# Patient Record
Sex: MALE | Race: BLACK OR AFRICAN AMERICAN | NOT HISPANIC OR LATINO | Employment: OTHER | URBAN - METROPOLITAN AREA
[De-identification: names, ages, dates, MRNs, and addresses within clinical notes are randomized per-mention and may not be internally consistent; named-entity substitution may affect disease eponyms.]

---

## 2016-06-13 LAB
LEFT EYE DM RETINOPATHY: NEGATIVE
RIGHT EYE DM RETINOPATHY: NEGATIVE

## 2017-04-21 ENCOUNTER — OFFICE VISIT (OUTPATIENT)
Dept: INTERNAL MEDICINE | Facility: CLINIC | Age: 62
End: 2017-04-21
Payer: COMMERCIAL

## 2017-04-21 VITALS
WEIGHT: 224 LBS | HEIGHT: 70 IN | DIASTOLIC BLOOD PRESSURE: 92 MMHG | SYSTOLIC BLOOD PRESSURE: 152 MMHG | RESPIRATION RATE: 16 BRPM | TEMPERATURE: 98 F | HEART RATE: 76 BPM | BODY MASS INDEX: 32.07 KG/M2

## 2017-04-21 DIAGNOSIS — L25.9 CONTACT DERMATITIS, UNSPECIFIED CONTACT DERMATITIS TYPE, UNSPECIFIED TRIGGER: Primary | ICD-10-CM

## 2017-04-21 DIAGNOSIS — E66.9 OBESITY, DIABETES, AND HYPERTENSION SYNDROME: ICD-10-CM

## 2017-04-21 DIAGNOSIS — E11.69 OBESITY, DIABETES, AND HYPERTENSION SYNDROME: ICD-10-CM

## 2017-04-21 DIAGNOSIS — E11.59 OBESITY, DIABETES, AND HYPERTENSION SYNDROME: ICD-10-CM

## 2017-04-21 DIAGNOSIS — M10.9 GOUTY ARTHRITIS: ICD-10-CM

## 2017-04-21 DIAGNOSIS — I67.9 CVD (CEREBROVASCULAR DISEASE): ICD-10-CM

## 2017-04-21 DIAGNOSIS — E11.9 CONTROLLED TYPE 2 DIABETES MELLITUS WITHOUT COMPLICATION, WITHOUT LONG-TERM CURRENT USE OF INSULIN: ICD-10-CM

## 2017-04-21 DIAGNOSIS — I10 ESSENTIAL HYPERTENSION: ICD-10-CM

## 2017-04-21 DIAGNOSIS — I15.2 HYPERTENSION COMPLICATING DIABETES: ICD-10-CM

## 2017-04-21 DIAGNOSIS — I15.2 OBESITY, DIABETES, AND HYPERTENSION SYNDROME: ICD-10-CM

## 2017-04-21 DIAGNOSIS — E11.59 HYPERTENSION COMPLICATING DIABETES: ICD-10-CM

## 2017-04-21 DIAGNOSIS — E78.5 HYPERLIPIDEMIA, UNSPECIFIED HYPERLIPIDEMIA TYPE: ICD-10-CM

## 2017-04-21 DIAGNOSIS — E66.9 OBESITY (BMI 30-39.9): ICD-10-CM

## 2017-04-21 PROCEDURE — 99999 PR PBB SHADOW E&M-EST. PATIENT-LVL III: CPT | Mod: PBBFAC,,, | Performed by: INTERNAL MEDICINE

## 2017-04-21 PROCEDURE — 3044F HG A1C LEVEL LT 7.0%: CPT | Mod: S$GLB,,, | Performed by: INTERNAL MEDICINE

## 2017-04-21 PROCEDURE — 99213 OFFICE O/P EST LOW 20 MIN: CPT | Mod: 25,S$GLB,, | Performed by: INTERNAL MEDICINE

## 2017-04-21 PROCEDURE — 96372 THER/PROPH/DIAG INJ SC/IM: CPT | Mod: S$GLB,,, | Performed by: INTERNAL MEDICINE

## 2017-04-21 PROCEDURE — 1160F RVW MEDS BY RX/DR IN RCRD: CPT | Mod: S$GLB,,, | Performed by: INTERNAL MEDICINE

## 2017-04-21 PROCEDURE — 4010F ACE/ARB THERAPY RXD/TAKEN: CPT | Mod: S$GLB,,, | Performed by: INTERNAL MEDICINE

## 2017-04-21 PROCEDURE — 3077F SYST BP >= 140 MM HG: CPT | Mod: S$GLB,,, | Performed by: INTERNAL MEDICINE

## 2017-04-21 PROCEDURE — 3080F DIAST BP >= 90 MM HG: CPT | Mod: S$GLB,,, | Performed by: INTERNAL MEDICINE

## 2017-04-21 RX ORDER — AMLODIPINE AND BENAZEPRIL HYDROCHLORIDE 10; 40 MG/1; MG/1
1 CAPSULE ORAL DAILY
Qty: 90 CAPSULE | Refills: 3 | Status: SHIPPED | OUTPATIENT
Start: 2017-04-21 | End: 2018-05-17 | Stop reason: SDUPTHER

## 2017-04-21 RX ORDER — PREDNISONE 20 MG/1
TABLET ORAL
Qty: 14 TABLET | Refills: 0 | Status: SHIPPED | OUTPATIENT
Start: 2017-04-21 | End: 2017-10-25 | Stop reason: ALTCHOICE

## 2017-04-21 RX ORDER — BETAMETHASONE SODIUM PHOSPHATE AND BETAMETHASONE ACETATE 3; 3 MG/ML; MG/ML
6 INJECTION, SUSPENSION INTRA-ARTICULAR; INTRALESIONAL; INTRAMUSCULAR; SOFT TISSUE
Status: COMPLETED | OUTPATIENT
Start: 2017-04-21 | End: 2017-04-21

## 2017-04-21 RX ADMIN — BETAMETHASONE SODIUM PHOSPHATE AND BETAMETHASONE ACETATE 6 MG: 3; 3 INJECTION, SUSPENSION INTRA-ARTICULAR; INTRALESIONAL; INTRAMUSCULAR; SOFT TISSUE at 12:04

## 2017-04-21 NOTE — MR AVS SNAPSHOT
[facility] - Internal Medicine  [address] [place] [address], [address]  [place] [address]  Phone: [phone]  Fax: [phone]                  Anjel Sanders   2017 11:45 AM   Office Visit    Description:  Male : 1955   Provider:  Mario Kraft MD   [facility]           Reason for Visit     Rash           Diagnoses this Visit        Comments    Contact dermatitis, unspecified contact dermatitis type, unspecified trigger    -  Primary     Essential hypertension         Controlled type 2 diabetes mellitus without complication, without long-term current use of insulin         Hyperlipidemia, unspecified hyperlipidemia type         Gouty arthritis         Obesity (BMI 30-39.9)         CVD (cerebrovascular disease)         Hypertension complicating diabetes         Obesity, diabetes, and hypertension syndrome                To Do List           Goals (5 Years of Data)     None      Follow-Up and Disposition     Return in about 3 months (around 2017).       These Medications        Disp Refills Start End    predniSONE (DELTASONE) 20 MG tablet 14 tablet 0 2017     2 tabs po qd x 7 days    [facility] [address] [place] [address] [facility] Ph #: [phone]       amlodipine-benazepril (LOTREL) 10-40 mg per capsule 90 capsule 3 2017    Take 1 capsule by mouth once daily. - Oral    [facility] [address] [place] [address], [place] [address] [facility] Ph #: [phone]         [facility]     [facility] Care [facility] -  Assistance  Unless otherwise directed by your provider, please contact [facility], our nurse care line that is available for / assistance.     Registered nurses in the [facility] provide: appointment scheduling, clinical advisement, health education, and other advisory services.  Call: [phone] (toll  "free)               Medications           Message regarding Medications     Verify the changes and/or additions to your medication regime listed below are the same as discussed with your clinician today.  If any of these changes or additions are incorrect, please notify your healthcare provider.        START taking these NEW medications        Refills    predniSONE (DELTASONE) 20 MG tablet 0    Si tabs po qd x 7 days    Class: Normal    amlodipine-benazepril (LOTREL) 10-40 mg per capsule 3    Sig: Take 1 capsule by mouth once daily.    Class: Normal    Route: Oral      These medications were administered today        Dose Freq    betamethasone acetate-betamethasone sodium phosphate injection 6 mg 6 mg Clinic/HOD 1 time    Sig: Inject 1 mL (6 mg total) into the muscle one time.    Class: Normal    Route: Intramuscular      STOP taking these medications     amlodipine-benazepril 10-20mg (LOTREL) 10-20 mg per capsule Take 1 capsule by mouth once daily.           Verify that the below list of medications is an accurate representation of the medications you are currently taking.  If none reported, the list may be blank. If incorrect, please contact your healthcare provider. Carry this list with you in case of emergency.           Current Medications     allopurinol (ZYLOPRIM) 300 MG tablet 2 tablets daily    aspirin (ECOTRIN) 81 MG EC tablet Take 81 mg by mouth once daily.    atorvastatin (LIPITOR) 80 MG tablet Take 1 tablet (80 mg total) by mouth once daily.    amlodipine-benazepril (LOTREL) 10-40 mg per capsule Take 1 capsule by mouth once daily.    predniSONE (DELTASONE) 20 MG tablet 2 tabs po qd x 7 days           Clinical Reference Information           Your Vitals Were     BP Pulse Temp Resp Height Weight    152/92 76 98.3 °F (36.8 °C) (Oral) 16 5' 10" (1.778 m) 101.6 kg (223 lb 15.8 oz)    BMI                32.14 kg/m2          Blood Pressure          Most Recent Value    BP  (!)  152/92      Allergies as of " 4/21/2017     No Known Allergies      Immunizations Administered on Date of Encounter - 4/21/2017     None      Language Assistance Services     ATTENTION: Language assistance services are available, free of charge. Please call [phone].      ATENCIÓN: Si hemalatha chan, tiene a mckeon disposición servicios gratuitos de asistencia lingüística. Llame al [phone].     CHÚ Ý: N?u b?n nói Ti?ng Vi?t, có các d?ch v? h? tr? ngôn ng? mi?n phí dành cho b?n. G?i s? [phone].         [facility] complies with applicable Federal civil rights laws and does not discriminate on the basis of race, color, national origin, age, disability, or sex.

## 2017-06-01 ENCOUNTER — OFFICE VISIT (OUTPATIENT)
Dept: DERMATOLOGY | Facility: CLINIC | Age: 62
End: 2017-06-01
Payer: COMMERCIAL

## 2017-06-01 DIAGNOSIS — L30.9 DERMATITIS: Primary | ICD-10-CM

## 2017-06-01 DIAGNOSIS — L01.00 IMPETIGO: ICD-10-CM

## 2017-06-01 PROCEDURE — 99999 PR PBB SHADOW E&M-EST. PATIENT-LVL II: CPT | Mod: PBBFAC,,, | Performed by: DERMATOLOGY

## 2017-06-01 PROCEDURE — 99202 OFFICE O/P NEW SF 15 MIN: CPT | Mod: S$GLB,,, | Performed by: DERMATOLOGY

## 2017-06-01 RX ORDER — TRIAMCINOLONE ACETONIDE 1 MG/G
OINTMENT TOPICAL
Qty: 454 G | Refills: 3 | Status: SHIPPED | OUTPATIENT
Start: 2017-06-01 | End: 2017-07-31

## 2017-06-01 RX ORDER — CEPHALEXIN 500 MG/1
500 CAPSULE ORAL 4 TIMES DAILY
Qty: 40 CAPSULE | Refills: 0 | Status: SHIPPED | OUTPATIENT
Start: 2017-06-01 | End: 2017-06-11

## 2017-06-01 NOTE — PROGRESS NOTES
Subjective:       Patient ID:  Anjel Sanders is a 61 y.o. male who presents for   Chief Complaint   Patient presents with    Rash     Pt c/o itchy, flaky rash that burns on post neck and both forearms x 2-3 months. Tx with -betamethasone sodium phosphate injection 6 mg and prednisone 20 mg. Pt has similar rash 1 years ago. 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   Prednisone pills help but flares after.  Wears long and short sleeves when he cuts grass.  Uses dial soap or Nepali spring.  No moisturizers on arms.   Pt states he did have this rash last spring but it was not as bad.         Review of Systems   HENT: Negative for mouth sores.    Skin: Positive for itching and rash.   Allergic/Immunologic: Positive for environmental allergies.        Objective:    Physical Exam   Constitutional: He appears well-developed and well-nourished. No distress.   Neurological: He is alert and oriented to person, place, and time. He is not disoriented.   Psychiatric: He has a normal mood and affect.   Skin:   Areas Examined (abnormalities noted in diagram):   Scalp / Hair Palpated and Inspected  Head / Face Inspection Performed  Neck Inspection Performed  Chest / Axilla Inspection Performed  Abdomen Inspection Performed  Back Inspection Performed  RUE Inspected  LUE Inspection Performed                   Diagram Legend     Erythematous scaling macule/papule c/w actinic keratosis       Vascular papule c/w angioma      Pigmented verrucoid papule/plaque c/w seborrheic keratosis      Yellow umbilicated papule c/w sebaceous hyperplasia      Irregularly shaped tan macule c/w lentigo     1-2 mm smooth white papules consistent with Milia      Movable subcutaneous cyst with punctum c/w epidermal inclusion cyst      Subcutaneous movable cyst c/w pilar cyst      Firm pink to brown papule c/w dermatofibroma      Pedunculated fleshy papule(s) c/w skin tag(s)       Evenly pigmented macule c/w junctional nevus     Mildly variegated pigmented, slightly irregular-bordered macule c/w mildly atypical nevus      Flesh colored to evenly pigmented papule c/w intradermal nevus       Pink pearly papule/plaque c/w basal cell carcinoma      Erythematous hyperkeratotic cursted plaque c/w SCC      Surgical scar with no sign of skin cancer recurrence      Open and closed comedones      Inflammatory papules and pustules      Verrucoid papule consistent consistent with wart     Erythematous eczematous patches and plaques     Dystrophic onycholytic nail with subungual debris c/w onychomycosis     Umbilicated papule    Erythematous-base heme-crusted tan verrucoid plaque consistent with inflamed seborrheic keratosis     Erythematous Silvery Scaling Plaque c/w Psoriasis     See annotation      Assessment / Plan:        Dermatitis  Impetigo  Pt difficult historian but possible photodrug reaction v PMLE v other photosensitivity dermatitis but unusual that face not affected.    Will treat and if not improved consider bx and DIF  And PRESTON  Pt thinks maybe worse since starting lotrel in spring but also states he had mild version of rash last spring  -     triamcinolone acetonide 0.1% (KENALOG) 0.1 % ointment; AAA qhs on arms then vaseline-  Dispense: 454 g; Refill: 3  -     cephALEXin (KEFLEX) 500 MG capsule; Take 1 capsule (500 mg total) by mouth 4 (four) times daily.  Dispense: 40 capsule; Refill: 0               Return in about 4 weeks (around 6/29/2017).

## 2017-06-28 ENCOUNTER — PATIENT OUTREACH (OUTPATIENT)
Dept: ADMINISTRATIVE | Facility: HOSPITAL | Age: 62
End: 2017-06-28

## 2017-06-28 NOTE — PROGRESS NOTES
Phoned patient to schedule labs prior to f/u visit and discuss need for eye exam. Unable to reach and no voicemail.

## 2017-07-31 ENCOUNTER — OFFICE VISIT (OUTPATIENT)
Dept: INTERNAL MEDICINE | Facility: CLINIC | Age: 62
End: 2017-07-31
Payer: COMMERCIAL

## 2017-07-31 VITALS
WEIGHT: 220.38 LBS | RESPIRATION RATE: 18 BRPM | HEIGHT: 70 IN | TEMPERATURE: 98 F | HEART RATE: 80 BPM | DIASTOLIC BLOOD PRESSURE: 84 MMHG | SYSTOLIC BLOOD PRESSURE: 126 MMHG | BODY MASS INDEX: 31.55 KG/M2

## 2017-07-31 DIAGNOSIS — E11.9 CONTROLLED TYPE 2 DIABETES MELLITUS WITHOUT COMPLICATION, WITHOUT LONG-TERM CURRENT USE OF INSULIN: ICD-10-CM

## 2017-07-31 DIAGNOSIS — E11.59 OBESITY, DIABETES, AND HYPERTENSION SYNDROME: ICD-10-CM

## 2017-07-31 DIAGNOSIS — I10 ESSENTIAL HYPERTENSION: Primary | ICD-10-CM

## 2017-07-31 DIAGNOSIS — I67.9 CVD (CEREBROVASCULAR DISEASE): ICD-10-CM

## 2017-07-31 DIAGNOSIS — M54.50 CHRONIC MIDLINE LOW BACK PAIN WITHOUT SCIATICA: ICD-10-CM

## 2017-07-31 DIAGNOSIS — G47.33 OSA (OBSTRUCTIVE SLEEP APNEA): ICD-10-CM

## 2017-07-31 DIAGNOSIS — I15.2 OBESITY, DIABETES, AND HYPERTENSION SYNDROME: ICD-10-CM

## 2017-07-31 DIAGNOSIS — G89.29 CHRONIC MIDLINE LOW BACK PAIN WITHOUT SCIATICA: ICD-10-CM

## 2017-07-31 DIAGNOSIS — M10.9 GOUTY ARTHRITIS: ICD-10-CM

## 2017-07-31 DIAGNOSIS — E78.5 HYPERLIPIDEMIA, UNSPECIFIED HYPERLIPIDEMIA TYPE: ICD-10-CM

## 2017-07-31 DIAGNOSIS — E11.59 HYPERTENSION COMPLICATING DIABETES: ICD-10-CM

## 2017-07-31 DIAGNOSIS — E11.69 OBESITY, DIABETES, AND HYPERTENSION SYNDROME: ICD-10-CM

## 2017-07-31 DIAGNOSIS — I15.2 HYPERTENSION COMPLICATING DIABETES: ICD-10-CM

## 2017-07-31 DIAGNOSIS — E66.9 OBESITY, DIABETES, AND HYPERTENSION SYNDROME: ICD-10-CM

## 2017-07-31 DIAGNOSIS — E66.9 OBESITY (BMI 30-39.9): ICD-10-CM

## 2017-07-31 PROCEDURE — 99214 OFFICE O/P EST MOD 30 MIN: CPT | Mod: S$GLB,,, | Performed by: INTERNAL MEDICINE

## 2017-07-31 PROCEDURE — 3044F HG A1C LEVEL LT 7.0%: CPT | Mod: S$GLB,,, | Performed by: INTERNAL MEDICINE

## 2017-07-31 PROCEDURE — 99999 PR PBB SHADOW E&M-EST. PATIENT-LVL III: CPT | Mod: PBBFAC,,, | Performed by: INTERNAL MEDICINE

## 2017-07-31 PROCEDURE — 4010F ACE/ARB THERAPY RXD/TAKEN: CPT | Mod: S$GLB,,, | Performed by: INTERNAL MEDICINE

## 2017-07-31 RX ORDER — CEPHALEXIN 500 MG/1
500 CAPSULE ORAL 4 TIMES DAILY
COMMUNITY
End: 2017-10-25

## 2017-07-31 NOTE — PROGRESS NOTES
Subjective:       Patient ID: Anjel Sanders is a 61 y.o. male.    Chief Complaint: Follow-up    HPI  Checkup.  No labs.  Seeing derm for rash, doing better with less pruritus with triamcinolone cream.  No CP, SOB.  Using CPAP.  No gout flares.  Still with occasional LBP w/o radicular sxs.  Review of Systems   All other systems reviewed and are negative.      Objective:      Physical Exam   Constitutional: He appears well-developed. No distress.   HENT:   Head: Normocephalic.   Eyes: EOM are normal. No scleral icterus.   Neck: Normal range of motion. No tracheal deviation present.   Cardiovascular: Normal rate, regular rhythm, normal heart sounds and intact distal pulses.    Pulmonary/Chest: Effort normal and breath sounds normal. No respiratory distress.   Abdominal: He exhibits no distension. There is no tenderness.   Musculoskeletal: Normal range of motion. He exhibits no edema.   Neurological: He is alert. He exhibits abnormal muscle tone (mild r hemiparesis).   Skin: Skin is warm and dry. Rash (lichenification UEs) noted. He is not diaphoretic. No erythema.   Psychiatric: He has a normal mood and affect. His behavior is normal.   Vitals reviewed.      Assessment:       1. Essential hypertension    2. Controlled type 2 diabetes mellitus without complication, without long-term current use of insulin    3. Gouty arthritis    4. CVD (cerebrovascular disease)    5. Obesity (BMI 30-39.9)    6. Hypertension complicating diabetes    7. Obesity, diabetes, and hypertension syndrome    8. Chronic midline low back pain without sciatica    9. Hyperlipidemia, unspecified hyperlipidemia type    10. MURTAZA (obstructive sleep apnea)        Plan:       Anjel was seen today for follow-up.    Diagnoses and all orders for this visit:    Essential hypertension   Well-cont    Controlled type 2 diabetes mellitus without complication, without long-term current use of insulin   Await labs    Gouty arthritis   Quiescent    CVD  (cerebrovascular disease)   Stable    Obesity (BMI 30-39.9)    Hypertension complicating diabetes    Obesity, diabetes, and hypertension syndrome    Chronic midline low back pain without sciatica   ACTM prn      Return in about 3 months (around 10/31/2017).

## 2017-10-24 ENCOUNTER — LAB VISIT (OUTPATIENT)
Dept: LAB | Facility: HOSPITAL | Age: 62
End: 2017-10-24
Attending: INTERNAL MEDICINE
Payer: MEDICARE

## 2017-10-24 DIAGNOSIS — E78.5 HYPERLIPIDEMIA, UNSPECIFIED HYPERLIPIDEMIA TYPE: ICD-10-CM

## 2017-10-24 DIAGNOSIS — E11.9 CONTROLLED TYPE 2 DIABETES MELLITUS WITHOUT COMPLICATION, WITHOUT LONG-TERM CURRENT USE OF INSULIN: ICD-10-CM

## 2017-10-24 LAB
ALBUMIN SERPL BCP-MCNC: 4.5 G/DL
ALP SERPL-CCNC: 85 U/L
ALT SERPL W/O P-5'-P-CCNC: 42 U/L
ANION GAP SERPL CALC-SCNC: 12 MMOL/L
AST SERPL-CCNC: 38 U/L
BILIRUB SERPL-MCNC: 0.6 MG/DL
BUN SERPL-MCNC: 16 MG/DL
CALCIUM SERPL-MCNC: 9.5 MG/DL
CHLORIDE SERPL-SCNC: 103 MMOL/L
CHOLEST SERPL-MCNC: 198 MG/DL
CHOLEST/HDLC SERPL: 4.3 {RATIO}
CO2 SERPL-SCNC: 28 MMOL/L
CREAT SERPL-MCNC: 1.42 MG/DL
EST. GFR  (AFRICAN AMERICAN): >60 ML/MIN/1.73 M^2
EST. GFR  (NON AFRICAN AMERICAN): 52.2 ML/MIN/1.73 M^2
ESTIMATED AVG GLUCOSE: 128 MG/DL
GLUCOSE SERPL-MCNC: 107 MG/DL
HBA1C MFR BLD HPLC: 6.1 %
HDLC SERPL-MCNC: 46 MG/DL
HDLC SERPL: 23.2 %
LDLC SERPL CALC-MCNC: 136 MG/DL
NONHDLC SERPL-MCNC: 152 MG/DL
POTASSIUM SERPL-SCNC: 4 MMOL/L
PROT SERPL-MCNC: 8.1 G/DL
SODIUM SERPL-SCNC: 143 MMOL/L
TRIGL SERPL-MCNC: 80 MG/DL

## 2017-10-24 PROCEDURE — 83036 HEMOGLOBIN GLYCOSYLATED A1C: CPT | Mod: PO

## 2017-10-24 PROCEDURE — 80061 LIPID PANEL: CPT

## 2017-10-24 PROCEDURE — 36415 COLL VENOUS BLD VENIPUNCTURE: CPT | Mod: PO

## 2017-10-24 PROCEDURE — 80053 COMPREHEN METABOLIC PANEL: CPT | Mod: PO

## 2017-10-25 ENCOUNTER — OFFICE VISIT (OUTPATIENT)
Dept: INTERNAL MEDICINE | Facility: CLINIC | Age: 62
End: 2017-10-25
Payer: MEDICARE

## 2017-10-25 VITALS
RESPIRATION RATE: 16 BRPM | SYSTOLIC BLOOD PRESSURE: 138 MMHG | BODY MASS INDEX: 34.98 KG/M2 | HEART RATE: 72 BPM | HEIGHT: 66 IN | DIASTOLIC BLOOD PRESSURE: 88 MMHG | TEMPERATURE: 97 F | WEIGHT: 217.69 LBS

## 2017-10-25 DIAGNOSIS — E11.59 OBESITY, DIABETES, AND HYPERTENSION SYNDROME: ICD-10-CM

## 2017-10-25 DIAGNOSIS — I10 ESSENTIAL HYPERTENSION: Primary | ICD-10-CM

## 2017-10-25 DIAGNOSIS — M10.9 GOUTY ARTHRITIS: ICD-10-CM

## 2017-10-25 DIAGNOSIS — E11.9 CONTROLLED TYPE 2 DIABETES MELLITUS WITHOUT COMPLICATION, WITHOUT LONG-TERM CURRENT USE OF INSULIN: ICD-10-CM

## 2017-10-25 DIAGNOSIS — E11.59 HYPERTENSION COMPLICATING DIABETES: ICD-10-CM

## 2017-10-25 DIAGNOSIS — E66.9 OBESITY, DIABETES, AND HYPERTENSION SYNDROME: ICD-10-CM

## 2017-10-25 DIAGNOSIS — I15.2 OBESITY, DIABETES, AND HYPERTENSION SYNDROME: ICD-10-CM

## 2017-10-25 DIAGNOSIS — Z23 NEED FOR PROPHYLACTIC VACCINATION AND INOCULATION AGAINST INFLUENZA: ICD-10-CM

## 2017-10-25 DIAGNOSIS — E66.01 MORBID OBESITY: ICD-10-CM

## 2017-10-25 DIAGNOSIS — I15.2 HYPERTENSION COMPLICATING DIABETES: ICD-10-CM

## 2017-10-25 DIAGNOSIS — E78.5 HYPERLIPIDEMIA, UNSPECIFIED HYPERLIPIDEMIA TYPE: ICD-10-CM

## 2017-10-25 DIAGNOSIS — I67.9 CVD (CEREBROVASCULAR DISEASE): ICD-10-CM

## 2017-10-25 DIAGNOSIS — E11.69 OBESITY, DIABETES, AND HYPERTENSION SYNDROME: ICD-10-CM

## 2017-10-25 PROCEDURE — 99999 PR PBB SHADOW E&M-EST. PATIENT-LVL III: CPT | Mod: PBBFAC,,, | Performed by: INTERNAL MEDICINE

## 2017-10-25 PROCEDURE — 99213 OFFICE O/P EST LOW 20 MIN: CPT | Mod: PBBFAC,PN | Performed by: INTERNAL MEDICINE

## 2017-10-25 PROCEDURE — G0008 ADMIN INFLUENZA VIRUS VAC: HCPCS | Mod: PBBFAC,PN

## 2017-10-25 PROCEDURE — 99214 OFFICE O/P EST MOD 30 MIN: CPT | Mod: S$PBB,,, | Performed by: INTERNAL MEDICINE

## 2017-10-25 RX ORDER — PREDNISONE 20 MG/1
TABLET ORAL
Qty: 14 TABLET | Refills: 0 | Status: SHIPPED | OUTPATIENT
Start: 2017-10-25 | End: 2017-11-03

## 2017-10-25 RX ORDER — ATORVASTATIN CALCIUM 80 MG/1
80 TABLET, FILM COATED ORAL DAILY
Qty: 90 TABLET | Refills: 4 | Status: SHIPPED | OUTPATIENT
Start: 2017-10-25 | End: 2018-08-22 | Stop reason: SDUPTHER

## 2017-10-25 NOTE — PROGRESS NOTES
Subjective:       Patient ID: Anjel Sanders is a 62 y.o. male.    Chief Complaint: Gout (on right   foot x 2 days)    HPI  Checkup. Labs reviewed.  Gout flared in r foot 2 days ago possibly provoked by soft drinks and a burger.  Last attack was a few years ago.  No CP, SOB.  No pedal paresthesias.  Ran out of Lipitor.  No recent eye exam.  Review of Systems   All other systems reviewed and are negative.      Objective:      Physical Exam   Constitutional: He appears well-developed. No distress.   HENT:   Head: Normocephalic.   Eyes: EOM are normal. No scleral icterus.   Neck: Normal range of motion. No tracheal deviation present.   Cardiovascular: Normal rate, regular rhythm, normal heart sounds and intact distal pulses.    Pulses:       Dorsalis pedis pulses are 2+ on the right side, and 2+ on the left side.        Posterior tibial pulses are 2+ on the right side, and 2+ on the left side.   Pulmonary/Chest: Effort normal and breath sounds normal. No respiratory distress.   Abdominal: He exhibits no distension. There is no tenderness.   Musculoskeletal: Normal range of motion. He exhibits tenderness (R 1st MTP.). He exhibits no edema.        Right foot: There is normal range of motion and no deformity.        Left foot: There is normal range of motion and no deformity.   Feet:   Right Foot:   Protective Sensation: 0 sites tested. 0 sites sensed.   Skin Integrity: Positive for dry skin.   Left Foot:   Protective Sensation: 0 sites tested. 0 sites sensed.   Skin Integrity: Positive for dry skin.   Neurological: He is alert. He exhibits abnormal muscle tone (mild r hemiparesis).   Skin: Skin is warm and dry. Rash (lichenification UEs) noted. He is not diaphoretic. No erythema.   Psychiatric: He has a normal mood and affect. His behavior is normal.   Vitals reviewed.      Assessment:       1. Essential hypertension    2. Hyperlipidemia, unspecified hyperlipidemia type    3. Gouty arthritis    4. CVD (cerebrovascular  disease)    5. Hypertension complicating diabetes    6. Morbid obesity    7. Need for prophylactic vaccination and inoculation against influenza    8. Obesity, diabetes, and hypertension syndrome    9. Controlled type 2 diabetes mellitus without complication, without long-term current use of insulin        Plan:       Anjel was seen today for gout.    Diagnoses and all orders for this visit:    Essential hypertension   Well-cont    Hyperlipidemia, unspecified hyperlipidemia type  -     atorvastatin (LIPITOR) 80 MG tablet; Take 1 tablet (80 mg total) by mouth once daily.  -     Lipid panel; Future    Gouty arthritis  -     predniSONE (DELTASONE) 20 MG tablet; 2 tabs po qd x 7 days    CVD (cerebrovascular disease)   Stable    Hypertension complicating diabetes    Morbid obesity    Need for prophylactic vaccination and inoculation against influenza  -     Influenza - Quadrivalent (3 years & older) (PF)    Obesity, diabetes, and hypertension syndrome    Controlled type 2 diabetes mellitus without complication, without long-term current use of insulin  -     Hemoglobin A1c; Future      Return in about 6 months (around 4/25/2018).

## 2017-11-03 ENCOUNTER — LAB VISIT (OUTPATIENT)
Dept: LAB | Facility: HOSPITAL | Age: 62
End: 2017-11-03
Attending: FAMILY MEDICINE
Payer: MEDICARE

## 2017-11-03 ENCOUNTER — OFFICE VISIT (OUTPATIENT)
Dept: FAMILY MEDICINE | Facility: CLINIC | Age: 62
End: 2017-11-03
Attending: FAMILY MEDICINE
Payer: MEDICARE

## 2017-11-03 VITALS
BODY MASS INDEX: 31.12 KG/M2 | SYSTOLIC BLOOD PRESSURE: 118 MMHG | OXYGEN SATURATION: 97 % | HEART RATE: 63 BPM | HEIGHT: 70 IN | TEMPERATURE: 99 F | WEIGHT: 217.38 LBS | DIASTOLIC BLOOD PRESSURE: 70 MMHG

## 2017-11-03 DIAGNOSIS — I10 ESSENTIAL HYPERTENSION: Primary | ICD-10-CM

## 2017-11-03 DIAGNOSIS — I10 ESSENTIAL HYPERTENSION: ICD-10-CM

## 2017-11-03 DIAGNOSIS — I67.9 CVD (CEREBROVASCULAR DISEASE): ICD-10-CM

## 2017-11-03 DIAGNOSIS — E78.5 HYPERLIPIDEMIA, UNSPECIFIED HYPERLIPIDEMIA TYPE: ICD-10-CM

## 2017-11-03 DIAGNOSIS — Z12.5 SPECIAL SCREENING, PROSTATE CANCER: ICD-10-CM

## 2017-11-03 DIAGNOSIS — N52.9 ERECTILE DYSFUNCTION, UNSPECIFIED ERECTILE DYSFUNCTION TYPE: ICD-10-CM

## 2017-11-03 DIAGNOSIS — E11.9 CONTROLLED TYPE 2 DIABETES MELLITUS WITHOUT COMPLICATION, WITHOUT LONG-TERM CURRENT USE OF INSULIN: ICD-10-CM

## 2017-11-03 DIAGNOSIS — M1A.9XX0 CHRONIC GOUT WITHOUT TOPHUS, UNSPECIFIED CAUSE, UNSPECIFIED SITE: ICD-10-CM

## 2017-11-03 DIAGNOSIS — G47.33 OSA (OBSTRUCTIVE SLEEP APNEA): ICD-10-CM

## 2017-11-03 DIAGNOSIS — E66.9 OBESITY (BMI 30-39.9): ICD-10-CM

## 2017-11-03 LAB
ALBUMIN SERPL BCP-MCNC: 3.7 G/DL
ALP SERPL-CCNC: 96 U/L
ALT SERPL W/O P-5'-P-CCNC: 49 U/L
ANION GAP SERPL CALC-SCNC: 7 MMOL/L
AST SERPL-CCNC: 20 U/L
BASOPHILS # BLD AUTO: 0.01 K/UL
BASOPHILS NFR BLD: 0.1 %
BILIRUB SERPL-MCNC: 0.8 MG/DL
BUN SERPL-MCNC: 12 MG/DL
CALCIUM SERPL-MCNC: 9.6 MG/DL
CHLORIDE SERPL-SCNC: 106 MMOL/L
CHOLEST SERPL-MCNC: 137 MG/DL
CHOLEST/HDLC SERPL: 2.7 {RATIO}
CO2 SERPL-SCNC: 29 MMOL/L
COMPLEXED PSA SERPL-MCNC: 1.4 NG/ML
CREAT SERPL-MCNC: 1.2 MG/DL
DIFFERENTIAL METHOD: ABNORMAL
EOSINOPHIL # BLD AUTO: 0 K/UL
EOSINOPHIL NFR BLD: 0 %
ERYTHROCYTE [DISTWIDTH] IN BLOOD BY AUTOMATED COUNT: 13.1 %
EST. GFR  (AFRICAN AMERICAN): >60 ML/MIN/1.73 M^2
EST. GFR  (NON AFRICAN AMERICAN): >60 ML/MIN/1.73 M^2
ESTIMATED AVG GLUCOSE: 131 MG/DL
GLUCOSE SERPL-MCNC: 118 MG/DL
HBA1C MFR BLD HPLC: 6.2 %
HCT VFR BLD AUTO: 45.1 %
HDLC SERPL-MCNC: 50 MG/DL
HDLC SERPL: 36.5 %
HGB BLD-MCNC: 14.9 G/DL
LDLC SERPL CALC-MCNC: 79.2 MG/DL
LYMPHOCYTES # BLD AUTO: 1.3 K/UL
LYMPHOCYTES NFR BLD: 15.2 %
MCH RBC QN AUTO: 28.4 PG
MCHC RBC AUTO-ENTMCNC: 33 G/DL
MCV RBC AUTO: 86 FL
MONOCYTES # BLD AUTO: 0.4 K/UL
MONOCYTES NFR BLD: 4.4 %
NEUTROPHILS # BLD AUTO: 6.7 K/UL
NEUTROPHILS NFR BLD: 80.1 %
NONHDLC SERPL-MCNC: 87 MG/DL
PLATELET # BLD AUTO: 319 K/UL
PMV BLD AUTO: 10.4 FL
POTASSIUM SERPL-SCNC: 4.4 MMOL/L
PROT SERPL-MCNC: 8 G/DL
RBC # BLD AUTO: 5.24 M/UL
SODIUM SERPL-SCNC: 142 MMOL/L
TRIGL SERPL-MCNC: 39 MG/DL
URATE SERPL-MCNC: 7.2 MG/DL
WBC # BLD AUTO: 8.38 K/UL

## 2017-11-03 PROCEDURE — 84550 ASSAY OF BLOOD/URIC ACID: CPT

## 2017-11-03 PROCEDURE — 83036 HEMOGLOBIN GLYCOSYLATED A1C: CPT

## 2017-11-03 PROCEDURE — 99999 PR PBB SHADOW E&M-EST. PATIENT-LVL III: CPT | Mod: PBBFAC,,, | Performed by: FAMILY MEDICINE

## 2017-11-03 PROCEDURE — 85025 COMPLETE CBC W/AUTO DIFF WBC: CPT

## 2017-11-03 PROCEDURE — 99213 OFFICE O/P EST LOW 20 MIN: CPT | Mod: PBBFAC,PO | Performed by: FAMILY MEDICINE

## 2017-11-03 PROCEDURE — 99214 OFFICE O/P EST MOD 30 MIN: CPT | Mod: S$PBB,,, | Performed by: FAMILY MEDICINE

## 2017-11-03 PROCEDURE — 36415 COLL VENOUS BLD VENIPUNCTURE: CPT

## 2017-11-03 PROCEDURE — 80061 LIPID PANEL: CPT

## 2017-11-03 PROCEDURE — 84153 ASSAY OF PSA TOTAL: CPT

## 2017-11-03 PROCEDURE — 80053 COMPREHEN METABOLIC PANEL: CPT

## 2017-11-03 RX ORDER — TADALAFIL 20 MG/1
20 TABLET ORAL DAILY
Qty: 9 TABLET | Refills: 2 | Status: SHIPPED | OUTPATIENT
Start: 2017-11-03 | End: 2019-02-26

## 2017-11-03 NOTE — PROGRESS NOTES
Subjective:       Patient ID: Anjel Sanders is a 62 y.o. male.    Chief Complaint: Gout and Back Pain    62 yr old black male with Hypertension, DM II, Hyperlipidemia, gout, ED, MURTAZA, presents today for 6 month follow up. He denies any complaints today.    DM II - diet controlled - no s/s of DM - foot exam UTD - eye exam due - on ASA and ACE    HTN - controlled - on prinzide - no side effects - does not need refills    HLD - uncontrolled - on statin - LDLCALC                  136.0               10/24/2017           - on meds - need labs    ED - uncontrolled - need cialis -     Gout - controlled - - URICACID                 6.6                 10/25/2016          on allopurinol    Health maintenance  -UTD except labs      Hypertension   This is a chronic problem. The current episode started more than 1 year ago. The problem has been gradually improving since onset. The problem is controlled. Pertinent negatives include no anxiety, blurred vision, chest pain, headaches, malaise/fatigue, neck pain, orthopnea, palpitations, peripheral edema, PND, shortness of breath or sweats. There are no associated agents to hypertension. Risk factors for coronary artery disease include dyslipidemia, obesity and male gender. Past treatments include ACE inhibitors and diuretics. The current treatment provides moderate improvement. There are no compliance problems.  There is no history of angina, CAD/MI, CVA, heart failure, left ventricular hypertrophy, renovascular disease, retinopathy or a thyroid problem. There is no history of chronic renal disease, coarctation of the aorta, hypercortisolism or pheochromocytoma.   Hyperlipidemia   This is a chronic problem. The current episode started more than 1 year ago. The problem is controlled. Recent lipid tests were reviewed and are normal. Exacerbating diseases include obesity. He has no history of chronic renal disease, diabetes, hypothyroidism or liver disease. There are no known  factors aggravating his hyperlipidemia. Pertinent negatives include no chest pain, focal sensory loss, focal weakness, leg pain, myalgias or shortness of breath. He is currently on no antihyperlipidemic treatment. The current treatment provides moderate improvement of lipids. There are no compliance problems.  Risk factors for coronary artery disease include dyslipidemia, hypertension, obesity and male sex.     Review of Systems   Constitutional: Negative.  Negative for activity change, diaphoresis, malaise/fatigue and unexpected weight change.   HENT: Negative.  Negative for congestion, ear pain, mouth sores, rhinorrhea and voice change.    Eyes: Negative.  Negative for blurred vision, pain, discharge and visual disturbance.   Respiratory: Negative.  Negative for apnea, cough, shortness of breath and wheezing.    Cardiovascular: Negative.  Negative for chest pain, palpitations, orthopnea and PND.   Gastrointestinal: Negative.  Negative for abdominal distention, anal bleeding, diarrhea and vomiting.   Endocrine: Negative.  Negative for cold intolerance and polyuria.   Genitourinary: Negative.  Negative for decreased urine volume, difficulty urinating, discharge, frequency and scrotal swelling.   Musculoskeletal: Negative.  Negative for back pain, myalgias, neck pain and neck stiffness.   Skin: Negative.  Negative for color change and rash.   Allergic/Immunologic: Negative.  Negative for environmental allergies and immunocompromised state.   Neurological: Negative.  Negative for dizziness, focal weakness, speech difficulty, weakness, light-headedness and headaches.   Hematological: Negative.    Psychiatric/Behavioral: Negative.  Negative for agitation, dysphoric mood and suicidal ideas. The patient is not nervous/anxious.        Objective:      Physical Exam   Constitutional: He is oriented to person, place, and time. He appears well-developed and well-nourished. No distress.   HENT:   Head: Normocephalic and  atraumatic.   Neck: Normal range of motion. Neck supple.   Cardiovascular: Normal rate, regular rhythm, normal heart sounds and intact distal pulses.  Exam reveals no gallop and no friction rub.    No murmur heard.  Pulmonary/Chest: Effort normal and breath sounds normal. No respiratory distress. He has no wheezes. He has no rales.   Musculoskeletal: Normal range of motion. He exhibits no edema or tenderness.   Neurological: He is alert and oriented to person, place, and time. He has normal reflexes. No cranial nerve deficit. He exhibits normal muscle tone. Coordination normal.   No deficits   Skin: Skin is warm and dry. He is not diaphoretic.   Psychiatric: He has a normal mood and affect.       Assessment:       1. Essential hypertension    2. Hyperlipidemia, unspecified hyperlipidemia type    3. CVD (cerebrovascular disease)    4. Controlled type 2 diabetes mellitus without complication, without long-term current use of insulin    5. Obesity (BMI 30-39.9)    6. MURTAZA (obstructive sleep apnea)    7. Chronic gout without tophus, unspecified cause, unspecified site    8. Erectile dysfunction, unspecified erectile dysfunction type    9. Special screening, prostate cancer        Plan:       Anjel was seen today for gout and back pain.    Diagnoses and all orders for this visit:    Essential hypertension  -     CBC auto differential; Future  -     Comprehensive metabolic panel; Future  -     Lipid panel; Future  -     Urinalysis; Future    Hyperlipidemia, unspecified hyperlipidemia type  -     CBC auto differential; Future  -     Comprehensive metabolic panel; Future  -     Lipid panel; Future    CVD (cerebrovascular disease)    Controlled type 2 diabetes mellitus without complication, without long-term current use of insulin  -     CBC auto differential; Future  -     Comprehensive metabolic panel; Future  -     Lipid panel; Future  -     Hemoglobin A1c; Future  -     Urinalysis; Future  -     Microalbumin/creatinine  urine ratio; Future    Obesity (BMI 30-39.9)    MURTAZA (obstructive sleep apnea)    Chronic gout without tophus, unspecified cause, unspecified site  -     Uric acid; Future    Erectile dysfunction, unspecified erectile dysfunction type  -     tadalafil (CIALIS) 20 MG Tab; Take 1 tablet (20 mg total) by mouth once daily.    Special screening, prostate cancer  -     PSA, Screening; Future      HTN/HLD   -controlled - stable - on meds       ED and Gout   -controlled     MURTAZA  -stable    CVA  -on ASA and plavix      DM II  -controlled  -diet and exercise only    Obesity  -diet and exercise  -weight loss    40 minutes spent during this visit of which greater than 50% devoted to face-face counseling and coordination of care regarding diagnosis and management plan     Spent adequate time in obtaining history and explaining differentials     Return in about 3 months (around 2/3/2018), or if symptoms worsen or fail to improve.

## 2017-11-06 ENCOUNTER — TELEPHONE (OUTPATIENT)
Dept: PHARMACY | Facility: CLINIC | Age: 62
End: 2017-11-06

## 2017-11-06 ENCOUNTER — TELEPHONE (OUTPATIENT)
Dept: FAMILY MEDICINE | Facility: CLINIC | Age: 62
End: 2017-11-06

## 2017-11-06 NOTE — TELEPHONE ENCOUNTER
----- Message from Lars Payan MD sent at 11/4/2017  4:31 PM CDT -----  Call    Labs within acceptable range

## 2017-11-07 ENCOUNTER — TELEPHONE (OUTPATIENT)
Dept: PHARMACY | Facility: CLINIC | Age: 62
End: 2017-11-07

## 2017-11-07 DIAGNOSIS — M10.9 GOUTY ARTHRITIS: ICD-10-CM

## 2017-11-07 RX ORDER — ALLOPURINOL 300 MG/1
TABLET ORAL
Qty: 60 TABLET | Refills: 0 | Status: SHIPPED | OUTPATIENT
Start: 2017-11-07 | End: 2018-08-22 | Stop reason: SDUPTHER

## 2017-11-07 NOTE — TELEPHONE ENCOUNTER
The prior authorization for Mr. Sanders's Cialis prescription has been denied per his insurance plan. The plan excludes all ED medications with no exceptions.  If there are any questions please contact the pharmacy at, [phone].

## 2017-11-07 NOTE — PROGRESS NOTES
Good afternoon,     The prior authorization for Mr. Sanders's Cialis prescription has been denied per his insurance plan. The plan excludes all ED medications with no exceptions.  If there are any questions please contact the pharmacy at, [phone].    Thank You.    Miladys Kwong CpT.  Patient Care Advocate  [facility]  [email or web address]

## 2017-11-08 NOTE — TELEPHONE ENCOUNTER
Informed pt his Cialis prescription has been denied per his insurance plan. His plan excludes all ED medications with no exceptions.

## 2017-12-11 ENCOUNTER — OFFICE VISIT (OUTPATIENT)
Dept: FAMILY MEDICINE | Facility: CLINIC | Age: 62
End: 2017-12-11
Attending: FAMILY MEDICINE
Payer: MEDICARE

## 2017-12-11 VITALS
OXYGEN SATURATION: 98 % | DIASTOLIC BLOOD PRESSURE: 76 MMHG | SYSTOLIC BLOOD PRESSURE: 116 MMHG | HEART RATE: 70 BPM | WEIGHT: 223.56 LBS | BODY MASS INDEX: 32.08 KG/M2

## 2017-12-11 DIAGNOSIS — M54.32 BILATERAL SCIATICA: ICD-10-CM

## 2017-12-11 DIAGNOSIS — G89.29 CHRONIC BILATERAL LOW BACK PAIN WITH BILATERAL SCIATICA: Primary | ICD-10-CM

## 2017-12-11 DIAGNOSIS — M54.42 CHRONIC BILATERAL LOW BACK PAIN WITH BILATERAL SCIATICA: Primary | ICD-10-CM

## 2017-12-11 DIAGNOSIS — M54.31 BILATERAL SCIATICA: ICD-10-CM

## 2017-12-11 DIAGNOSIS — M54.41 CHRONIC BILATERAL LOW BACK PAIN WITH BILATERAL SCIATICA: Primary | ICD-10-CM

## 2017-12-11 PROCEDURE — 96372 THER/PROPH/DIAG INJ SC/IM: CPT | Mod: PBBFAC,PO

## 2017-12-11 PROCEDURE — 99999 PR PBB SHADOW E&M-EST. PATIENT-LVL III: CPT | Mod: PBBFAC,,, | Performed by: FAMILY MEDICINE

## 2017-12-11 PROCEDURE — 99213 OFFICE O/P EST LOW 20 MIN: CPT | Mod: PBBFAC,PO | Performed by: FAMILY MEDICINE

## 2017-12-11 PROCEDURE — 99214 OFFICE O/P EST MOD 30 MIN: CPT | Mod: 25,S$PBB,, | Performed by: FAMILY MEDICINE

## 2017-12-11 RX ORDER — GABAPENTIN 100 MG/1
100 CAPSULE ORAL NIGHTLY
Qty: 90 CAPSULE | Refills: 2 | Status: SHIPPED | OUTPATIENT
Start: 2017-12-11 | End: 2018-08-22

## 2017-12-11 RX ORDER — KETOROLAC TROMETHAMINE 30 MG/ML
30 INJECTION, SOLUTION INTRAMUSCULAR; INTRAVENOUS
Status: COMPLETED | OUTPATIENT
Start: 2017-12-11 | End: 2017-12-11

## 2017-12-11 RX ADMIN — KETOROLAC TROMETHAMINE 30 MG: 60 INJECTION, SOLUTION INTRAMUSCULAR at 10:12

## 2017-12-11 NOTE — PROGRESS NOTES
Subjective:       Patient ID: Anjel Sanders is a 62 y.o. male.    Chief Complaint: Back Pain (lower back pain that goes to legs.  Pain goes away when pressure is applied.)    62 yr old black male with Hypertension, DM II, Hyperlipidemia, gout, ED, MURTAZA, presents today for evaluation of recurrent low back pain. Onset 2-3 months ago and gradually worsening. No injury or fall. It radiates in both legs and intermittent tingling. No weakness. No issues with BM and urination. He tried NSAIDS and heat pads and it helped some. Details as follows -      History as below - reviewed       Back Pain   This is a recurrent problem. The current episode started 1 to 4 weeks ago. The problem occurs constantly. The problem is unchanged. The pain is present in the sacro-iliac and lumbar spine. The quality of the pain is described as aching. The pain radiates to the left foot and right foot. The pain is at a severity of 8/10. The pain is severe. The pain is the same all the time. The symptoms are aggravated by bending and twisting. Pertinent negatives include no chest pain, leg pain, paresis, paresthesias, pelvic pain or weakness. He has tried NSAIDs and heat for the symptoms. The treatment provided mild relief.     Review of Systems   Constitutional: Negative.  Negative for activity change, diaphoresis and unexpected weight change.   HENT: Negative.  Negative for congestion, ear pain, mouth sores, rhinorrhea and voice change.    Eyes: Negative.  Negative for pain, discharge and visual disturbance.   Respiratory: Negative.  Negative for apnea, cough and wheezing.    Cardiovascular: Negative.  Negative for chest pain and palpitations.   Gastrointestinal: Negative.  Negative for abdominal distention, anal bleeding, diarrhea and vomiting.   Endocrine: Negative.  Negative for cold intolerance and polyuria.   Genitourinary: Negative.  Negative for decreased urine volume, difficulty urinating, discharge, frequency, pelvic pain and scrotal  swelling.   Musculoskeletal: Positive for arthralgias, back pain and myalgias. Negative for neck stiffness.   Skin: Negative.  Negative for color change and rash.   Allergic/Immunologic: Negative.  Negative for environmental allergies and immunocompromised state.   Neurological: Negative.  Negative for dizziness, speech difficulty, weakness, light-headedness and paresthesias.   Hematological: Negative.    Psychiatric/Behavioral: Negative.  Negative for agitation, dysphoric mood and suicidal ideas. The patient is not nervous/anxious.        PMH/PSH/FH/SH/MED/ALLERGY reviewed    Objective:       Vitals:    12/11/17 0919   BP: 116/76   Pulse: 70       Physical Exam   Constitutional: He is oriented to person, place, and time. He appears well-developed and well-nourished.   HENT:   Head: Normocephalic and atraumatic.   Right Ear: External ear normal.   Left Ear: External ear normal.   Nose: Nose normal.   Mouth/Throat: Oropharynx is clear and moist. No oropharyngeal exudate.   Eyes: Conjunctivae and EOM are normal. Pupils are equal, round, and reactive to light. Right eye exhibits no discharge. Left eye exhibits no discharge. No scleral icterus.   Neck: Normal range of motion. Neck supple. No JVD present. No tracheal deviation present. No thyromegaly present.   Cardiovascular: Normal rate, regular rhythm, normal heart sounds and intact distal pulses.  Exam reveals no gallop and no friction rub.    No murmur heard.  Pulmonary/Chest: Effort normal and breath sounds normal. No stridor. No respiratory distress. He has no wheezes. He has no rales. He exhibits no tenderness.   Abdominal: Soft. Bowel sounds are normal. He exhibits no distension and no mass. There is no tenderness. There is no rebound and no guarding. No hernia.   Musculoskeletal: Normal range of motion. He exhibits tenderness (TTP l3-S1 and paralumbar area. + sacroiliac area. SLRt + B/L). He exhibits no edema.   Lymphadenopathy:     He has no cervical  adenopathy.   Neurological: He is alert and oriented to person, place, and time. He has normal reflexes. He displays normal reflexes. No cranial nerve deficit. He exhibits normal muscle tone. Coordination normal.   Skin: Skin is warm and dry. No rash noted. No erythema. No pallor.   Psychiatric: He has a normal mood and affect. His behavior is normal. Judgment and thought content normal.       Assessment:       1. Chronic bilateral low back pain with bilateral sciatica    2. Bilateral sciatica        Plan:       Anjel was seen today for back pain.    Diagnoses and all orders for this visit:    Chronic bilateral low back pain with bilateral sciatica  -     ketorolac injection 30 mg; Inject 1 mL (30 mg total) into the muscle one time.  -     gabapentin (NEURONTIN) 100 MG capsule; Take 1 capsule (100 mg total) by mouth every evening.    Bilateral sciatica  -     ketorolac injection 30 mg; Inject 1 mL (30 mg total) into the muscle one time.  -     gabapentin (NEURONTIN) 100 MG capsule; Take 1 capsule (100 mg total) by mouth every evening.      Chronic B/L sciatica/LBP  -rest, neurontin, heat pads  -Toradol 30 mg IM once    Spent adequate time in obtaining history and explaining differentials    40 minutes spent during this visit of which greater than 50% devoted to face-face counseling and coordination of care regarding diagnosis and management plan    Return in about 2 months (around 2/11/2018), or if symptoms worsen or fail to improve.

## 2018-01-11 ENCOUNTER — TELEPHONE (OUTPATIENT)
Dept: FAMILY MEDICINE | Facility: CLINIC | Age: 63
End: 2018-01-11

## 2018-01-11 NOTE — TELEPHONE ENCOUNTER
----- Message from Allison Hennessy sent at 1/11/2018  3:00 PM CST -----  Contact: Pt  Pt is calling to speak with nurse in regards to calling medication in for cold like symptoms.    Pt can be reached at [phone].   Thank you

## 2018-03-05 ENCOUNTER — TELEPHONE (OUTPATIENT)
Dept: FAMILY MEDICINE | Facility: CLINIC | Age: 63
End: 2018-03-05

## 2018-03-05 DIAGNOSIS — R05.9 COUGH: Primary | ICD-10-CM

## 2018-03-05 NOTE — TELEPHONE ENCOUNTER
----- Message from Nidhi Zhang sent at 3/5/2018  1:28 PM CST -----  Contact: Pt wife Mrs.Henderson Mrs. Sanders says pt needs to be seen on tomorrow says pt has a bad cough    Attempted to schedule visit and first available is not until 4/11 for EP and same day visit is not until 03/08/2018    Mrs. Sanders can be reached at [phone]

## 2018-03-06 ENCOUNTER — HOSPITAL ENCOUNTER (OUTPATIENT)
Dept: RADIOLOGY | Facility: HOSPITAL | Age: 63
Discharge: HOME | End: 2018-03-06
Attending: FAMILY MEDICINE
Payer: MEDICARE

## 2018-03-06 ENCOUNTER — OFFICE VISIT (OUTPATIENT)
Dept: FAMILY MEDICINE | Facility: CLINIC | Age: 63
End: 2018-03-06
Attending: FAMILY MEDICINE
Payer: MEDICARE

## 2018-03-06 VITALS
WEIGHT: 223.56 LBS | OXYGEN SATURATION: 98 % | DIASTOLIC BLOOD PRESSURE: 85 MMHG | BODY MASS INDEX: 32.01 KG/M2 | HEIGHT: 70 IN | TEMPERATURE: 98 F | SYSTOLIC BLOOD PRESSURE: 139 MMHG | HEART RATE: 65 BPM

## 2018-03-06 DIAGNOSIS — R05.9 COUGH: ICD-10-CM

## 2018-03-06 DIAGNOSIS — J20.8 ACUTE BACTERIAL BRONCHITIS: Primary | ICD-10-CM

## 2018-03-06 DIAGNOSIS — B96.89 ACUTE BACTERIAL BRONCHITIS: Primary | ICD-10-CM

## 2018-03-06 PROCEDURE — 99213 OFFICE O/P EST LOW 20 MIN: CPT | Mod: PBBFAC,PO | Performed by: FAMILY MEDICINE

## 2018-03-06 PROCEDURE — 99214 OFFICE O/P EST MOD 30 MIN: CPT | Mod: S$PBB,,, | Performed by: FAMILY MEDICINE

## 2018-03-06 PROCEDURE — 71046 X-RAY EXAM CHEST 2 VIEWS: CPT | Mod: 26,,, | Performed by: RADIOLOGY

## 2018-03-06 PROCEDURE — 71046 X-RAY EXAM CHEST 2 VIEWS: CPT | Mod: TC,FY

## 2018-03-06 PROCEDURE — 99999 PR PBB SHADOW E&M-EST. PATIENT-LVL III: CPT | Mod: PBBFAC,,, | Performed by: FAMILY MEDICINE

## 2018-03-06 RX ORDER — DOXYCYCLINE 100 MG/1
100 CAPSULE ORAL 2 TIMES DAILY
Qty: 20 CAPSULE | Refills: 0 | Status: SHIPPED | OUTPATIENT
Start: 2018-03-06 | End: 2018-04-20 | Stop reason: SDUPTHER

## 2018-03-06 NOTE — PROGRESS NOTES
Subjective:       Patient ID: Anjel Sanders is a 62 y.o. male.    Chief Complaint: Cough (has had cough for about 3 weeks.  )    62 yr old black male with Hypertension, DM II, Hyperlipidemia, gout, ED, MURTAZA, presents today for evaluation of cough with yellow sputum. Onset 3-4 weeks ago and gradually worsening. He had low grade fever too. He had smoke exposure in his life. + sick contacts. He also had recent travel but to nearby state. No weight changes or appetite changes. He tried OTC meds, cough syrup with no relief. Details as follows -      History as below - reviewed       Cough   This is a chronic problem. The current episode started more than 1 month ago. The problem has been unchanged. The problem occurs constantly. The cough is productive of sputum. Associated symptoms include shortness of breath. Pertinent negatives include no chest pain, chills, ear pain, headaches, heartburn, hemoptysis, myalgias, nasal congestion, rash, rhinorrhea or wheezing. Nothing aggravates the symptoms. He has tried OTC cough suppressant for the symptoms. The treatment provided no relief. His past medical history is significant for bronchitis and environmental allergies.     Review of Systems   Constitutional: Negative.  Negative for activity change, chills, diaphoresis and unexpected weight change.   HENT: Negative.  Negative for congestion, ear pain, mouth sores, rhinorrhea and voice change.    Eyes: Negative.  Negative for pain, discharge and visual disturbance.   Respiratory: Positive for cough and shortness of breath. Negative for apnea, hemoptysis and wheezing.    Cardiovascular: Negative.  Negative for chest pain and palpitations.   Gastrointestinal: Negative.  Negative for abdominal distention, anal bleeding, diarrhea, heartburn and vomiting.   Endocrine: Negative.  Negative for cold intolerance and polyuria.   Genitourinary: Negative.  Negative for decreased urine volume, difficulty urinating, discharge, frequency and  scrotal swelling.   Musculoskeletal: Negative.  Negative for back pain, myalgias and neck stiffness.   Skin: Negative.  Negative for color change and rash.   Allergic/Immunologic: Positive for environmental allergies. Negative for immunocompromised state.   Neurological: Negative.  Negative for dizziness, speech difficulty, weakness, light-headedness and headaches.   Hematological: Negative.    Psychiatric/Behavioral: Negative.  Negative for agitation, dysphoric mood and suicidal ideas. The patient is not nervous/anxious.        PMH/PSH/FH/SH/MED/ALLERGY reviewed    Objective:       Vitals:    03/06/18 1049   BP: 139/85   Pulse: 65   Temp: 97.8 °F (36.6 °C)       Physical Exam   Constitutional: He is oriented to person, place, and time. He appears well-developed and well-nourished.   HENT:   Head: Normocephalic and atraumatic.   Right Ear: External ear normal.   Left Ear: External ear normal.   Nose: Nose normal.   Mouth/Throat: Oropharynx is clear and moist. No oropharyngeal exudate.   Eyes: Conjunctivae and EOM are normal. Pupils are equal, round, and reactive to light. Right eye exhibits no discharge. Left eye exhibits no discharge. No scleral icterus.   Neck: Normal range of motion. Neck supple. No JVD present. No tracheal deviation present. No thyromegaly present.   Cardiovascular: Normal rate, regular rhythm, normal heart sounds and intact distal pulses.  Exam reveals no gallop and no friction rub.    No murmur heard.  Pulmonary/Chest: Effort normal. No stridor. No respiratory distress. He has wheezes. He has rales. He exhibits no tenderness.   B/L wheeze and rales   Abdominal: Soft. Bowel sounds are normal. He exhibits no distension and no mass. There is no tenderness. There is no rebound and no guarding. No hernia.   Musculoskeletal: Normal range of motion. He exhibits tenderness (TTP l3-S1 and paralumbar area. + sacroiliac area. SLRt + B/L). He exhibits no edema.   Lymphadenopathy:     He has no cervical  adenopathy.   Neurological: He is alert and oriented to person, place, and time. He has normal reflexes. He displays normal reflexes. No cranial nerve deficit. He exhibits normal muscle tone. Coordination normal.   Skin: Skin is warm and dry. No rash noted. No erythema. No pallor.   Psychiatric: He has a normal mood and affect. His behavior is normal. Judgment and thought content normal.       X-Ray Chest PA And Lateral 03/06/2018 None Specified             RESULTS:  2 views of chest, comparison 2015.  Heart normal.  Lungs clear.  Calcified plaque left carotid bifurcation region.    IMPRESSION:    No active process.         Assessment:       1. Acute bacterial bronchitis        Plan:       Anjel was seen today for cough.    Diagnoses and all orders for this visit:    Acute bacterial bronchitis  -     doxycycline (MONODOX) 100 MG capsule; Take 1 capsule (100 mg total) by mouth 2 (two) times daily.      Acute bacterial bronchitis  -CXr reassuring  -dozy x 10 days  -desym as needed OTC    Spent adequate time in obtaining history and explaining differentials    40 minutes spent during this visit of which greater than 50% devoted to face-face counseling and coordination of care regarding diagnosis and management plan    Follow-up in about 3 months (around 6/6/2018), or if symptoms worsen or fail to improve.

## 2018-04-20 DIAGNOSIS — B96.89 ACUTE BACTERIAL BRONCHITIS: ICD-10-CM

## 2018-04-20 DIAGNOSIS — J20.8 ACUTE BACTERIAL BRONCHITIS: ICD-10-CM

## 2018-04-22 RX ORDER — DOXYCYCLINE 100 MG/1
CAPSULE ORAL
Qty: 20 CAPSULE | Refills: 0 | Status: SHIPPED | OUTPATIENT
Start: 2018-04-22 | End: 2018-07-23 | Stop reason: SDUPTHER

## 2018-05-17 DIAGNOSIS — I10 ESSENTIAL HYPERTENSION: ICD-10-CM

## 2018-05-17 RX ORDER — AMLODIPINE AND BENAZEPRIL HYDROCHLORIDE 10; 40 MG/1; MG/1
1 CAPSULE ORAL DAILY
Qty: 90 CAPSULE | Refills: 1 | Status: SHIPPED | OUTPATIENT
Start: 2018-05-17 | End: 2018-08-22 | Stop reason: SDUPTHER

## 2018-05-18 DIAGNOSIS — E11.9 TYPE 2 DIABETES MELLITUS WITHOUT COMPLICATION: ICD-10-CM

## 2018-06-10 ENCOUNTER — NURSE TRIAGE (OUTPATIENT)
Dept: ADMINISTRATIVE | Facility: CLINIC | Age: 63
End: 2018-06-10

## 2018-06-11 NOTE — TELEPHONE ENCOUNTER
"  Reason for Disposition   General information question, no triage required and triager able to answer question    Answer Assessment - Initial Assessment Questions  1. REASON FOR CALL or QUESTION: "What is your reason for calling today?" or "How can I best help you?" or "What question do you have that I can help answer?"      Questions regarding appt    Protocols used: ST INFORMATION ONLY CALL-A-AH    "

## 2018-07-23 DIAGNOSIS — B96.89 ACUTE BACTERIAL BRONCHITIS: ICD-10-CM

## 2018-07-23 DIAGNOSIS — J20.8 ACUTE BACTERIAL BRONCHITIS: ICD-10-CM

## 2018-07-23 RX ORDER — DOXYCYCLINE 100 MG/1
CAPSULE ORAL
Qty: 20 CAPSULE | Refills: 0 | Status: SHIPPED | OUTPATIENT
Start: 2018-07-23 | End: 2018-08-22

## 2018-08-22 ENCOUNTER — LAB VISIT (OUTPATIENT)
Dept: LAB | Facility: HOSPITAL | Age: 63
End: 2018-08-22
Attending: FAMILY MEDICINE
Payer: MEDICARE

## 2018-08-22 ENCOUNTER — TELEPHONE (OUTPATIENT)
Dept: FAMILY MEDICINE | Facility: CLINIC | Age: 63
End: 2018-08-22

## 2018-08-22 ENCOUNTER — OFFICE VISIT (OUTPATIENT)
Dept: FAMILY MEDICINE | Facility: CLINIC | Age: 63
End: 2018-08-22
Attending: FAMILY MEDICINE
Payer: MEDICARE

## 2018-08-22 VITALS
HEART RATE: 59 BPM | TEMPERATURE: 99 F | DIASTOLIC BLOOD PRESSURE: 78 MMHG | BODY MASS INDEX: 32.45 KG/M2 | SYSTOLIC BLOOD PRESSURE: 128 MMHG | HEIGHT: 70 IN | WEIGHT: 226.63 LBS | OXYGEN SATURATION: 98 %

## 2018-08-22 DIAGNOSIS — M10.9 GOUTY ARTHRITIS: ICD-10-CM

## 2018-08-22 DIAGNOSIS — R41.3 MEMORY DEFICITS: ICD-10-CM

## 2018-08-22 DIAGNOSIS — M54.31 BILATERAL SCIATICA: ICD-10-CM

## 2018-08-22 DIAGNOSIS — E11.9 CONTROLLED TYPE 2 DIABETES MELLITUS WITHOUT COMPLICATION, WITHOUT LONG-TERM CURRENT USE OF INSULIN: ICD-10-CM

## 2018-08-22 DIAGNOSIS — G89.29 CHRONIC BILATERAL LOW BACK PAIN WITH BILATERAL SCIATICA: ICD-10-CM

## 2018-08-22 DIAGNOSIS — M54.32 BILATERAL SCIATICA: ICD-10-CM

## 2018-08-22 DIAGNOSIS — I10 ESSENTIAL HYPERTENSION: Primary | ICD-10-CM

## 2018-08-22 DIAGNOSIS — I67.9 CVD (CEREBROVASCULAR DISEASE): ICD-10-CM

## 2018-08-22 DIAGNOSIS — M54.41 CHRONIC BILATERAL LOW BACK PAIN WITH BILATERAL SCIATICA: ICD-10-CM

## 2018-08-22 DIAGNOSIS — M54.42 CHRONIC BILATERAL LOW BACK PAIN WITH BILATERAL SCIATICA: ICD-10-CM

## 2018-08-22 DIAGNOSIS — E78.2 MIXED HYPERLIPIDEMIA: ICD-10-CM

## 2018-08-22 DIAGNOSIS — I10 ESSENTIAL HYPERTENSION: ICD-10-CM

## 2018-08-22 DIAGNOSIS — E78.5 HYPERLIPIDEMIA, UNSPECIFIED HYPERLIPIDEMIA TYPE: ICD-10-CM

## 2018-08-22 LAB
ALBUMIN SERPL BCP-MCNC: 4.1 G/DL
ALP SERPL-CCNC: 103 U/L
ALT SERPL W/O P-5'-P-CCNC: 39 U/L
ANION GAP SERPL CALC-SCNC: 7 MMOL/L
AST SERPL-CCNC: 25 U/L
BASOPHILS # BLD AUTO: 0.02 K/UL
BASOPHILS NFR BLD: 0.4 %
BILIRUB SERPL-MCNC: 1.1 MG/DL
BUN SERPL-MCNC: 11 MG/DL
CALCIUM SERPL-MCNC: 9.6 MG/DL
CHLORIDE SERPL-SCNC: 105 MMOL/L
CHOLEST SERPL-MCNC: 152 MG/DL
CHOLEST/HDLC SERPL: 3.6 {RATIO}
CO2 SERPL-SCNC: 30 MMOL/L
CREAT SERPL-MCNC: 1.3 MG/DL
DIFFERENTIAL METHOD: ABNORMAL
EOSINOPHIL # BLD AUTO: 0.2 K/UL
EOSINOPHIL NFR BLD: 4 %
ERYTHROCYTE [DISTWIDTH] IN BLOOD BY AUTOMATED COUNT: 14.2 %
EST. GFR  (AFRICAN AMERICAN): >60 ML/MIN/1.73 M^2
EST. GFR  (NON AFRICAN AMERICAN): 58 ML/MIN/1.73 M^2
ESTIMATED AVG GLUCOSE: 131 MG/DL
GLUCOSE SERPL-MCNC: 108 MG/DL
HBA1C MFR BLD HPLC: 6.2 %
HCT VFR BLD AUTO: 42.4 %
HDLC SERPL-MCNC: 42 MG/DL
HDLC SERPL: 27.6 %
HGB BLD-MCNC: 13.6 G/DL
LDLC SERPL CALC-MCNC: 93.6 MG/DL
LYMPHOCYTES # BLD AUTO: 2 K/UL
LYMPHOCYTES NFR BLD: 40.4 %
MCH RBC QN AUTO: 28 PG
MCHC RBC AUTO-ENTMCNC: 32.1 G/DL
MCV RBC AUTO: 87 FL
MONOCYTES # BLD AUTO: 0.5 K/UL
MONOCYTES NFR BLD: 9.3 %
NEUTROPHILS # BLD AUTO: 2.3 K/UL
NEUTROPHILS NFR BLD: 45.9 %
NONHDLC SERPL-MCNC: 110 MG/DL
PLATELET # BLD AUTO: 241 K/UL
PMV BLD AUTO: 10.3 FL
POTASSIUM SERPL-SCNC: 4.2 MMOL/L
PROT SERPL-MCNC: 7.6 G/DL
RBC # BLD AUTO: 4.86 M/UL
SODIUM SERPL-SCNC: 142 MMOL/L
TRIGL SERPL-MCNC: 82 MG/DL
WBC # BLD AUTO: 5.05 K/UL

## 2018-08-22 PROCEDURE — 99999 PR PBB SHADOW E&M-EST. PATIENT-LVL IV: CPT | Mod: PBBFAC,,, | Performed by: FAMILY MEDICINE

## 2018-08-22 PROCEDURE — 80053 COMPREHEN METABOLIC PANEL: CPT

## 2018-08-22 PROCEDURE — 99214 OFFICE O/P EST MOD 30 MIN: CPT | Mod: PBBFAC,PO | Performed by: FAMILY MEDICINE

## 2018-08-22 PROCEDURE — 85025 COMPLETE CBC W/AUTO DIFF WBC: CPT

## 2018-08-22 PROCEDURE — 80061 LIPID PANEL: CPT

## 2018-08-22 PROCEDURE — 36415 COLL VENOUS BLD VENIPUNCTURE: CPT

## 2018-08-22 PROCEDURE — 83036 HEMOGLOBIN GLYCOSYLATED A1C: CPT

## 2018-08-22 PROCEDURE — 99214 OFFICE O/P EST MOD 30 MIN: CPT | Mod: S$PBB,,, | Performed by: FAMILY MEDICINE

## 2018-08-22 RX ORDER — GABAPENTIN 300 MG/1
300 CAPSULE ORAL NIGHTLY
Qty: 90 CAPSULE | Refills: 2 | Status: SHIPPED | OUTPATIENT
Start: 2018-08-22 | End: 2019-03-19 | Stop reason: SDUPTHER

## 2018-08-22 RX ORDER — AMLODIPINE AND BENAZEPRIL HYDROCHLORIDE 10; 40 MG/1; MG/1
1 CAPSULE ORAL DAILY
Qty: 90 CAPSULE | Refills: 1 | Status: SHIPPED | OUTPATIENT
Start: 2018-08-22 | End: 2018-08-22 | Stop reason: SDUPTHER

## 2018-08-22 RX ORDER — AMLODIPINE AND BENAZEPRIL HYDROCHLORIDE 10; 40 MG/1; MG/1
1 CAPSULE ORAL DAILY
Qty: 90 CAPSULE | Refills: 3 | Status: SHIPPED | OUTPATIENT
Start: 2018-08-22 | End: 2019-07-12 | Stop reason: SDUPTHER

## 2018-08-22 RX ORDER — ALLOPURINOL 300 MG/1
300 TABLET ORAL DAILY
Qty: 90 TABLET | Refills: 3 | Status: SHIPPED | OUTPATIENT
Start: 2018-08-22 | End: 2019-07-12 | Stop reason: SDUPTHER

## 2018-08-22 RX ORDER — ATORVASTATIN CALCIUM 80 MG/1
80 TABLET, FILM COATED ORAL DAILY
Qty: 90 TABLET | Refills: 4 | Status: SHIPPED | OUTPATIENT
Start: 2018-08-22 | End: 2019-07-12 | Stop reason: SDUPTHER

## 2018-08-22 NOTE — PROGRESS NOTES
Subjective:       Patient ID: Anjel Sanders is a 62 y.o. male.    Chief Complaint: Low-back Pain and Memory Loss    62 yr old black male with Hypertension, DM II, Hyperlipidemia, gout, ED, MURTAZA, presents today for 6 month follow up. C/o memory issues and baseline extremity weakness since stroke.     DM II - diet controlled - no s/s of DM - foot exam UTD - eye exam due - on ASA and ACE    HTN - controlled - on prinzide - no side effects - does not need refills    HLD - uncontrolled - on statin -LDLCALC                  93.6                08/22/2018                   - on meds - need labs    ED - controlled - need cialis -     Gout - controlled - -  URICACID                 7.2 (H)             11/03/2017                   Not -  on allopurinol    Health maintenance  -UTD except labs      Hypertension   This is a chronic problem. The current episode started more than 1 year ago. The problem has been gradually improving since onset. The problem is controlled. Pertinent negatives include no anxiety, blurred vision, chest pain, headaches, malaise/fatigue, neck pain, orthopnea, palpitations, peripheral edema, PND, shortness of breath or sweats. There are no associated agents to hypertension. Risk factors for coronary artery disease include dyslipidemia, obesity and male gender. Past treatments include ACE inhibitors and diuretics. The current treatment provides moderate improvement. There are no compliance problems.  There is no history of angina, CAD/MI, CVA, heart failure, left ventricular hypertrophy or retinopathy. There is no history of chronic renal disease, coarctation of the aorta, hypercortisolism, pheochromocytoma, renovascular disease or a thyroid problem.   Hyperlipidemia   This is a chronic problem. The current episode started more than 1 year ago. The problem is controlled. Recent lipid tests were reviewed and are normal. Exacerbating diseases include obesity. He has no history of chronic renal disease,  diabetes, hypothyroidism or liver disease. There are no known factors aggravating his hyperlipidemia. Pertinent negatives include no chest pain, focal sensory loss, focal weakness, leg pain, myalgias or shortness of breath. He is currently on no antihyperlipidemic treatment. The current treatment provides moderate improvement of lipids. There are no compliance problems.  Risk factors for coronary artery disease include dyslipidemia, hypertension, obesity and male sex.     Review of Systems   Constitutional: Negative for activity change, diaphoresis, malaise/fatigue and unexpected weight change.   HENT: Negative.  Negative for congestion, ear pain, mouth sores, rhinorrhea and voice change.    Eyes: Negative.  Negative for blurred vision, pain, discharge and visual disturbance.   Respiratory: Negative.  Negative for apnea, cough, shortness of breath and wheezing.    Cardiovascular: Negative.  Negative for chest pain, palpitations, orthopnea and PND.   Gastrointestinal: Negative.  Negative for abdominal distention, anal bleeding, diarrhea and vomiting.   Endocrine: Negative.  Negative for cold intolerance and polyuria.   Genitourinary: Negative.  Negative for decreased urine volume, difficulty urinating, discharge, frequency and scrotal swelling.   Musculoskeletal: Negative.  Negative for back pain, myalgias, neck pain and neck stiffness.   Skin: Negative.  Negative for color change and rash.   Allergic/Immunologic: Negative.  Negative for environmental allergies and immunocompromised state.   Neurological: Positive for weakness. Negative for dizziness, focal weakness, speech difficulty, light-headedness and headaches.   Hematological: Negative.    Psychiatric/Behavioral: Negative.  Negative for agitation, dysphoric mood and suicidal ideas. The patient is not nervous/anxious.        PMH/PSH/FH/SH/MED/ALLERGY reviewed    Objective:       Vitals:    08/22/18 1043   BP: 128/78   Pulse: (!) 59   Temp: 98.5 °F (36.9 °C)        Physical Exam   Constitutional: He is oriented to person, place, and time. He appears well-developed and well-nourished. No distress.   HENT:   Head: Normocephalic and atraumatic.   Neck: Normal range of motion. Neck supple.   Cardiovascular: Normal rate, regular rhythm, normal heart sounds and intact distal pulses. Exam reveals no gallop and no friction rub.   No murmur heard.  Pulmonary/Chest: Effort normal and breath sounds normal. No respiratory distress. He has no wheezes. He has no rales.   Musculoskeletal: Normal range of motion. He exhibits no edema or tenderness.   Neurological: He is alert and oriented to person, place, and time. He has normal reflexes. No cranial nerve deficit. He exhibits normal muscle tone. Coordination normal.   No deficits   Skin: Skin is warm and dry. He is not diaphoretic.   Psychiatric: He has a normal mood and affect.       Protective Sensation (w/ 10 gram monofilament):  Right: Intact  Left: Intact    Visual Inspection:  Normal -  Bilateral    Pedal Pulses:   Right: Present  Left: Present    Posterior tibialis:   Right:Present  Left: Present      Assessment:       1. Essential hypertension    2. Mixed hyperlipidemia    3. CVD (cerebrovascular disease)    4. Controlled type 2 diabetes mellitus without complication, without long-term current use of insulin    5. Chronic bilateral low back pain with bilateral sciatica    6. Bilateral sciatica    7. Memory deficits    8. Hyperlipidemia, unspecified hyperlipidemia type    9. Gouty arthritis        Plan:         Anjel was seen today for low-back pain and memory loss.    Diagnoses and all orders for this visit:    Essential hypertension  -     CBC auto differential; Future  -     Comprehensive metabolic panel; Future  -     Lipid panel; Future  -     Discontinue: amlodipine-benazepril (LOTREL) 10-40 mg per capsule; Take 1 capsule by mouth once daily.  -     amlodipine-benazepril (LOTREL) 10-40 mg per capsule; Take 1 capsule by  mouth once daily.    Mixed hyperlipidemia  -     CBC auto differential; Future  -     Comprehensive metabolic panel; Future  -     Lipid panel; Future    CVD (cerebrovascular disease)  -     Ambulatory referral to Neurology    Controlled type 2 diabetes mellitus without complication, without long-term current use of insulin  -     CBC auto differential; Future  -     Comprehensive metabolic panel; Future  -     Lipid panel; Future  -     Hemoglobin A1c; Future    Chronic bilateral low back pain with bilateral sciatica  -     gabapentin (NEURONTIN) 300 MG capsule; Take 1 capsule (300 mg total) by mouth every evening.    Bilateral sciatica  -     gabapentin (NEURONTIN) 300 MG capsule; Take 1 capsule (300 mg total) by mouth every evening.    Memory deficits  -     Ambulatory referral to Neurology    Hyperlipidemia, unspecified hyperlipidemia type  -     atorvastatin (LIPITOR) 80 MG tablet; Take 1 tablet (80 mg total) by mouth once daily.    Gouty arthritis  -     allopurinol (ZYLOPRIM) 300 MG tablet; Take 1 tablet (300 mg total) by mouth once daily.    HTN/HLD   -controlled - stable - on meds       ED and Gout   -controlled     MURTAZA  -stable    CVA  -on ASA and plavix      DM II  -controlled  -diet and exercise only    Obesity  -diet and exercise  -weight loss    Memory deficits  -refer neurology    40 minutes spent during this visit of which greater than 50% devoted to face-face counseling and coordination of care regarding diagnosis and management plan     Spent adequate time in obtaining history and explaining differentials       Follow-up in about 3 months (around 11/22/2018), or if symptoms worsen or fail to improve.

## 2018-10-05 ENCOUNTER — LAB VISIT (OUTPATIENT)
Dept: LAB | Facility: HOSPITAL | Age: 63
End: 2018-10-05
Attending: PSYCHIATRY & NEUROLOGY
Payer: MEDICARE

## 2018-10-05 ENCOUNTER — OFFICE VISIT (OUTPATIENT)
Dept: NEUROLOGY | Facility: CLINIC | Age: 63
End: 2018-10-05
Attending: FAMILY MEDICINE
Payer: MEDICARE

## 2018-10-05 VITALS
HEART RATE: 64 BPM | DIASTOLIC BLOOD PRESSURE: 86 MMHG | WEIGHT: 226.44 LBS | BODY MASS INDEX: 32.42 KG/M2 | HEIGHT: 70 IN | SYSTOLIC BLOOD PRESSURE: 159 MMHG

## 2018-10-05 DIAGNOSIS — I63.81 LEFT SIDED LACUNAR STROKE: ICD-10-CM

## 2018-10-05 DIAGNOSIS — R41.82 ALTERED MENTAL STATUS, UNSPECIFIED ALTERED MENTAL STATUS TYPE: ICD-10-CM

## 2018-10-05 DIAGNOSIS — M54.30 SCIATICA, UNSPECIFIED LATERALITY: Primary | ICD-10-CM

## 2018-10-05 DIAGNOSIS — G31.84 MILD COGNITIVE IMPAIRMENT: ICD-10-CM

## 2018-10-05 LAB — VIT B12 SERPL-MCNC: 412 PG/ML

## 2018-10-05 PROCEDURE — 99999 PR PBB SHADOW E&M-EST. PATIENT-LVL III: CPT | Mod: PBBFAC,,, | Performed by: PSYCHIATRY & NEUROLOGY

## 2018-10-05 PROCEDURE — 82607 VITAMIN B-12: CPT

## 2018-10-05 PROCEDURE — 99213 OFFICE O/P EST LOW 20 MIN: CPT | Mod: PBBFAC,PO | Performed by: PSYCHIATRY & NEUROLOGY

## 2018-10-05 PROCEDURE — 99214 OFFICE O/P EST MOD 30 MIN: CPT | Mod: S$PBB,,, | Performed by: PSYCHIATRY & NEUROLOGY

## 2018-10-05 PROCEDURE — 36415 COLL VENOUS BLD VENIPUNCTURE: CPT

## 2018-10-05 RX ORDER — DONEPEZIL HYDROCHLORIDE 5 MG/1
5 TABLET, FILM COATED ORAL NIGHTLY
Qty: 30 TABLET | Refills: 11 | Status: SHIPPED | OUTPATIENT
Start: 2018-10-05 | End: 2019-07-12

## 2018-10-05 NOTE — LETTER
October 5, 2018      Lars Payan MD  [address]  [address]  [facility] [address]           [facility]  [address] [facility] [address]  Phone: [phone]  Fax: [phone]          Patient: Anjel Sanders   MR Number: [id number]   YOB: 1955   Date of Visit: 10/5/2018       Dear Dr. Lars Payan:    Thank you for referring Anjel Sanders to me for evaluation. Attached you will find relevant portions of my assessment and plan of care.    If you have questions, please do not hesitate to call me. I look forward to following Anjel Sanders along with you.    Sincerely,    Anthony Gamboa MD    Enclosure  CC:  No Recipients    If you would like to receive this communication electronically, please contact [email or web address] or [phone] to request more information on [employer] access.    For providers and/or their staff who would like to refer a patient to [facility], please contact us through our one-stop-shop provider referral line, [facility], at [phone].    If you feel you have received this communication in error or would no longer like to receive these types of communications, please e-mail [email or web address]

## 2018-10-05 NOTE — PATIENT INSTRUCTIONS
Symptoms of a Stroke     A sudden feeling of weakness on one side of your body may be a sign that you are having a stroke.   During a stroke, blood stops flowing to part of the brain. This can damage areas in the brain that control the rest of the body. Get help right away if any of these symptoms come on suddenly, even if the symptoms dont last.  Know the symptoms of a stroke  · Weakness. You may feel a sudden weakness, tingling, or a loss of feeling on 1 side of your face or body including your arm or leg.   · Vision problems. You may have sudden double vision or trouble seeing in 1 or both eyes.  · Speech problems. You may have sudden trouble talking, slurred speech, or problems understanding others.  · Headache. You may have a sudden, severe headache.  · Movement problems. You may have sudden trouble walking, dizziness, a feeling of spinning, a loss of balance, a feeling of falling, or blackouts.  · Seizure. You may also have a seizure with a large or hemorrhagic stroke.   Remember: If you have any of these symptoms, call 911 and your doctor as soon as possible.  F.A.S.T. is an easy way to remember the signs of a stroke. When you see these signs, you will know that you need to call 911 fast.   F.A.S.T. stands for:  · F is for face drooping - One side of the face is drooping or numb. When the person smiles, the smile is uneven.  · A is for arm weakness - One arm is weak or numb. When the person lifts both arms at the same time, one arm may drift downward.  · S is for speech difficulty - You may notice slurred speech or difficulty speaking. The person can't repeat a simple sentence correctly when asked.  · T is for time to dial 911 - If someone shows any of these symptoms, even if they go away, call 911 immediately. Make note of the time the symptoms first appeared.   Date Last Reviewed: 8/26/2015 © 2000-2017 [employer]. [address] [place] [address], [place] [address]. All rights reserved. This  information is not intended as a substitute for professional medical care. Always follow your healthcare professional's instructions.

## 2018-10-05 NOTE — PROGRESS NOTES
[facility]  [facility] [place]    Date: 10/5/18  Patient Name: Anjel Sanders   MRN: [id number]   PCP: Lars Payan  Referring Provider: Lars Payan MD    Assessment:   Anjel Sanders is a 63 y.o. male presenting in follow-up for cognitive change and low back pain.  Have provided her for physical therapy for conservative management.  Should symptoms persist will consider imaging.  With regard to be route to this code presentation moved to to sit with mild cognitive impairment.  Will attempt to contact family for collateral information.  Will initiate Aricept 5 mg daily and obtain dementia screening labs listed below.  Patient may have a vascular component to cognitive deficits with known history of prior stroke.  Plan:     Problem List Items Addressed This Visit        Neuro    Left sided lacunar stroke    Current Assessment & Plan     -- continue ASA, statin therapy  -- MRI personally reviewed, L thalamic infarct           Other Visit Diagnoses     Sciatica, unspecified laterality    -  Primary    Relevant Orders    Ambulatory consult to Physical Therapy    Mild cognitive impairment        Relevant Orders    Vitamin B12    TSH    RPR    Vitamin B1    Altered mental status, unspecified altered mental status type         Relevant Orders    Vitamin B12          Anthony Gamboa MD  [facility]   Department of Neurology    Patient note was created using MModal Dictation.  Any errors in syntax or even information may not have been identified and edited on initial review prior to signing this note.  Subjective:   Patient seen in consultation at the request of Lars Payan MD for the evaluation of memory changes.  A copy of this note will be sent to the referring physician.        HPI:   Mr. Anjel Sanders is a 63 y.o. male who presents with a chief complaint of memory changes and low back pain.  The patient presents by himself and is a vague historian.  He states that  his wife has complained that his short-term memory is not as good as it used to be.  He states that he feels that he is doing well and denies any difficulties completing his activities of daily living, managing his finances, any mood changes, or behavioral changes.  The patient does have difficulty remembering the medications he takes and notably is wearing his shirt inside out during his visit.  He does not appear to be aware of this.  He complains of aching low back pain with radiating pain and numbness into his bilateral buttocks and follows made worse by standing or sitting for long periods of time.  He denies weakness.  He has never completed PT.    PAST MEDICAL HISTORY:  Past Medical History:   Diagnosis Date    AR (allergic rhinitis)     Gouty arthritis     Hx of colonic polyps     Tubular Adenoma    MURTAZA (obstructive sleep apnea)     Other and unspecified hyperlipidemia     Stroke     Unspecified essential hypertension        PAST SURGICAL HISTORY:  Past Surgical History:   Procedure Laterality Date    COLONOSCOPY N/A 6/28/2013    Performed by Thomas Oneil MD at [facility] ([address] [place] [address])    FOOT SURGERY         CURRENT MEDS:  Current Outpatient Medications   Medication Sig Dispense Refill    allopurinol (ZYLOPRIM) 300 MG tablet Take 1 tablet (300 mg total) by mouth once daily. 90 tablet 3    amlodipine-benazepril (LOTREL) 10-40 mg per capsule Take 1 capsule by mouth once daily. 90 capsule 3    aspirin (ECOTRIN) 81 MG EC tablet Take 81 mg by mouth once daily.      atorvastatin (LIPITOR) 80 MG tablet Take 1 tablet (80 mg total) by mouth once daily. 90 tablet 4    gabapentin (NEURONTIN) 300 MG capsule Take 1 capsule (300 mg total) by mouth every evening. 90 capsule 2    donepezil (ARICEPT) 5 MG tablet Take 1 tablet (5 mg total) by mouth every evening. 30 tablet 11    tadalafil (CIALIS) 20 MG Tab Take 1 tablet (20 mg total) by mouth once daily. 9 tablet 2     No current facility-administered  "medications for this visit.        ALLERGIES:  Review of patient's allergies indicates:  No Known Allergies    FAMILY HISTORY:  Family History   Problem Relation Age of Onset    Stomach cancer Sister     Stroke Sister     Hypertension Mother     Glaucoma Mother     Diabetes Sister     Heart failure Sister     No Known Problems Father     No Known Problems Brother     No Known Problems Maternal Aunt     No Known Problems Maternal Uncle     No Known Problems Paternal Aunt     No Known Problems Paternal Uncle     No Known Problems Maternal Grandmother     No Known Problems Maternal Grandfather     No Known Problems Paternal Grandmother     No Known Problems Paternal Grandfather     Amblyopia Neg Hx     Blindness Neg Hx     Cancer Neg Hx     Cataracts Neg Hx     Macular degeneration Neg Hx     Retinal detachment Neg Hx     Strabismus Neg Hx     Thyroid disease Neg Hx        SOCIAL HISTORY:  Social History     Tobacco Use    Smoking status: Never Smoker    Smokeless tobacco: Never Used   Substance Use Topics    Alcohol use: Yes     Alcohol/week: 5.6 oz     Types: 6 Cans of beer, 4 Standard drinks or equivalent per week    Drug use: Not on file       Review of Systems:  12 review of systems is negative except for the symptoms mentioned in HPI.      Objective:     Vitals:    10/05/18 0923   BP: (!) 159/86   Pulse: 64   Weight: 102.7 kg (226 lb 6.6 oz)   Height: 5' 10" (1.778 m)     General: NAD, well nourished   Eyes: no tearing, discharge, no erythema   ENT: moist mucous membranes of the oral cavity, nares patent    Neck: Supple, full range of motion  Cardiovascular: Warm and well perfused, pulses equal and symmetrical  Lungs: Normal work of breathing, normal chest wall excursions  Skin: No rash, lesions, or breakdown on exposed skin  Psychiatry: Mood and affect are appropriate   Abdomen: soft, non tender, non distended  Extremeties: No cyanosis, clubbing or edema.    Neurological   MENTAL " STATUS: Alert and oriented to person, place, and time. Attention and concentration within normal limits. Speech without dysarthria, able to name and repeat without difficulty. Recent and remote memory within normal limits   CRANIAL NERVES: Visual fields intact. PERRL. EOMI. Facial sensation intact. Face symmetrical. Hearing grossly intact. Full shoulder shrug bilaterally. Tongue protrudes midline   SENSORY: Sensation is intact to light touch throughout.    MOTOR: Normal bulk and tone. N 5/5 deltoid, biceps, triceps, interosseous, hand  bilaterally. 5/5 iliopsoas, knee extension/flexion, foot dorsi/plantarflexion bilaterally.    REFLEXES: Symmetric and 1+ throughout, absent at patellars.   CEREBELLAR/COORDINATION/GAIT: Gait steady with normal arm swing and stride length. Finger to nose intact. Normal rapid alternating movements.     MOCA Results 10/05/2018 :   Visuospatial/Executive: 4/5  Naming: 3/3  Attention: 4/6  Language: 3/3  Abstraction: 2/2  Delayed Recall: 0/5  Orientation: 5/6  Total:  21/30

## 2018-10-09 ENCOUNTER — CLINICAL SUPPORT (OUTPATIENT)
Dept: REHABILITATION | Facility: HOSPITAL | Age: 63
End: 2018-10-09
Attending: PSYCHIATRY & NEUROLOGY
Payer: MEDICARE

## 2018-10-09 DIAGNOSIS — R53.1 DECREASED STRENGTH: ICD-10-CM

## 2018-10-09 DIAGNOSIS — M53.86 DECREASED ROM OF LUMBAR SPINE: ICD-10-CM

## 2018-10-09 PROCEDURE — 97161 PT EVAL LOW COMPLEX 20 MIN: CPT | Mod: PN

## 2018-10-09 PROCEDURE — 97110 THERAPEUTIC EXERCISES: CPT | Mod: PN

## 2018-10-09 PROCEDURE — G8978 MOBILITY CURRENT STATUS: HCPCS | Mod: CK,PN

## 2018-10-09 PROCEDURE — G8979 MOBILITY GOAL STATUS: HCPCS | Mod: CJ,PN

## 2018-10-09 NOTE — PLAN OF CARE
TIME RECORD    Date: 10/9/2018    Start Time:  11:10  Stop Time:  12:00    PROCEDURES:    TIMED  Procedure Min.   TE 10                     UNTIMED  Procedure Min.   IE 40         Total Timed Minutes:  10  Total Timed Units:  1 TE  Total Untimed Units:  1 LCE  Charges Billed/# of units:  1 TE + 1 LCE    [facility]    PHYSICAL THERAPY EVALUATION  Onset Date: 4/2018  Medical Diagnosis: M54.30 (ICD-10-CM) - Sciatica, unspecified laterality  Treatment Diagnosis:   1. Decreased strength     2. Decreased ROM of lumbar spine       Physician: Anthony Gamboa MD  Past Medical History:   Diagnosis Date    AR (allergic rhinitis)     Gouty arthritis     Hx of colonic polyps     Tubular Adenoma    MURTAZA (obstructive sleep apnea)     Other and unspecified hyperlipidemia     Stroke     Unspecified essential hypertension      Precautions: thalamic stroke in 2015, gouty arthritis, HTN  Prior Therapy: Yes but pt unsure for what  Medications: Anjel Sanders has a current medication list which includes the following prescription(s): allopurinol, amlodipine-benazepril, aspirin, atorvastatin, donepezil, gabapentin, and tadalafil.  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 Pt also has history of R thalamic stroke about 3 years ago with R hemiplegia, pt also demo memory impairments possibly from stroke as well. Pt states that back pain is worse at the end of the day, but also has morning stiffness/pain that  "gets better with movement. He states that most of his pain is midline at low lumbar spine that gets better if he puts pressure on it with his hands, and that occasionally "tightness" and pain will go down the back of his legs. He complains B posterior leg cramps R>>L at night that he has to get up or out of bed and walk around. 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    Pain:  Location: lumbar spine   Description: Aching and Dull  Activities Which Increase Pain: Standing, Bending, Walking, Night Time, Morning and Getting out of bed/chair  Activities Which Decrease Pain: heating pad and rest  Pain Scale: 0/10 at best 2/10 now  7/10 at worst    Objective     Posture: increased forward head, increased lumbothoracic lordosis, decreased lower thoracic lyphosis  Palpation: +TTP at L3-S1 vertebrae, R SI joint  Sensation: light touch intact  DTRs: 1+/5 B L4 and S1  Range of Motion/Strength:   * = minimal low back pain/stiffness with testing  AROM: LUMBAR   Flexion 90%*   Extension 100%, hinge point at L1-2   Right side bending 90%*   Left side bending 100%*   Right rotation 100%*   Left rotation 90%*     Hip  Right   Left  Pain/Dysfunction with Movement    AROM PROM MMT AROM PROM MMT    Flexion WFL WFL 4+/5 WFL WFL 5/5    Extension 50% WFL 2+/5`* 50% WFL 2+/5*    Abduction WFL WFL 4+/5 WFL WFL 4+/5    Adduction WFL WFL 4+/5 WFL WFL 4+/5    Internal rotation 50% WFL 4+/5 WFL WFL 5/5    External rotation 50% WFL 4+/5 WFL WFL 5/5       Knee  Right   Left  Pain/Dysfunction with Movement    AROM PROM MMT AROM PROM MMT    Flexion WFL WFL 5/5 WFL WFL 5/5    Extension WFL WFL 5/5 WFL WFL 5/5      Ankle  Right   Left  Pain/Dysfunction with Movement    AROM PROM MMT AROM PROM MMT    Plantarflexion WFL WFL 5/5 WFL WFL 5/5    Dorsiflexion WFL WFL 5/5 WFL WFL 5/5    Inversion WFL WFL NT WFL WFL NT    Eversion WFL WFL NT WFL WFL NT      Flexibility: decreased R HS length, decreased B quad and " hip flexor length  Gait: Without AD  Analysis: WBoS, decreased carson, mild BLE ER  Bed Mobility:Independent  Transfers: Independent  Special Tests:   Slump test = negative for dural tension B, slight positive in nerve root tension (minimal pain)  Quadrant test = Minimal pain B, negative  SLR Test = negative B  SL Bridge Test (glut med strength) = NT  Ely's test = positive B  Prone Instability Test = NT  Korey Test = NT   J-Sign= NT  HS Length 90-90 test = decreased R HS length    SI Tests:  SI distraction test = NT  SI compression test (sidelying) = NT  Flick test = NT  Thigh Thrust Test = NT  Sacral Spring = NT    Repeated Measures:  Lumbar flexion = NT  Lumbar extension = NT    CMS Impairment/Limitation/Restriction for FOTO Lumbar Spine Survey    Therapist reviewed FOTO scores for Anjel Sanders on 10/9/2018.   FOTO documents entered into [address] - see Media section.    Limitation Score: 47%  Category: Mobility    Current : CK = at least 40% but < 60% impaired, limited or restricted  Goal: CJ = at least 20% but < 40% impaired, limited or restricted         TREATMENT     Time In: 11:50  Time Out: 12:00    PT Evaluation Completed? Yes  Discussed Plan of Care with patient: Yes    Anjel received 10 minutes of therapeutic exercise & instruction including:  DATE 10/9/2018   VISIT 1   POC 12/9/18      G CODE 1/10   FOTO 1/5   Cap Visit  Cap Total 113.20  113.20   MHP    MT Hip ext mobs, next   Stretches:    HS stretch Next on R   Piriformis stretch Next    Hip flexor stretch    Supine:    LTRs Next    TAs 5x5''   Brace knee fall outs    Brace marching    Bridges 10x   SL hip abd    Clams Next    Thoracic rotations Next    Dead bug        Prone:    Glut sets    Butt winks    Prone contra UE/LE ext     Quadruped contra UE/LE ext    Cat-Camel    Isometric multifidi submax contraction        Standing:    Rows    Lat pull downs Next BTC    Calf stretch on fitter Next    Leg Press Next    Palloff press Next    Lifts     Chops    Gait    CP    INITIAL[facility]     Written Home Exercises Provided: Yes  Anjel jeanette good understanding of the education provided. Patient demo good return demo of skill of exercises.    See EMR in Patient Instructions for HEP given: Yes      Assessment       Initial Assessment (Pertinent finding, problem list and factors affecting outcome):   Pt is a 64 yo male dx with Sciaticapresenting to PT at [facility]. Pt currently presents with low back and buttock pain, decreased lumbar ROM, decreased R>L hip extension A/PROM, decreased BLE strength, poor posture, and functional deficits with prolonged standing and walking activities. Pt would benefit from skilled PT consisting of muscular skeletal stretching/strengthening, manual therapy, neuro muscular re-education, and modalities prn to address limitations and increase functional mobility.      History  Co-morbidities and personal factors that may impact the plan of care Co-morbidities:   BMI over 30, COPD, thalamic stroke      Personal Factors:   decreased short term memory     moderate   Examination  Body Structures and Functions, activity limitations and participation restrictions that may impact the plan of care Body Regions:   back  lower extremities  trunk    Body Systems:    gross symmetry  ROM  strength  gross coordinated movement  balance  gait  transfers  transitions  motor control    Participation Restrictions:   Community walking    Activity limitations:   Learning and applying knowledge  no deficits    General Tasks and Commands  no deficits    Communication  no deficits    Mobility  lifting and carrying objects  walking    Self care  no deficits    Domestic Life  shopping  cooking  doing house work (cleaning house, washing dishes, laundry)  assisting others    Interactions/Relationships  no deficits    Life Areas  no deficits    Community and Social Life  no deficits         high   Clinical Presentation stable and  uncomplicated low   Decision Making/ Complexity Score: low       Rehab Potiential: good  Spiritual/Cultural Needs: None  Barriers to Rehab: thalamic stroke, memory deficits  GOALS: Short Term Goals: 4 weeks  1. Pt will demo good TA muscle contraction for improved deep abdominal strength and lumbar stability.  3. Increase lumbar ROM to WNL loss in order to improve functional mobility.    4. Pt will demo good sitting/standing posture and body mechanics for improved spine health and decreased risk of future injury.  5. Pt to tolerate HEP to improve ROM and independence with ADL's.  6. Increased B hip extension A/PROM to WNL for increased functional mobility.    Long Term Goals: 8 weeks  1. Report decreased low back pain without radiculopathy to </= 2/10  to increase tolerance for ADLs and yard work.  2. Increase strength to >/= 4+/5 MMT grade for BLE to increase tolerance for ADL and work activities.  3. Pt to demonstrate negative SLR and/or Slump Test in order to show improved core strength and decreased nerve/dural tension.  4. Patient's goal: To get rid of the pain  5. Pt will report at CJ level (20-40% impaired) on FOTO lumbar score for low back pain disability to demonstrate decrease in disability and improvement in back pain.      Plan     Certification Period: 10/9/18 to 12/9/18  Recommended Treatment Plan: 2 times per week for 8 weeks: Aquatic Therapy, Cervical/Lumbar Traction, Electrical Stimulation IFC/Russian, Gait Training, Manual Therapy, Moist Heat/ Ice, Neuromuscular Re-ed, Orthotic Management and Training, Patient Education, Self Care, Therapeutic Activites and Therapeutic Exercise  Other Recommendations: modalities prn, ASTYM prn, kinesiotape prn, Functional Dry Needling prn       Graham Shah, PT  10/9/2018      I CERTIFY THE NEED FOR THESE SERVICES FURNISHED UNDER THIS PLAN OF TREATMENT AND WHILE UNDER MY CARE    Physician's comments:  ________________________________________________________________________________________________________________________________________________      Physician's Name: ___________________________________

## 2018-10-11 ENCOUNTER — CLINICAL SUPPORT (OUTPATIENT)
Dept: REHABILITATION | Facility: HOSPITAL | Age: 63
End: 2018-10-11
Attending: PSYCHIATRY & NEUROLOGY
Payer: MEDICARE

## 2018-10-11 DIAGNOSIS — M53.86 DECREASED ROM OF LUMBAR SPINE: ICD-10-CM

## 2018-10-11 DIAGNOSIS — R53.1 DECREASED STRENGTH: ICD-10-CM

## 2018-10-11 PROCEDURE — 97110 THERAPEUTIC EXERCISES: CPT | Mod: PN

## 2018-10-11 NOTE — PROGRESS NOTES
DAILY TREATMENT NOTE    DATE: 10/11/2018    Start Time:  9:05  Stop Time:  10:00    PROCEDURES:    TIMED  Procedure Min.   TE 25   MT                  UNTIMED  Procedure Min.   Supervised TE 30         Total Timed Minutes:  25  Total Timed Units:  2 TE  Total Untimed Units:  0  Charges Billed/# of units:  2 TE      Progress/Current Status    Subjective:     Patient ID: Anjel Sanders is a 63 y.o. male.  Diagnosis:   1. Decreased strength     2. Decreased ROM of lumbar spine       Pain: 2 /10 Midline low back pain  Pt reports low back has been feeling a bit better    Objective:     Pt performed TE x 25 mins per log below 1:1 with PT, and supervised TE x 30 mins per log below.    DATE 10/11/18 10/9/2018   VISIT 2 1   POC 12/9/18        G CODE 2/10 1/10   FOTO 2/5 1/5   Cap Visit  Cap Total 60.64  173.84 113.20  113.20   MHP      MT Next  Hip ext mobs, next   Stretches:      HS stretch 3x30'' R Next on R   Piriformis stretch 3x30'' B Next    Hip flexor stretch     Supine:      LTRs 2' Next    TAs 15x5'' 5x5''   Brace knee fall outs      Brace hip extensions      Bridges 3x10 10x   SL hip abd 2x10 B     Clams 20x5'' B Next    Thoracic rotations 15x5''  Next    Dead bug Next             Prone:      Prone contra UE/LE ext       Quadruped contra UE/LE ext      Cat-Camel      Hip extensions Next after MT            Standing:      Rows      Lat pull downs 2x10 GTC Next BTC    Calf stretch on fitter 3x30'' DL Next    Leg Press 2x25 DL  6.5 plates  ^weight next Next    Palloff press 15x B double GTC  (no trunk rotation!) Next    Lifts      Chops      Gait      CP      INITIALS [facility]     Assessment:   A: decreased B hip ER and hip ext present   Perform manual for decreased B hip extension next visit and pt requires cues for proper palloff press performance without any rotation of the trunk. Pt tolerated all other exercises well today.    Patient Education/Response:     Cont HEP    Plans and Goals:     Cont per POC,  progress per pt tolerance. Pt limited by B knee and hip OA    GOALS: Short Term Goals: 4 weeks  1. Pt will demo good TA muscle contraction for improved deep abdominal strength and lumbar stability.  3. Increase lumbar ROM to WNL loss in order to improve functional mobility.    4. Pt will demo good sitting/standing posture and body mechanics for improved spine health and decreased risk of future injury.  5. Pt to tolerate HEP to improve ROM and independence with ADL's.  6. Increased B hip extension A/PROM to WNL for increased functional mobility.     Long Term Goals: 8 weeks  1. Report decreased low back pain without radiculopathy to </= 2/10  to increase tolerance for ADLs and yard work.  2. Increase strength to >/= 4+/5 MMT grade for BLE to increase tolerance for ADL and work activities.  3. Pt to demonstrate negative SLR and/or Slump Test in order to show improved core strength and decreased nerve/dural tension.  4. Patient's goal: To get rid of the pain  5. Pt will report at CJ level (20-40% impaired) on FOTO lumbar score for low back pain disability to demonstrate decrease in disability and improvement in back pain.

## 2018-10-18 ENCOUNTER — CLINICAL SUPPORT (OUTPATIENT)
Dept: REHABILITATION | Facility: HOSPITAL | Age: 63
End: 2018-10-18
Attending: PSYCHIATRY & NEUROLOGY
Payer: MEDICARE

## 2018-10-18 DIAGNOSIS — M53.86 DECREASED ROM OF LUMBAR SPINE: ICD-10-CM

## 2018-10-18 DIAGNOSIS — R53.1 DECREASED STRENGTH: ICD-10-CM

## 2018-10-18 PROCEDURE — 97140 MANUAL THERAPY 1/> REGIONS: CPT | Mod: PN

## 2018-10-18 PROCEDURE — 97110 THERAPEUTIC EXERCISES: CPT | Mod: PN

## 2018-10-18 NOTE — PROGRESS NOTES
DAILY TREATMENT NOTE    DATE: 10/18/2018    Start Time:  10:05  Stop Time:  11:00    PROCEDURES:    TIMED  Procedure Min.   TE 45   MT 10                 UNTIMED  Procedure Min.   Supervised TE          Total Timed Minutes:  45  Total Timed Units:  4  Total Untimed Units:  0  Charges Billed/# of units:  3 TE + 1 MT      Progress/Current Status    Subjective:     Patient ID: Anjel Sanders is a 63 y.o. male.  Diagnosis:   1. Decreased strength     2. 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 Normally back does not bother him. Pt reports after his session last Thursday he was walking around the corner for assistance to jump his car battery and stepped off unknowingly from a large drop off (probably about 2-3') landing on his L shoulder and L hip. He is currently able to lift his L arm in any direction and received x-rays last week for his L shoulder and hip that were negative for any fractures. He has not seen shoulder orthopedic MD yet. L hip is better just bruised and L shoulder is slightly better with him able to lay on it at night easier.    Objective:   O: decreased B hip mobility and poor L UE elevation in any direction above 45 degrees with sharp pain. Pt denied any TTP around L shoulder.  Pt performed TE x 45 mins per log below and received MT x 10 mins consisting of B hip extension mobs Grade III and B long axis hip distraction Grade III.    DATE 10/18/18 10/11/18 10/9/2018   VISIT 3 2 1   POC 12/9/18         G CODE 3/10 2/10 1/10   FOTO 3/5 2/5 1/5   Cap Visit  Cap Total 118.42  292.26 60.64  173.84 113.20  113.20   MHP       MT 10' Next  Hip ext mobs, next   Stretches:       HS stretch 3x30'' B 3x30'' R Next on R   Piriformis stretch NT 3x30'' B Next    SKTC 2x30'' B     Supine:       LTRs 2' w/ SB 2' Next    TAs 10x10'' 15x5'' 5x5''   Brace knee fall outs       Brace hip extensions       Bridges  2x15 3x10 10x   SL hip abd standing 2x10 B     Clams standing 20x5'' B Next    Thoracic rotations Hold  15x5''  Next    Dead bug Trial  Next              Prone:       Prone contra UE/LE ext        Quadruped contra UE/LE ext       Cat-Camel       Hip extensions  Next after MT             Standing:       Rows       Lat pull downs Hold  2x10 GTC Next BTC    Calf stretch on fitter 2x45'' DL 3x30'' DL Next    Leg Press  2x25 DL  6.5 plates  ^weight next Next    Palloff press Hold  15x B double GTC  (no trunk rotation!) Next    Lifts       Chops       Gait       CP       INITIALS MARY MARY MARY     Assessment:   A: decreased B hip ER and hip ext present   Modify manual and exercises as needed due to L shoulder pain and decreased mobility. Pt presents as a L RCT and PT recommended that pt schedule appointment with orthopaedic MD and further imaging of L shoulder.    Patient Education/Response:     Cont HEP    Plans and Goals:     Cont per POC, progress per pt tolerance. Pt limited by B knee and hip OA    GOALS: Short Term Goals: 4 weeks  1. Pt will demo good TA muscle contraction for improved deep abdominal strength and lumbar stability.  3. Increase lumbar ROM to WNL loss in order to improve functional mobility.    4. Pt will demo good sitting/standing posture and body mechanics for improved spine health and decreased risk of future injury.  5. Pt to tolerate HEP to improve ROM and independence with ADL's.  6. Increased B hip extension A/PROM to WNL for increased functional mobility.     Long Term Goals: 8 weeks  1. Report decreased low back pain without radiculopathy to </= 2/10  to increase tolerance for ADLs and yard work.  2. Increase strength to >/= 4+/5 MMT grade for BLE to increase tolerance for ADL and work activities.  3. Pt to demonstrate negative SLR and/or Slump Test in order to show improved core strength and decreased nerve/dural tension.  4. Patient's goal: To get rid of the pain  5. Pt will report at CJ level  (20-40% impaired) on FOTO lumbar score for low back pain disability to demonstrate decrease in disability and improvement in back pain.

## 2018-10-25 ENCOUNTER — CLINICAL SUPPORT (OUTPATIENT)
Dept: REHABILITATION | Facility: HOSPITAL | Age: 63
End: 2018-10-25
Attending: PSYCHIATRY & NEUROLOGY
Payer: MEDICARE

## 2018-10-25 DIAGNOSIS — M53.86 DECREASED ROM OF LUMBAR SPINE: ICD-10-CM

## 2018-10-25 DIAGNOSIS — R53.1 DECREASED STRENGTH: ICD-10-CM

## 2018-10-25 PROCEDURE — 97110 THERAPEUTIC EXERCISES: CPT | Mod: PN

## 2018-10-25 NOTE — PROGRESS NOTES
DAILY TREATMENT NOTE    DATE: 10/25/2018    Start Time:  9:40 AM  Stop Time:  10:15 AM    PROCEDURES:    TIMED  Procedure Min.   TE 35                     UNTIMED  Procedure Min.             Total Timed Minutes:  35  Total Timed Units:  2  Total Untimed Units:  0  Charges Billed/# of units:  2 TE       Progress/Current Status    Subjective:     Patient ID: Anjel Sanders is a 63 y.o. male.  Diagnosis:   1. Decreased strength     2. Decreased ROM of lumbar spine       Pain: pt states he tries his best to stay mobile at home. Limited by pain  In B hip and LE's. Pt agreeable to pT session     Objective:   O:  Pt performed TE x 35 mins per log below 1:1 w / PTA    DATE 10/25/18 10/18/18 10/11/18 10/9/2018   VISIT 4 3 2 1   POC 12/9/18          G CODE 4/10 3/10 2/10 1/10   FOTO 4/5 3/5 2/5 1/5   Cap Visit  Cap Total 60.64  352.90 118.42  292.26 60.64  173.84 113.20  113.20   MHP        MT NT 10' Next  Hip ext mobs, next   Stretches:        HS stretch 3x30'' B 3x30'' B 3x30'' R Next on R   Piriformis stretch  NT 3x30'' B Next    SKTC 2x30'' B 2x30'' B     Supine:        LTRs 2' w/ SB 2' w/ SB 2' Next    TAs 10x10'' 10x10'' 15x5'' 5x5''   Brace knee fall outs        Brace hip extensions        Bridges 2x15  2x15 3x10 10x   SL hip abd  standing 2x10 B     Clams  standing 20x5'' B Next    Thoracic rotations NT Hold  15x5''  Next    Dead bug NT Trial  Next               Prone:        Prone contra UE/LE ext         Quadruped contra UE/LE ext        Cat-Camel        Hip extensions   Next after MT              Standing:        Rows        Lat pull downs  Hold  2x10 GTC Next BTC    Calf stretch on fitter 3x30'' DL 2x45'' DL 3x30'' DL Next    Leg Press 8.0 3x10 B  2x25 DL  6.5 plates  ^weight next Next    Palloff press  Hold  15x B double GTC  (no trunk rotation!) Next    Lifts        Chops        Gait        CP        INITIALS JA 1/6 MARY MARY MARY     Assessment:   Pt completed session with time restraints. Tolerated session  with no adverse reactions.     Patient Education/Response:     Cont HEP    Plans and Goals:     Cont per POC, progress per pt tolerance. Pt limited by B knee and hip OA    GOALS: Short Term Goals: 4 weeks  1. Pt will demo good TA muscle contraction for improved deep abdominal strength and lumbar stability.  3. Increase lumbar ROM to WNL loss in order to improve functional mobility.    4. Pt will demo good sitting/standing posture and body mechanics for improved spine health and decreased risk of future injury.  5. Pt to tolerate HEP to improve ROM and independence with ADL's.  6. Increased B hip extension A/PROM to WNL for increased functional mobility.     Long Term Goals: 8 weeks  1. Report decreased low back pain without radiculopathy to </= 2/10  to increase tolerance for ADLs and yard work.  2. Increase strength to >/= 4+/5 MMT grade for BLE to increase tolerance for ADL and work activities.  3. Pt to demonstrate negative SLR and/or Slump Test in order to show improved core strength and decreased nerve/dural tension.  4. Patient's goal: To get rid of the pain  5. Pt will report at CJ level (20-40% impaired) on FOTO lumbar score for low back pain disability to demonstrate decrease in disability and improvement in back pain.

## 2018-10-30 ENCOUNTER — CLINICAL SUPPORT (OUTPATIENT)
Dept: REHABILITATION | Facility: HOSPITAL | Age: 63
End: 2018-10-30
Attending: PSYCHIATRY & NEUROLOGY
Payer: MEDICARE

## 2018-10-30 DIAGNOSIS — M53.86 DECREASED ROM OF LUMBAR SPINE: ICD-10-CM

## 2018-10-30 DIAGNOSIS — R53.1 DECREASED STRENGTH: ICD-10-CM

## 2018-10-30 PROCEDURE — 97110 THERAPEUTIC EXERCISES: CPT | Mod: PN

## 2018-10-30 NOTE — PROGRESS NOTES
DAILY TREATMENT NOTE    DATE: 10/30/2018    Start Time:  1500  Stop Time:  1545    PROCEDURES:    TIMED  Procedure Min.   TE 40                     UNTIMED  Procedure Min.             Total Timed Minutes:  40  Total Timed Units:  3  Total Untimed Units:  0  Charges Billed/# of units:  3 TE       Progress/Current Status    Subjective:     Patient ID: Anjel Sanders is a 63 y.o. male.  Diagnosis:   1. Decreased strength     2. Decreased ROM of lumbar spine       Mr. Sanders voices no new complaints.  Agreeable to PT.  Pain: 0-1/10 low back    Objective:   O:  Pt performed TE x 40 mins per log below 1:1 w / PT.    DATE 10/305/18   VISIT 5   POC 12/9/18      G CODE 5/5   FOTO 5/5 DONE   Cap Visit  Cap Total 90.96  443.86   MHP    MT NT   Stretches:    HS stretch 3x30'' B   Piriformis stretch -   SKTC 2x30'' B   Supine:    LTRs 2' w/ SB   TAs 10x10''   Brace knee fall outs    Brace hip extensions 2x10 B   Bridges 2x15    SL hip abd    Clams    Thoracic rotations NT   Dead bug 2x10 B        Prone:    Prone contra UE/LE ext     Quadruped contra UE/LE ext    Cat-Camel    Hip extensions         Standing:    Rows    Lat pull downs 3x10 GTB    Calf stretch on fitter 3x30'' DL   Leg Press 9.0 3x10 B   Palloff press    Lifts    Chops    Gait    CP    INITIALS JH     Assessment:   No low back pain with therapy session.  Limited positioning and therex due to L shoulder pain from recent fall. Good abdominal activation but quick too fatigue.  Pain with forward bending at end-range return motion but patient already stands past neutral lumbar extension.    Patient Education/Response:   Cont HEP    Plans and Goals:   Cont per POC, progress per pt tolerance. Pt limited by B knee and hip OA    GOALS: Short Term Goals: 4 weeks  1. Pt will demo good TA muscle contraction for improved deep abdominal strength and lumbar stability.  3. Increase lumbar ROM to WNL loss in order to improve functional mobility.    4. Pt will demo good  sitting/standing posture and body mechanics for improved spine health and decreased risk of future injury.  5. Pt to tolerate HEP to improve ROM and independence with ADL's.  6. Increased B hip extension A/PROM to WNL for increased functional mobility.     Long Term Goals: 8 weeks  1. Report decreased low back pain without radiculopathy to </= 2/10  to increase tolerance for ADLs and yard work.  2. Increase strength to >/= 4+/5 MMT grade for BLE to increase tolerance for ADL and work activities.  3. Pt to demonstrate negative SLR and/or Slump Test in order to show improved core strength and decreased nerve/dural tension.  4. Patient's goal: To get rid of the pain  5. Pt will report at CJ level (20-40% impaired) on FOTO lumbar score for low back pain disability to demonstrate decrease in disability and improvement in back pain.

## 2018-11-01 ENCOUNTER — CLINICAL SUPPORT (OUTPATIENT)
Dept: REHABILITATION | Facility: HOSPITAL | Age: 63
End: 2018-11-01
Attending: PSYCHIATRY & NEUROLOGY
Payer: MEDICARE

## 2018-11-01 DIAGNOSIS — M53.86 DECREASED ROM OF LUMBAR SPINE: ICD-10-CM

## 2018-11-01 DIAGNOSIS — R53.1 DECREASED STRENGTH: ICD-10-CM

## 2018-11-01 PROCEDURE — 97110 THERAPEUTIC EXERCISES: CPT | Mod: PN

## 2018-11-01 NOTE — PROGRESS NOTES
"DAILY TREATMENT NOTE    DATE: 11/1/2018    Start Time:  8:15 AM  Stop Time:  9:00 AM    PROCEDURES:    TIMED  Procedure Min.   TE 25                     UNTIMED  Procedure Min.             Total Timed Minutes:  25  Total Timed Units:  2  Total Untimed Units:  0  Charges Billed/# of units:  2 TE       Progress/Current Status    Subjective:     Patient ID: Anjel Sanders is a 63 y.o. male.  Diagnosis:   1. Decreased strength     2. Decreased ROM of lumbar spine         Pain: Pt reports no new complaints of pain upon arrival. States he has not been doing any HEP    Objective:   O:  Pt performed Therapeutic exercise x 25 mins per log below 1:1 w / PTA, and 20 min under PTA supervision.    DATE 11/1/18 10/305/18   VISIT 6 5   POC 12/9/18       G CODE 6/10 5/5   FOTO 6/10 5/5 DONE   Cap Visit  Cap Total 60.64  504.50 90.96  443.86   MHP     MT - NT   Stretches:     HS stretch 3x30'' B 3x30'' B   Piriformis stretch  -   SKTC 3x30'' B 2x30'' B   Supine:     LTRs 2' w/ SB 2' w/ SB   TAs 10"x10 10x10''   Brace knee fall outs     Brace hip extensions 2x10 B 2x10 B   Bridges 2x15 2x15    SL hip abd     Clams     Thoracic rotations  NT   Dead bug Marching only due to Left shoulder pain. 2x10 B         Prone:     Prone contra UE/LE ext      Quadruped contra UE/LE ext     Cat-Camel     Hip extensions           Standing:     Rows     Lat pull downs 3x10 GTB  3x10 GTB    Calf stretch on fitter 30"X3 DL 3x30'' DL   Leg Press 9.0 3x10 B 9.0 3x10 B   Palloff press     Lifts     Chops     Gait     CP     INITIALS JA 1/6      Assessment:   Pt completed session with no reports of increased mid lower back pain. Some verbal instructions provided for safety and technique with core activities.     Patient Education/Response:   Cont HEP    Plans and Goals:   Cont per POC, progress per pt tolerance. Pt limited by B knee and hip OA    GOALS: Short Term Goals: 4 weeks  1. Pt will demo good TA muscle contraction for improved deep abdominal " strength and lumbar stability.  3. Increase lumbar ROM to WNL loss in order to improve functional mobility.    4. Pt will demo good sitting/standing posture and body mechanics for improved spine health and decreased risk of future injury.  5. Pt to tolerate HEP to improve ROM and independence with ADL's.  6. Increased B hip extension A/PROM to WNL for increased functional mobility.     Long Term Goals: 8 weeks  1. Report decreased low back pain without radiculopathy to </= 2/10  to increase tolerance for ADLs and yard work.  2. Increase strength to >/= 4+/5 MMT grade for BLE to increase tolerance for ADL and work activities.  3. Pt to demonstrate negative SLR and/or Slump Test in order to show improved core strength and decreased nerve/dural tension.  4. Patient's goal: To get rid of the pain  5. Pt will report at CJ level (20-40% impaired) on FOTO lumbar score for low back pain disability to demonstrate decrease in disability and improvement in back pain.

## 2018-11-06 ENCOUNTER — CLINICAL SUPPORT (OUTPATIENT)
Dept: REHABILITATION | Facility: HOSPITAL | Age: 63
End: 2018-11-06
Attending: PSYCHIATRY & NEUROLOGY
Payer: MEDICARE

## 2018-11-06 DIAGNOSIS — R53.1 DECREASED STRENGTH: ICD-10-CM

## 2018-11-06 DIAGNOSIS — M53.86 DECREASED ROM OF LUMBAR SPINE: ICD-10-CM

## 2018-11-06 PROCEDURE — 97110 THERAPEUTIC EXERCISES: CPT | Mod: PN

## 2018-11-06 PROCEDURE — 97140 MANUAL THERAPY 1/> REGIONS: CPT | Mod: PN

## 2018-11-06 NOTE — PROGRESS NOTES
"DAILY TREATMENT NOTE    DATE: 11/6/2018    Start Time:  1200  Stop Time:  1300    PROCEDURES:    TIMED  Procedure Min.   TE 45   MT 10                 UNTIMED  Procedure Min.             Total Timed Minutes:  55  Total Timed Units:  4  Total Untimed Units:  -  Charges Billed/# of units:  3 TE, 1 MT      Progress/Current Status    Subjective:     Patient ID: Anjel Sanders is a 63 y.o. male.  Diagnosis:   1. Decreased strength     2. Decreased ROM of lumbar spine       Mr. Sanders voices no complaints with regards to his lumbosacral area.   Pain: L shoulder pain; worse at night and lying on his L shoulder.     Objective:   O: Brief L shoulder assessment: (+) Drop arm test, (+) Concepcion's test, PROM flexion/abduction >> AROM, pain and weakness with L supraspinatus and infraspinatus testing.     Pt performed Therapeutic exercise x 45 mins per log below 1:1 w / PT; see log below.    PT provided manual therapy to L shoulder complex including cervical and shoulders assessment x 10 minutes.     Patient decline ice pack for L shoulder joint.     DATE 11/6/2018   VISIT 7   POC 12/9/18      G CODE 7/10   FOTO 7/10   Cap Visit  Cap Total 118.42  622.92   MHP    MT -   Stretches:    HS stretch 3x30'' B   Piriformis stretch    SKTC 3x30'' B   Supine:    LTRs 2' w/ SB   TAs 10"x10   Brace knee fall outs    Brace hip extensions 2x10 B   Brace with SLR 2x10 B   Bridges 2x15   SL hip abd    Clams    Thoracic rotations    Dead bug Marching only due to Left shoulder pain.        Prone:    Prone contra UE/LE ext     Quadruped contra UE/LE ext    Cat-Camel    Hip extensions         Standing:    Rows    Lat pull downs 3x10 GTB    Calf stretch on fitter 30"X3 DL   Leg Press 9.0 3x10 B   Palloff press    Lifts    Chops    Gait    CP    INITIALS JA 1/6     Assessment:   A:  Tolerate lumbar program activities without pain.  Improving core strength and HS flexibility. Brief L shoulder assessment reveals possible RTC dysfunction; cannot r/o " tear.      Patient Education/Response:   Cont HEP    Plans and Goals:   Cont per POC, progress per pt tolerance. Pt limited by B knee and hip OA    GOALS: Short Term Goals: 4 weeks  1. Pt will demo good TA muscle contraction for improved deep abdominal strength and lumbar stability.  3. Increase lumbar ROM to WNL loss in order to improve functional mobility.    4. Pt will demo good sitting/standing posture and body mechanics for improved spine health and decreased risk of future injury.  5. Pt to tolerate HEP to improve ROM and independence with ADL's.  6. Increased B hip extension A/PROM to WNL for increased functional mobility.     Long Term Goals: 8 weeks  1. Report decreased low back pain without radiculopathy to </= 2/10  to increase tolerance for ADLs and yard work.  2. Increase strength to >/= 4+/5 MMT grade for BLE to increase tolerance for ADL and work activities.  3. Pt to demonstrate negative SLR and/or Slump Test in order to show improved core strength and decreased nerve/dural tension.  4. Patient's goal: To get rid of the pain  5. Pt will report at CJ level (20-40% impaired) on FOTO lumbar score for low back pain disability to demonstrate decrease in disability and improvement in back pain.

## 2018-11-08 ENCOUNTER — CLINICAL SUPPORT (OUTPATIENT)
Dept: REHABILITATION | Facility: HOSPITAL | Age: 63
End: 2018-11-08
Attending: PSYCHIATRY & NEUROLOGY
Payer: MEDICARE

## 2018-11-08 DIAGNOSIS — R53.1 DECREASED STRENGTH: ICD-10-CM

## 2018-11-08 DIAGNOSIS — M53.86 DECREASED ROM OF LUMBAR SPINE: ICD-10-CM

## 2018-11-08 PROCEDURE — 97110 THERAPEUTIC EXERCISES: CPT | Mod: PN

## 2018-11-08 NOTE — PROGRESS NOTES
"  Physical Therapy Daily Treatment Note     Name: Anjel Sanders  Clinic Number: [id number]    Therapy Diagnosis:   Encounter Diagnoses   Name Primary?    Decreased strength     Decreased ROM of lumbar spine      Physician: Anthony Gamboa MD    Visit Date: 11/8/2018    Start Time:  9:10  Stop Time:  9:55     PROCEDURES:     TIMED  Procedure Min.   TE 45                                                                     UNTIMED  Procedure Min.                  Total Timed Minutes:  45  Total Timed Units:  3  Total Untimed Units:    Charges Billed/# of units:  TE-3    Subjective     Pt reports: not feeling well today due to going to the [place] game last night and doing a lot of walking which increased his lower back pain. Pt reports trouble sleeping due to L shoulder pain.   He was not compliant with home exercise program, stating he was just here two days ago.   Response to previous treatment: no adverse effects  Functional change: none    Pain: 3/10 currently, 8/10 last night  Location: lower back , L shoulder    Objective   Pt arriving 10' late to session   Anjel received therapeutic exercises to develop strength, ROM, posture and core stabilization for 45 minutes including:  Log below     DATE 11/8/18 11/6/2018   VISIT 8 7   POC 12/9/18        G CODE 8/10 7/10   FOTO 8/10 7/10   Cap Visit  Cap Total 90.96  713.88 118.42  622.92   MHP      MT  -   Stretches:      HS stretch 3x30" B 3x30'' B   Piriformis stretch      SKTC 3x30" B 3x30'' B   Supine:      LTRs 2' w/ SB 2' w/ SB   TAs 10" x 10 10"x10   Brace knee fall outs      Brace hip extensions 2x10 B 2x10 B   Brace with SLR 2x10 B 2x10 B   Bridges 2x15 2x15   SL hip abd      Clams      Thoracic rotations      Dead bug Marching 2x15 B Marching only due to Left shoulder pain.          Prone:      Prone contra UE/LE ext       Quadruped contra UE/LE ext      Cat-Camel      Hip extensions             Standing:      Rows      Lat pull downs 3x10 3# cable cross " "3x10 GTB    Calf stretch on fitter 3x30" DL 30"X3 DL   Leg Press 10.0 5x10 B 9.0 3x10 B   Palloff press      Lifts      Chops      Gait      CP      INITIALS AC 2/6 JA 1/6       Home Exercises Provided and Patient Education Provided     Education provided:   - Continue HEP    Written Home Exercises Provided: Patient instructed to cont prior HEP.  Exercises were reviewed and Anjel was able to demonstrate them prior to the end of the session.  Anjel demonstrated good  understanding of the education provided.     See EMR under Patient Instructions for exercises provided prior visit.    Assessment     Pt with report of decrease in back pain towards completion.   Anjel is progressing well towards his goals.   Pt prognosis is Good.     Pt will continue to benefit from skilled outpatient physical therapy to address the deficits listed in the problem list box on initial evaluation, provide pt/family education and to maximize pt's level of independence in the home and community environment.   Pt's spiritual, cultural and educational needs considered and pt agreeable to plan of care and goals.    Anticipated barriers to physical therapy: none    GOALS: Short Term Goals: 4 weeks  1. Pt will demo good TA muscle contraction for improved deep abdominal strength and lumbar stability.  3. Increase lumbar ROM to WNL loss in order to improve functional mobility.    4. Pt will demo good sitting/standing posture and body mechanics for improved spine health and decreased risk of future injury.  5. Pt to tolerate HEP to improve ROM and independence with ADL's.  6. Increased B hip extension A/PROM to WNL for increased functional mobility.     Long Term Goals: 8 weeks  1. Report decreased low back pain without radiculopathy to </= 2/10  to increase tolerance for ADLs and yard work.  2. Increase strength to >/= 4+/5 MMT grade for BLE to increase tolerance for ADL and work activities.  3. Pt to demonstrate negative SLR and/or Slump Test in " order to show improved core strength and decreased nerve/dural tension.  4. Patient's goal: To get rid of the pain  5. Pt will report at CJ level (20-40% impaired) on FOTO lumbar score for low back pain disability to demonstrate decrease in disability and improvement in back pain.    Plan     Continue POC. Progress as tolerated.    Marleen Sebastian, PTA

## 2018-11-13 ENCOUNTER — CLINICAL SUPPORT (OUTPATIENT)
Dept: REHABILITATION | Facility: HOSPITAL | Age: 63
End: 2018-11-13
Attending: PSYCHIATRY & NEUROLOGY
Payer: MEDICARE

## 2018-11-13 DIAGNOSIS — M53.86 DECREASED ROM OF LUMBAR SPINE: ICD-10-CM

## 2018-11-13 DIAGNOSIS — R53.1 DECREASED STRENGTH: ICD-10-CM

## 2018-11-13 PROCEDURE — 97110 THERAPEUTIC EXERCISES: CPT | Mod: PN

## 2018-11-13 NOTE — PROGRESS NOTES
"  Physical Therapy Daily Treatment Note     Name: Anjel Sanders  Clinic Number: [id number]    Therapy Diagnosis:   Encounter Diagnoses   Name Primary?    Decreased strength     Decreased ROM of lumbar spine      Physician: Anthony Gamboa MD    Visit Date: 11/13/2018    Start Time:  2:00  Stop Time:  3:00     PROCEDURES:     TIMED  Procedure Min.   TE 60                                                                     UNTIMED  Procedure Min.                  Total Timed Minutes:  60  Total Timed Units:  4  Total Untimed Units:    Charges Billed/# of units:  TE-4    Subjective     Pt reports: he feels like his back is getting better although still reports bad shoulder pain. Pt stated the main thing that increases his back pain is walking/standing for long periods of time.  He was partially compliant with home exercise program.  Response to previous treatment: no adverse effects  Functional change: none    Pain: 2-3/10  Location: bilateral lower back     Objective     Anjel received therapeutic exercises to develop strength, ROM, posture and core stabilization for 60 minutes including:  Log below     DATE 11/13/18 11/8/18 11/6/2018   VISIT 9 8 7   POC 12/9/18          G CODE 9/10 8/10 7/10   FOTO 9/10 NEXT 8/10 7/10   Cap Visit  Cap Total 121.28  835.16   90.96  713.88 118.42  622.92   MHP        MT    -   Stretches:        HS stretch 3x30" B 3x30" B 3x30'' B   Piriformis stretch        SKTC 3x30" B 3x30" B 3x30'' B   DKTC x15 w/ SB     Supine:        LTRs 2' w/ SB 2' w/ SB 2' w/ SB   TAs 10" x 10 10" x 10 10"x10   Brace knee fall outs        Brace hip extensions 2x10 B 2x10 B 2x10 B   Brace with SLR 2x10 B 2x10 B 2x10 B   Bridges 2x15 w/ ball adduction 2x15 2x15   SL hip abd        Clams        Thoracic rotations        Dead bug Marching 2x15 B Marching 2x15 B Marching only due to Left shoulder pain.            Prone:        Prone contra UE/LE ext         Quadruped contra UE/LE ext        Cat-Camel        Hip " "extensions                 Standing:        Hip abduction 2x10 B       Hip extension 2x10 B     Rows      Lat pull downs 3x10 GTB 3x10 3# cable cross 3x10 GTB    Calf stretch on fitter 3x30" DL 3x30" DL 30"X3 DL   Leg Press 9.5 3x10 DL 10.0 5x10 B 9.0 3x10 B   Palloff press        Lifts        Chops        Gait        CP        INITIALS AC 3/6 AC 2/6 JA 1/6        Home Exercises Provided and Patient Education Provided     Education provided:   - Continue HEP    Written Home Exercises Provided: Patient instructed to cont prior HEP.  Exercises were reviewed and Anjel was able to demonstrate them prior to the end of the session.  Anjel demonstrated good  understanding of the education provided.     See EMR under Patient Instructions for exercises provided prior visit.    Assessment     Pt with report of decreased pain towards completion. Addition of standing hip abduction and extension with no pain reported.   Anjel is progressing well towards his goals.   Pt prognosis is Good.     Pt will continue to benefit from skilled outpatient physical therapy to address the deficits listed in the problem list box on initial evaluation, provide pt/family education and to maximize pt's level of independence in the home and community environment.   Pt's spiritual, cultural and educational needs considered and pt agreeable to plan of care and goals.    Anticipated barriers to physical therapy: none    GOALS: Short Term Goals: 4 weeks  1. Pt will demo good TA muscle contraction for improved deep abdominal strength and lumbar stability.  3. Increase lumbar ROM to WNL loss in order to improve functional mobility.    4. Pt will demo good sitting/standing posture and body mechanics for improved spine health and decreased risk of future injury.  5. Pt to tolerate HEP to improve ROM and independence with ADL's.  6. Increased B hip extension A/PROM to WNL for increased functional mobility.     Long Term Goals: 8 weeks  1. Report decreased " low back pain without radiculopathy to </= 2/10  to increase tolerance for ADLs and yard work.  2. Increase strength to >/= 4+/5 MMT grade for BLE to increase tolerance for ADL and work activities.  3. Pt to demonstrate negative SLR and/or Slump Test in order to show improved core strength and decreased nerve/dural tension.  4. Patient's goal: To get rid of the pain  5. Pt will report at CJ level (20-40% impaired) on FOTO lumbar score for low back pain disability to demonstrate decrease in disability and improvement in back pain.    Plan   Continue POC. Progress as tolerated.    Marleen Sebastian, PTA

## 2018-11-15 ENCOUNTER — CLINICAL SUPPORT (OUTPATIENT)
Dept: REHABILITATION | Facility: HOSPITAL | Age: 63
End: 2018-11-15
Attending: PSYCHIATRY & NEUROLOGY
Payer: MEDICARE

## 2018-11-15 ENCOUNTER — TELEPHONE (OUTPATIENT)
Dept: ADMINISTRATIVE | Facility: HOSPITAL | Age: 63
End: 2018-11-15

## 2018-11-15 DIAGNOSIS — R53.1 DECREASED STRENGTH: ICD-10-CM

## 2018-11-15 DIAGNOSIS — Z13.5 ENCOUNTER FOR SCREENING FOR DIABETIC RETINOPATHY: Primary | ICD-10-CM

## 2018-11-15 DIAGNOSIS — M53.86 DECREASED ROM OF LUMBAR SPINE: ICD-10-CM

## 2018-11-15 PROCEDURE — 97110 THERAPEUTIC EXERCISES: CPT | Mod: PN

## 2018-11-15 NOTE — PROGRESS NOTES
"  Physical Therapy Daily Treatment Note     Name: Anjel Sanders  Clinic Number: [id number]    Therapy Diagnosis:   Encounter Diagnoses   Name Primary?    Decreased strength     Decreased ROM of lumbar spine      Physician: Anthony Gamboa MD    Visit Date: 11/15/2018    Start Time:  9:05  Stop Time:  10:10     PROCEDURES:     TIMED  Procedure Min.   TE 20   TE Supervised 45                                                                 UNTIMED  Procedure Min.                  Total Timed Minutes:  20  Total Timed Units:  1  Total Untimed Units:    Charges Billed/# of units:  TE-1    Subjective     Pt reports: shoulder pain today but no lower back pain, stating "its really coming along well"  He was compliant with home exercise program.  Response to previous treatment: no adverse effects  Functional change: none    Pain: 0/10  Location: lower back    Objective     Anjel received therapeutic exercises to develop strength, endurance and ROM for 20 minutes 1:1 with PTA and 45' supervised including:  Log below     DATE 11/15/18 11/13/18 11/8/18 11/6/2018   VISIT 10 9 8 7   POC 12/9/18            G CODE 10/10 9/10 8/10 7/10   FOTO 10/10 DONE 9/10 NEXT 8/10 7/10   Cap Visit  Cap Total 30.32  865.48   121.28  835.16    90.96  713.88 118.42  622.92   MHP          MT      -   Stretches:          HS stretch 3x30" B 3x30" B 3x30" B 3x30'' B   Piriformis stretch          SKTC 3x30" B 3x30" B 3x30" B 3x30'' B   DKTC 15 w/ SB x15 w/ SB       Supine:          LTRs 2" w/ SB 2' w/ SB 2' w/ SB 2' w/ SB   TAs 10" x 10 10" x 10 10" x 10 10"x10   Brace knee fall outs 2x10         Brace hip extensions 2x10 B 2x10 B 2x10 B 2x10 B   Brace with SLR 2x10 B 2x10 B 2x10 B 2x10 B   Bridges 2x15 w/ ball 2x15 w/ ball adduction 2x15 2x15   SL hip abd          Clams          Thoracic rotations          Dead bug Marching 2x15 B Marching 2x15 B Marching 2x15 B Marching only due to Left shoulder pain.              Prone:          Prone contra UE/LE " "ext           Quadruped contra UE/LE ext          Cat-Camel          Hip extensions                     Standing:          Hip abduction 2x10 B 2x10 B       Hip extension 2x10 B 2x10 B       Rows          Lat pull downs 3x10 GTB 3x10 GTB 3x10 3# cable cross 3x10 GTB    Calf stretch on fitter 3x30" DL 3x30" DL 3x30" DL 30"X3 DL   Heel raises 3x10      Leg Press 9.5 3x10 DL 9.5 3x10 DL 10.0 5x10 B 9.0 3x10 B   Palloff press          Lifts          Chops          Gait          CP          INITIALS AC 4/6 AC 3/6 AC 2/6 JA 1/6        Home Exercises Provided and Patient Education Provided     Education provided:   - Continue HEP    Written Home Exercises Provided: Patient instructed to cont prior HEP.  Exercises were reviewed and Anjel was able to demonstrate them prior to the end of the session.  Anjel demonstrated good  understanding of the education provided.     See EMR under Patient Instructions for exercises provided prior visit.    Assessment     Pt required cueing for most of his there-ex for correct form, otherwise performing well with no c/o pain.   Anjel is progressing well towards his goals.   Pt prognosis is Excellent.     Pt will continue to benefit from skilled outpatient physical therapy to address the deficits listed in the problem list box on initial evaluation, provide pt/family education and to maximize pt's level of independence in the home and community environment.   Pt's spiritual, cultural and educational needs considered and pt agreeable to plan of care and goals.    Anticipated barriers to physical therapy: none    GOALS: Short Term Goals: 4 weeks  1. Pt will demo good TA muscle contraction for improved deep abdominal strength and lumbar stability.  3. Increase lumbar ROM to WNL loss in order to improve functional mobility.    4. Pt will demo good sitting/standing posture and body mechanics for improved spine health and decreased risk of future injury.  5. Pt to tolerate HEP to improve ROM and " independence with ADL's.  6. Increased B hip extension A/PROM to WNL for increased functional mobility.     Long Term Goals: 8 weeks  1. Report decreased low back pain without radiculopathy to </= 2/10  to increase tolerance for ADLs and yard work.  2. Increase strength to >/= 4+/5 MMT grade for BLE to increase tolerance for ADL and work activities.  3. Pt to demonstrate negative SLR and/or Slump Test in order to show improved core strength and decreased nerve/dural tension.  4. Patient's goal: To get rid of the pain  5. Pt will report at CJ level (20-40% impaired) on FOTO lumbar score for low back pain disability to demonstrate decrease in disability and improvement in back pain.    Plan   Continue POC. Progress as tolerated.    Marleen Sebastian, PTA

## 2018-11-20 ENCOUNTER — CLINICAL SUPPORT (OUTPATIENT)
Dept: REHABILITATION | Facility: HOSPITAL | Age: 63
End: 2018-11-20
Attending: PSYCHIATRY & NEUROLOGY
Payer: MEDICARE

## 2018-11-20 DIAGNOSIS — R53.1 DECREASED STRENGTH: ICD-10-CM

## 2018-11-20 DIAGNOSIS — M53.86 DECREASED ROM OF LUMBAR SPINE: ICD-10-CM

## 2018-11-20 PROCEDURE — 97110 THERAPEUTIC EXERCISES: CPT | Mod: PN

## 2018-11-20 NOTE — PROGRESS NOTES
"  Physical Therapy Daily Treatment Note     Name: Anjel Sanders  Clinic Number: [id number]    Therapy Diagnosis:   Encounter Diagnoses   Name Primary?    Decreased strength     Decreased ROM of lumbar spine      Physician: Anthony Gamboa MD    Visit Date: 11/20/2018    Start Time:  2:00  Stop Time:  2:58     PROCEDURES:     TIMED  Procedure Min.   TE 53                                                                     UNTIMED  Procedure Min.    Bike  5            Total Timed Minutes: 53  Total Timed Units:  4  Total Untimed Units:    Charges Billed/# of units:  TE-4    Subjective     Pt reports: that his back has not been bothering him at all but he is still having shoulder pain.  He was compliant with home exercise program.  Response to previous treatment: no adverse effects  Functional change: none    Pain: 0/10  Location: lower back    Objective     Anjel received therapeutic exercises to develop strength, endurance and ROM for 53 including:  Log below     DATE 11/20/18 11/15/18 11/13/18 11/8/18 11/6/2018   VISIT 11 10 9 8 7   POC 12/9/18             G CODE  10/10 9/10 8/10 7/10   FOTO  10/10 DONE 9/10 NEXT 8/10 7/10   Cap Visit  Cap Total 121.28  986.76 30.32  865.48 121.28  835.16    90.96  713.88 118.42  622.92   MHP           MT       -   Stretches:           HS stretch 3x30" B 3x30" B 3x30" B 3x30" B 3x30'' B   Piriformis stretch           SKTC 3x30" B 3x30" B 3x30" B 3x30" B 3x30'' B   DKTC 2' w/ SB 15 w/ SB x15 w/ SB       Supine:           LTRs 2" w/ SB 2" w/ SB 2' w/ SB 2' w/ SB 2' w/ SB   TAs 10" x 10 10" x 10 10" x 10 10" x 10 10"x10   Brace knee fall outs 2x15 B 2x10          Brace hip extensions 2x15 B 2x10 B 2x10 B 2x10 B 2x10 B   Brace with SLR 2x15 B 2x10 B 2x10 B 2x10 B 2x10 B   Bridges 2x15 w/ ball 2x15 w/ ball 2x15 w/ ball adduction 2x15 2x15   SL hip abd           Clams           Thoracic rotations           Dead bug Marching 2x15 B Marching 2x15 B Marching 2x15 B Marching 2x15 B " "Marching only due to Left shoulder pain.               Prone:           Prone contra UE/LE ext            Quadruped contra UE/LE ext           Cat-Camel           Hip extensions                       Standing:           Hip abduction oot 2x10 B 2x10 B       Hip extension oot 2x10 B 2x10 B       Rows           Lat pull downs 3x10 GTC 3x10 GTB 3x10 GTB 3x10 3# cable cross 3x10 GTB    Calf stretch on fitter 3x30" DL 3x30" DL 3x30" DL 3x30" DL 30"X3 DL   Heel raises 3x10 3x10      Leg Press 9.5 3x10 DL 9.5 3x10 DL 9.5 3x10 DL 10.0 5x10 B 9.0 3x10 B   Palloff press           Lifts           Chops           Gait           CP           INITIALS AC 5/6 AC 4/6 AC 3/6 AC 2/6 JA 1/6        Home Exercises Provided and Patient Education Provided     Education provided:   - Continue HEP    Written Home Exercises Provided: Patient instructed to cont prior HEP.  Exercises were reviewed and Anjel was able to demonstrate them prior to the end of the session.  Anjel demonstrated good  understanding of the education provided.     See EMR under Patient Instructions for exercises provided prior visit.    Assessment     Pt able to complete session with no report of increased back pain. Pt suffering from muscle cramps in R calf today, as well as reporting experiencing numbness in R posterior leg while performing calf stretch.    Anjel is progressing well towards his goals.   Pt prognosis is Excellent.     Pt will continue to benefit from skilled outpatient physical therapy to address the deficits listed in the problem list box on initial evaluation, provide pt/family education and to maximize pt's level of independence in the home and community environment.   Pt's spiritual, cultural and educational needs considered and pt agreeable to plan of care and goals.    Anticipated barriers to physical therapy: none    GOALS: Short Term Goals: 4 weeks  1. Pt will demo good TA muscle contraction for improved deep abdominal strength and lumbar " stability.  3. Increase lumbar ROM to WNL loss in order to improve functional mobility.    4. Pt will demo good sitting/standing posture and body mechanics for improved spine health and decreased risk of future injury.  5. Pt to tolerate HEP to improve ROM and independence with ADL's.  6. Increased B hip extension A/PROM to WNL for increased functional mobility.     Long Term Goals: 8 weeks  1. Report decreased low back pain without radiculopathy to </= 2/10  to increase tolerance for ADLs and yard work.  2. Increase strength to >/= 4+/5 MMT grade for BLE to increase tolerance for ADL and work activities.  3. Pt to demonstrate negative SLR and/or Slump Test in order to show improved core strength and decreased nerve/dural tension.  4. Patient's goal: To get rid of the pain  5. Pt will report at CJ level (20-40% impaired) on FOTO lumbar score for low back pain disability to demonstrate decrease in disability and improvement in back pain.    Plan   Continue POC. Progress as tolerated.    Marleen Sebastian, PTA

## 2018-11-27 ENCOUNTER — CLINICAL SUPPORT (OUTPATIENT)
Dept: REHABILITATION | Facility: HOSPITAL | Age: 63
End: 2018-11-27
Attending: PSYCHIATRY & NEUROLOGY
Payer: MEDICARE

## 2018-11-27 ENCOUNTER — LAB VISIT (OUTPATIENT)
Dept: LAB | Facility: HOSPITAL | Age: 63
End: 2018-11-27
Attending: FAMILY MEDICINE
Payer: MEDICARE

## 2018-11-27 ENCOUNTER — OFFICE VISIT (OUTPATIENT)
Dept: FAMILY MEDICINE | Facility: CLINIC | Age: 63
End: 2018-11-27
Attending: FAMILY MEDICINE
Payer: MEDICARE

## 2018-11-27 ENCOUNTER — TELEPHONE (OUTPATIENT)
Dept: FAMILY MEDICINE | Facility: CLINIC | Age: 63
End: 2018-11-27

## 2018-11-27 VITALS
HEART RATE: 65 BPM | HEIGHT: 70 IN | OXYGEN SATURATION: 98 % | BODY MASS INDEX: 33.27 KG/M2 | SYSTOLIC BLOOD PRESSURE: 130 MMHG | WEIGHT: 232.38 LBS | DIASTOLIC BLOOD PRESSURE: 80 MMHG

## 2018-11-27 DIAGNOSIS — Z12.5 ENCOUNTER FOR SCREENING FOR MALIGNANT NEOPLASM OF PROSTATE: ICD-10-CM

## 2018-11-27 DIAGNOSIS — R53.1 DECREASED STRENGTH: ICD-10-CM

## 2018-11-27 DIAGNOSIS — M19.112 OSTEOARTHRITIS OF LEFT SHOULDER DUE TO ROTATOR CUFF INJURY: ICD-10-CM

## 2018-11-27 DIAGNOSIS — E66.9 OBESITY (BMI 30-39.9): ICD-10-CM

## 2018-11-27 DIAGNOSIS — E11.9 CONTROLLED TYPE 2 DIABETES MELLITUS WITHOUT COMPLICATION, WITHOUT LONG-TERM CURRENT USE OF INSULIN: ICD-10-CM

## 2018-11-27 DIAGNOSIS — G47.33 OSA (OBSTRUCTIVE SLEEP APNEA): ICD-10-CM

## 2018-11-27 DIAGNOSIS — S46.002S OSTEOARTHRITIS OF LEFT SHOULDER DUE TO ROTATOR CUFF INJURY: ICD-10-CM

## 2018-11-27 DIAGNOSIS — R25.2 LEG CRAMPS: ICD-10-CM

## 2018-11-27 DIAGNOSIS — I10 ESSENTIAL HYPERTENSION: ICD-10-CM

## 2018-11-27 DIAGNOSIS — I10 ESSENTIAL HYPERTENSION: Primary | ICD-10-CM

## 2018-11-27 DIAGNOSIS — Z23 IMMUNIZATION DUE: ICD-10-CM

## 2018-11-27 DIAGNOSIS — E78.2 MIXED HYPERLIPIDEMIA: ICD-10-CM

## 2018-11-27 DIAGNOSIS — I69.351 HEMIPARESIS AFFECTING RIGHT SIDE AS LATE EFFECT OF CEREBROVASCULAR ACCIDENT: ICD-10-CM

## 2018-11-27 DIAGNOSIS — M75.42 ROTATOR CUFF IMPINGEMENT SYNDROME OF LEFT SHOULDER: ICD-10-CM

## 2018-11-27 DIAGNOSIS — M53.86 DECREASED ROM OF LUMBAR SPINE: ICD-10-CM

## 2018-11-27 LAB
ALBUMIN SERPL BCP-MCNC: 4.2 G/DL
ALP SERPL-CCNC: 108 U/L
ALT SERPL W/O P-5'-P-CCNC: 35 U/L
ANION GAP SERPL CALC-SCNC: 7 MMOL/L
AST SERPL-CCNC: 29 U/L
BILIRUB SERPL-MCNC: 1 MG/DL
BUN SERPL-MCNC: 12 MG/DL
CALCIUM SERPL-MCNC: 9.9 MG/DL
CHLORIDE SERPL-SCNC: 103 MMOL/L
CK SERPL-CCNC: 1250 U/L
CO2 SERPL-SCNC: 30 MMOL/L
COMPLEXED PSA SERPL-MCNC: 1.3 NG/ML
CREAT SERPL-MCNC: 1.2 MG/DL
EST. GFR  (AFRICAN AMERICAN): >60 ML/MIN/1.73 M^2
EST. GFR  (NON AFRICAN AMERICAN): >60 ML/MIN/1.73 M^2
ESTIMATED AVG GLUCOSE: 134 MG/DL
GLUCOSE SERPL-MCNC: 120 MG/DL
HBA1C MFR BLD HPLC: 6.3 %
POTASSIUM SERPL-SCNC: 4 MMOL/L
PROT SERPL-MCNC: 8.4 G/DL
SODIUM SERPL-SCNC: 140 MMOL/L

## 2018-11-27 PROCEDURE — 84153 ASSAY OF PSA TOTAL: CPT

## 2018-11-27 PROCEDURE — 36415 COLL VENOUS BLD VENIPUNCTURE: CPT

## 2018-11-27 PROCEDURE — 90686 IIV4 VACC NO PRSV 0.5 ML IM: CPT | Mod: PBBFAC,PO

## 2018-11-27 PROCEDURE — 82550 ASSAY OF CK (CPK): CPT

## 2018-11-27 PROCEDURE — 83036 HEMOGLOBIN GLYCOSYLATED A1C: CPT

## 2018-11-27 PROCEDURE — 97110 THERAPEUTIC EXERCISES: CPT | Mod: PN

## 2018-11-27 PROCEDURE — 80053 COMPREHEN METABOLIC PANEL: CPT

## 2018-11-27 PROCEDURE — 99999 PR PBB SHADOW E&M-EST. PATIENT-LVL IV: CPT | Mod: PBBFAC,,, | Performed by: FAMILY MEDICINE

## 2018-11-27 PROCEDURE — 99214 OFFICE O/P EST MOD 30 MIN: CPT | Mod: PBBFAC,PO,25 | Performed by: FAMILY MEDICINE

## 2018-11-27 PROCEDURE — 99214 OFFICE O/P EST MOD 30 MIN: CPT | Mod: S$PBB,,, | Performed by: FAMILY MEDICINE

## 2018-11-27 NOTE — PROGRESS NOTES
"  Physical Therapy Daily Treatment Note     Name: Anjel Sanders  Clinic Number: [id number]    Therapy Diagnosis:   Encounter Diagnoses   Name Primary?    Decreased strength     Decreased ROM of lumbar spine      Physician: Anthony Gamboa MD    Visit Date: 11/27/2018    Start Time:  12:00  Stop Time:  12:55     PROCEDURES:     TIMED  Procedure Min.   TE 55                                                                     UNTIMED  Procedure Min.    Bike              Total Timed Minutes: 55  Total Timed Units:  4  Total Untimed Units:    Charges Billed/# of units:  TE-4    Subjective     Pt reports: bach is pretty good but left shoulder most painful   He was compliant with home exercise program.  Response to previous treatment: no adverse effects  Functional change: none    Pain: 2/10; Left shoulder  5/10   Location: lower back    Objective     Anjel received therapeutic exercises to develop strength, endurance and ROM for 55 including:  Log below     DATE 11/27/18 11/20/18 11/15/18 11/13/18 11/8/18 11/6/2018   VISIT 12 11 10 9 8 7   POC 12/9/18              G CODE 12/  10/10 9/10 8/10 7/10   FOTO 12/20  10/10 DONE 9/10 NEXT 8/10 7/10   Cap Visit  Cap Total 121.28  1108.04 121.28  986.76 30.32  865.48 121.28  835.16    90.96  713.88 118.42  622.92   MHP            MT        -   Stretches:            HS stretch 3x30" B 3x30" B 3x30" B 3x30" B 3x30" B 3x30'' B   Piriformis stretch            SKTC 3x30" B 3x30" B 3x30" B 3x30" B 3x30" B 3x30'' B   DKTC 2' w/Grn SB 2' w/ SB 15 w/ SB x15 w/ SB       Supine:            LTRs 2' w/Grn SB 2" w/ SB 2" w/ SB 2' w/ SB 2' w/ SB 2' w/ SB   TAs 10x10" 10" x 10 10" x 10 10" x 10 10" x 10 10"x10   Brace knee fall outs 2x15 B 2x15 B 2x10          Brace hip extensions 2x15 B 2x15 B 2x10 B 2x10 B 2x10 B 2x10 B   Brace with SLR 2x15 B 2x15 B 2x10 B 2x10 B 2x10 B 2x10 B   Bridges 2x15 w/ball 2x15 w/ ball 2x15 w/ ball 2x15 w/ ball adduction 2x15 2x15   SL hip abd            Clams       " "     Thoracic rotations            Dead bug Held due to left shoulder pain Marching 2x15 B Marching 2x15 B Marching 2x15 B Marching 2x15 B Marching only due to Left shoulder pain.                Prone:            Prone contra UE/LE ext             Quadruped contra UE/LE ext            Cat-Camel            Hip extensions                         Standing:            Hip abduction 2x15 B oot 2x10 B 2x10 B       Hip extension 2x15 B oot 2x10 B 2x10 B       Rows            Lat pull downs 3x10 GTB 3x10 GTC 3x10 GTB 3x10 GTB 3x10 3# cable cross 3x10 GTB    Calf stretch on fitter 3x30" B 3x30" DL 3x30" DL 3x30" DL 3x30" DL 30"X3 DL   Heel raises 3x10  3x10 3x10      Leg Press 100# 3 x 10 DL 9.5 3x10 DL 9.5 3x10 DL 9.5 3x10 DL 10.0 5x10 B 9.0 3x10 B   Palloff press            Lifts            Chops            Gait            CP            INITIALS MB 5/6 AC 4/6 AC 3/6 AC 2/6 AC 1/6 JA         Home Exercises Provided and Patient Education Provided     Education provided:   - Continue HEP    Written Home Exercises Provided: Patient instructed to cont prior HEP.  Exercises were reviewed and Anjel was able to demonstrate them prior to the end of the session.  Anjel demonstrated good  understanding of the education provided.     See EMR under Patient Instructions for exercises provided prior visit.    Assessment     Anjel tolerated interventions well. Presented with only minimal pain 2/10. Left shouder pain is chief c/o. Dead bud TE deferred due to left shoulder pain.  Anjel is progressing well towards his goals.   Pt prognosis is Excellent.     Pt will continue to benefit from skilled outpatient physical therapy to address the deficits listed in the problem list box on initial evaluation, provide pt/family education and to maximize pt's level of independence in the home and community environment.   Pt's spiritual, cultural and educational needs considered and pt agreeable to plan of care and goals.    Anticipated barriers to " physical therapy: none    GOALS: Short Term Goals: 4 weeks  1. Pt will demo good TA muscle contraction for improved deep abdominal strength and lumbar stability.  3. Increase lumbar ROM to WNL loss in order to improve functional mobility.    4. Pt will demo good sitting/standing posture and body mechanics for improved spine health and decreased risk of future injury.  5. Pt to tolerate HEP to improve ROM and independence with ADL's.  6. Increased B hip extension A/PROM to WNL for increased functional mobility.     Long Term Goals: 8 weeks  1. Report decreased low back pain without radiculopathy to </= 2/10  to increase tolerance for ADLs and yard work.  2. Increase strength to >/= 4+/5 MMT grade for BLE to increase tolerance for ADL and work activities.  3. Pt to demonstrate negative SLR and/or Slump Test in order to show improved core strength and decreased nerve/dural tension.  4. Patient's goal: To get rid of the pain  5. Pt will report at CJ level (20-40% impaired) on FOTO lumbar score for low back pain disability to demonstrate decrease in disability and improvement in back pain.    Plan   Continue POC. Progress as tolerated.    Jez Harrell, PTA

## 2018-11-27 NOTE — TELEPHONE ENCOUNTER
Left msg for CB to inform pt that his Lab showed high CK levels which are suspected to be from medication LIPITOR - please stop and we will recheck labs in 4 weeks

## 2018-11-27 NOTE — PROGRESS NOTES
Subjective:       Patient ID: Anjel Sanders is a 63 y.o. male.    Chief Complaint: Diabetes (3 mths follow-up); Diabetic Eye Exam; and Shoulder Pain (Left shoulder)    63 yr old black male with Hypertension, DM II, Hyperlipidemia, gout, ED, MURTAZA, presents today for 6 month follow up. C/o left shoulder pain from fall 4 weeks ago. He tripped and fell on shoulder. He went to ER and had x rays and they were normal. He still has pain and limited mobility.    DM II - diet controlled  - A1C                   6.3 (H)             11/27/2018          - no s/s of DM - foot exam UTD - eye exam due - on ASA and ACE    HTN - controlled - on prinzide - no side effects - does not need refills    HLD - uncontrolled - on statin -LDLCALC                  93.6                08/22/2018                   - on meds - need labs    ED - controlled - need cialis -     Gout - controlled - -  URICACID                 7.2 (H)             11/03/2017                   Not -  on allopurinol    Health maintenance  -UTD except labs      Hypertension   This is a chronic problem. The current episode started more than 1 year ago. The problem has been gradually improving since onset. The problem is controlled. Pertinent negatives include no anxiety, blurred vision, chest pain, headaches, malaise/fatigue, neck pain, orthopnea, palpitations, peripheral edema, PND, shortness of breath or sweats. There are no associated agents to hypertension. Risk factors for coronary artery disease include dyslipidemia, obesity and male gender. Past treatments include ACE inhibitors and diuretics. The current treatment provides moderate improvement. There are no compliance problems.  There is no history of angina, CAD/MI, CVA, heart failure, left ventricular hypertrophy or retinopathy. There is no history of chronic renal disease, coarctation of the aorta, hypercortisolism, pheochromocytoma, renovascular disease or a thyroid problem.   Hyperlipidemia   This is a  chronic problem. The current episode started more than 1 year ago. The problem is controlled. Recent lipid tests were reviewed and are normal. Exacerbating diseases include obesity. He has no history of chronic renal disease, diabetes, hypothyroidism or liver disease. There are no known factors aggravating his hyperlipidemia. Associated symptoms include myalgias. Pertinent negatives include no chest pain, focal sensory loss, focal weakness, leg pain or shortness of breath. He is currently on no antihyperlipidemic treatment. The current treatment provides moderate improvement of lipids. There are no compliance problems.  Risk factors for coronary artery disease include dyslipidemia, hypertension, obesity and male sex.   Shoulder Injury    The left shoulder is affected. The incident occurred more than 1 week ago. The injury mechanism was a fall. The quality of the pain is described as cramping and shooting. The pain does not radiate. The pain is at a severity of 6/10. The pain is moderate. Pertinent negatives include no chest pain. The symptoms are aggravated by movement, overhead lifting and palpation. He has tried NSAIDs for the symptoms. The treatment provided moderate relief.     Review of Systems   Constitutional: Negative.  Negative for activity change, diaphoresis, malaise/fatigue and unexpected weight change.   HENT: Negative.  Negative for congestion, ear pain, mouth sores, rhinorrhea and voice change.    Eyes: Negative.  Negative for blurred vision, pain, discharge and visual disturbance.   Respiratory: Negative.  Negative for apnea, cough, shortness of breath and wheezing.    Cardiovascular: Negative.  Negative for chest pain, palpitations, orthopnea and PND.   Gastrointestinal: Negative.  Negative for abdominal distention, anal bleeding, diarrhea and vomiting.   Endocrine: Negative.  Negative for cold intolerance and polyuria.   Genitourinary: Negative.  Negative for decreased urine volume, difficulty  urinating, discharge, frequency and scrotal swelling.   Musculoskeletal: Positive for arthralgias and myalgias. Negative for back pain, neck pain and neck stiffness.   Skin: Negative.  Negative for color change and rash.   Allergic/Immunologic: Negative.  Negative for environmental allergies and immunocompromised state.   Neurological: Negative.  Negative for dizziness, focal weakness, speech difficulty, weakness, light-headedness and headaches.   Hematological: Negative.    Psychiatric/Behavioral: Negative.  Negative for agitation, dysphoric mood and suicidal ideas. The patient is not nervous/anxious.        PMH/PSH/FH/SH/MED/ALLERGY reviewed    Objective:       Vitals:    11/27/18 0836   BP: 130/80   Pulse: 65       Physical Exam   Constitutional: He is oriented to person, place, and time. He appears well-developed and well-nourished. No distress.   HENT:   Head: Normocephalic and atraumatic.   Right Ear: External ear normal.   Left Ear: External ear normal.   Nose: Nose normal.   Mouth/Throat: Oropharynx is clear and moist. No oropharyngeal exudate.   Eyes: Conjunctivae and EOM are normal. Pupils are equal, round, and reactive to light. Right eye exhibits no discharge. Left eye exhibits no discharge. No scleral icterus.   Neck: Normal range of motion. Neck supple. No JVD present. No tracheal deviation present. No thyromegaly present.   Cardiovascular: Normal rate, regular rhythm, normal heart sounds and intact distal pulses. Exam reveals no gallop and no friction rub.   No murmur heard.  Pulmonary/Chest: Effort normal and breath sounds normal. No stridor. No respiratory distress. He has no wheezes. He has no rales. He exhibits no tenderness.   Abdominal: Soft. Bowel sounds are normal. He exhibits no distension and no mass. There is no tenderness. There is no rebound and no guarding. No hernia.   Musculoskeletal: Normal range of motion. He exhibits tenderness (TTP LEFT SHOULDER WITH ROTATOR CUFF IMPINGEMENT). He  exhibits no edema.   Lymphadenopathy:     He has no cervical adenopathy.   Neurological: He is alert and oriented to person, place, and time. He has normal reflexes. He displays normal reflexes. No cranial nerve deficit. He exhibits normal muscle tone. Coordination normal.   No deficits   Skin: Skin is warm and dry. No rash noted. He is not diaphoretic. No erythema. No pallor.   Psychiatric: He has a normal mood and affect. His behavior is normal. Judgment and thought content normal.       Assessment:       1. Essential hypertension    2. Hemiparesis affecting right side as late effect of cerebrovascular accident    3. Mixed hyperlipidemia    4. Controlled type 2 diabetes mellitus without complication, without long-term current use of insulin    5. Obesity (BMI 30-39.9)    6. MURTAZA (obstructive sleep apnea)    7. Encounter for screening for malignant neoplasm of prostate    8. Leg cramps    9. Immunization due    10. Osteoarthritis of left shoulder due to rotator cuff injury    11. Rotator cuff impingement syndrome of left shoulder        Plan:       Anjel was seen today for diabetes, diabetic eye exam and shoulder pain.    Diagnoses and all orders for this visit:    Essential hypertension  -     Comprehensive metabolic panel; Future    Hemiparesis affecting right side as late effect of cerebrovascular accident    Mixed hyperlipidemia  -     Comprehensive metabolic panel; Future    Controlled type 2 diabetes mellitus without complication, without long-term current use of insulin  -     Ambulatory referral to Optometry  -     Comprehensive metabolic panel; Future  -     Hemoglobin A1c; Future    Obesity (BMI 30-39.9)    MURTAZA (obstructive sleep apnea)    Encounter for screening for malignant neoplasm of prostate  -     PSA, Screening; Future    Leg cramps  -     CK; Future    Immunization due  -     Influenza - Quadrivalent (3 years & older) (PF)    Osteoarthritis of left shoulder due to rotator cuff injury  -      Ambulatory Referral to Physical/Occupational Therapy    Rotator cuff impingement syndrome of left shoulder  -     Ambulatory Referral to Physical/Occupational Therapy      HTN/HLD   -controlled - stable - on meds       ED and Gout   -controlled     MURTAZA  -stable    CVA  -on ASA and plavix      DM II  -controlled  -diet and exercise only    Obesity  -diet and exercise  -weight loss    Memory deficits  -FOLLOW neurology    40 minutes spent during this visit of which greater than 50% devoted to face-face counseling and coordination of care regarding diagnosis and management plan     Spent adequate time in obtaining history and explaining differentials     Follow-up in about 3 months (around 2/27/2019), or if symptoms worsen or fail to improve.

## 2018-11-27 NOTE — TELEPHONE ENCOUNTER
----- Message from Lars Payan MD sent at 11/27/2018  3:58 PM CST -----  Call    Lab showed high CK levels which are suspected to be from medication LIPITOR - please stop and we will recheck labs in 4 weeks

## 2018-11-29 ENCOUNTER — CLINICAL SUPPORT (OUTPATIENT)
Dept: REHABILITATION | Facility: HOSPITAL | Age: 63
End: 2018-11-29
Attending: PSYCHIATRY & NEUROLOGY
Payer: MEDICARE

## 2018-11-29 DIAGNOSIS — R53.1 DECREASED STRENGTH: ICD-10-CM

## 2018-11-29 DIAGNOSIS — M53.86 DECREASED ROM OF LUMBAR SPINE: ICD-10-CM

## 2018-11-29 PROCEDURE — 97110 THERAPEUTIC EXERCISES: CPT | Mod: PN

## 2018-11-29 NOTE — PROGRESS NOTES
"  Physical Therapy Daily Treatment Note     Name: Anjel Sanders  Clinic Number: [id number]    Therapy Diagnosis:   Encounter Diagnoses   Name Primary?    Decreased strength     Decreased ROM of lumbar spine      Physician: Anthony Gamboa MD    Visit Date: 11/29/2018    Start Time:  9:15  Stop Time:  10:00     PROCEDURES:     TIMED  Procedure Min.   TE 45                                                                     UNTIMED  Procedure Min.    Bike              Total Timed Minutes: 45  Total Timed Units:  3  Total Untimed Units:    Charges Billed/# of units:  TE-3    Subjective     Pt reports: left shoulder is more painful than back. Pt reports minimal to no back pain and only pain mainly comes on at the end of the day. Muscle cramps at night that MD knows about that may be from the medication he is on.  He was compliant with home exercise program.  Response to previous treatment: no adverse effects  Functional change: none    Pain: 0/10 low back; Left shoulder  5/10     Objective     Anjel received therapeutic exercises to develop strength, endurance and ROM for 45 including:  Log below and tests and measures     DATE 11/29/18 11/27/18 11/20/18 11/15/18 11/13/18 11/8/18 11/6/2018   VISIT 13 12 11 10 9 8 7   POC 12/9/18               G CODE 3/10 12/  10/10 9/10 8/10 7/10   FOTO 3/10 12/20  10/10 DONE 9/10 NEXT 8/10 7/10   Cap Visit  Cap Total 90.96  1199 121.28  1108.04 121.28  986.76 30.32  865.48 121.28  835.16    90.96  713.88 118.42  622.92   MHP             MT         -   Stretches:             HS stretch -- 3x30" B 3x30" B 3x30" B 3x30" B 3x30" B 3x30'' B   Hip flexor and quad stretch 4x30'' B  MT on L side            SKTC 2x45'' B 3x30" B 3x30" B 3x30" B 3x30" B 3x30" B 3x30'' B   DKTC 2' c. grn ball 2' w/Grn SB 2' w/ SB 15 w/ SB x15 w/ SB       Supine:             LTRs 2' c/ SB 2' w/Grn SB 2" w/ SB 2" w/ SB 2' w/ SB 2' w/ SB 2' w/ SB   TAs -- 10x10" 10" x 10 10" x 10 10" x 10 10" x 10 10"x10   Brace " "knee fall outs -- 2x15 B 2x15 B 2x10          Brace hip extensions -- 2x15 B 2x15 B 2x10 B 2x10 B 2x10 B 2x10 B   Brace with SLR -- 2x15 B 2x15 B 2x10 B 2x10 B 2x10 B 2x10 B   Bridges -- 2x15 w/ball 2x15 w/ ball 2x15 w/ ball 2x15 w/ ball adduction 2x15 2x15   SL hip abd             Clams             Thoracic rotations             Dead bug -- Held due to left shoulder pain Marching 2x15 B Marching 2x15 B Marching 2x15 B Marching 2x15 B Marching only due to Left shoulder pain.   Prone:             Hip extensions                           Standing:             Hip abduction cybex  2x15 B  1.5 plates 2x15 B oot 2x10 B 2x10 B       Hip extension cybex  2x15 B  1.5 plates 2x15 B oot 2x10 B 2x10 B       Rows             Lat pull downs  3x10 GTB 3x10 GTC 3x10 GTB 3x10 GTB 3x10 3# cable cross 3x10 GTB    Calf stretch on fitter  3x30" B 3x30" DL 3x30" DL 3x30" DL 3x30" DL 30"X3 DL   Heel raises -- 3x10  3x10 3x10      Leg Press 3x10 DL  3 plates 10 3 x 10 DL 9.5 3x10 DL 9.5 3x10 DL 9.5 3x10 DL 10.0 5x10 B 9.0 3x10 B   Palloff press             Lifts             Chops             Gait             CP             INITIALS MARY MB 5/6 AC 4/6 AC 3/6 AC 2/6 AC 1/6 JA       Range of Motion/Strength:   * = minimal low back pain/stiffness with testing  AROM: LUMBAR   Flexion 90%   Extension 100%, hinge point at L1-2   Right side bending 100%   Left side bending 100%   Right rotation 100%   Left rotation 100%      Hip   Right     Left   Pain/Dysfunction with Movement     AROM PROM MMT AROM PROM MMT     Flexion WFL WFL 5/5 WFL WFL 5/5     Extension 50% WFL 4/5* 50% WFL 4/5*     Abduction WFL WFL 5/5 WFL WFL 5/5     Adduction WFL WFL 5/5 WFL WFL 5/5     Internal rotation 75% WFL 5/5 WFL WFL 5/5     External rotation 75% WFL 5/5 WFL WFL 5/5        Knee   Right     Left   Pain/Dysfunction with Movement     AROM PROM MMT AROM PROM MMT     Flexion WFL WFL 5/5 WFL WFL 5/5     Extension WFL WFL 5/5 WFL WFL 5/5        Ankle   Right     Left   " Pain/Dysfunction with Movement     AROM PROM MMT AROM PROM MMT     Plantarflexion WFL WFL 5/5 WFL WFL 5/5     Dorsiflexion WFL WFL 5/5 WFL WFL 5/5     Inversion WFL WFL NT WFL WFL NT     Eversion WFL WFL NT WFL WFL NT        Flexibility: decreased B quad and hip flexor length  Gait Analysis: decreased carson, mild BLE ER, increased lumbar lordosis  Special Tests:   Slump test = negative for dural tension B  Quadrant test = Minimal pain B, negative  SLR Test = negative B  Ely's test = negative B, min low back pain with each and B quad tightness    Home Exercises Provided and Patient Education Provided     Education provided:   - Continue HEP    Written Home Exercises Provided: Patient instructed to cont prior HEP.  Exercises were reviewed and Anjel was able to demonstrate them prior to the end of the session.  Anjel demonstrated good  understanding of the education provided.     See EMR under Patient Instructions for exercises provided prior visit.    Assessment     Pt performed well and demo improved BLE strength, pt demo significant tightness in 2 and 1 joint hip flexors. Pt shown how to stretch at home and added hip flexor and quad stretch on HEP. Pt will be d/c soon for low back and evaled for L shoulder.    Anjel is progressing well towards his goals.   Pt prognosis is Excellent.     Pt will continue to benefit from skilled outpatient physical therapy to address the deficits listed in the problem list box on initial evaluation, provide pt/family education and to maximize pt's level of independence in the home and community environment.   Pt's spiritual, cultural and educational needs considered and pt agreeable to plan of care and goals.    Anticipated barriers to physical therapy: none    GOALS: Short Term Goals: 4 weeks  1. Pt will demo good TA muscle contraction for improved deep abdominal strength and lumbar stability.  3. Increase lumbar ROM to WNL loss in order to improve functional mobility.    4. Pt  will demo good sitting/standing posture and body mechanics for improved spine health and decreased risk of future injury.  5. Pt to tolerate HEP to improve ROM and independence with ADL's.  6. Increased B hip extension A/PROM to WNL for increased functional mobility.     Long Term Goals: 8 weeks  1. Report decreased low back pain without radiculopathy to </= 2/10  to increase tolerance for ADLs and yard work.  2. Increase strength to >/= 4+/5 MMT grade for BLE to increase tolerance for ADL and work activities.  3. Pt to demonstrate negative SLR and/or Slump Test in order to show improved core strength and decreased nerve/dural tension.  4. Patient's goal: To get rid of the pain  5. Pt will report at CJ level (20-40% impaired) on FOTO lumbar score for low back pain disability to demonstrate decrease in disability and improvement in back pain.    Plan   Continue POC. Progress as tolerated.    Graham Shah, PT

## 2018-12-04 ENCOUNTER — TELEPHONE (OUTPATIENT)
Dept: FAMILY MEDICINE | Facility: CLINIC | Age: 63
End: 2018-12-04

## 2018-12-04 ENCOUNTER — CLINICAL SUPPORT (OUTPATIENT)
Dept: REHABILITATION | Facility: HOSPITAL | Age: 63
End: 2018-12-04
Attending: PSYCHIATRY & NEUROLOGY
Payer: MEDICARE

## 2018-12-04 DIAGNOSIS — M53.86 DECREASED ROM OF LUMBAR SPINE: ICD-10-CM

## 2018-12-04 DIAGNOSIS — R53.1 DECREASED STRENGTH: ICD-10-CM

## 2018-12-04 PROCEDURE — 97110 THERAPEUTIC EXERCISES: CPT | Mod: PN

## 2018-12-04 NOTE — PROGRESS NOTES
"  Physical Therapy Daily Treatment Note     Name: Anjel Sanders  Clinic Number: [id number]    Therapy Diagnosis:   No diagnosis found.  Physician: Anthony Gamboa MD    Visit Date: 12/4/2018    Start Time:  12:00  Stop Time:  12:55     PROCEDURES:     TIMED  Procedure Min.   TE 55                                                                     UNTIMED  Procedure Min.    Bike              Total Timed Minutes: 55  Total Timed Units:  4  Total Untimed Units:    Charges Billed/# of units:  TE-4    Subjective     Pt reports: left shoulder is more painful than back. HEP compliant. Okay to continue with addressing LB pain then address left shoulder    Response to previous treatment: no adverse effects  Functional change: none    Pain: 2/10 low back; Left shoulder  5/10 "Only when I do certain things."    Objective     Anjel received therapeutic exercises to develop strength, endurance and ROM for 55 including:  Log below and tests and measures     DATE 12/4/18 11/29/18 11/27/18 11/20/18 11/15/18 11/13/18 11/8/18 11/6/2018   VISIT 14 13 12 11 10 9 8 7   POC 12/9/18                G CODE 4/10 3/10 12/  10/10 9/10 8/10 7/10   FOTO 4/10 3/10 12/20  10/10 DONE 9/10 NEXT 8/10 7/10   Cap Visit  Cap Total 121.28  1320.28 90.96  1199 121.28  1108.04 121.28  986.76 30.32  865.48 121.28  835.16    90.96  713.88 118.42  622.92   MHP              MT          -   Stretches:              HS stretch 3x30" B w/strap -- 3x30" B 3x30" B 3x30" B 3x30" B 3x30" B 3x30'' B   Hip flexor and quad stretch 4x30" Bw/strap   4x30'' B  MT on L side            SKTC 3x30" B 2x45'' B 3x30" B 3x30" B 3x30" B 3x30" B 3x30" B 3x30'' B   DKTC 2' w/Grn SB 2' c. grn ball 2' w/Grn SB 2' w/ SB 15 w/ SB x15 w/ SB       Supine:              LTRs 2' w/Grn SB 2' c/ SB 2' w/Grn SB 2" w/ SB 2" w/ SB 2' w/ SB 2' w/ SB 2' w/ SB   TAs 10"x10 -- 10x10" 10" x 10 10" x 10 10" x 10 10" x 10 10"x10   Brace knee fall outs  -- 2x15 B 2x15 B 2x10          Brace hip " "extensions  -- 2x15 B 2x15 B 2x10 B 2x10 B 2x10 B 2x10 B   Brace with SLR  -- 2x15 B 2x15 B 2x10 B 2x10 B 2x10 B 2x10 B   Bridges 3x30 w/ball -- 2x15 w/ball 2x15 w/ ball 2x15 w/ ball 2x15 w/ ball adduction 2x15 2x15   SL hip abd              Clams              Thoracic rotations              Dead bug  -- Held due to left shoulder pain Marching 2x15 B Marching 2x15 B Marching 2x15 B Marching 2x15 B Marching only due to Left shoulder pain.   Prone:              Hip extensions                             Standing:              Hip abduction Cybex  2x15 B   2.0 plates cybex  2x15 B  1.5 plates 2x15 B oot 2x10 B 2x10 B       Hip extension Cybex  2x15 B  2.0 plates cybex  2x15 B  1.5 plates 2x15 B oot 2x10 B 2x10 B       Rows              Lat pull downs   3x10 GTB 3x10 GTC 3x10 GTB 3x10 GTB 3x10 3# cable cross 3x10 GTB    Calf stretch on fitter   3x30" B 3x30" DL 3x30" DL 3x30" DL 3x30" DL 30"X3 DL   Heel raises  -- 3x10  3x10 3x10      Leg Press 3x10 DL  10 plates 3x10 DL  3 plates 10 3 x 10 DL 9.5 3x10 DL 9.5 3x10 DL 9.5 3x10 DL 10.0 5x10 B 9.0 3x10 B   Palloff press              Lifts              Chops              Gait              CP              INITIALS MB 1/6 MARY MB 5/6 AC 4/6 AC 3/6 AC 2/6 AC 1/6 JA           Home Exercises Provided and Patient Education Provided     Education provided:   - Continue HEP    Written Home Exercises Provided: Patient instructed to cont prior HEP.  Exercises were reviewed and Anjel was able to demonstrate them prior to the end of the session.  Anjel demonstrated good  understanding of the education provided.     See EMR under Patient Instructions for exercises provided prior visit.    Assessment     Pt performed well . Reviewed stretch at home and added hip flexor and quad stretch on HEP. Pt will be d/c soon for low back and evaled for L shoulder.    Anjel is progressing well towards his goals.   Pt prognosis is Excellent.     Pt will continue to benefit from skilled outpatient " physical therapy to address the deficits listed in the problem list box on initial evaluation, provide pt/family education and to maximize pt's level of independence in the home and community environment.   Pt's spiritual, cultural and educational needs considered and pt agreeable to plan of care and goals.    Anticipated barriers to physical therapy: none    GOALS: Short Term Goals: 4 weeks  1. Pt will demo good TA muscle contraction for improved deep abdominal strength and lumbar stability.  3. Increase lumbar ROM to WNL loss in order to improve functional mobility.    4. Pt will demo good sitting/standing posture and body mechanics for improved spine health and decreased risk of future injury.  5. Pt to tolerate HEP to improve ROM and independence with ADL's.  6. Increased B hip extension A/PROM to WNL for increased functional mobility.     Long Term Goals: 8 weeks  1. Report decreased low back pain without radiculopathy to </= 2/10  to increase tolerance for ADLs and yard work.  2. Increase strength to >/= 4+/5 MMT grade for BLE to increase tolerance for ADL and work activities.  3. Pt to demonstrate negative SLR and/or Slump Test in order to show improved core strength and decreased nerve/dural tension.  4. Patient's goal: To get rid of the pain  5. Pt will report at CJ level (20-40% impaired) on FOTO lumbar score for low back pain disability to demonstrate decrease in disability and improvement in back pain.    Plan   Continue POC. Progress as tolerated.    Jez Harrell, PTA

## 2018-12-04 NOTE — TELEPHONE ENCOUNTER
----- Message from Mary Beth Ruiz sent at 12/4/2018  2:41 PM CST -----  Contact: [phone]/self  Patient called in returning your call. Please advise.

## 2018-12-04 NOTE — TELEPHONE ENCOUNTER
Informed pt his lab showed high CK levels which are suspected to be from medication Lipitor. Informed pt to please stop the Atorvastatin and Dr. Payan will recheck his labs in 4 wks. Pt verbalized understanding.

## 2018-12-06 ENCOUNTER — CLINICAL SUPPORT (OUTPATIENT)
Dept: REHABILITATION | Facility: HOSPITAL | Age: 63
End: 2018-12-06
Attending: PSYCHIATRY & NEUROLOGY
Payer: MEDICARE

## 2018-12-06 DIAGNOSIS — M53.86 DECREASED ROM OF LUMBAR SPINE: ICD-10-CM

## 2018-12-06 DIAGNOSIS — R53.1 DECREASED STRENGTH: ICD-10-CM

## 2018-12-06 PROCEDURE — G8980 MOBILITY D/C STATUS: HCPCS | Mod: CJ,PN

## 2018-12-06 PROCEDURE — 97110 THERAPEUTIC EXERCISES: CPT | Mod: PN

## 2018-12-06 PROCEDURE — G8979 MOBILITY GOAL STATUS: HCPCS | Mod: CJ,PN

## 2018-12-06 NOTE — PROGRESS NOTES
"  Physical Therapy Daily Treatment Note     Name: Anjel Sanders  Clinic Number: [id number]    Therapy Diagnosis:   Encounter Diagnoses   Name Primary?    Decreased strength     Decreased ROM of lumbar spine      Physician: Anthony Gamboa MD    Visit Date: 12/6/2018    Start Time:  9:20  Stop Time:  9:40     PROCEDURES:     TIMED  Procedure Min.   TE 20                                                                     UNTIMED  Procedure Min.    Bike              Total Timed Minutes: 20  Total Timed Units:  1  Total Untimed Units:    Charges Billed/# of units:  TE-1    Subjective     Pt reports: low back is only when he is walking or standing for a long time. If he has to walk about 1/2 to a full block. Next visit will be eval for L shoulder   Response to previous treatment: no adverse effects  Functional change: none    Pain: 1/10 low back    Objective     Anjel received therapeutic exercises to develop strength, endurance and ROM for 20 including:  Log below and tests and measures     DATE 12/6/18 12/4/18 11/29/18 11/27/18 11/20/18 11/15/18 11/13/18 11/8/18 11/6/2018   VISIT 15 14 13 12 11 10 9 8 7   POC 12/9/18                 G CODE 5/10 4/10 3/10 12/  10/10 9/10 8/10 7/10   FOTO 5/10 4/10 3/10 12/20  10/10 DONE 9/10 NEXT 8/10 7/10   Cap Visit  Cap Total 60.64  1380.92 121.28  1320.28 90.96  1199 121.28  1108.04 121.28  986.76 30.32  865.48 121.28  835.16    90.96  713.88 118.42  622.92   MHP               MT           -   Stretches:               HS stretch -- 3x30" B w/strap -- 3x30" B 3x30" B 3x30" B 3x30" B 3x30" B 3x30'' B   Hip flexor and quad stretch -- 4x30" Bw/strap   4x30'' B  MT on L side            SKTC -- 3x30" B 2x45'' B 3x30" B 3x30" B 3x30" B 3x30" B 3x30" B 3x30'' B   DKTC -- 2' w/Grn SB 2' c. grn ball 2' w/Grn SB 2' w/ SB 15 w/ SB x15 w/ SB       Supine:               LTRs -- 2' w/Grn SB 2' c/ SB 2' w/Grn SB 2" w/ SB 2" w/ SB 2' w/ SB 2' w/ SB 2' w/ SB   TAs -- 10"x10 -- 10x10" 10" x 10 10" " "x 10 10" x 10 10" x 10 10"x10   Brace knee fall outs   -- 2x15 B 2x15 B 2x10          Brace hip extensions   -- 2x15 B 2x15 B 2x10 B 2x10 B 2x10 B 2x10 B   Brace with SLR   -- 2x15 B 2x15 B 2x10 B 2x10 B 2x10 B 2x10 B   Bridges -- 3x30 w/ball -- 2x15 w/ball 2x15 w/ ball 2x15 w/ ball 2x15 w/ ball adduction 2x15 2x15   SL hip abd               Clams               Thoracic rotations               Dead bug   -- Held due to left shoulder pain Marching 2x15 B Marching 2x15 B Marching 2x15 B Marching 2x15 B Marching only due to Left shoulder pain.   Prone:               Hip extensions                               Standing:               Hip abduction -- Cybex  2x15 B   2.0 plates cybex  2x15 B  1.5 plates 2x15 B oot 2x10 B 2x10 B       Hip extension -- Cybex  2x15 B  2.0 plates cybex  2x15 B  1.5 plates 2x15 B oot 2x10 B 2x10 B       Rows               Lat pull downs    3x10 GTB 3x10 GTC 3x10 GTB 3x10 GTB 3x10 3# cable cross 3x10 GTB    Calf stretch on fitter    3x30" B 3x30" DL 3x30" DL 3x30" DL 3x30" DL 30"X3 DL   Heel raises   -- 3x10  3x10 3x10      Leg Press -- 3x10 DL  10 plates 3x10 DL  3 plates 10 3 x 10 DL 9.5 3x10 DL 9.5 3x10 DL 9.5 3x10 DL 10.0 5x10 B 9.0 3x10 B   Palloff press               Lifts               Chops               Gait               CP               INITIALS MARY MB 1/6 MARY MB 5/6 AC 4/6 AC 3/6 AC 2/6 AC 1/6 JA       Range of Motion/Strength:   * = minimal low back pain/stiffness with testing  AROM: LUMBAR   Flexion 100%   Extension 100%, hinge point at L1-2   Right side bending 100%   Left side bending 100%   Right rotation 100%   Left rotation 100%      Hip   Right     Left   Pain/Dysfunction with Movement     AROM PROM MMT AROM PROM MMT     Flexion WFL WFL 5/5 WFL WFL 5/5     Extension 50% WFL 4/5 50% WFL 4/5* Very minimal R sided low back pain c/ L MMT   Abduction WFL WFL 5/5 WFL WFL 4+/5     Adduction WFL WFL 5/5 WFL WFL 5/5     Internal rotation 75% WFL 5/5 WFL WFL 5/5     External " rotation 75% WFL 5/5 WFL WFL 5/5        Knee   Right     Left   Pain/Dysfunction with Movement     AROM PROM MMT AROM PROM MMT     Flexion WFL WFL 5/5 WFL WFL 5/5     Extension WFL WFL 5/5 WFL WFL 5/5        Ankle   Right     Left   Pain/Dysfunction with Movement     AROM PROM MMT AROM PROM MMT     Plantarflexion WFL WFL 5/5 WFL WFL 5/5     Dorsiflexion WFL WFL 5/5 WFL WFL 5/5     Inversion WFL WFL NT WFL WFL NT     Eversion WFL WFL NT WFL WFL NT          Outpatient Therapy Discharge Summary     Name: Anjel Sanders  Clinic Number: [id number]    Therapy Diagnosis:   Encounter Diagnoses   Name Primary?    Decreased strength     Decreased ROM of lumbar spine      Physician: Anthony Gamboa MD    Physician Orders: Eval and treat  Medical Diagnosis: M54.30 (ICD-10-CM) - Sciatica, unspecified laterality  Evaluation Date: 10/9/18      Date of Last visit: 12/6/18  Total Visits Received: 15    Assessment    Pt will start rehab for potential L shoulder rotator cuff tear later this month. Pt only reports low back pain with extended periods of walking and standing. Able to cut grass and perform ADLs.    Goals:  Short Term Goals: 4 weeks  1. Pt will demo good TA muscle contraction for improved deep abdominal strength and lumbar stability. - met  3. Increase lumbar ROM to WNL loss in order to improve functional mobility.  - met  4. Pt will demo good sitting/standing posture and body mechanics for improved spine health and decreased risk of future injury. - met  5. Pt to tolerate HEP to improve ROM and independence with ADL's. - met  6. Increased B hip extension A/PROM to WNL for increased functional mobility. - met     Long Term Goals: 8 weeks  1. Report decreased low back pain without radiculopathy to </= 2/10  to increase tolerance for ADLs and yard work. - met  2. Increase strength to >/= 4+/5 MMT grade for BLE to increase tolerance for ADL and work activities. - met  3. Pt to demonstrate negative SLR and/or Slump Test in  order to show improved core strength and decreased nerve/dural tension. - met  4. Patient's goal: To get rid of the pain - met  5. Pt will report at CJ level (20-40% impaired) on FOTO lumbar score for low back pain disability to demonstrate decrease in disability and improvement in back pain. - met    Discharge reason: Patient has met all of his/her goals and Patient has reached the maximum rehab potential for the present time.     Plan   This patient is discharged from Physical Therapy    Graham Shah, PT

## 2018-12-06 NOTE — Clinical Note
Zuly Gamboa, Anthony LUTZ MD,Thank you for your referral of Anjel Sanders (MRN:[id number], :1955) to [facility] and [facility]. Pt d/c from PT, see d/c note.Thank you,Graham Shah, PT, DPT

## 2018-12-19 ENCOUNTER — CLINICAL SUPPORT (OUTPATIENT)
Dept: REHABILITATION | Facility: HOSPITAL | Age: 63
End: 2018-12-19
Attending: PSYCHIATRY & NEUROLOGY
Payer: MEDICARE

## 2018-12-19 DIAGNOSIS — M25.60 STIFFNESS IN JOINT: ICD-10-CM

## 2018-12-19 DIAGNOSIS — M79.602 PAIN OF LEFT UPPER EXTREMITY: ICD-10-CM

## 2018-12-19 DIAGNOSIS — R53.1 WEAKNESS: ICD-10-CM

## 2018-12-19 PROCEDURE — 97165 OT EVAL LOW COMPLEX 30 MIN: CPT | Mod: PN

## 2018-12-19 PROCEDURE — 97110 THERAPEUTIC EXERCISES: CPT | Mod: PN

## 2018-12-19 PROCEDURE — G8987 SELF CARE CURRENT STATUS: HCPCS | Mod: CJ,PN

## 2018-12-19 PROCEDURE — G8988 SELF CARE GOAL STATUS: HCPCS | Mod: CI,PN

## 2018-12-19 NOTE — PLAN OF CARE
[facility] Occupational Therapy  Initial Evaluation     Date: 12/19/2018  Patient: Anjel Sanders  Chart Number: [id number]    Therapy Diagnosis: L shoulder pain, weakness, stiffness in joint  Physician: Lars Payan MD    Physician Orders: OT eval and treat  Medical Diagnosis:   M19.112,S46.002S (ICD-10-CM) - Osteoarthritis of left shoulder due to rotator cuff injury   M75.42 (ICD-10-CM) - Rotator cuff impingement syndrome of left shoulder     Evaluation Date: 12/19/2018  Insurance Authorization period Expiration: 12/31/2018  Plan of Care Expiration Period: 2/15/2019    Visit # / Visits Authortized: 1 / 18  Time In:410  Time Out: 450  Total Billable Time: 40 minutes  LCE1 TE1    Precautions: Standard and Standard    Subjective     Involved Side: Left  Dominant Side: Right  Date of Onset: a few months ago  Mechanism of Injury: s/p fall onto left side   History of Current Condition: presents to clinic today with decreased ROM, weakness and pain all of which limit pt functionally.   Surgical Procedure: none  Imaging: xrays reveal no abnormalities; No MRI  Previous Therapy: rec'd therapy for lower back earlier this year    Patient's Goals for Therapy: to decrease pain and increase functional use LUE    Pain:  Functional Pain Scale Rating 0-10:   5/10 on average  0/10 at best  8/10 at worst  Location: supraspinatus region  Description: Aching  Aggravating Factors: Night Time and Lifting  Easing Factors: pain medication    Occupation:    Working presently: retired  Duties: lives with wife; drives    Functional Limitations/Social History:    Previous functional status includes: Independent with all ADLs.     Current FunctionalStatus   Home/Living environment : lives with their spouse      Limitation of Functional Status as follows:   ADLs/IADLs:     - Feeding: Independent      - Bathing: Independent      - Dressing/Grooming: some difficulty with overhead lifting    - Driving: Independent     Leisure:  housework, yard work, cutting grass-limited with these tasks by shoulder      Past Medical History/Physical Systems Review:   Anjel Sanders  has a past medical history of AR (allergic rhinitis), Gouty arthritis, colonic polyps, MURATZA (obstructive sleep apnea), Other and unspecified hyperlipidemia, Stroke, and Unspecified essential hypertension.    Anjel Sanders  has a past surgical history that includes Foot surgery and COLONOSCOPY (N/A, 6/28/2013).    Anjel has a current medication list which includes the following prescription(s): allopurinol, amlodipine-benazepril, aspirin, atorvastatin, donepezil, gabapentin, oxycodone-acetaminophen, and tadalafil.    Review of patient's allergies indicates:  No Known Allergies     Objective     Sensation Test: Patient denies any numbness/tingling    Observation/Inspection:rounded shoulders    Range of Motion/Strength:   Shoulder  Left   Right  Pain/Dysfunction with Movement    AROM PROM MMT AROM PROM MMT    Flexion 60 80 3-/5 WNL WNL WNL    Extension 45 NT NT WNL WNL WNL    Abduction 60 85 3-/5 WNL WNL WNL    HorizAdduction 20 NT NT WNL WNL WNL    Internal rotation PSIS @0WNL 3-/5 WNL WNL WNL    ER at 90° abd NT NT NT WNL WNL WNL    ER at 0° abd 35 30 3-/5 WNL WNL WNL      ROM Comments: Pain at end range    Painful Arc: none noted    Tenderness upon Palpation:      Positive: Bicipital Groove and Supraspinatus Region      Special Tests:  AC Joint Left Right   Empty Can Test + -   Hawkin's Kenndy + -   Neer's Test + -     Scapular Control/Dyskinesis:    Normal / Subtle / Obvious  Comments    Left  Noted -    Right  Normal -       CMS Impairment/Limitation/Restriction for FOTO Shoulder Survey    Therapist reviewed FOTO scores for Anjel Sanders on 12/19/2018.   FOTO documents entered into [employer] - see Media section.    Limitation Score: 34%  Category: Self Care    Current : CJ = at least 20% but < 40% impaired, limited or restricted  Goal: CI = at least 1% but < 20%  impaired, limited or restricted     Treatment     Treatment Time In: 450  Treatment Time Out: 500  Total Treatment time separate from Evaluation time:10    Anjel received therapeutic exercises for 10 minutes including:  -see attached in pt instructions    Home Exercise Program/Education:  Issued HEP (see patient instructions in EMR) and educated on modality use for pain management . Exercises were reviewed and Anjel was able to demonstrate them prior to the end of the session.   Pt received a written copy of exercises to perform at home. Anjel demonstrated good  understanding of the education provided.  Pt was advised to perform these exercises free of pain, and to stop performing them if pain occurs.    Patient/Family Education: role of OT, goals for OT, scheduling/cancellations - pt verbalized understanding. Discussed insurance limitations with patient.    Assessment     Anjel Sanders is a 63 y.o. male referred to outpatient occupational therapy and presents with a medical diagnosis of L shoulder impingement, resulting in Decreased ROM, Decreased muscle strength, Increased pain and Joint Stiffness and demonstrates limitations as described in the chart below. Following medical record review it is determined that pt will benefit from occupational therapy services in order to maximize pain free and/or functional use of left shoulder. The following goals were discussed with the patient and patient is in agreement with them as to be addressed in the treatment plan. The patient's rehab potential is Good.     Anticipated barriers to occupational therapy: none  Pt has no cultural, educational or language barriers to learning provided.    Profile and History Assessment of Occupational Performance Level of Clinical Decision Making Complexity Score   Occupational Profile:   Anjel Sanders is a 63 y.o. male who lives with their spouse and is currently retired. Anjel Sanders has difficulty with  grooming and  dressing  driving/transportation management, housework/household chores and yardwork  affecting his/her daily functional abilities. His/her main goal for therapy is to decrease pain and increase functional use.     Comorbidities:   gout and HTN    Medical and Therapy History Review:   Expanded               Performance Deficits    Physical:  Joint Mobility  Joint Stability  Muscle Power/Strength  Muscle Endurance  Muscle Tone  Postural Control  Pain    Cognitive:  No Deficits    Psychosocial:    No Deficits     Clinical Decision Making:  low    Assessment Process:  Problem-Focused Assessments    Modification/Need for Assistance:  Not Necessary    Intervention Selection:  Several Treatment Options       low  Based on PMHX, co morbidities , data from assessments and functional level of assistance required with task and clinical presentation directly impacting function.       The following goals were discussed with the patient and patient is in agreement with them as to be addressed in the treatment plan.     Goals:     Short Term Goals to be met in 4 weeks: (1/18/2019)  1) Initiate Hep   2) Pt will increase L shoulder AROM by 10 degrees grossly for improved performance with overhead ADL's  3) Pt will report 5/10 pain in (L)shoulder at worst  4) Pt will demonstrate increased MMT to 4-/5 grossly L shoulder  5) Patient will be able to achieve less than or equal to 25% on FOTO shoulder survey demonstrating overall improved functional ability with upper extremity.     Long Term Goals to be met by discharge:  1) Independent with HEP  2) Pt will demonstrate (L) shoulder AROM WFL grossly for [place] with ADL's  3) Pt will demonstrate (L) shoulder MMT WFL grossly for [place] with functional activities  4) Independent and pain free with ADL's and IADL's  5) Patient will be able to achieve less than or equal to 10% on FOTO shoulder survey demonstrating overall improved functional ability with upper extremity.        Plan   Certification Period/Plan of care expiration: 12/19/2018 to 2/15/2019.    Outpatient Occupational Therapy 2 times weekly for 8 weeks to include the following interventions: Manual therapy/joint mobilizations, Modalities for pain management, Therapeutic exercises/activities., Strengthening, Joint Protection and Energy Conservation.      JUJU Barnhart

## 2018-12-26 ENCOUNTER — DOCUMENTATION ONLY (OUTPATIENT)
Dept: REHABILITATION | Facility: HOSPITAL | Age: 63
End: 2018-12-26

## 2018-12-26 DIAGNOSIS — R53.1 WEAKNESS: ICD-10-CM

## 2018-12-26 DIAGNOSIS — M79.602 PAIN OF LEFT UPPER EXTREMITY: ICD-10-CM

## 2018-12-26 DIAGNOSIS — M25.60 STIFFNESS IN JOINT: ICD-10-CM

## 2018-12-26 NOTE — PROGRESS NOTES
Face to Face PTA Conference performed with Marleen Sebastian PTA, Alisha Gay PTA, Tommy Kimble PTA eJz Harrell PTA regarding patient's current status, overall progress, and plan of care    Graham Shah, PT     Jez Harrell, ZACH    Face to face meeting completed with Graham Shah, PT regarding current status and progress of   Anjel Sanders .  Marleen Sebastian, PTA    Alisha Gay PTA    Tommy Kimble PTA

## 2018-12-27 ENCOUNTER — CLINICAL SUPPORT (OUTPATIENT)
Dept: REHABILITATION | Facility: HOSPITAL | Age: 63
End: 2018-12-27
Attending: PSYCHIATRY & NEUROLOGY
Payer: MEDICARE

## 2018-12-27 DIAGNOSIS — R53.1 WEAKNESS: ICD-10-CM

## 2018-12-27 DIAGNOSIS — M25.60 STIFFNESS IN JOINT: ICD-10-CM

## 2018-12-27 DIAGNOSIS — M79.602 PAIN OF LEFT UPPER EXTREMITY: ICD-10-CM

## 2018-12-27 PROCEDURE — 97110 THERAPEUTIC EXERCISES: CPT | Mod: PN

## 2018-12-27 PROCEDURE — 97140 MANUAL THERAPY 1/> REGIONS: CPT | Mod: PN

## 2018-12-27 NOTE — PROGRESS NOTES
"  Occupational Therapy Daily Treatment Note   Date: 12/27/2018  Patient: Anjel Sanders  Chart Number: [id number]     Therapy Diagnosis: L shoulder pain, weakness, stiffness in joint  Physician: Lars Payan MD     Physician Orders: OT eval and treat  Medical Diagnosis:   M19.112,S46.002S (ICD-10-CM) - Osteoarthritis of left shoulder due to rotator cuff injury   M75.42 (ICD-10-CM) - Rotator cuff impingement syndrome of left shoulder      Evaluation Date: 12/19/2018  Insurance Authorization period Expiration: 12/31/2018  Plan of Care Expiration Period: 2/15/2019     Visit # / Visits Authortized: 2 / 18  Time In:12:35 pm pt 35 min late  Time Out: 1:00 pm   Total Billable Time: 25 minutes   TE1 MT 1      Precautions: Standard        Subjective     Pt reports: " I hurt bad after sleeping on it"  he was compliant with home exercise program given last session.   Response to previous treatment:Fair with soreness and fatigue  Functional change: I still cant lift it up.     Pain: 6/10  Location: left shoulder      Objective     Anjel received the following supervised modalities after being cleared for contradictions for 10 minutes:   -MHP to L shoulder x 10 minutes in order to increase extensibility of tissues prior to MT while OT completed MT and STM.    Anjel received the following manual therapy techniques for 10 minutes:   -Mt: Pt recieved manual therapy consisting of PROM in all planes, joint mobilization (grades I-II) with gentle oscillations at acromioclavicular and glenohumeral joint along with myofascial release and STM to surrounding musculature (biceps, pects, deltoids, traps, triceps etc.) to decrease stiffness and pain with movements.       Anjel received therapeutic exercises for 15 minutes including:  -  Exercises Date:12/27/2018    Visit #2   PROM (L/R) Shoulder FLEX/ABD/IR/ER 10x   Supine dowel Flex  Chest press 0#  2/10   SL ABD NT pain   SL ER NT pain    pulleys 3'   Wall slides 2/15       Corner pec " stretch NT pain   Tband Lats Yellow  2/15   Tband Rows Yellow  2/15   Tband IR/ER/ADD NT  2/15         Updated HEP and provided new resistive bands with education of exercise to tolerance.       Home Exercises and Education Provided     Education provided:   - Pt provided with written instructions, demonstration and education of HEP with pt demonstrating and verbalizing understanding of appropriate movements and techniques to increase strength, ROM and activity tolerance for increased IND.  - Progress towards goals     Written Home Exercises Provided: yes.  Exercises were reviewed and Anjel was able to demonstrate them prior to the end of the session.  Anjel demonstrated good  understanding of the HEP provided.   .   See EMR under Media for exercises provided 12/27/2018.        Assessment     Pt would continue to benefit from skilled OT. Pt tolerated first after visit better. His ROM increased with decreased pain and improved tolerance noted after completing HEP with isometrics. Progressed to AAROM movements and PROM stretches to within pain free range. He did fair with these but was unable to tolerate them at all at al. He showed good understanding of updated HEP.      Anjel is progressing well towards his goals and there are no updates to goals at this time. Pt prognosis is Good.     Pt will continue to benefit from skilled outpatient occupational therapy to address the deficits listed in the problem list on initial evaluation provide pt/family education and to maximize pt's level of independence in the home and community environment.     Anticipated barriers to occupational therapy: transportation, pain    Pt's spiritual, cultural and educational needs considered and pt agreeable to plan of care and goals.      Goals:      Short Term Goals to be met in 4 weeks: (1/18/2019)  1) Initiate Hep   2) Pt will increase L shoulder AROM by 10 degrees grossly for improved performance with overhead ADL's  3) Pt will report  5/10 pain in (L)shoulder at worst  4) Pt will demonstrate increased MMT to 4-/5 grossly L shoulder  5) Patient will be able to achieve less than or equal to 25% on FOTO shoulder survey demonstrating overall improved functional ability with upper extremity.      Long Term Goals to be met by discharge:  1) Independent with HEP  2) Pt will demonstrate (L) shoulder AROM WFL grossly for [place] with ADL's  3) Pt will demonstrate (L) shoulder MMT WFL grossly for [place] with functional activities  4) Independent and pain free with ADL's and IADL's  5) Patient will be able to achieve less than or equal to 10% on FOTO shoulder survey demonstrating overall improved functional ability with upper extremity.      Plan   Certification Period/Plan of care expiration: 12/19/2018 to 2/15/2019.     Outpatient Occupational Therapy 2 times weekly for until 2/15/19 to include the following interventions: Manual therapy/joint mobilizations, Modalities for pain management, Therapeutic exercises/activities., Strengthening, Joint Protection and Energy Conservation.       Seferino Fiore, OT

## 2018-12-27 NOTE — PATIENT INSTRUCTIONS
ROM: Flexion - Wand (Supine)        Lie on back holding wand. Raise arms over head.   Repeat 15 times per set. Do 2 sets per session. Do 3-5 sessions per day.     [email or web address]     Abduction (Side-Lying)        Lie on left side. Raise arm above head. Keep palm forward.  Repeat 15 times per set. Do 2 sets per session. Do 3-5 sessions per day.     [email or web address]     Copyright © [employer]. All rights reserved.    External Rotation: Side-Lying (Dumbbell)        Lie with neck supported, left elbow bent to 90°, forearm across stomach. Raise forearm, keeping elbow at side.  Repeat ___10_ times per set. Do __3__ sets per session. Do __2__ sessions per week. Use _0___ lb weight.      Copyright © [employer]. All rights reserved.   Lat Pull Down        Face anchor with knees slightly flexed. Palms down, pull arms down to sides.  Repeat _15_ times per set. Do _2_ sets per session. Do _10_ sessions per week.  Sultan Height: Over Head     [email or web address]     Copyright © [employer]. All rights reserved.       Copyright © [employer]. All rights reserved.  Low Row: Thumbs Up        Face anchor, medium to wide stance. Thumbs up, pull arms back, squeezing shoulder blades together.   Repeat 15 times per set. Do 2 sets per session. Do 3-5 sessions per week.  Sultan Height: Waist     [email or web address]

## 2019-01-03 ENCOUNTER — CLINICAL SUPPORT (OUTPATIENT)
Dept: REHABILITATION | Facility: HOSPITAL | Age: 64
End: 2019-01-03
Attending: PSYCHIATRY & NEUROLOGY
Payer: MEDICARE

## 2019-01-03 DIAGNOSIS — R53.1 WEAKNESS: ICD-10-CM

## 2019-01-03 DIAGNOSIS — M25.60 STIFFNESS IN JOINT: ICD-10-CM

## 2019-01-03 DIAGNOSIS — M79.602 PAIN OF LEFT UPPER EXTREMITY: ICD-10-CM

## 2019-01-03 PROCEDURE — 97140 MANUAL THERAPY 1/> REGIONS: CPT | Mod: PN

## 2019-01-03 PROCEDURE — 97110 THERAPEUTIC EXERCISES: CPT | Mod: PN

## 2019-01-03 NOTE — PROGRESS NOTES
"  Occupational Therapy Daily Treatment Note   Date: 1/3/2019  Patient: Anjel Sanders  Chart Number: [id number]     Therapy Diagnosis: L shoulder pain, weakness, stiffness in joint  Physician: Lars Payan MD     Physician Orders: OT eval and treat  Medical Diagnosis:   M19.112,S46.002S (ICD-10-CM) - Osteoarthritis of left shoulder due to rotator cuff injury   M75.42 (ICD-10-CM) - Rotator cuff impingement syndrome of left shoulder      Evaluation Date: 12/19/2018  Insurance Authorization period Expiration: 12/31/2018  Plan of Care Expiration Period: 2/15/2019     Visit # / Visits Authortized: 3 / 18  Time In:12:10 pm  Time Out: 1:00 pm   Total Billable Time: 40 minutes   TE2 MT 1      Medicare:88.10    Precautions: Standard      Subjective     Pt reports: "It hurts me when I move it the wrong way, I feel like it's improving though."  he was compliant with home exercise program given last session.   Response to previous treatment:Fair with soreness and fatigue  Functional change: I still cant lift it up.     Pain:0/10 at rest;  8-9/10 at worst  Location: left shoulder      Objective     Anjel received the following supervised modalities after being cleared for contradictions for 10 minutes:   -MHP to L shoulder x 10 minutes in order to increase extensibility of tissues prior to MT while OT completed MT and STM.    Anjel received the following manual therapy techniques for 10 minutes:   -MT consisting of patient supine for L shoulder lateral telescoping, UT STM, pec lift, subscapularis MFR and stretch, PROM with endrange stretching, GHJ inferior anterior posterior glides grade I-III, gentle shoulder oscillations, to RSL for L scapula framing, medial scapula release scapula    Anjel received therapeutic exercises for 15 minutes including:    Exercises Date:1/3/2019    Visit #3   PROM (L) Shoulder FLEX/ABD/IR/ER 10x   Supine dowel Flex  Chest press 0#  2/15   SL ABD 0  2/15   SL ER 0  2/15   pulleys 3'   Wall slides " "2/15   Standing dowel abd 2/15   Corner pec stretch NT pain   Tband Lats Red  2/15   Tband Rows Red  2/15   Tband IR/ER Red  2/15   IR puley stretch 3/30"      Range of Motion/Strength:   Shoulder   Left     AROM PROM   Flexion 75(+15) 110(+30)   Extension 60(+15) NT   Abduction 70(+10) 90(+5)   HorizAdduction 25(+5) NT   Internal rotation PSIS(=) 60(+60)   ER at 90° abd NT 30(+30)   ER at 0° abd 50(+15) 40(+10)      Home Exercises and Education Provided     Education provided:   - Pt provided with written instructions, demonstration and education of HEP with pt demonstrating and verbalizing understanding of appropriate movements and techniques to increase strength, ROM and activity tolerance for increased IND.  - Progress towards goals     Written Home Exercises Provided: yes.  Exercises were reviewed and Anjel was able to demonstrate them prior to the end of the session.  Anjel demonstrated good  understanding of the HEP provided.   .   See EMR under Media for exercises provided 12/27/2018.      Assessment   Pt participated well this date. He continues to tolerate progression of treatment well. He was able to complete tband therex today as well as S/L therex which he wasn't able to do last session. Pt continues to have soft tissue restrictions however responds well to manual therapy.  ROM steadily improving.    Pt would continue to benefit from skilled OT.Anjel is progressing well towards his goals and there are no updates to goals at this time. Pt prognosis is Good.     Pt will continue to benefit from skilled outpatient occupational therapy to address the deficits listed in the problem list on initial evaluation provide pt/family education and to maximize pt's level of independence in the home and community environment.     Anticipated barriers to occupational therapy: transportation, pain    Pt's spiritual, cultural and educational needs considered and pt agreeable to plan of care and goals.    Goals:    "   Short Term Goals to be met in 4 weeks: (1/18/2019)  1) Initiate Hep   2) Pt will increase L shoulder AROM by 10 degrees grossly for improved performance with overhead ADL's  3) Pt will report 5/10 pain in (L)shoulder at worst  4) Pt will demonstrate increased MMT to 4-/5 grossly L shoulder  5) Patient will be able to achieve less than or equal to 25% on FOTO shoulder survey demonstrating overall improved functional ability with upper extremity.      Long Term Goals to be met by discharge:  1) Independent with HEP  2) Pt will demonstrate (L) shoulder AROM WFL grossly for [place] with ADL's  3) Pt will demonstrate (L) shoulder MMT WFL grossly for [place] with functional activities  4) Independent and pain free with ADL's and IADL's  5) Patient will be able to achieve less than or equal to 10% on FOTO shoulder survey demonstrating overall improved functional ability with upper extremity.    Plan   Certification Period/Plan of care expiration: 12/19/2018 to 2/15/2019.     Outpatient Occupational Therapy 2 times weekly for until 2/15/19 to include the following interventions: Manual therapy/joint mobilizations, Modalities for pain management, Therapeutic exercises/activities., Strengthening, Joint Protection and Energy Conservation.       JUJU Barnhart

## 2019-01-08 ENCOUNTER — CLINICAL SUPPORT (OUTPATIENT)
Dept: REHABILITATION | Facility: HOSPITAL | Age: 64
End: 2019-01-08
Attending: PSYCHIATRY & NEUROLOGY
Payer: MEDICARE

## 2019-01-08 DIAGNOSIS — R53.1 WEAKNESS: ICD-10-CM

## 2019-01-08 DIAGNOSIS — M79.602 PAIN OF LEFT UPPER EXTREMITY: ICD-10-CM

## 2019-01-08 DIAGNOSIS — M25.60 STIFFNESS IN JOINT: ICD-10-CM

## 2019-01-08 PROCEDURE — 97140 MANUAL THERAPY 1/> REGIONS: CPT | Mod: PN

## 2019-01-08 NOTE — PROGRESS NOTES
"  Occupational Therapy Daily Treatment Note   Date: 1/8/2019  Patient: Anjel Sanders  Chart Number: [id number]     Therapy Diagnosis: L shoulder pain, weakness, stiffness in joint  Physician: Lars Payan MD     Physician Orders: OT eval and treat  Medical Diagnosis:   M19.112,S46.002S (ICD-10-CM) - Osteoarthritis of left shoulder due to rotator cuff injury   M75.42 (ICD-10-CM) - Rotator cuff impingement syndrome of left shoulder      Evaluation Date: 12/19/2018  Insurance Authorization period Expiration: 12/31/2018  Plan of Care Expiration Period: 2/15/2019     Visit # / Visits Authortized: 4 / 18  Time In:11:10 pm  Time Out: 1140 pm   Total Billable Time: 15 minutes  MT 1      Medicare:115.56    Precautions: Standard      Subjective     Pt reports: "It hasn't been an 8-9 this whole time just this morning."  he was compliant with home exercise program given last session.   Response to previous treatment:Fair with soreness and fatigue  Functional change: I still cant lift it up.     Pain:0/10 at rest;  8-9/10 today  Location: left shoulder      Objective     Anjel received the following supervised modalities after being cleared for contradictions for 10 minutes:   -MHP to L shoulder x 10 minutes in order to increase extensibility of tissues prior to MT while OT completed MT and STM.    Anjel received the following manual therapy techniques for 10 minutes:   -MT consisting of patient supine for L shoulder lateral telescoping, UT STM, pec lift, subscapularis MFR and stretch, PROM with endrange stretching, GHJ inferior anterior posterior glides grade I-III, gentle shoulder oscillations, to RSL for L scapula framing, medial scapula release scapula    Anjel received therapeutic exercises for 10 minutes including:    Exercises Date:1/8/2019    Visit #4   PROM (L) Shoulder FLEX/ABD/IR/ER 10x   Supine dowel Flex  Chest press 1#  2/15   SL ABD Unable 2/2 to pain   SL ER 0  2/15   pulleys 3'   Wall slides 2/15   Standing " dowel abd NT   Corner pec stretch NT pain   Tband Lats Red  2/15   Tband Rows Red  2/15   Tband IR/ER Red  2/15   IR pulley stretch NT      Home Exercises and Education Provided     Education provided:   - Pt provided with written instructions, demonstration and education of HEP with pt demonstrating and verbalizing understanding of appropriate movements and techniques to increase strength, ROM and activity tolerance for increased IND.  - Progress towards goals     Written Home Exercises Provided: yes.  Exercises were reviewed and Anjel was able to demonstrate them prior to the end of the session.  Anjel demonstrated good  understanding of the HEP provided.   .   See EMR under Media for exercises provided 12/27/2018.      Assessment   Pt increased pain today. Unable to complete some ex's secondary to pain. Soft tissue restrictions noted in biceps and pecs however responded fairly to manual therapy. Instructed pt to ice shoulder and keep everything in a pain free ROM to decrease pain. Pt verbalized understanding.     Pt would continue to benefit from skilled OT.Anjel is progressing well towards his goals and there are no updates to goals at this time. Pt prognosis is Good.     Pt will continue to benefit from skilled outpatient occupational therapy to address the deficits listed in the problem list on initial evaluation provide pt/family education and to maximize pt's level of independence in the home and community environment.     Anticipated barriers to occupational therapy: transportation, pain    Pt's spiritual, cultural and educational needs considered and pt agreeable to plan of care and goals.    Goals:      Short Term Goals to be met in 4 weeks: (1/18/2019)  1) Initiate Hep   2) Pt will increase L shoulder AROM by 10 degrees grossly for improved performance with overhead ADL's  3) Pt will report 5/10 pain in (L)shoulder at worst  4) Pt will demonstrate increased MMT to 4-/5 grossly L shoulder  5) Patient will  be able to achieve less than or equal to 25% on FOTO shoulder survey demonstrating overall improved functional ability with upper extremity.      Long Term Goals to be met by discharge:  1) Independent with HEP  2) Pt will demonstrate (L) shoulder AROM WFL grossly for [place] with ADL's  3) Pt will demonstrate (L) shoulder MMT WFL grossly for [place] with functional activities  4) Independent and pain free with ADL's and IADL's  5) Patient will be able to achieve less than or equal to 10% on FOTO shoulder survey demonstrating overall improved functional ability with upper extremity.    Plan   Certification Period/Plan of care expiration: 12/19/2018 to 2/15/2019.     Outpatient Occupational Therapy 2 times weekly for until 2/15/19 to include the following interventions: Manual therapy/joint mobilizations, Modalities for pain management, Therapeutic exercises/activities., Strengthening, Joint Protection and Energy Conservation.       JJUU Barnhart

## 2019-01-10 ENCOUNTER — CLINICAL SUPPORT (OUTPATIENT)
Dept: REHABILITATION | Facility: HOSPITAL | Age: 64
End: 2019-01-10
Attending: PSYCHIATRY & NEUROLOGY
Payer: MEDICARE

## 2019-01-10 DIAGNOSIS — M25.60 STIFFNESS IN JOINT: ICD-10-CM

## 2019-01-10 DIAGNOSIS — M79.602 PAIN OF LEFT UPPER EXTREMITY: ICD-10-CM

## 2019-01-10 DIAGNOSIS — R53.1 WEAKNESS: ICD-10-CM

## 2019-01-10 PROCEDURE — 97140 MANUAL THERAPY 1/> REGIONS: CPT | Mod: PN

## 2019-01-10 PROCEDURE — 97110 THERAPEUTIC EXERCISES: CPT | Mod: PN

## 2019-01-10 NOTE — PROGRESS NOTES
"  Occupational Therapy Daily Treatment Note   Date: 1/10/2019  Patient: Anjel Sanders  Chart Number: [id number]     Therapy Diagnosis: L shoulder pain, weakness, stiffness in joint  Physician: Lars Payan MD     Physician Orders: OT eval and treat  Medical Diagnosis:   M19.112,S46.002S (ICD-10-CM) - Osteoarthritis of left shoulder due to rotator cuff injury   M75.42 (ICD-10-CM) - Rotator cuff impingement syndrome of left shoulder      Evaluation Date: 12/19/2018  Insurance Authorization period Expiration: 12/31/2018  Plan of Care Expiration Period: 2/15/2019     Visit # / Visits Authortized: 5/ 18 FOTO 47%  Time In:12:10 pm  Time Out: 100 pm   Total Billable Time: 40 minutes  MT 1 TE2     Medicare:203.66    Precautions: Standard      Subjective     Pt reports: "I'm feeling a lot better today."  he was compliant with home exercise program given last session.   Response to previous treatment:decreased pain  Functional change: able to perform more therex    Pain:0/10 at rest; 3/10 today  Location: left shoulder      Objective     Anjel received the following supervised modalities after being cleared for contradictions for 10 minutes:   -MHP to L shoulder x 10 minutes in order to increase extensibility of tissues prior to MT while OT completed MT and STM.    Anjel received the following manual therapy techniques for 10 minutes:   -MT consisting of patient supine for L shoulder lateral telescoping, UT STM, pec lift, subscapularis MFR and stretch, PROM with endrange stretching, GHJ inferior anterior posterior glides grade I-III, gentle shoulder oscillations, to RSL for L scapula framing, medial scapula release scapula    Anjel received therapeutic exercises for 10 minutes including:    Exercises Date:1/10/2019    Visit #5   PROM (L) Shoulder FLEX/ABD/IR/ER 10x   Supine dowel Flex  Chest press 2#  2/15   SL ABD 0  2/15   SL ER 0  2/15   Sleeper stretch 3/30"   pulleys 3'   Wall slides 2/15   Standing dowel abd NT " "  Corner pec stretch 3/30"   Tband Lats Green  2/15   Tband Rows Green  2/15   Tband IR/ER Green  2/15   IR pulley stretch 3/30"      Home Exercises and Education Provided     Education provided:   - Pt provided with written instructions, demonstration and education of HEP with pt demonstrating and verbalizing understanding of appropriate movements and techniques to increase strength, ROM and activity tolerance for increased IND.  - Progress towards goals     Written Home Exercises Provided: yes.  Exercises were reviewed and Anjel was able to demonstrate them prior to the end of the session.  Anjel demonstrated good  understanding of the HEP provided.   .   See EMR under Media for exercises provided 12/27/2018.      Assessment   Pt with decreased pain today.  Able to perform all therex in a pain free ROM. He continues to have some soft tissue tightness however he responds well to manual therapy. Pt motivated.     Pt would continue to benefit from skilled OT.Anjel is progressing well towards his goals and there are no updates to goals at this time. Pt prognosis is Good.     Pt will continue to benefit from skilled outpatient occupational therapy to address the deficits listed in the problem list on initial evaluation provide pt/family education and to maximize pt's level of independence in the home and community environment.     Anticipated barriers to occupational therapy: transportation, pain    Pt's spiritual, cultural and educational needs considered and pt agreeable to plan of care and goals.    Goals:      Short Term Goals to be met in 4 weeks: (1/18/2019)  1) Initiate Hep   2) Pt will increase L shoulder AROM by 10 degrees grossly for improved performance with overhead ADL's  3) Pt will report 5/10 pain in (L)shoulder at worst  4) Pt will demonstrate increased MMT to 4-/5 grossly L shoulder  5) Patient will be able to achieve less than or equal to 25% on FOTO shoulder survey demonstrating overall improved " functional ability with upper extremity.      Long Term Goals to be met by discharge:  1) Independent with HEP  2) Pt will demonstrate (L) shoulder AROM WFL grossly for [place] with ADL's  3) Pt will demonstrate (L) shoulder MMT WFL grossly for [place] with functional activities  4) Independent and pain free with ADL's and IADL's  5) Patient will be able to achieve less than or equal to 10% on FOTO shoulder survey demonstrating overall improved functional ability with upper extremity.    Plan   Certification Period/Plan of care expiration: 12/19/2018 to 2/15/2019.     Outpatient Occupational Therapy 2 times weekly for until 2/15/19 to include the following interventions: Manual therapy/joint mobilizations, Modalities for pain management, Therapeutic exercises/activities., Strengthening, Joint Protection and Energy Conservation.       JUJU Barnhart

## 2019-01-15 ENCOUNTER — CLINICAL SUPPORT (OUTPATIENT)
Dept: REHABILITATION | Facility: HOSPITAL | Age: 64
End: 2019-01-15
Attending: PSYCHIATRY & NEUROLOGY
Payer: MEDICARE

## 2019-01-15 DIAGNOSIS — M25.60 STIFFNESS IN JOINT: ICD-10-CM

## 2019-01-15 DIAGNOSIS — M79.602 PAIN OF LEFT UPPER EXTREMITY: ICD-10-CM

## 2019-01-15 DIAGNOSIS — R53.1 WEAKNESS: ICD-10-CM

## 2019-01-15 PROCEDURE — 97140 MANUAL THERAPY 1/> REGIONS: CPT | Mod: PN

## 2019-01-15 PROCEDURE — 97110 THERAPEUTIC EXERCISES: CPT | Mod: PN

## 2019-01-15 NOTE — PROGRESS NOTES
"  Occupational Therapy Daily Treatment Note   Date: 1/15/2019  Patient: Anjel Sanders  Chart Number: [id number]     Therapy Diagnosis: L shoulder pain, weakness, stiffness in joint  Physician: Lars Payan MD     Physician Orders: OT eval and treat  Medical Diagnosis:   M19.112,S46.002S (ICD-10-CM) - Osteoarthritis of left shoulder due to rotator cuff injury   M75.42 (ICD-10-CM) - Rotator cuff impingement syndrome of left shoulder      Evaluation Date: 12/19/2018  Insurance Authorization period Expiration: 12/31/2018  Plan of Care Expiration Period: 2/15/2019     Visit # / Visits Authortized: 6/ 18 FOTO 47%  Time In:2:05 pm  Time Out: 305 pm   Total Billable Time: 50 minutes  MT 1 TE2     Medicare:291.76    Precautions: Standard      Subjective     Pt reports: "I seem to have less pain and more movement."  he was compliant with home exercise program given last session.   Response to previous treatment:decreased pain  Functional change: able to perform more therex    Pain:0/10 at rest; 2/10 today  Location: left shoulder      Objective     Anjel received the following supervised modalities after being cleared for contradictions for 10 minutes:   -MHP to L shoulder x 10 minutes in order to increase extensibility of tissues prior to MT while OT completed MT and STM.    Anjel received the following manual therapy techniques for 10 minutes:   -MT consisting of patient supine for L shoulder lateral telescoping, UT STM, pec lift, subscapularis MFR and stretch, PROM with endrange stretching, GHJ inferior anterior posterior glides grade I-III, gentle shoulder oscillations, to RSL for L scapula framing, medial scapula release scapula    Anjel received therapeutic exercises for 30 minutes including:    Exercises Date:1/15/2019    Visit #6   PROM (L) Shoulder FLEX/ABD/IR/ER 10x   Supine dowel Flex 2#  2/15   SL ABD 0  2/15   SL ER 0  2/15   Sleeper stretch 3/30"   pulleys 3'   Wall slides 2/15   Standing dowel abd NT " "  Corner pec stretch 3/30"   Tband Lats Green  2/15   Tband Rows Green  2/15   Tband IR/ER Green  2/15   IR pulley stretch 3/30"      Range of Motion/Strength:   Shoulder   Left     AROM PROM   Flexion 85(+25) 110(+30)   Extension 55(+10) NT   Abduction 75(+15) 110(+25)   HorizAdduction 20(=) NT   Internal rotation PSIS(=) 60(+60)   ER at 90° abd NT 35(+35)   ER at 0° abd 45(+10) 45(+15)      Home Exercises and Education Provided     Education provided:   - Pt provided with written instructions, demonstration and education of HEP with pt demonstrating and verbalizing understanding of appropriate movements and techniques to increase strength, ROM and activity tolerance for increased IND.  - Progress towards goals     Written Home Exercises Provided: yes.  Exercises were reviewed and Anjel was able to demonstrate them prior to the end of the session.  Anjel demonstrated good  understanding of the HEP provided.   .   See EMR under Media for exercises provided 12/27/2018.      Assessment   Pain continues to improve.  He continues to have some soft tissue tightness in biceps and pecs however he responds well to manual therapy. ROM steadily improving however pt still has significant weakness in RTC. Tolerated progression of treatment well. Pt motivated.     Pt would continue to benefit from skilled OT.Anjel is progressing well towards his goals and there are no updates to goals at this time. Pt prognosis is Good.     Pt will continue to benefit from skilled outpatient occupational therapy to address the deficits listed in the problem list on initial evaluation provide pt/family education and to maximize pt's level of independence in the home and community environment.     Anticipated barriers to occupational therapy: transportation, pain    Pt's spiritual, cultural and educational needs considered and pt agreeable to plan of care and goals.    Goals:      Short Term Goals to be met in 4 weeks: (1/18/2019)  1) Initiate Hep "   2) Pt will increase L shoulder AROM by 10 degrees grossly for improved performance with overhead ADL's  3) Pt will report 5/10 pain in (L)shoulder at worst  4) Pt will demonstrate increased MMT to 4-/5 grossly L shoulder  5) Patient will be able to achieve less than or equal to 25% on FOTO shoulder survey demonstrating overall improved functional ability with upper extremity.      Long Term Goals to be met by discharge:  1) Independent with HEP  2) Pt will demonstrate (L) shoulder AROM WFL grossly for [place] with ADL's  3) Pt will demonstrate (L) shoulder MMT WFL grossly for [place] with functional activities  4) Independent and pain free with ADL's and IADL's  5) Patient will be able to achieve less than or equal to 10% on FOTO shoulder survey demonstrating overall improved functional ability with upper extremity.    Plan   Certification Period/Plan of care expiration: 12/19/2018 to 2/15/2019.     Outpatient Occupational Therapy 2 times weekly for until 2/15/19 to include the following interventions: Manual therapy/joint mobilizations, Modalities for pain management, Therapeutic exercises/activities., Strengthening, Joint Protection and Energy Conservation.       JUJU Barnhart

## 2019-01-17 ENCOUNTER — CLINICAL SUPPORT (OUTPATIENT)
Dept: REHABILITATION | Facility: HOSPITAL | Age: 64
End: 2019-01-17
Attending: PSYCHIATRY & NEUROLOGY
Payer: MEDICARE

## 2019-01-17 DIAGNOSIS — R53.1 WEAKNESS: ICD-10-CM

## 2019-01-17 DIAGNOSIS — M79.602 PAIN OF LEFT UPPER EXTREMITY: ICD-10-CM

## 2019-01-17 DIAGNOSIS — M25.60 STIFFNESS IN JOINT: ICD-10-CM

## 2019-01-17 PROCEDURE — 97140 MANUAL THERAPY 1/> REGIONS: CPT | Mod: PN

## 2019-01-17 PROCEDURE — 97110 THERAPEUTIC EXERCISES: CPT | Mod: PN

## 2019-01-17 NOTE — PROGRESS NOTES
"  Occupational Therapy Daily Treatment Note   Date: 1/17/2019  Patient: Anjel Sanders  Chart Number: [id number]     Therapy Diagnosis: L shoulder pain, weakness, stiffness in joint  Physician: Lars Payan MD     Physician Orders: OT eval and treat  Medical Diagnosis:   M19.112,S46.002S (ICD-10-CM) - Osteoarthritis of left shoulder due to rotator cuff injury   M75.42 (ICD-10-CM) - Rotator cuff impingement syndrome of left shoulder      Evaluation Date: 12/19/2018  Insurance Authorization period Expiration: 12/31/2018  Plan of Care Expiration Period: 2/15/2019     Visit # / Visits Authortized: 7/ 18 FOTO 47%  Time In:1200 pm  Time Out: 1250 pm   Total Billable Time: 25 minutes  MT 1 TE1     Medicare:[id number]    Precautions: Standard      Subjective     Pt reports: "I like doing the exercises first then you stretching me at the end it seems to work it out some."  he was compliant with home exercise program given last session.   Response to previous treatment:decreased pain  Functional change: able to perform more therex    Pain:0/10 at rest; 3/10 today  Location: left shoulder      Objective     Anjel received the following supervised modalities after being cleared for contradictions for 10 minutes:   -MHP to L shoulder x 10 minutes in order to increase extensibility of tissues prior to MT while OT completed MT and STM.    Anjel received the following manual therapy techniques for 10 minutes:   -MT consisting of patient supine for L shoulder lateral telescoping, UT STM, pec lift, subscapularis MFR and stretch, PROM with endrange stretching, GHJ inferior anterior posterior glides grade I-III, gentle shoulder oscillations, to RSL for L scapula framing, medial scapula release scapula    Anjel received 1:1 therapeutic exercises for 15 minutes including:    Exercises Date:1/17/2019    Visit #7   PROM (L) Shoulder FLEX/ABD/IR/ER 10x   Supine dowel Flex 2#  2/15   SL ABD 0  2/15   SL ER 0  2/15   Sleeper stretch 3/30" " "  pulleys 3'   Wall slides 2/15   Standing dowel abd NT   Corner pec stretch 3/30"   Tband Lats Green  2/15   Tband Rows Green  2/15   Tband IR/ER Green  2/15   IR pulley stretch 3/30"         Home Exercises and Education Provided     Education provided:   - Pt provided with written instructions, demonstration and education of HEP with pt demonstrating and verbalizing understanding of appropriate movements and techniques to increase strength, ROM and activity tolerance for increased IND.  - Progress towards goals     Written Home Exercises Provided: yes.  Exercises were reviewed and Anjel was able to demonstrate them prior to the end of the session.  Anjel demonstrated good  understanding of the HEP provided.   .   See EMR under Media for exercises provided 12/27/2018.      Assessment   Pt with good participation today. He continues to be motivated. Pain continues to improve each session.  He continues to have some soft tissue tightness in biceps and pecs however he responds well to manual therapy. Pt would continue to benefit from skilled OT.Anjel is progressing well towards his goals and there are no updates to goals at this time. Pt prognosis is Good.     Pt will continue to benefit from skilled outpatient occupational therapy to address the deficits listed in the problem list on initial evaluation provide pt/family education and to maximize pt's level of independence in the home and community environment.     Anticipated barriers to occupational therapy: transportation, pain    Pt's spiritual, cultural and educational needs considered and pt agreeable to plan of care and goals.    Goals:      Short Term Goals to be met in 4 weeks: (1/18/2019)  1) Initiate Hep   2) Pt will increase L shoulder AROM by 10 degrees grossly for improved performance with overhead ADL's  3) Pt will report 5/10 pain in (L)shoulder at worst  4) Pt will demonstrate increased MMT to 4-/5 grossly L shoulder  5) Patient will be able to " achieve less than or equal to 25% on FOTO shoulder survey demonstrating overall improved functional ability with upper extremity.      Long Term Goals to be met by discharge:  1) Independent with HEP  2) Pt will demonstrate (L) shoulder AROM WFL grossly for [place] with ADL's  3) Pt will demonstrate (L) shoulder MMT WFL grossly for [place] with functional activities  4) Independent and pain free with ADL's and IADL's  5) Patient will be able to achieve less than or equal to 10% on FOTO shoulder survey demonstrating overall improved functional ability with upper extremity.    Plan   Certification Period/Plan of care expiration: 12/19/2018 to 2/15/2019.     Outpatient Occupational Therapy 2 times weekly for until 2/15/19 to include the following interventions: Manual therapy/joint mobilizations, Modalities for pain management, Therapeutic exercises/activities., Strengthening, Joint Protection and Energy Conservation.       JUJU Barnhart

## 2019-01-24 ENCOUNTER — CLINICAL SUPPORT (OUTPATIENT)
Dept: REHABILITATION | Facility: HOSPITAL | Age: 64
End: 2019-01-24
Attending: PSYCHIATRY & NEUROLOGY
Payer: MEDICARE

## 2019-01-24 DIAGNOSIS — M79.602 PAIN OF LEFT UPPER EXTREMITY: ICD-10-CM

## 2019-01-24 DIAGNOSIS — M25.60 STIFFNESS IN JOINT: ICD-10-CM

## 2019-01-24 DIAGNOSIS — R53.1 WEAKNESS: ICD-10-CM

## 2019-01-24 PROCEDURE — 97110 THERAPEUTIC EXERCISES: CPT | Mod: PN

## 2019-01-24 PROCEDURE — 97140 MANUAL THERAPY 1/> REGIONS: CPT | Mod: PN

## 2019-01-24 NOTE — PROGRESS NOTES
"  Occupational Therapy Daily Treatment Note   Date: 1/24/2019  Patient: Anjel Sanders  Chart Number: [id number]     Therapy Diagnosis: L shoulder pain, weakness, stiffness in joint  Physician: Lars Payan MD     Physician Orders: OT eval and treat  Medical Diagnosis:   M19.112,S46.002S (ICD-10-CM) - Osteoarthritis of left shoulder due to rotator cuff injury   M75.42 (ICD-10-CM) - Rotator cuff impingement syndrome of left shoulder      Evaluation Date: 12/19/2018  Insurance Authorization period Expiration: 12/31/2019  Plan of Care Expiration Period: 2/15/2019     Visit # Total 8 / Visits Authortized: 6/50 FOTO 47%  Time In:1105 am  Time Out: 1200 pm   Total Billable Time: 55 minutes  MT 1 TE3     Medicare:[id number]    Precautions: Standard      Subjective     Pt reports: "I feel like it's moving better but you are killing me!"  he was compliant with home exercise program given last session.   Response to previous treatment:decreased pain  Functional change: able to perform more therex    Pain:0/10 at rest; 3/10 today  Location: left shoulder      Objective     Pt on UBE for/rev @120rpms x 6 minutes prior to session    Anjel received the following manual therapy techniques for 10 minutes:   -MT consisting of patient supine for L shoulder lateral telescoping, UT STM, pec lift, subscapularis MFR and stretch, PROM with endrange stretching, GHJ inferior anterior posterior glides grade I-III, gentle shoulder oscillations, to RSL for L scapula framing, medial scapula release scapula    Anjel received 1:1 therapeutic exercises for 45 minutes including:    Exercises Date:1/24/2019    Visit #7   PROM (L) Shoulder FLEX/ABD/IR/ER 10x   Supine dowel Flex/serratus punches/ER 2#  2/15   SL ABD/Gator 0  2/15   SL ER NT   Sleeper stretch NT   pulleys NT   Wall slides 2/15   Standing dowel abd/IR 2/15   Corner pec stretch 3/30"   Tband Lats Green  2/15   Tband Rows Green  2/15   Tband IR/ER Green  2/15   IR pulley stretch 3/30" "      Range of Motion/Strength:   Shoulder   Left     AROM PROM   Flexion 95(+10) 120(+10)   Extension 60(+5) NT   Abduction 100(+25) 120(+10)   HorizAdduction 25(=) NT   Internal rotation S2(PSIS) 70(+10)   ER at 90° abd 55(+55) 45(+10)   ER at 0° abd 55(+10) 50(+5)      Home Exercises and Education Provided     Education provided:   - Pt provided with written instructions, demonstration and education of HEP with pt demonstrating and verbalizing understanding of appropriate movements and techniques to increase strength, ROM and activity tolerance for increased IND.  - Progress towards goals     Written Home Exercises Provided: yes.  Exercises were reviewed and Anjel was able to demonstrate them prior to the end of the session.  Anjel demonstrated good  understanding of the HEP provided.   .   See EMR under Media for exercises provided 12/27/2018.      Assessment   Pt with good participation today. ROM continues to steadily improve. He still  Has some soft tissue restrictions however responds to manual therapy and PLLS.  Pain report low in sessions.  Tolerated new therex well.      Pt would continue to benefit from skilled OT.Anjel is progressing well towards his goals and there are no updates to goals at this time. Pt prognosis is Good.     Pt will continue to benefit from skilled outpatient occupational therapy to address the deficits listed in the problem list on initial evaluation provide pt/family education and to maximize pt's level of independence in the home and community environment.     Anticipated barriers to occupational therapy: transportation, pain    Pt's spiritual, cultural and educational needs considered and pt agreeable to plan of care and goals.    Goals:      Short Term Goals to be met in 4 weeks: (1/18/2019)  1) Initiate Hep   2) Pt will increase L shoulder AROM by 10 degrees grossly for improved performance with overhead ADL's  3) Pt will report 5/10 pain in (L)shoulder at worst  4) Pt will  demonstrate increased MMT to 4-/5 grossly L shoulder  5) Patient will be able to achieve less than or equal to 25% on FOTO shoulder survey demonstrating overall improved functional ability with upper extremity.      Long Term Goals to be met by discharge:  1) Independent with HEP  2) Pt will demonstrate (L) shoulder AROM WFL grossly for [place] with ADL's  3) Pt will demonstrate (L) shoulder MMT WFL grossly for [place] with functional activities  4) Independent and pain free with ADL's and IADL's  5) Patient will be able to achieve less than or equal to 10% on FOTO shoulder survey demonstrating overall improved functional ability with upper extremity.    Plan   Certification Period/Plan of care expiration: 12/19/2018 to 2/15/2019.     Outpatient Occupational Therapy 2 times weekly for until 2/15/19 to include the following interventions: Manual therapy/joint mobilizations, Modalities for pain management, Therapeutic exercises/activities., Strengthening, Joint Protection and Energy Conservation.       JUJU Barnhart

## 2019-01-29 ENCOUNTER — CLINICAL SUPPORT (OUTPATIENT)
Dept: REHABILITATION | Facility: HOSPITAL | Age: 64
End: 2019-01-29
Attending: PSYCHIATRY & NEUROLOGY
Payer: MEDICARE

## 2019-01-29 DIAGNOSIS — R53.1 WEAKNESS: ICD-10-CM

## 2019-01-29 DIAGNOSIS — M79.602 PAIN OF LEFT UPPER EXTREMITY: ICD-10-CM

## 2019-01-29 DIAGNOSIS — M25.60 STIFFNESS IN JOINT: ICD-10-CM

## 2019-01-29 PROCEDURE — 97110 THERAPEUTIC EXERCISES: CPT | Mod: PN

## 2019-01-29 PROCEDURE — 97140 MANUAL THERAPY 1/> REGIONS: CPT | Mod: PN

## 2019-01-29 NOTE — PROGRESS NOTES
"  Occupational Therapy Daily Treatment Note   Date: 1/29/2019  Patient: Anjel Sanders  Chart Number: [id number]     Therapy Diagnosis: L shoulder pain, weakness, stiffness in joint  Physician: Lars Payan MD     Physician Orders: OT eval and treat  Medical Diagnosis:   M19.112,S46.002S (ICD-10-CM) - Osteoarthritis of left shoulder due to rotator cuff injury   M75.42 (ICD-10-CM) - Rotator cuff impingement syndrome of left shoulder      Evaluation Date: 12/19/2018  Insurance Authorization period Expiration: 12/31/2019  Plan of Care Expiration Period: 2/15/2019     Visit # Total 9 / Visits Authortized: 7/50 FOTO 47%  Time In:1240 pm  Time Out: 135 pm   Total Billable Time: 55 minutes  MT 1 TE3     Medicare:617.28    Precautions: Standard      Subjective     Pt reports: "I feel like I am improving I just have pain with certain motions."  he was compliant with home exercise program given last session.   Response to previous treatment:decreased pain  Functional change: able to perform more therex    Pain:0/10 at rest; 3/10 today  Location: left shoulder      Objective     Pt on UBE for/rev @120rpms x 6 minutes prior to session    Anjel received the following manual therapy techniques for 10 minutes:   -MT consisting of patient supine for L shoulder lateral telescoping, UT STM, pec lift, subscapularis MFR and stretch, PROM with endrange stretching, GHJ inferior anterior posterior glides grade I-III, gentle shoulder oscillations, to RSL for L scapula framing, medial scapula release scapula    Anjel received 1:1 therapeutic exercises for 45 minutes including:    Exercises Date:1/29/2019    Visit #7   PROM (L) Shoulder FLEX/ABD/IR/ER 10x   Supine dowel Flex/serratus punches/ER 2#  2/15   SL ABD/Gator 0  2/15   SL ER 0  2/15   Sleeper stretch 3/30"   pulleys NT   Wall slides 2/15   Standing dowel abd/IR 2/15   Corner pec stretch 3/30"   Tband Lats Green  2/15   Tband Rows Green  2/15   Tband IR/ER Green  2/15   IR pulley " "stretch 3/30"      Home Exercises and Education Provided     Education provided:   - Pt provided with written instructions, demonstration and education of HEP with pt demonstrating and verbalizing understanding of appropriate movements and techniques to increase strength, ROM and activity tolerance for increased IND.  - Progress towards goals     Written Home Exercises Provided: yes.  Exercises were reviewed and Anjel was able to demonstrate them prior to the end of the session.  Anjel demonstrated good  understanding of the HEP provided.   .   See EMR under Media for exercises provided 12/27/2018.      Assessment   Pt with good participation today. ROM continues to steadily improve. He still  Has some soft tissue restrictions most significant in posterior capsule however responds to manual therapy and PLLS.  Pain report low in sessions.       Pt would continue to benefit from skilled OT.Anjel is progressing well towards his goals and there are no updates to goals at this time. Pt prognosis is Good.     Pt will continue to benefit from skilled outpatient occupational therapy to address the deficits listed in the problem list on initial evaluation provide pt/family education and to maximize pt's level of independence in the home and community environment.     Anticipated barriers to occupational therapy: transportation, pain    Pt's spiritual, cultural and educational needs considered and pt agreeable to plan of care and goals.    Goals:      Short Term Goals to be met in 4 weeks: (1/18/2019)  1) Initiate Hep   2) Pt will increase L shoulder AROM by 10 degrees grossly for improved performance with overhead ADL's  3) Pt will report 5/10 pain in (L)shoulder at worst  4) Pt will demonstrate increased MMT to 4-/5 grossly L shoulder  5) Patient will be able to achieve less than or equal to 25% on FOTO shoulder survey demonstrating overall improved functional ability with upper extremity.      Long Term Goals to be met by " discharge:  1) Independent with HEP  2) Pt will demonstrate (L) shoulder AROM WFL grossly for [place] with ADL's  3) Pt will demonstrate (L) shoulder MMT WFL grossly for [place] with functional activities  4) Independent and pain free with ADL's and IADL's  5) Patient will be able to achieve less than or equal to 10% on FOTO shoulder survey demonstrating overall improved functional ability with upper extremity.    Plan   Certification Period/Plan of care expiration: 12/19/2018 to 2/15/2019.     Outpatient Occupational Therapy 2 times weekly for until 2/15/19 to include the following interventions: Manual therapy/joint mobilizations, Modalities for pain management, Therapeutic exercises/activities., Strengthening, Joint Protection and Energy Conservation.       JUJU Barnhart

## 2019-01-31 ENCOUNTER — CLINICAL SUPPORT (OUTPATIENT)
Dept: REHABILITATION | Facility: HOSPITAL | Age: 64
End: 2019-01-31
Attending: PSYCHIATRY & NEUROLOGY
Payer: MEDICARE

## 2019-01-31 DIAGNOSIS — M79.602 PAIN OF LEFT UPPER EXTREMITY: ICD-10-CM

## 2019-01-31 DIAGNOSIS — M25.60 STIFFNESS IN JOINT: ICD-10-CM

## 2019-01-31 DIAGNOSIS — R53.1 WEAKNESS: ICD-10-CM

## 2019-01-31 PROCEDURE — 97140 MANUAL THERAPY 1/> REGIONS: CPT | Mod: PN

## 2019-01-31 NOTE — PROGRESS NOTES
"  Occupational Therapy Daily Treatment Note   Date: 1/31/2019  Patient: Anjel Sanders  Chart Number: [id number]     Therapy Diagnosis: L shoulder pain, weakness, stiffness in joint  Physician: Lars Payan MD     Physician Orders: OT eval and treat  Medical Diagnosis:   M19.112,S46.002S (ICD-10-CM) - Osteoarthritis of left shoulder due to rotator cuff injury   M75.42 (ICD-10-CM) - Rotator cuff impingement syndrome of left shoulder      Evaluation Date: 12/19/2018  Insurance Authorization period Expiration: 12/31/2019  Plan of Care Expiration Period: 2/15/2019     Visit # Total 9 / Visits Authortized: 8/50 FOTO 47%  Time In:1200 pm  Time Out:1250 pm   Total Billable Time: 10 minutes  MT 1      Medicare:644.74    Precautions: Standard      Subjective     Pt reports: "I feel like I am moving better but I definitely still have pain with certain motions."  he was compliant with home exercise program given last session.   Response to previous treatment:more motion  Functional change: able to reach behind back more    Pain:0/10 at rest; 3/10 today  Location: left shoulder      Objective     Pt on UBE for/rev @120rpms x 6 minutes prior to session    Anjel received the following manual therapy techniques for 10 minutes:   -MT consisting of patient supine for L shoulder lateral telescoping, UT STM, pec lift, subscapularis MFR and stretch, PROM with endrange stretching, GHJ inferior anterior posterior glides grade I-III, gentle shoulder oscillations, to RSL for L scapula framing, medial scapula release scapula    Anjel received sup therapeutic exercises for 40 minutes including:    Exercises        PROM (L) Shoulder FLEX/ABD/IR/ER 10x   Supine dowel Flex/serratus punches/ER 3#  2/15   SL ABD/Gator 0  2/15   SL ER 0  2/15   Sleeper stretch 3/30"   pulleys 3min   Wall slides 2/15   Standing dowel abd/IR 2/15   Corner pec stretch 3/30"   Tband Lats Green  2/15   Tband Rows Green  2/15   Tband IR/ER Green  2/15   IR pulley " "stretch 3/30"      Home Exercises and Education Provided     Education provided:   - Pt provided with written instructions, demonstration and education of HEP with pt demonstrating and verbalizing understanding of appropriate movements and techniques to increase strength, ROM and activity tolerance for increased IND.  - Progress towards goals     Written Home Exercises Provided: yes.  Exercises were reviewed and Anjel was able to demonstrate them prior to the end of the session.  Anjel demonstrated good  understanding of the HEP provided.   .   See EMR under Media for exercises provided 1/03/2019     Assessment   Pt continues to have pain and weakness that limit him functionally. Overall his ROM and pain have improved since the initial evaluation. He still has posterior capsule tightness and soft tissue restrictions in anterior shoulder and biceps however responds well to manual therapy and progressive low load stretch. Pt motivated.      Pt would continue to benefit from skilled OT.Anjel is progressing well towards his goals and there are no updates to goals at this time. Pt prognosis is Good.     Pt will continue to benefit from skilled outpatient occupational therapy to address the deficits listed in the problem list on initial evaluation provide pt/family education and to maximize pt's level of independence in the home and community environment.     Anticipated barriers to occupational therapy: pain    Pt's spiritual, cultural and educational needs considered and pt agreeable to plan of care and goals.    Goals:      Short Term Goals to be met in 4 weeks: (1/18/2019)  1) Initiate Hep-met, ongoing  2) Pt will increase L shoulder AROM by 10 degrees grossly for improved performance with overhead ADL's-met 1/15/2019  3) Pt will report 5/10 pain in (L)shoulder at worst-not met  4) Pt will demonstrate increased MMT to 4-/5 grossly L shoulder-not met  5) Patient will be able to achieve less than or equal to 25% on FOTO " shoulder survey demonstrating overall improved functional ability with upper extremity-not met.      Long Term Goals to be met by discharge:  1) Independent with HEP  2) Pt will demonstrate (L) shoulder AROM WFL grossly for [place] with ADL's  3) Pt will demonstrate (L) shoulder MMT WFL grossly for [place] with functional activities  4) Independent and pain free with ADL's and IADL's  5) Patient will be able to achieve less than or equal to 10% on FOTO shoulder survey demonstrating overall improved functional ability with upper extremity.    Plan   Certification Period/Plan of care expiration: 12/19/2018 to 2/15/2019.     Outpatient Occupational Therapy 2 times weekly for until 2/15/19 to include the following interventions: Manual therapy/joint mobilizations, Modalities for pain management, Therapeutic exercises/activities., Strengthening, Joint Protection and Energy Conservation.       JUJU Barnhart

## 2019-02-05 ENCOUNTER — CLINICAL SUPPORT (OUTPATIENT)
Dept: REHABILITATION | Facility: HOSPITAL | Age: 64
End: 2019-02-05
Attending: PSYCHIATRY & NEUROLOGY
Payer: MEDICARE

## 2019-02-05 DIAGNOSIS — R53.1 WEAKNESS: ICD-10-CM

## 2019-02-05 DIAGNOSIS — M25.60 STIFFNESS IN JOINT: ICD-10-CM

## 2019-02-05 DIAGNOSIS — M79.602 PAIN OF LEFT UPPER EXTREMITY: ICD-10-CM

## 2019-02-05 PROCEDURE — 97140 MANUAL THERAPY 1/> REGIONS: CPT | Mod: PN

## 2019-02-05 PROCEDURE — 97110 THERAPEUTIC EXERCISES: CPT | Mod: PN

## 2019-02-05 NOTE — PROGRESS NOTES
"  Occupational Therapy Daily Treatment Note   Date: 2/5/2019  Patient: Anjel Sanders  Chart Number: [id number]     Therapy Diagnosis: L shoulder pain, weakness, stiffness in joint  Physician: Lars Payan MD     Physician Orders: OT eval and treat  Medical Diagnosis:   M19.112,S46.002S (ICD-10-CM) - Osteoarthritis of left shoulder due to rotator cuff injury   M75.42 (ICD-10-CM) - Rotator cuff impingement syndrome of left shoulder      Evaluation Date: 12/19/2018  Insurance Authorization period Expiration: 12/31/2019  Plan of Care Expiration Period: 2/15/2019     Visit # Total 9 / Visits Authortized: 9/50 FOTO 47%  Time In: 1:17 pm  Time Out: 2:04pm   Total Billable Time: 47 minutes  MT 1 , TE x 2     Medicare:[id number]    Precautions: Standard      Subjective     Pt reports: "I'm not feeling too good today, feels like the shoulder needs to be worked out."  he was compliant with home exercise program given last session.   Response to previous treatment:more motion  Functional change: able to reach behind back more, still can't reach overhead    Pain:0/10 at rest; 3-4/10 today  Location: left shoulder      Objective     Pt on UBE for/rev @120rpms x 6 minutes prior to session    Anjel received the following manual therapy techniques for 10 minutes:   -MT consisting of patient supine for L shoulder lateral telescoping, UT STM, pec lift, subscapularis MFR and stretch, PROM with endrange stretching, GHJ inferior anterior posterior glides grade I-III, gentle shoulder oscillations, to RSL for L scapula framing, medial scapula release scapula    Anjel received sup therapeutic exercises for 40 minutes including:    Exercises        PROM (L) Shoulder FLEX/ABD/IR/ER 10x   Supine dowel Flex/serratus punches/ER 3#  2/15   SL ABD/Gator 0  2/15   SL ER 0  2/15   Sleeper stretch NT time 3/30"   pulleys 3min   Wall slides  2/15   Standing dowel abd/IR NT time 2/15   Corner pec stretch 2/30" (unable to perform all 3 sets today) " "  Tband Lats Green  2/15   Tband Rows Green  2/15   Tband IR/ER Green  2/15   IR pulley stretch NT time 3/30"      NT=Not Today  Home Exercises and Education Provided     Education provided:   - Pt provided with written instructions, demonstration and education of HEP with pt demonstrating and verbalizing understanding of appropriate movements and techniques to increase strength, ROM and activity tolerance for increased IND.  - Progress towards goals     Written Home Exercises Provided: Patient instructed to cont prior HEP.  Exercises were reviewed and Anjel was able to demonstrate them prior to the end of the session.  Anjel demonstrated good  understanding of the HEP provided.   .   See EMR under Media for exercises provided 1/03/2019     Assessment   Pt continues to have pain and weakness that limit him functionally.  He still has posterior capsule tightness and soft tissue restrictions in anterior shoulder and biceps however responds well to manual therapy and progressive low load stretch.  Pt. States feeling slightly more pain after last session ( 1# increase in supine dowel exercises). Pt. With fatigue throughout session but able to complete all exercises given this date.      Pt would continue to benefit from skilled OT.Anjel is progressing well towards his goals and there are no updates to goals at this time. Pt prognosis is Good.     Pt will continue to benefit from skilled outpatient occupational therapy to address the deficits listed in the problem list on initial evaluation provide pt/family education and to maximize pt's level of independence in the home and community environment.     Anticipated barriers to occupational therapy: pain    Pt's spiritual, cultural and educational needs considered and pt agreeable to plan of care and goals.    Goals:      Short Term Goals to be met in 4 weeks: (1/18/2019)  1) Initiate Hep-met, ongoing  2) Pt will increase L shoulder AROM by 10 degrees grossly for improved " performance with overhead ADL's-met 1/15/2019  3) Pt will report 5/10 pain in (L)shoulder at worst-not met  4) Pt will demonstrate increased MMT to 4-/5 grossly L shoulder-not met  5) Patient will be able to achieve less than or equal to 25% on FOTO shoulder survey demonstrating overall improved functional ability with upper extremity-not met.      Long Term Goals to be met by discharge:  1) Independent with HEP  2) Pt will demonstrate (L) shoulder AROM WFL grossly for [place] with ADL's  3) Pt will demonstrate (L) shoulder MMT WFL grossly for [place] with functional activities  4) Independent and pain free with ADL's and IADL's  5) Patient will be able to achieve less than or equal to 10% on FOTO shoulder survey demonstrating overall improved functional ability with upper extremity.    Plan   Certification Period/Plan of care expiration: 12/19/2018 to 2/15/2019.     Outpatient Occupational Therapy 2 times weekly for until 2/15/19 to include the following interventions: Manual therapy/joint mobilizations, Modalities for pain management, Therapeutic exercises/activities., Strengthening, Joint Protection and Energy Conservation.       Brenda Brantley, OT

## 2019-02-07 ENCOUNTER — CLINICAL SUPPORT (OUTPATIENT)
Dept: REHABILITATION | Facility: HOSPITAL | Age: 64
End: 2019-02-07
Attending: PSYCHIATRY & NEUROLOGY
Payer: MEDICARE

## 2019-02-07 DIAGNOSIS — M25.60 STIFFNESS IN JOINT: ICD-10-CM

## 2019-02-07 DIAGNOSIS — R53.1 WEAKNESS: ICD-10-CM

## 2019-02-07 DIAGNOSIS — M79.602 PAIN OF LEFT UPPER EXTREMITY: ICD-10-CM

## 2019-02-07 PROCEDURE — 97110 THERAPEUTIC EXERCISES: CPT | Mod: PN

## 2019-02-07 PROCEDURE — 97140 MANUAL THERAPY 1/> REGIONS: CPT | Mod: PN

## 2019-02-07 NOTE — PROGRESS NOTES
"  Occupational Therapy Daily Treatment Note   Date: 2/7/2019  Patient: Anjel Sanders  Chart Number: [id number]     Therapy Diagnosis: L shoulder pain, weakness, stiffness in joint  Physician: Lars Payan MD     Physician Orders: OT eval and treat  Medical Diagnosis:   M19.112,S46.002S (ICD-10-CM) - Osteoarthritis of left shoulder due to rotator cuff injury   M75.42 (ICD-10-CM) - Rotator cuff impingement syndrome of left shoulder      Evaluation Date: 12/19/2018  Insurance Authorization period Expiration: 12/31/2019  Plan of Care Expiration Period: 2/15/2019     Visit # Total 10 / Visits Authortized: 10/50 FOTO 41%  Time In: 12:05 pm  Time Out: 12:55 pm   Total Billable Time: 50 minutes  MT 1 , TE x 2     Medicare:820.94    Precautions: Standard      Subjective     Pt reports: "My shoulder feels better overall it just pains me with those certain motions."  he was compliant with home exercise program given last session.   Response to previous treatment:more motion  Functional change: able to reach behind back more, still can't reach overhead    Pain:0/10 at rest; 3-4/10 today  Location: left shoulder      Objective     Pt on UBE for/rev @120rpms x 6 minutes prior to session    Anjel received the following manual therapy techniques for 10 minutes:   -MT consisting of patient supine for L shoulder lateral telescoping, UT STM, pec lift, subscapularis MFR and stretch, PROM with endrange stretching, GHJ inferior anterior posterior glides grade I-III, gentle shoulder oscillations, to RSL for L scapula framing, medial scapula release scapula    Anjel received therapeutic exercises for 40 minutes including:    Exercises        PROM (L) Shoulder FLEX/ABD/IR/ER 10x   Supine pectoralis stretch/Butterfly stretch 3/30"   Supine dowel Flex 3#  2/15   SL ABD/Gator 0  2/15   SL ER 0  2/15   Sleeper stretch NT time 3/30"   pulleys 3min   Wall slides  2/15   Standing dowel abd/IR NT time 2/15   Corner pec stretch 2/30" (unable to " "perform all 3 sets today)   Tband Lats Green  2/15   Tband Rows Green  2/15   Tband IR/ER Green  2/15   IR pulley stretch NT time 3/30"      NT=Not Today  Home Exercises and Education Provided     Education provided:   - Pt provided with written instructions, demonstration and education of HEP with pt demonstrating and verbalizing understanding of appropriate movements and techniques to increase strength, ROM and activity tolerance for increased IND.  - Progress towards goals     Written Home Exercises Provided: Patient instructed to cont prior HEP.  Exercises were reviewed and Anjel was able to demonstrate them prior to the end of the session.  Anjel demonstrated good  understanding of the HEP provided.   .   See EMR under Media for exercises provided 1/03/2019     Assessment   Pt continues to have pain and stiffness that limit him in his Left shoulder. He still has tightness noted in pecs and biceps and tends to hold his shoulder in a guarded position. Required tactile cueing for postural awareness during sidelying exercises however reported decreased pain and increased motion once provided. Pt with an improved FOTO score indicating increased functional use LUE with self care tasks.      Pt would continue to benefit from skilled OT.Anjel is progressing well towards his goals and there are no updates to goals at this time. Pt prognosis is Good.     Pt will continue to benefit from skilled outpatient occupational therapy to address the deficits listed in the problem list on initial evaluation provide pt/family education and to maximize pt's level of independence in the home and community environment.     Anticipated barriers to occupational therapy: pain    Pt's spiritual, cultural and educational needs considered and pt agreeable to plan of care and goals.    Goals:      Short Term Goals to be met in 4 weeks: (1/18/2019)  1) Initiate Hep-met, ongoing  2) Pt will increase L shoulder AROM by 10 degrees grossly for " improved performance with overhead ADL's-met 1/15/2019  3) Pt will report 5/10 pain in (L)shoulder at worst-not met  4) Pt will demonstrate increased MMT to 4-/5 grossly L shoulder-not met  5) Patient will be able to achieve less than or equal to 25% on FOTO shoulder survey demonstrating overall improved functional ability with upper extremity-not met.      Long Term Goals to be met by discharge:  1) Independent with HEP  2) Pt will demonstrate (L) shoulder AROM WFL grossly for [place] with ADL's  3) Pt will demonstrate (L) shoulder MMT WFL grossly for [place] with functional activities  4) Independent and pain free with ADL's and IADL's  5) Patient will be able to achieve less than or equal to 10% on FOTO shoulder survey demonstrating overall improved functional ability with upper extremity.    Plan   Certification Period/Plan of care expiration: 12/19/2018 to 2/15/2019.     Outpatient Occupational Therapy 2 times weekly for until 2/15/19 to include the following interventions: Manual therapy/joint mobilizations, Modalities for pain management, Therapeutic exercises/activities., Strengthening, Joint Protection and Energy Conservation.     Updates/Grading for next session: Reassess next week progress with ROM and strengthening.     JUJU Barnhart

## 2019-02-12 ENCOUNTER — CLINICAL SUPPORT (OUTPATIENT)
Dept: REHABILITATION | Facility: HOSPITAL | Age: 64
End: 2019-02-12
Attending: PSYCHIATRY & NEUROLOGY
Payer: MEDICARE

## 2019-02-12 DIAGNOSIS — R53.1 WEAKNESS: ICD-10-CM

## 2019-02-12 DIAGNOSIS — M25.60 STIFFNESS IN JOINT: ICD-10-CM

## 2019-02-12 DIAGNOSIS — M79.602 PAIN OF LEFT UPPER EXTREMITY: ICD-10-CM

## 2019-02-12 PROCEDURE — 97140 MANUAL THERAPY 1/> REGIONS: CPT | Mod: PN

## 2019-02-12 PROCEDURE — 97110 THERAPEUTIC EXERCISES: CPT | Mod: PN

## 2019-02-12 NOTE — PROGRESS NOTES
"  Occupational Therapy Daily Treatment Note   Date: 2/12/2019  Patient: Anjel Sanders  Chart Number: [id number]     Therapy Diagnosis: L shoulder pain, weakness, stiffness in joint  Physician: Lars Payan MD     Physician Orders: OT eval and treat  Medical Diagnosis:   M19.112,S46.002S (ICD-10-CM) - Osteoarthritis of left shoulder due to rotator cuff injury   M75.42 (ICD-10-CM) - Rotator cuff impingement syndrome of left shoulder      Evaluation Date: 12/19/2018  Insurance Authorization period Expiration: 12/31/2019  Plan of Care Expiration Period: 2/15/2019     Visit # Total 11 / Visits Authortized: 11/50 FOTO 41%  Time In: 2:15 pm  Time Out: 2:55 pm   Total Billable Time: 40 minutes  MT 1 , TE x 2     Medicare:909.04    Precautions: Standard      Subjective     Pt reports: "I feel better than when I started I still have difficulty reaching up and I have pain with certain movements."   he was compliant with home exercise program given last session.   Response to previous treatment:more motion  Functional change: able to reach behind back more, still can't reach overhead    Pain:0/10 at rest; 2/10 today  Location: left shoulder      Objective     Pt on UBE for/rev @120rpms x 6 minutes prior to session    Anjel received the following manual therapy techniques for 10 minutes:   -MT consisting of patient supine for L shoulder lateral telescoping, UT STM, pec lift, subscapularis MFR and stretch, PROM with endrange stretching, GHJ inferior anterior posterior glides grade I-III, gentle shoulder oscillations, to RSL for L scapula framing, medial scapula release scapula    Anjel received therapeutic exercises for 30 minutes including:    Exercises        PROM (L) Shoulder FLEX/ABD/IR/ER 10x   Supine pectoralis stretch/Butterfly stretch 3/30"   Supine dowel Flex 3#  2/15   SL ABD/Gator 0  2/15   SL ER 0  2/15   Sleeper stretch 3/30"   pulleys 3min   Wall slides  2/15   Standing dowel abd/IR  2/15   Corner pec stretch " "2/30"    Tband Lats Green  2/15   Tband Rows Green  2/15   Tband IR/ER Green  2/15   IR pulley stretch  3/30"      Range of Motion/Strength:   Shoulder   Left     AROM PROM   Flexion 95(=) 125(+5)   Extension 60(=) NT   Abduction 90(-10) 120(=)   HorizAdduction 25(=) NT   Internal rotation S2(=) 75(+5)   ER at 90° abd 55(=) 45(=)   ER at 0° abd 55(=) 50(=)      NT=Not Today  Home Exercises and Education Provided     Education provided:   - Pt provided with written instructions, demonstration and education of HEP with pt demonstrating and verbalizing understanding of appropriate movements and techniques to increase strength, ROM and activity tolerance for increased IND.  - Progress towards goals     Written Home Exercises Provided: Patient instructed to cont prior HEP.  Exercises were reviewed and Anjel was able to demonstrate them prior to the end of the session.  Anjel demonstrated good  understanding of the HEP provided.   .   See EMR under Media for exercises provided 1/03/2019     Assessment   Pt has been attending OT x 8 weeks for treatment Left shoulder pain, weakness and stiffness. Pt has been motivated. He is demonstrating overall improved ROM in his Left shoulder since initial evaluation. He seems to be progressing more slowly in the past few weeks. He still has some functional limitations. Pt may benefit from a follow up with ortho for an MRI at this point.  He still has tightness noted in pecs and biceps and tends to hold his shoulder in a guarded position.     Anticipated barriers to occupational therapy: pain    Pt's spiritual, cultural and educational needs considered and pt agreeable to plan of care and goals.    Goals:      Short Term Goals to be met in 4 weeks: (1/18/2019)  1) Initiate Hep-met, ongoing  2) Pt will increase L shoulder AROM by 10 degrees grossly for improved performance with overhead ADL's-met 1/15/2019  3) Pt will report 5/10 pain in (L)shoulder at worst-not met  4) Pt will " demonstrate increased MMT to 4-/5 grossly L shoulder-not met  5) Patient will be able to achieve less than or equal to 25% on FOTO shoulder survey demonstrating overall improved functional ability with upper extremity-not met.      Long Term Goals to be met by discharge:  1) Independent with HEP  2) Pt will demonstrate (L) shoulder AROM WFL grossly for [place] with ADL's  3) Pt will demonstrate (L) shoulder MMT WFL grossly for [place] with functional activities  4) Independent and pain free with ADL's and IADL's  5) Patient will be able to achieve less than or equal to 10% on FOTO shoulder survey demonstrating overall improved functional ability with upper extremity.    Plan   Certification Period/Plan of care expiration: 12/19/2018 to 2/15/2019.    Updates/Grading for next session: pt instructed to follow up with ortho at this point. He would benefit from an MRI to r/o RCT  JUJU Barnhart

## 2019-02-26 ENCOUNTER — TELEPHONE (OUTPATIENT)
Dept: FAMILY MEDICINE | Facility: CLINIC | Age: 64
End: 2019-02-26

## 2019-02-26 ENCOUNTER — OFFICE VISIT (OUTPATIENT)
Dept: FAMILY MEDICINE | Facility: CLINIC | Age: 64
End: 2019-02-26
Attending: FAMILY MEDICINE
Payer: MEDICARE

## 2019-02-26 ENCOUNTER — LAB VISIT (OUTPATIENT)
Dept: LAB | Facility: HOSPITAL | Age: 64
End: 2019-02-26
Attending: FAMILY MEDICINE
Payer: MEDICARE

## 2019-02-26 VITALS
DIASTOLIC BLOOD PRESSURE: 80 MMHG | WEIGHT: 227.31 LBS | BODY MASS INDEX: 32.54 KG/M2 | SYSTOLIC BLOOD PRESSURE: 130 MMHG | OXYGEN SATURATION: 98 % | TEMPERATURE: 98 F | HEART RATE: 67 BPM | HEIGHT: 70 IN

## 2019-02-26 DIAGNOSIS — E11.9 CONTROLLED TYPE 2 DIABETES MELLITUS WITHOUT COMPLICATION, WITHOUT LONG-TERM CURRENT USE OF INSULIN: Primary | ICD-10-CM

## 2019-02-26 DIAGNOSIS — I15.2 HYPERTENSION COMPLICATING DIABETES: ICD-10-CM

## 2019-02-26 DIAGNOSIS — M25.512 CHRONIC LEFT SHOULDER PAIN: ICD-10-CM

## 2019-02-26 DIAGNOSIS — I51.89 DIASTOLIC DYSFUNCTION: ICD-10-CM

## 2019-02-26 DIAGNOSIS — G89.29 CHRONIC LEFT SHOULDER PAIN: ICD-10-CM

## 2019-02-26 DIAGNOSIS — E66.01 MORBID OBESITY: ICD-10-CM

## 2019-02-26 DIAGNOSIS — G47.33 OSA (OBSTRUCTIVE SLEEP APNEA): ICD-10-CM

## 2019-02-26 DIAGNOSIS — I63.81 LEFT SIDED LACUNAR STROKE: ICD-10-CM

## 2019-02-26 DIAGNOSIS — E11.59 HYPERTENSION COMPLICATING DIABETES: ICD-10-CM

## 2019-02-26 DIAGNOSIS — E11.9 CONTROLLED TYPE 2 DIABETES MELLITUS WITHOUT COMPLICATION, WITHOUT LONG-TERM CURRENT USE OF INSULIN: ICD-10-CM

## 2019-02-26 DIAGNOSIS — E78.5 DYSLIPIDEMIA ASSOCIATED WITH TYPE 2 DIABETES MELLITUS: ICD-10-CM

## 2019-02-26 DIAGNOSIS — E66.9 OBESITY (BMI 30-39.9): ICD-10-CM

## 2019-02-26 DIAGNOSIS — I69.351 HEMIPARESIS AFFECTING RIGHT SIDE AS LATE EFFECT OF CEREBROVASCULAR ACCIDENT: ICD-10-CM

## 2019-02-26 DIAGNOSIS — E11.69 DYSLIPIDEMIA ASSOCIATED WITH TYPE 2 DIABETES MELLITUS: ICD-10-CM

## 2019-02-26 DIAGNOSIS — E66.01 SEVERE OBESITY (BMI 35.0-39.9) WITH COMORBIDITY: ICD-10-CM

## 2019-02-26 DIAGNOSIS — M75.42 ROTATOR CUFF IMPINGEMENT SYNDROME OF LEFT SHOULDER: ICD-10-CM

## 2019-02-26 DIAGNOSIS — R01.1 HEART MURMUR: ICD-10-CM

## 2019-02-26 LAB
ALBUMIN SERPL BCP-MCNC: 4.1 G/DL
ALP SERPL-CCNC: 106 U/L
ALT SERPL W/O P-5'-P-CCNC: 35 U/L
ANION GAP SERPL CALC-SCNC: 7 MMOL/L
AST SERPL-CCNC: 23 U/L
BASOPHILS # BLD AUTO: 0.02 K/UL
BASOPHILS NFR BLD: 0.5 %
BILIRUB SERPL-MCNC: 1 MG/DL
BUN SERPL-MCNC: 12 MG/DL
CALCIUM SERPL-MCNC: 9.8 MG/DL
CHLORIDE SERPL-SCNC: 104 MMOL/L
CHOLEST SERPL-MCNC: 128 MG/DL
CHOLEST/HDLC SERPL: 3.5 {RATIO}
CO2 SERPL-SCNC: 30 MMOL/L
CREAT SERPL-MCNC: 1.3 MG/DL
DIFFERENTIAL METHOD: NORMAL
EOSINOPHIL # BLD AUTO: 0.2 K/UL
EOSINOPHIL NFR BLD: 4.1 %
ERYTHROCYTE [DISTWIDTH] IN BLOOD BY AUTOMATED COUNT: 13.3 %
EST. GFR  (AFRICAN AMERICAN): >60 ML/MIN/1.73 M^2
EST. GFR  (NON AFRICAN AMERICAN): 58 ML/MIN/1.73 M^2
ESTIMATED AVG GLUCOSE: 137 MG/DL
GLUCOSE SERPL-MCNC: 107 MG/DL
HBA1C MFR BLD HPLC: 6.4 %
HCT VFR BLD AUTO: 44.4 %
HDLC SERPL-MCNC: 37 MG/DL
HDLC SERPL: 28.9 %
HGB BLD-MCNC: 14.3 G/DL
LDLC SERPL CALC-MCNC: 79 MG/DL
LYMPHOCYTES # BLD AUTO: 1.8 K/UL
LYMPHOCYTES NFR BLD: 40.9 %
MCH RBC QN AUTO: 27.8 PG
MCHC RBC AUTO-ENTMCNC: 32.2 G/DL
MCV RBC AUTO: 86 FL
MONOCYTES # BLD AUTO: 0.5 K/UL
MONOCYTES NFR BLD: 11.5 %
NEUTROPHILS # BLD AUTO: 1.9 K/UL
NEUTROPHILS NFR BLD: 43 %
NONHDLC SERPL-MCNC: 91 MG/DL
PLATELET # BLD AUTO: 257 K/UL
PMV BLD AUTO: 10.2 FL
POTASSIUM SERPL-SCNC: 4.2 MMOL/L
PROT SERPL-MCNC: 7.7 G/DL
RBC # BLD AUTO: 5.14 M/UL
SODIUM SERPL-SCNC: 141 MMOL/L
TRIGL SERPL-MCNC: 60 MG/DL
WBC # BLD AUTO: 4.35 K/UL

## 2019-02-26 PROCEDURE — 36415 COLL VENOUS BLD VENIPUNCTURE: CPT

## 2019-02-26 PROCEDURE — 80061 LIPID PANEL: CPT

## 2019-02-26 PROCEDURE — 85025 COMPLETE CBC W/AUTO DIFF WBC: CPT

## 2019-02-26 PROCEDURE — 99214 PR OFFICE/OUTPT VISIT, EST, LEVL IV, 30-39 MIN: ICD-10-PCS | Mod: HCNC,S$GLB,, | Performed by: FAMILY MEDICINE

## 2019-02-26 PROCEDURE — 99999 PR PBB SHADOW E&M-EST. PATIENT-LVL IV: CPT | Mod: PBBFAC,HCNC,, | Performed by: FAMILY MEDICINE

## 2019-02-26 PROCEDURE — 99214 OFFICE O/P EST MOD 30 MIN: CPT | Mod: PBBFAC,PO | Performed by: FAMILY MEDICINE

## 2019-02-26 PROCEDURE — 80053 COMPREHEN METABOLIC PANEL: CPT

## 2019-02-26 PROCEDURE — 83036 HEMOGLOBIN GLYCOSYLATED A1C: CPT

## 2019-02-26 PROCEDURE — 99999 PR PBB SHADOW E&M-EST. PATIENT-LVL IV: ICD-10-PCS | Mod: PBBFAC,HCNC,, | Performed by: FAMILY MEDICINE

## 2019-02-26 PROCEDURE — 99214 OFFICE O/P EST MOD 30 MIN: CPT | Mod: HCNC,S$GLB,, | Performed by: FAMILY MEDICINE

## 2019-02-26 NOTE — TELEPHONE ENCOUNTER
----- Message from Lars Payan MD sent at 2/26/2019  2:35 PM CST -----  Call    Sugar test is getting worse - strict diet recommended and no sugar

## 2019-02-26 NOTE — PROGRESS NOTES
Subjective:       Patient ID: Anjel Sanders is a 63 y.o. male.    Chief Complaint: Follow-up    63 yr old black male with Hypertension, DM II, Hyperlipidemia, gout, ED, MURTAZA, presents today for 6 month follow up. C/o left shoulder pain from fall 12 weeks ago. He tripped and fell on shoulder. He went to ER and had x rays and they were normal. He still has pain and limited mobility.    DM II - diet controlled  - A1C                   6.3 (H)             11/27/2018          - no s/s of DM - foot exam UTD - eye exam due - on ASA and ACE    HTN - controlled - on prinzide - no side effects - does not need refills    HLD - uncontrolled - on statin -LDLCALC                  93.6                08/22/2018                   - on meds - need labs    ED - controlled - need cialis -     Gout - controlled - -  URICACID                 7.2 (H)             11/03/2017     -  on allopurinol    Health maintenance  -UTD except labs      Hypertension   This is a chronic problem. The current episode started more than 1 year ago. The problem has been gradually improving since onset. The problem is controlled. Pertinent negatives include no anxiety, blurred vision, chest pain, headaches, malaise/fatigue, neck pain, orthopnea, palpitations, peripheral edema, PND, shortness of breath or sweats. There are no associated agents to hypertension. Risk factors for coronary artery disease include dyslipidemia, obesity and male gender. Past treatments include ACE inhibitors and diuretics. The current treatment provides moderate improvement. There are no compliance problems.  There is no history of angina, CAD/MI, CVA, heart failure, left ventricular hypertrophy or retinopathy. There is no history of chronic renal disease, coarctation of the aorta, hypercortisolism, pheochromocytoma, renovascular disease or a thyroid problem.   Hyperlipidemia   This is a chronic problem. The current episode started more than 1 year ago. The problem is controlled.  Recent lipid tests were reviewed and are normal. Exacerbating diseases include obesity. He has no history of chronic renal disease, diabetes, hypothyroidism or liver disease. There are no known factors aggravating his hyperlipidemia. Associated symptoms include myalgias. Pertinent negatives include no chest pain, focal sensory loss, focal weakness, leg pain or shortness of breath. He is currently on no antihyperlipidemic treatment. The current treatment provides moderate improvement of lipids. There are no compliance problems.  Risk factors for coronary artery disease include dyslipidemia, hypertension, obesity and male sex.   Shoulder Injury    The left shoulder is affected. The incident occurred more than 1 week ago. The injury mechanism was a fall. The quality of the pain is described as cramping and shooting. The pain does not radiate. The pain is at a severity of 6/10. The pain is moderate. Pertinent negatives include no chest pain. The symptoms are aggravated by movement, overhead lifting and palpation. He has tried NSAIDs for the symptoms. The treatment provided moderate relief.     Review of Systems   Constitutional: Negative.  Negative for activity change, diaphoresis, malaise/fatigue and unexpected weight change.   HENT: Negative.  Negative for congestion, ear pain, mouth sores, rhinorrhea and voice change.    Eyes: Negative.  Negative for blurred vision, pain, discharge and visual disturbance.   Respiratory: Negative.  Negative for apnea, cough, shortness of breath and wheezing.    Cardiovascular: Negative.  Negative for chest pain, palpitations, orthopnea and PND.   Gastrointestinal: Negative.  Negative for abdominal distention, anal bleeding, diarrhea and vomiting.   Endocrine: Negative.  Negative for cold intolerance and polyuria.   Genitourinary: Negative.  Negative for decreased urine volume, difficulty urinating, discharge, frequency and scrotal swelling.   Musculoskeletal: Positive for myalgias.  Negative for back pain, neck pain and neck stiffness.   Skin: Negative.  Negative for color change and rash.   Allergic/Immunologic: Negative.  Negative for environmental allergies and immunocompromised state.   Neurological: Negative.  Negative for dizziness, focal weakness, speech difficulty, weakness, light-headedness and headaches.   Hematological: Negative.    Psychiatric/Behavioral: Negative.  Negative for agitation, dysphoric mood and suicidal ideas. The patient is not nervous/anxious.        PMH/PSH/FH/SH/MED/ALLERGY reviewed    Objective:       Vitals:    02/26/19 0818   BP: 130/80   Pulse: 67   Temp: 98.4 °F (36.9 °C)       Physical Exam   Constitutional: He is oriented to person, place, and time. He appears well-developed and well-nourished. No distress.   HENT:   Head: Normocephalic and atraumatic.   Right Ear: External ear normal.   Left Ear: External ear normal.   Nose: Nose normal.   Mouth/Throat: Oropharynx is clear and moist. No oropharyngeal exudate.   Eyes: Conjunctivae and EOM are normal. Pupils are equal, round, and reactive to light. Right eye exhibits no discharge. Left eye exhibits no discharge. No scleral icterus.   Neck: Normal range of motion. Neck supple. No JVD present. No tracheal deviation present. No thyromegaly present.   Cardiovascular: Normal rate, regular rhythm and intact distal pulses. Exam reveals no gallop and no friction rub.   Murmur (3/6 ESM AORTIC AREA) heard.  Pulmonary/Chest: Effort normal and breath sounds normal. No stridor. No respiratory distress. He has no wheezes. He has no rales. He exhibits no tenderness.   Abdominal: Soft. Bowel sounds are normal. He exhibits no distension and no mass. There is no tenderness. There is no rebound and no guarding. No hernia.   Musculoskeletal: Normal range of motion. He exhibits tenderness (TTP LEFT SHOULDER WITH ROTATOR CUFF IMPINGEMENT). He exhibits no edema.   Lymphadenopathy:     He has no cervical adenopathy.   Neurological:  He is alert and oriented to person, place, and time. He has normal reflexes. He displays normal reflexes. No cranial nerve deficit. He exhibits normal muscle tone. Coordination normal.   No deficits   Skin: Skin is warm and dry. No rash noted. He is not diaphoretic. No erythema. No pallor.   Psychiatric: He has a normal mood and affect. His behavior is normal. Judgment and thought content normal.       Assessment:       1. Controlled type 2 diabetes mellitus without complication, without long-term current use of insulin    2. Hypertension complicating diabetes    3. Obesity (BMI 30-39.9)    4. Hemiparesis affecting right side as late effect of cerebrovascular accident    5. Left sided lacunar stroke    6. MURTAZA (obstructive sleep apnea)    7. Chronic left shoulder pain    8. Rotator cuff impingement syndrome of left shoulder    9. Dyslipidemia associated with type 2 diabetes mellitus    10. Diastolic dysfunction    11. Heart murmur    12. Morbid obesity    13. Severe obesity (BMI 35.0-39.9) with comorbidity        Plan:           Anjel was seen today for follow-up.    Diagnoses and all orders for this visit:    Controlled type 2 diabetes mellitus without complication, without long-term current use of insulin  -     CBC auto differential; Future  -     Comprehensive metabolic panel; Future  -     Lipid panel; Future  -     Hemoglobin A1c; Future  -     Urinalysis; Future  -     Microalbumin/creatinine urine ratio; Future    Hypertension complicating diabetes  -     CBC auto differential; Future  -     Comprehensive metabolic panel; Future  -     Lipid panel; Future    Obesity (BMI 30-39.9)    Hemiparesis affecting right side as late effect of cerebrovascular accident    Left sided lacunar stroke    MURTAZA (obstructive sleep apnea)    Chronic left shoulder pain  -     MRI Shoulder Without Contrast Left; Future  -     Ambulatory referral to Orthopedics    Rotator cuff impingement syndrome of left shoulder  -     MRI  Shoulder Without Contrast Left; Future  -     Ambulatory referral to Orthopedics    Dyslipidemia associated with type 2 diabetes mellitus  -     CBC auto differential; Future  -     Comprehensive metabolic panel; Future  -     Lipid panel; Future    Diastolic dysfunction  -     Transthoracic echo (TTE) complete (Cupid Only); Future    Heart murmur  -     Transthoracic echo (TTE) complete (Cupid Only); Future    Morbid obesity    Severe obesity (BMI 35.0-39.9) with comorbidity        HTN/HLD   -controlled - stable - on meds     CHRONIC LEFT SHOULDER PAIN/ROTATOR CUFF IMPINGEMENT  -REFER ORTHOPEDICS  -MRI LEFT SHOULDER    HEART MURMUR  -ECHO CUPID ONLY      ED and Gout   -controlled     MURTAZA  -stable    CVA  -on ASA and plavix      DM II  -controlled  -diet and exercise only    Obesity  -diet and exercise  -weight loss    Memory deficits  -FOLLOW neurology    40 minutes spent during this visit of which greater than 50% devoted to face-face counseling and coordination of care regarding diagnosis and management plan     Spent adequate time in obtaining history and explaining differentials       Follow-up in about 3 months (around 5/26/2019), or if symptoms worsen or fail to improve.

## 2019-03-07 ENCOUNTER — HOSPITAL ENCOUNTER (OUTPATIENT)
Dept: CARDIOLOGY | Facility: HOSPITAL | Age: 64
Discharge: HOME | End: 2019-03-07
Attending: FAMILY MEDICINE
Payer: MEDICARE

## 2019-03-07 ENCOUNTER — HOSPITAL ENCOUNTER (OUTPATIENT)
Dept: RADIOLOGY | Facility: HOSPITAL | Age: 64
Discharge: HOME | End: 2019-03-07
Attending: FAMILY MEDICINE
Payer: MEDICARE

## 2019-03-07 DIAGNOSIS — M75.42 ROTATOR CUFF IMPINGEMENT SYNDROME OF LEFT SHOULDER: ICD-10-CM

## 2019-03-07 DIAGNOSIS — M25.512 CHRONIC LEFT SHOULDER PAIN: ICD-10-CM

## 2019-03-07 DIAGNOSIS — I51.89 DIASTOLIC DYSFUNCTION: ICD-10-CM

## 2019-03-07 DIAGNOSIS — G89.29 CHRONIC LEFT SHOULDER PAIN: ICD-10-CM

## 2019-03-07 DIAGNOSIS — R01.1 HEART MURMUR: ICD-10-CM

## 2019-03-07 LAB
AORTIC ROOT ANNULUS: 2.86 CM
AORTIC VALVE CUSP SEPERATION: 1.53 CM
AV INDEX (PROSTH): 0.64
AV MEAN GRADIENT: 8.6 MMHG
AV PEAK GRADIENT: 15.68 MMHG
AV VALVE AREA: 1.69 CM2
AV VELOCITY RATIO: 0.63
CV ECHO LV RWT: 0.59 CM
DOP CALC AO PEAK VEL: 1.98 M/S
DOP CALC AO VTI: 43.69 CM
DOP CALC LVOT AREA: 2.66 CM2
DOP CALC LVOT DIAMETER: 1.84 CM
DOP CALC LVOT PEAK VEL: 1.24 M/S
DOP CALC LVOT STROKE VOLUME: 73.75 CM3
DOP CALCLVOT PEAK VEL VTI: 27.75 CM
E WAVE DECELERATION TIME: 266.03 MSEC
E/A RATIO: 1.36
ECHO LV POSTERIOR WALL: 1.16 CM (ref 0.6–1.1)
FRACTIONAL SHORTENING: 31 % (ref 28–44)
INTERVENTRICULAR SEPTUM: 1.09 CM (ref 0.6–1.1)
LA MAJOR: 5.49 CM
LA WIDTH: 3.92 CM
LEFT ATRIUM SIZE: 3.26 CM
LEFT INTERNAL DIMENSION IN SYSTOLE: 2.7 CM (ref 2.1–4)
LEFT VENTRICLE DIASTOLIC VOLUME: 65.95 ML
LEFT VENTRICLE SYSTOLIC VOLUME: 26.95 ML
LEFT VENTRICULAR INTERNAL DIMENSION IN DIASTOLE: 3.9 CM (ref 3.5–6)
LEFT VENTRICULAR MASS: 144.78 G
LV LATERAL E/E' RATIO: 12.86
MV PEAK A VEL: 0.66 M/S
MV PEAK E VEL: 0.9 M/S
PISA TR MAX VEL: 3.01 M/S
PULM VEIN S/D RATIO: 1.05
PV PEAK D VEL: 0.56 M/S
PV PEAK S VEL: 0.59 M/S
PV PEAK VELOCITY: 1.07 CM/S
RA MAJOR: 4.9 CM
RA PRESSURE: 3 MMHG
TDI LATERAL: 0.07
TR MAX PG: 36.24 MMHG
TV REST PULMONARY ARTERY PRESSURE: 39 MMHG

## 2019-03-07 PROCEDURE — 93306 TRANSTHORACIC ECHO (TTE) COMPLETE (CUPID ONLY): ICD-10-PCS | Mod: 26,HCNC,, | Performed by: INTERNAL MEDICINE

## 2019-03-07 PROCEDURE — 73221 MRI SHOULDER WITHOUT CONTRAST LEFT: ICD-10-PCS | Mod: 26,HCNC,LT, | Performed by: RADIOLOGY

## 2019-03-07 PROCEDURE — 73221 MRI JOINT UPR EXTREM W/O DYE: CPT | Mod: 26,HCNC,LT, | Performed by: RADIOLOGY

## 2019-03-07 PROCEDURE — 93306 TTE W/DOPPLER COMPLETE: CPT | Mod: HCNC

## 2019-03-07 PROCEDURE — 93306 TTE W/DOPPLER COMPLETE: CPT | Mod: 26,HCNC,, | Performed by: INTERNAL MEDICINE

## 2019-03-07 PROCEDURE — 73221 MRI JOINT UPR EXTREM W/O DYE: CPT | Mod: TC,HCNC,LT

## 2019-03-08 ENCOUNTER — TELEPHONE (OUTPATIENT)
Dept: FAMILY MEDICINE | Facility: CLINIC | Age: 64
End: 2019-03-08

## 2019-03-08 NOTE — TELEPHONE ENCOUNTER
----- Message from Marleni Iraheta sent at 3/8/2019  2:57 PM CST -----  Contact: Self [phone]  Patient is returning your call.  Please advise.

## 2019-03-08 NOTE — TELEPHONE ENCOUNTER
Called pt to let him know mri results and dr recommendations, left message requesting a call back.

## 2019-03-08 NOTE — TELEPHONE ENCOUNTER
----- Message from Lars Payan MD sent at 3/8/2019 12:44 PM CST -----  Call    MRI showed tear in rotator cuff in shoulder - follow orthopedics for management

## 2019-03-08 NOTE — TELEPHONE ENCOUNTER
Advised patient that MRI results came back and did show tear.  Confirmed appt with ortho and urged to keep appt for further management.  Patient verbalized understanding.

## 2019-03-19 DIAGNOSIS — M54.31 BILATERAL SCIATICA: ICD-10-CM

## 2019-03-19 DIAGNOSIS — M54.41 CHRONIC BILATERAL LOW BACK PAIN WITH BILATERAL SCIATICA: ICD-10-CM

## 2019-03-19 DIAGNOSIS — M54.42 CHRONIC BILATERAL LOW BACK PAIN WITH BILATERAL SCIATICA: ICD-10-CM

## 2019-03-19 DIAGNOSIS — M54.32 BILATERAL SCIATICA: ICD-10-CM

## 2019-03-19 DIAGNOSIS — G89.29 CHRONIC BILATERAL LOW BACK PAIN WITH BILATERAL SCIATICA: ICD-10-CM

## 2019-03-19 RX ORDER — GABAPENTIN 300 MG/1
300 CAPSULE ORAL NIGHTLY
Qty: 90 CAPSULE | Refills: 2 | Status: SHIPPED | OUTPATIENT
Start: 2019-03-19 | End: 2020-05-20 | Stop reason: SDUPTHER

## 2019-03-28 ENCOUNTER — OFFICE VISIT (OUTPATIENT)
Dept: ORTHOPEDICS | Facility: CLINIC | Age: 64
End: 2019-03-28
Attending: FAMILY MEDICINE
Payer: MEDICARE

## 2019-03-28 VITALS — WEIGHT: 227 LBS | BODY MASS INDEX: 32.5 KG/M2 | HEIGHT: 70 IN

## 2019-03-28 DIAGNOSIS — M75.122 COMPLETE TEAR OF LEFT ROTATOR CUFF: ICD-10-CM

## 2019-03-28 PROCEDURE — 99204 PR OFFICE/OUTPT VISIT, NEW, LEVL IV, 45-59 MIN: ICD-10-PCS | Mod: HCNC,S$GLB,, | Performed by: ORTHOPAEDIC SURGERY

## 2019-03-28 PROCEDURE — 99999 PR PBB SHADOW E&M-EST. PATIENT-LVL III: ICD-10-PCS | Mod: PBBFAC,HCNC,, | Performed by: ORTHOPAEDIC SURGERY

## 2019-03-28 PROCEDURE — 3008F PR BODY MASS INDEX (BMI) DOCUMENTED: ICD-10-PCS | Mod: HCNC,CPTII,S$GLB, | Performed by: ORTHOPAEDIC SURGERY

## 2019-03-28 PROCEDURE — 99204 OFFICE O/P NEW MOD 45 MIN: CPT | Mod: HCNC,S$GLB,, | Performed by: ORTHOPAEDIC SURGERY

## 2019-03-28 PROCEDURE — 99999 PR PBB SHADOW E&M-EST. PATIENT-LVL III: CPT | Mod: PBBFAC,HCNC,, | Performed by: ORTHOPAEDIC SURGERY

## 2019-03-28 PROCEDURE — 3008F BODY MASS INDEX DOCD: CPT | Mod: HCNC,CPTII,S$GLB, | Performed by: ORTHOPAEDIC SURGERY

## 2019-03-28 NOTE — LETTER
March 28, 2019      Lars Payan MD  [address]  [address]  [place] [place] [facility] [address]           [facility]  [address]  [facility] [address]  Phone: [phone]          Patient: Anjel Sanders   MR Number: [id number]   YOB: 1955   Date of Visit: 3/28/2019       Dear Dr. Lars Payan:    Thank you for referring Anjel Sanders to me for evaluation. Attached you will find relevant portions of my assessment and plan of care.    If you have questions, please do not hesitate to call me. I look forward to following Anjel Sanders along with you.    Sincerely,    Bob Esparza Jr., MD    Enclosure  CC:  No Recipients    If you would like to receive this communication electronically, please contact [email or web address] or [phone] to request more information on [facility] access.    For providers and/or their staff who would like to refer a patient to [facility], please contact us through our one-stop-shop provider referral line, [facility], at [phone].    If you feel you have received this communication in error or would no longer like to receive these types of communications, please e-mail [email or web address]

## 2019-03-28 NOTE — PROGRESS NOTES
CC: rotator cuff tear left shoulder        HPI:  Anjel Sanders is a very pleasant 63 y.o. with 3 month history of left shoulder pain after previous fall.    He had a previous MRI which showed a large tear of the rotator cuff.  Symptoms continue to worsen including pain left shoulder and limited use.  He has tried physical therapy without relief long-term results          PAST MEDICAL HISTORY:   Past Medical History:   Diagnosis Date    AR (allergic rhinitis)     Gouty arthritis     Hx of colonic polyps     Tubular Adenoma    MURTAZA (obstructive sleep apnea)     Other and unspecified hyperlipidemia     Stroke     Unspecified essential hypertension      PAST SURGICAL HISTORY:   Past Surgical History:   Procedure Laterality Date    COLONOSCOPY N/A 6/28/2013    [facility]formed [facility]    FOOT SURGERY       FAMILY HISTORY:   Family History   Problem Relation Age of Onset    Stomach cancer Sister     Stroke Sister     Hypertension Mother     Glaucoma Mother     Diabetes Sister     Heart failure Sister     No Known Problems Father     No Known Problems Brother     No Known Problems Maternal Aunt     No Known Problems Maternal Uncle     No Known Problems Paternal Aunt     No Known Problems Paternal Uncle     No Known Problems Maternal Grandmother     No Known Problems Maternal Grandfather     No Known Problems Paternal Grandmother     No Known Problems Paternal Grandfather     Amblyopia Neg Hx     Blindness Neg Hx     Cancer Neg Hx     Cataracts Neg Hx     Macular degeneration Neg Hx     Retinal detachment Neg Hx     Strabismus Neg Hx     Thyroid disease Neg Hx      SOCIAL HISTORY:   Social History     Socioeconomic History    Marital status:      Spouse name: Not on file    Number of children: Not on file    Years of education: Not on file    Highest education level: Not on file   Occupational History    Not on file   Social Needs    Financial  resource strain: Not on file    Food insecurity:     Worry: Not on file     Inability: Not on file    Transportation needs:     Medical: Not on file     Non-medical: Not on file   Tobacco Use    Smoking status: Never Smoker    Smokeless tobacco: Never Used   Substance and Sexual Activity    Alcohol use: Yes     Alcohol/week: 5.6 oz     Types: 6 Cans of beer, 4 Standard drinks or equivalent per week    Drug use: Not on file    Sexual activity: Not on file   Lifestyle    Physical activity:     Days per week: Not on file     Minutes per session: Not on file    Stress: Not on file   Relationships    Social connections:     Talks on phone: Not on file     Gets together: Not on file     Attends Oriental orthodox service: Not on file     Active member of club or organization: Not on file     Attends meetings of clubs or organizations: Not on file     Relationship status: Not on file    Intimate partner violence:     Fear of current or ex partner: Not on file     Emotionally abused: Not on file     Physically abused: Not on file     Forced sexual activity: Not on file   Other Topics Concern    Not on file   Social History Narrative     with [employer]       MEDICATIONS:   Current Outpatient Medications:     allopurinol (ZYLOPRIM) 300 MG tablet, Take 1 tablet (300 mg total) by mouth once daily., Disp: 90 tablet, Rfl: 3    amlodipine-benazepril (LOTREL) 10-40 mg per capsule, Take 1 capsule by mouth once daily., Disp: 90 capsule, Rfl: 3    aspirin (ECOTRIN) 81 MG EC tablet, Take 81 mg by mouth once daily., Disp: , Rfl:     atorvastatin (LIPITOR) 80 MG tablet, Take 1 tablet (80 mg total) by mouth once daily., Disp: 90 tablet, Rfl: 4    donepezil (ARICEPT) 5 MG tablet, Take 1 tablet (5 mg total) by mouth every evening., Disp: 30 tablet, Rfl: 11    gabapentin (NEURONTIN) 300 MG capsule, TAKE 1 CAPSULE (300 MG TOTAL) BY MOUTH EVERY EVENING., Disp: 90 capsule, Rfl: 2  ALLERGIES: Review of patient's  "allergies indicates:  No Known Allergies    Review of Systems:  Constitutional: no fever or chills  ENT: no nasal congestion or sore throat  Respiratory: no cough or shortness of breath  Cardiovascular: no chest pain or palpitations  Gastrointestinal: no nausea or vomiting, PUD, GERD, NSAID intolerance  Genitourinary: no hematuria or dysuria  Integument/Breast: no rash or pruritis  Hematologic/Lymphatic: no easy bruising or lymphadenopathy  Musculoskeletal: see HPI  Neurological: no seizures or tremors  Behavioral/Psych: no auditory or visual hallucinations      Physical Exam   Vitals:    03/28/19 0911   Weight: 103 kg (227 lb)   Height: 5' 10" (1.778 m)   PainSc: 0-No pain       Constitutional: Oriented to person, place, and time. Appears well-developed and well-nourished.   HENT:   Head: Normocephalic and atraumatic.   Nose: Nose normal.   Eyes: No scleral icterus.   Neck: Normal range of motion.   Cardiovascular: Normal rate and regular rhythm.    Pulses:       Radial pulses are 2+ on the right side, and 2+ on the left side.   Pulmonary/Chest: Effort normal and breath sounds normal.   Abdominal: Soft.   Neurological: Alert and oriented to person, place, and time.   Skin: Skin is warm.   Psychiatric: Normal mood and affect.     MUSCULOSKELETAL UPPER EXTREMITY:  Examination of left shoulder no tenderness no swelling range of motion slightly decreased secondary to pain. Positive impingement sign.    There is weakness of the rotator cuff with a positive supraspinatus stress test.  Neurologic exam intact.                Diagnostic Studies:  X-ray AP and lateral left shoulder demonstrate arthritic change of the AC joint left shoulder including inferior spurring.    MRI scan left shoulder demonstrates large tear of the rotator cuff with retraction.            Assessment:  1.  Rotator cuff tear left shoulder probable acute on chronic.    2.  Left shoulder AC arthritis.        Plan:  I explained the nature of the problem " "to the patient recommended surgical treatment to include left shoulder arthroscopy rotator cuff repair and AC resection.      The risks and benefits of surgery were discussed with the patient today and they understand.  The consent was signed in the office for surgery.      Bob Esparza MD (Jay)  [facility]  Orthopedic Upper Extremity Surgery      "

## 2019-04-25 ENCOUNTER — CLINICAL SUPPORT (OUTPATIENT)
Dept: LAB | Facility: HOSPITAL | Age: 64
End: 2019-04-25
Attending: ORTHOPAEDIC SURGERY
Payer: MEDICARE

## 2019-04-25 ENCOUNTER — ANESTHESIA EVENT (OUTPATIENT)
Dept: SURGERY | Facility: HOSPITAL | Age: 64
End: 2019-04-25
Payer: MEDICARE

## 2019-04-25 ENCOUNTER — HOSPITAL ENCOUNTER (OUTPATIENT)
Dept: PREADMISSION TESTING | Facility: HOSPITAL | Age: 64
Discharge: HOME | End: 2019-04-25
Attending: ORTHOPAEDIC SURGERY
Payer: MEDICARE

## 2019-04-25 DIAGNOSIS — M75.122 COMPLETE TEAR OF LEFT ROTATOR CUFF: ICD-10-CM

## 2019-04-25 DIAGNOSIS — M75.122 COMPLETE TEAR OF LEFT ROTATOR CUFF: Primary | ICD-10-CM

## 2019-04-25 PROCEDURE — 93010 EKG 12-LEAD: ICD-10-PCS | Mod: HCNC,,, | Performed by: INTERNAL MEDICINE

## 2019-04-25 PROCEDURE — 93010 ELECTROCARDIOGRAM REPORT: CPT | Mod: HCNC,,, | Performed by: INTERNAL MEDICINE

## 2019-04-25 PROCEDURE — 93005 ELECTROCARDIOGRAM TRACING: CPT | Mod: HCNC

## 2019-04-25 RX ORDER — LIDOCAINE HYDROCHLORIDE 10 MG/ML
1 INJECTION, SOLUTION EPIDURAL; INFILTRATION; INTRACAUDAL; PERINEURAL ONCE
Status: CANCELLED | OUTPATIENT
Start: 2019-04-25 | End: 2019-04-25

## 2019-04-25 RX ORDER — SODIUM CHLORIDE, SODIUM LACTATE, POTASSIUM CHLORIDE, CALCIUM CHLORIDE 600; 310; 30; 20 MG/100ML; MG/100ML; MG/100ML; MG/100ML
INJECTION, SOLUTION INTRAVENOUS CONTINUOUS
Status: CANCELLED | OUTPATIENT
Start: 2019-04-25

## 2019-04-25 NOTE — DISCHARGE INSTRUCTIONS
Your surgery is scheduled for 4/30/19.    Please report to Outpatient Surgery Intake Office on the 2nd FLOOR at 1:00p.m.          INSTRUCTIONS IMPORTANT!!!  ¨ Do not eat or drink after 6:00am-including water. OK to brush teeth, no   gum, candy or mints!    ¨ Take only these medicines with a small swallow of water-morning of surgery: amlodipine        ____  Proceed to [facility] on 4/25/19 for additional blood test.      ____  No powder, lotions or creams to surgical area.  ____  Please remove all jewelry, including piercings and leave at home.  ____  No money or valuables needed. Please leave at home.  ____  Please bring any documents given by your doctor.  ____  If going home the same day, arrange for a ride home. You will not be able to             drive if Anesthesia was used.  ____  Wear loose fitting clothing. Allow for dressings, bandages.  ____  Stop Aspirin, Ibuprofen, Motrin and Aleve at least 3-5 days before surgery, unless otherwise instructed by your doctor, or the nurse.   You MAY use Tylenol/acetaminophen until day of surgery.  ____  Wash the surgical area with Hibiclens the night before surgery, and again the             morning of surgery.  Be sure to rinse hibiclens off completely (if instructed by   nurse).  ____  If you take diabetic medication, do not take am of surgery unless instructed by Doctor.  ____  Call MD for temperature above 101 degrees or any other signs of infection such as Urinary (bladder) infection, Upper respiratory infection, skin boils, etc.  ____ Stop taking any Fish Oil supplement or any Vitamins that contain Vitamin E at least 5 days prior to surgery.  ____ Do Not wear your contact lenses the day of your procedure.  You may wear your glasses.      ____Do not shave surgical site for 3 days prior to surgery.  ____ Practice Good hand washing before, during, and after procedure.      I have read or had read and explained to me, and understand the above  information.  Additional comments or instructions:  For additional questions call [phone]      ANESTHESIA SIDE EFFECTS  -For the first 24 hours after surgery:  Do not drive, use heavy equipment, make important decisions, or drink alcohol  -It is normal to feel sleepy for several hours.  Rest until you are more awake.  -Have someone stay with you, if needed.  They can watch for problems and help keep you safe.  -Some possible post anesthesia side effects include: nausea and vomiting, sore throat and hoarseness, sleepiness, and dizziness.        Pre-Op Bathing Instructions    Before surgery, you can play an important role in your own health.    Because skin is not sterile, we need to be sure that your skin is as free of germs as possible. By following the instructions below, you can reduce the number of germs on your skin before surgery.    IMPORTANT: You will need to shower with a special soap called Hibiclens*, available at any pharmacy.  If you are allergic to Chlorhexidine (the antiseptic in Hibiclens), use an antibacterial soap such as Dial Soap for your preoperative shower.  You will shower with Hibiclens both the night before your surgery and the morning of your surgery.  Do not use Hibiclens on the head, face or genitals to avoid injury to those areas.    STEP #1: THE NIGHT BEFORE YOUR SURGERY     1. Do not shave the area of your body where your surgery will be performed.  2. Shower and wash your hair and body as usual with your normal soap and shampoo.  3. Rinse your hair and body thoroughly after you shower to remove all soap residue.  4. With your hand, apply one packet of Hibiclens soap to the surgical site.   5. Wash the site gently for five (5) minutes. Do not scrub your skin too hard.   6. Do not wash with your regular soap after Hibiclens is used.  7. Rinse your body thoroughly.  8. Pat yourself dry with a clean, soft towel.  9. Do not use lotion, cream, or powder.  10. Wear clean clothes.    STEP #2:  THE MORNING OF YOUR SURGERY     1. Repeat Step #1.    * Not to be used by people allergic to Chlorhexidine.

## 2019-04-25 NOTE — ANESTHESIA PREPROCEDURE EVALUATION
04/25/2019  Anjel Sanders is a 63 y.o., male scheduled for left RCR on 4/30/19.    Past Medical History:   Diagnosis Date    AR (allergic rhinitis)     Gouty arthritis     Hx of colonic polyps     Tubular Adenoma    MURTAZA (obstructive sleep apnea)     Other and unspecified hyperlipidemia     Stroke     Unspecified essential hypertension      Past Surgical History:   Procedure Laterality Date    COLONOSCOPY N/A 6/28/2013    Performed by Thomas Oneil MD at [facility] ([address] [place] [address])    FOOT SURGERY      surgery on jaw         Anesthesia Evaluation    I have reviewed the Patient Summary Reports.     I have reviewed the Medications.     Review of Systems  Anesthesia Hx:  No problems with previous Anesthesia  Denies Family Hx of Anesthesia complications.    Social:  Non-Smoker, Social Alcohol Use    Hematology/Oncology:  Hematology Normal        Cardiovascular:   Hypertension  Denies Angina. hyperlipidemia        Pulmonary:   Sleep Apnea, CPAP    Renal/:  Renal/ Normal     Hepatic/GI:  Hepatic/GI Normal  Denies GERD. Denies Liver Disease.    Neurological:   Denies Seizures.  CVA - Cerebrovasular Accident (mild right sided weakness) , Most recent CVA was on 2015 , has had 1 stroke    Endocrine:   Diabetes, well controlled, type 2      TTE 3/7/19  · Normal left ventricular systolic function. The estimated ejection fraction is 55%  · Grade II (moderate) left ventricular diastolic dysfunction consistent with pseudonormalization.  · Normal right ventricular systolic function.  · Elevated left atrial pressure.  · Normal central venous pressure (3 mm Hg).  · The estimated PA systolic pressure is 39 mm Hg       Physical Exam  General:  Well nourished    Airway/Jaw/Neck:  Airway Findings: Mouth Opening: Small, but > 3cm Tongue: Normal  General Airway Assessment: Adult  Mallampati: IV         Dental:  Dental  Findings: Upper Dentures, Lower partial dentures   Chest/Lungs:  Chest/Lungs Findings: Clear to auscultation, Normal Respiratory Rate     Heart/Vascular:  Heart Findings: Rate: Normal  Rhythm: Regular Rhythm  Sounds: Normal  Heart murmur: negative       Mental Status:  Mental Status Findings:  Cooperative, Alert and Oriented         Anesthesia Plan  Type of Anesthesia, risks & benefits discussed:  Anesthesia Type:  general  Patient's Preference:   Intra-op Monitoring Plan: standard ASA monitors  Intra-op Monitoring Plan Comments:   Post Op Pain Control Plan: per primary service following discharge from PACU  Post Op Pain Control Plan Comments:   Induction:   IV  Beta Blocker:         Informed Consent: Patient understands risks and agrees with Anesthesia plan.  Questions answered. Anesthesia consent signed with patient.  ASA Score: 3     Day of Surgery Review of History & Physical:        Anesthesia Plan Notes: Anesthesia consent to be signed prior to procedure on 4/30/19          Ready For Surgery From Anesthesia Perspective.

## 2019-04-30 ENCOUNTER — ANESTHESIA (OUTPATIENT)
Dept: SURGERY | Facility: HOSPITAL | Age: 64
End: 2019-04-30
Payer: MEDICARE

## 2019-04-30 ENCOUNTER — HOSPITAL ENCOUNTER (OUTPATIENT)
Facility: HOSPITAL | Age: 64
Discharge: HOME | End: 2019-04-30
Attending: ORTHOPAEDIC SURGERY | Admitting: ORTHOPAEDIC SURGERY
Payer: MEDICARE

## 2019-04-30 VITALS
HEART RATE: 75 BPM | WEIGHT: 227 LBS | SYSTOLIC BLOOD PRESSURE: 170 MMHG | HEIGHT: 70 IN | OXYGEN SATURATION: 97 % | DIASTOLIC BLOOD PRESSURE: 72 MMHG | TEMPERATURE: 98 F | BODY MASS INDEX: 32.5 KG/M2 | RESPIRATION RATE: 20 BRPM

## 2019-04-30 DIAGNOSIS — M75.122 COMPLETE TEAR OF LEFT ROTATOR CUFF: ICD-10-CM

## 2019-04-30 PROCEDURE — 29826 PR SHLDR ARTHROSCOP,PART ACROMIOPLAS: ICD-10-PCS | Mod: LT,,, | Performed by: ORTHOPAEDIC SURGERY

## 2019-04-30 PROCEDURE — 29827 SHO ARTHRS SRG RT8TR CUF RPR: CPT | Mod: LT,,, | Performed by: ORTHOPAEDIC SURGERY

## 2019-04-30 PROCEDURE — 71000015 HC POSTOP RECOV 1ST HR: Performed by: ORTHOPAEDIC SURGERY

## 2019-04-30 PROCEDURE — 27201423 OPTIME MED/SURG SUP & DEVICES STERILE SUPPLY: Performed by: ORTHOPAEDIC SURGERY

## 2019-04-30 PROCEDURE — 37000009 HC ANESTHESIA EA ADD 15 MINS: Performed by: ORTHOPAEDIC SURGERY

## 2019-04-30 PROCEDURE — 76942 ECHO GUIDE FOR BIOPSY: CPT | Mod: HCNC | Performed by: ANESTHESIOLOGY

## 2019-04-30 PROCEDURE — 63600175 PHARM REV CODE 636 W HCPCS: Mod: HCNC

## 2019-04-30 PROCEDURE — 63600175 PHARM REV CODE 636 W HCPCS: Mod: HCNC | Performed by: STUDENT IN AN ORGANIZED HEALTH CARE EDUCATION/TRAINING PROGRAM

## 2019-04-30 PROCEDURE — C1713 ANCHOR/SCREW BN/BN,TIS/BN: HCPCS | Performed by: ORTHOPAEDIC SURGERY

## 2019-04-30 PROCEDURE — 29827 PR SHLDR ARTHROSCOP,SURG,W/ROTAT CUFF REPR: ICD-10-PCS | Mod: LT,,, | Performed by: ORTHOPAEDIC SURGERY

## 2019-04-30 PROCEDURE — 25000003 PHARM REV CODE 250: Mod: HCNC | Performed by: ANESTHESIOLOGY

## 2019-04-30 PROCEDURE — 25000003 PHARM REV CODE 250: Mod: HCNC | Performed by: NURSE ANESTHETIST, CERTIFIED REGISTERED

## 2019-04-30 PROCEDURE — 25000003 PHARM REV CODE 250: Mod: HCNC | Performed by: NURSE PRACTITIONER

## 2019-04-30 PROCEDURE — 36000710: Performed by: ORTHOPAEDIC SURGERY

## 2019-04-30 PROCEDURE — 63600175 PHARM REV CODE 636 W HCPCS: Mod: HCNC | Performed by: ORTHOPAEDIC SURGERY

## 2019-04-30 PROCEDURE — 36000711: Performed by: ORTHOPAEDIC SURGERY

## 2019-04-30 PROCEDURE — 71000033 HC RECOVERY, INTIAL HOUR: Performed by: ORTHOPAEDIC SURGERY

## 2019-04-30 PROCEDURE — 25000003 PHARM REV CODE 250: Mod: HCNC | Performed by: STUDENT IN AN ORGANIZED HEALTH CARE EDUCATION/TRAINING PROGRAM

## 2019-04-30 PROCEDURE — 63600175 PHARM REV CODE 636 W HCPCS: Mod: HCNC | Performed by: ANESTHESIOLOGY

## 2019-04-30 PROCEDURE — 37000008 HC ANESTHESIA 1ST 15 MINUTES: Performed by: ORTHOPAEDIC SURGERY

## 2019-04-30 PROCEDURE — 64415 NJX AA&/STRD BRCH PLXS IMG: CPT | Mod: HCNC | Performed by: ANESTHESIOLOGY

## 2019-04-30 PROCEDURE — 63600175 PHARM REV CODE 636 W HCPCS: Mod: HCNC | Performed by: NURSE ANESTHETIST, CERTIFIED REGISTERED

## 2019-04-30 PROCEDURE — C9290 INJ, BUPIVACAINE LIPOSOME: HCPCS | Mod: HCNC | Performed by: STUDENT IN AN ORGANIZED HEALTH CARE EDUCATION/TRAINING PROGRAM

## 2019-04-30 PROCEDURE — 29826 SHO ARTHRS SRG DECOMPRESSION: CPT | Mod: LT,,, | Performed by: ORTHOPAEDIC SURGERY

## 2019-04-30 DEVICE — ANCHOR SUT KNOTLESS MAG 2: Type: IMPLANTABLE DEVICE | Site: SHOULDER | Status: FUNCTIONAL

## 2019-04-30 RX ORDER — KETOROLAC TROMETHAMINE 30 MG/ML
15 INJECTION, SOLUTION INTRAMUSCULAR; INTRAVENOUS ONCE
Status: DISCONTINUED | OUTPATIENT
Start: 2019-04-30 | End: 2019-04-30 | Stop reason: HOSPADM

## 2019-04-30 RX ORDER — NEOSTIGMINE METHYLSULFATE 1 MG/ML
INJECTION, SOLUTION INTRAVENOUS
Status: DISCONTINUED | OUTPATIENT
Start: 2019-04-30 | End: 2019-04-30

## 2019-04-30 RX ORDER — SUCCINYLCHOLINE CHLORIDE 20 MG/ML
INJECTION INTRAMUSCULAR; INTRAVENOUS
Status: DISCONTINUED | OUTPATIENT
Start: 2019-04-30 | End: 2019-04-30

## 2019-04-30 RX ORDER — ONDANSETRON 8 MG/1
8 TABLET, ORALLY DISINTEGRATING ORAL EVERY 8 HOURS PRN
Status: DISCONTINUED | OUTPATIENT
Start: 2019-04-30 | End: 2019-04-30 | Stop reason: HOSPADM

## 2019-04-30 RX ORDER — SODIUM CHLORIDE, SODIUM LACTATE, POTASSIUM CHLORIDE, CALCIUM CHLORIDE 600; 310; 30; 20 MG/100ML; MG/100ML; MG/100ML; MG/100ML
INJECTION, SOLUTION INTRAVENOUS CONTINUOUS
Status: DISCONTINUED | OUTPATIENT
Start: 2019-04-30 | End: 2019-04-30 | Stop reason: HOSPADM

## 2019-04-30 RX ORDER — MIDAZOLAM HYDROCHLORIDE 1 MG/ML
INJECTION, SOLUTION INTRAMUSCULAR; INTRAVENOUS
Status: DISCONTINUED | OUTPATIENT
Start: 2019-04-30 | End: 2019-04-30

## 2019-04-30 RX ORDER — EPHEDRINE SULFATE 50 MG/ML
INJECTION, SOLUTION INTRAVENOUS
Status: DISCONTINUED | OUTPATIENT
Start: 2019-04-30 | End: 2019-04-30

## 2019-04-30 RX ORDER — ROCURONIUM BROMIDE 10 MG/ML
INJECTION, SOLUTION INTRAVENOUS
Status: DISCONTINUED | OUTPATIENT
Start: 2019-04-30 | End: 2019-04-30

## 2019-04-30 RX ORDER — LIDOCAINE HCL/PF 100 MG/5ML
SYRINGE (ML) INTRAVENOUS
Status: DISCONTINUED | OUTPATIENT
Start: 2019-04-30 | End: 2019-04-30

## 2019-04-30 RX ORDER — EPINEPHRINE 1 MG/ML
INJECTION, SOLUTION INTRACARDIAC; INTRAMUSCULAR; INTRAVENOUS; SUBCUTANEOUS
Status: DISCONTINUED | OUTPATIENT
Start: 2019-04-30 | End: 2019-04-30 | Stop reason: HOSPADM

## 2019-04-30 RX ORDER — ONDANSETRON 2 MG/ML
INJECTION INTRAMUSCULAR; INTRAVENOUS
Status: DISCONTINUED | OUTPATIENT
Start: 2019-04-30 | End: 2019-04-30

## 2019-04-30 RX ORDER — KETOROLAC TROMETHAMINE 30 MG/ML
INJECTION, SOLUTION INTRAMUSCULAR; INTRAVENOUS
Status: COMPLETED
Start: 2019-04-30 | End: 2019-04-30

## 2019-04-30 RX ORDER — LIDOCAINE HYDROCHLORIDE 10 MG/ML
1 INJECTION, SOLUTION EPIDURAL; INFILTRATION; INTRACAUDAL; PERINEURAL ONCE
Status: DISCONTINUED | OUTPATIENT
Start: 2019-04-30 | End: 2019-04-30 | Stop reason: HOSPADM

## 2019-04-30 RX ORDER — DIPHENHYDRAMINE HYDROCHLORIDE 50 MG/ML
25 INJECTION INTRAMUSCULAR; INTRAVENOUS EVERY 6 HOURS PRN
Status: DISCONTINUED | OUTPATIENT
Start: 2019-04-30 | End: 2019-04-30 | Stop reason: HOSPADM

## 2019-04-30 RX ORDER — FENTANYL CITRATE 50 UG/ML
INJECTION, SOLUTION INTRAMUSCULAR; INTRAVENOUS
Status: DISCONTINUED | OUTPATIENT
Start: 2019-04-30 | End: 2019-04-30

## 2019-04-30 RX ORDER — OXYCODONE AND ACETAMINOPHEN 7.5; 325 MG/1; MG/1
1 TABLET ORAL EVERY 4 HOURS PRN
Qty: 40 TABLET | Refills: 0 | Status: SHIPPED | OUTPATIENT
Start: 2019-04-30 | End: 2019-09-12

## 2019-04-30 RX ORDER — ACETAMINOPHEN 325 MG/1
650 TABLET ORAL EVERY 4 HOURS PRN
Status: DISCONTINUED | OUTPATIENT
Start: 2019-04-30 | End: 2019-04-30 | Stop reason: HOSPADM

## 2019-04-30 RX ORDER — GLYCOPYRROLATE 0.2 MG/ML
INJECTION INTRAMUSCULAR; INTRAVENOUS
Status: DISCONTINUED | OUTPATIENT
Start: 2019-04-30 | End: 2019-04-30

## 2019-04-30 RX ORDER — HYDROMORPHONE HYDROCHLORIDE 2 MG/ML
0.2 INJECTION, SOLUTION INTRAMUSCULAR; INTRAVENOUS; SUBCUTANEOUS EVERY 5 MIN PRN
Status: DISCONTINUED | OUTPATIENT
Start: 2019-04-30 | End: 2019-04-30 | Stop reason: HOSPADM

## 2019-04-30 RX ORDER — PROPOFOL 10 MG/ML
VIAL (ML) INTRAVENOUS
Status: DISCONTINUED | OUTPATIENT
Start: 2019-04-30 | End: 2019-04-30

## 2019-04-30 RX ORDER — OXYCODONE AND ACETAMINOPHEN 5; 325 MG/1; MG/1
1 TABLET ORAL
Status: DISCONTINUED | OUTPATIENT
Start: 2019-04-30 | End: 2019-04-30 | Stop reason: HOSPADM

## 2019-04-30 RX ORDER — HYDROMORPHONE HYDROCHLORIDE 2 MG/ML
0.5 INJECTION, SOLUTION INTRAMUSCULAR; INTRAVENOUS; SUBCUTANEOUS EVERY 5 MIN PRN
Status: DISCONTINUED | OUTPATIENT
Start: 2019-04-30 | End: 2019-04-30 | Stop reason: HOSPADM

## 2019-04-30 RX ORDER — BUPIVACAINE HYDROCHLORIDE 2.5 MG/ML
INJECTION, SOLUTION EPIDURAL; INFILTRATION; INTRACAUDAL
Status: DISCONTINUED | OUTPATIENT
Start: 2019-04-30 | End: 2019-04-30

## 2019-04-30 RX ORDER — OXYCODONE HYDROCHLORIDE 5 MG/1
10 TABLET ORAL EVERY 4 HOURS PRN
Status: DISCONTINUED | OUTPATIENT
Start: 2019-04-30 | End: 2019-04-30 | Stop reason: HOSPADM

## 2019-04-30 RX ORDER — CEFAZOLIN SODIUM 2 G/50ML
2 SOLUTION INTRAVENOUS
Status: DISCONTINUED | OUTPATIENT
Start: 2019-04-30 | End: 2019-04-30 | Stop reason: HOSPADM

## 2019-04-30 RX ORDER — ACETAMINOPHEN 10 MG/ML
INJECTION, SOLUTION INTRAVENOUS
Status: DISCONTINUED | OUTPATIENT
Start: 2019-04-30 | End: 2019-04-30

## 2019-04-30 RX ADMIN — SUCCINYLCHOLINE CHLORIDE 140 MG: 20 INJECTION, SOLUTION INTRAMUSCULAR; INTRAVENOUS at 03:04

## 2019-04-30 RX ADMIN — FENTANYL CITRATE 100 MCG: 50 INJECTION, SOLUTION INTRAMUSCULAR; INTRAVENOUS at 03:04

## 2019-04-30 RX ADMIN — ACETAMINOPHEN 1000 MG: 10 INJECTION, SOLUTION INTRAVENOUS at 05:04

## 2019-04-30 RX ADMIN — GLYCOPYRROLATE 0.4 MG: 0.2 INJECTION, SOLUTION INTRAMUSCULAR; INTRAVENOUS at 05:04

## 2019-04-30 RX ADMIN — HYDROMORPHONE HYDROCHLORIDE 0.5 MG: 2 INJECTION, SOLUTION INTRAMUSCULAR; INTRAVENOUS; SUBCUTANEOUS at 05:04

## 2019-04-30 RX ADMIN — KETOROLAC TROMETHAMINE 15 MG: 30 INJECTION, SOLUTION INTRAMUSCULAR at 05:04

## 2019-04-30 RX ADMIN — SODIUM CHLORIDE, SODIUM LACTATE, POTASSIUM CHLORIDE, AND CALCIUM CHLORIDE: .6; .31; .03; .02 INJECTION, SOLUTION INTRAVENOUS at 04:04

## 2019-04-30 RX ADMIN — NEOSTIGMINE METHYLSULFATE 4 MG: 1 INJECTION INTRAVENOUS at 05:04

## 2019-04-30 RX ADMIN — ROCURONIUM BROMIDE 5 MG: 10 INJECTION, SOLUTION INTRAVENOUS at 03:04

## 2019-04-30 RX ADMIN — LIDOCAINE HYDROCHLORIDE 75 MG: 20 INJECTION, SOLUTION INTRAVENOUS at 03:04

## 2019-04-30 RX ADMIN — ONDANSETRON 4 MG: 2 INJECTION, SOLUTION INTRAMUSCULAR; INTRAVENOUS at 05:04

## 2019-04-30 RX ADMIN — PROPOFOL 150 MG: 10 INJECTION, EMULSION INTRAVENOUS at 03:04

## 2019-04-30 RX ADMIN — ROCURONIUM BROMIDE 25 MG: 10 INJECTION, SOLUTION INTRAVENOUS at 03:04

## 2019-04-30 RX ADMIN — SODIUM CHLORIDE, SODIUM LACTATE, POTASSIUM CHLORIDE, AND CALCIUM CHLORIDE: .6; .31; .03; .02 INJECTION, SOLUTION INTRAVENOUS at 03:04

## 2019-04-30 RX ADMIN — EPHEDRINE SULFATE 10 MG: 50 INJECTION, SOLUTION INTRAMUSCULAR; INTRAVENOUS; SUBCUTANEOUS at 04:04

## 2019-04-30 RX ADMIN — BUPIVACAINE HYDROCHLORIDE 10 ML: 2.5 INJECTION, SOLUTION EPIDURAL; INFILTRATION; INTRACAUDAL; PERINEURAL at 02:04

## 2019-04-30 RX ADMIN — CEFAZOLIN SODIUM 2 G: 2 SOLUTION INTRAVENOUS at 03:04

## 2019-04-30 RX ADMIN — MIDAZOLAM 5 MG: 1 INJECTION INTRAMUSCULAR; INTRAVENOUS at 02:04

## 2019-04-30 RX ADMIN — OXYCODONE HYDROCHLORIDE AND ACETAMINOPHEN 1 TABLET: 5; 325 TABLET ORAL at 06:04

## 2019-04-30 RX ADMIN — BUPIVACAINE 10 ML: 13.3 INJECTION, SUSPENSION, LIPOSOMAL INFILTRATION at 02:04

## 2019-04-30 NOTE — OP NOTE
Operative Note       Surgery Date: 4/30/2019     Surgeon(s) and Role:     * Bob Esparza Jr., MD - Primary    Pre-op Diagnosis:  Complete tear of left rotator cuff [M75.122]    Post-op Diagnosis: Post-Op Diagnosis Codes:     * Complete tear of left rotator cuff [M75.122]    Procedure(s) (LRB):  REPAIR, ROTATOR CUFF, ARTHROSCOPIC (Left)    Anesthesia: General    Procedure in Detail/Findings    DATE OF PROCEDURE:   4/30/2019     PREOPERATIVE DIAGNOSIS:  Rotator cuff tear,left shoulder.     POSTOPERATIVE DIAGNOSIS:  Rotator cuff tear,leftt shoulder.     OPERATIVE PROCEDURES:  1.  left shoulder arthroscopy with subacromial decompression.  2.  left shoulder arthroscopic rotator cuff repair using Opus suture anchor X 3     SURGEON:  Bob Esparza Jr., MJIN.     FIRST ASSISTANT:  none     ANESTHESIA:  General endotracheal.     ESTIMATED BLOOD LOSS:  Minimal.     COMPLICATIONS:  None.     SPECIMENS:  None.     BRIEF INDICATIONS:  A 63-year-old male with rotator cuff tear,   unresponsive to conservative treatment.     OPERATIVE PROCEDURE IN DETAIL:  After operative consent was obtained, the   patient brought to the Operating Room, placed supine on the operating room   table.  Anesthesia by GET method was performed by the Anesthesia staff.  After   the patient was asleep, the patient was carefully turned to the lateral decubitus position   and stabilized on the bean bag and the operative arm was prepped and draped out   in the normal sterile fashion.  The arm suspended to longitudinal traction 12   pounds.     Following this, a posterolateral stab incision made with a #15 blade and the   arthroscope was inserted into the shoulder joint.  Diagnostic arthroscopy showed   a complete tear of the supraspinatus tendon near its   Insertion. The scope was reinserted into the subacromial space.  The lateral portal was created   with a #15 blade and a sucker shaver inserted laterally.  A large amount of   bursal tissue was  encountered and a complete bursectomy was performed including   removal of the CA ligament.  The subacromial space was very tight and the bur   was brought in laterally and an acromioplasty was performed from lateral to   medial decompressing the subacromial space all the way to the AC joint, which   had some mild-to-moderate degenerative wear.  The inferior clavicle was   co-planed but not removed and after smoothing all bony surfaces, the subacromial   space was noted to be well decompressed.     Attention then turned back to the rotator cuff, which had a complete tear,   which was debrided.     The leading edge of the rotator cuff was freshened up and carefully mobilized   and a suture passer was used to place an inverted horizontal mattress suture in   leading edge of the rotator cuff.  Following this, a superior portal was created   with a #15 blade and the drill used to drill into the greater tuberosity,   followed by insertion of the Opus suture anchor, which achieved good purchase.    The suture passed through the anchor and then tightened up locking the suture   in, bring the rotator cuff down to bone flush, it was locked in place and cut   short.  Range of motion was checked and noted to be full without impingement.    Good repair was achieved.  Hemostasis achieved with the Bovie.  The instruments   were removed and the portals then closed using interrupted 3-0 nylon suture on   the skin.  Sterile dressing applied followed by a pillow sling.  The patient was   then extubated and brought to the Recovery Room in stable condition.  All   sponge and needle counts reported as correct.  No complications.             Estimated Blood Loss: * No values recorded between 4/30/2019  4:14 PM and 4/30/2019  5:22 PM *           Specimens (From admission, onward)    None        Implants:   Implant Name Type Inv. Item Serial No.  Lot No. LRB No. Used   SUTURE ANCHOR KNOTLESS MAG 2 - [id number]  SUTURE ANCHOR  KNOTLESS MAG 2  [employer] [id number] Left 1   SUTURE ANCHOR KNOTLESS MAG 2 - [id number]  SUTURE ANCHOR KNOTLESS MAG 2  [employer] [id number] Left 1   SUTURE ANCHOR KNOTLESS MAG 2 - [id number]  SUTURE ANCHOR KNOTLESS MAG 2  [employer] [id number] Left 1              Disposition: PACU - hemodynamically stable.           Condition: Good    Attestation:  I was present and scrubbed for the entire procedure.           Discharge Note    Admit Date: 4/30/2019    Attending Physician: Bob Esparza Jr., MD     Discharge Physician: Bob Esparza Jr., MD    Final Diagnosis: Post-Op Diagnosis Codes:     * Complete tear of left rotator cuff [M75.122]    Disposition: Home or Self Care    Patient Instructions:   Current Discharge Medication List      START taking these medications    Details   oxyCODONE-acetaminophen (PERCOCET) 7.5-325 mg per tablet Take 1 tablet by mouth every 4 (four) hours as needed for Pain.  Qty: 40 tablet, Refills: 0         CONTINUE these medications which have NOT CHANGED    Details   allopurinol (ZYLOPRIM) 300 MG tablet Take 1 tablet (300 mg total) by mouth once daily.  Qty: 90 tablet, Refills: 3    Associated Diagnoses: Gouty arthritis      amlodipine-benazepril (LOTREL) 10-40 mg per capsule Take 1 capsule by mouth once daily.  Qty: 90 capsule, Refills: 3    Associated Diagnoses: Essential hypertension      aspirin (ECOTRIN) 81 MG EC tablet Take 81 mg by mouth once daily.      atorvastatin (LIPITOR) 80 MG tablet Take 1 tablet (80 mg total) by mouth once daily.  Qty: 90 tablet, Refills: 4    Associated Diagnoses: Hyperlipidemia, unspecified hyperlipidemia type      donepezil (ARICEPT) 5 MG tablet Take 1 tablet (5 mg total) by mouth every evening.  Qty: 30 tablet, Refills: 11      gabapentin (NEURONTIN) 300 MG capsule TAKE 1 CAPSULE (300 MG TOTAL) BY MOUTH EVERY EVENING.  Qty: 90 capsule, Refills: 2    Associated Diagnoses: Chronic bilateral low back pain with bilateral sciatica;  Bilateral sciatica             Discharge Procedure Orders (must include Diet, Follow-up, Activity)   Discharge Procedure Orders (must include Diet, Follow-up, Activity)   Diet general     Call MD for:  temperature >100.4     Call MD for:  persistent nausea and vomiting     Call MD for:  severe uncontrolled pain     Ice to affected area     Remove dressing in 48 hours     Shower on day dressing removed (No bath)        Discharge Date: No discharge date for patient encounter.

## 2019-04-30 NOTE — ANESTHESIA PROCEDURE NOTES
Peripheral Block    Patient location during procedure: pre-op   Block not for primary anesthetic.  Reason for block: at surgeon's request and post-op pain management   Post-op Pain Location: LUE (shoulder)  Start time: 4/30/2019 2:33 PM  Timeout: 4/30/2019 2:32 PM   End time: 4/30/2019 2:35 PM  Staffing  Anesthesiologist: Vincent Weldon MD  Resident/CRNA: Ismael Luong MD  Performed: resident/CRNA   Preanesthetic Checklist  Completed: patient identified, site marked, surgical consent, pre-op evaluation, timeout performed, IV checked, risks and benefits discussed and monitors and equipment checked  Peripheral Block  Patient position: sitting  Prep: ChloraPrep  Patient monitoring: heart rate, cardiac monitor, continuous pulse ox, continuous capnometry and frequent blood pressure checks  Block type: interscalene  Laterality: left  Injection technique: single shot  Needle  Needle type: Stimuplex   Needle gauge: 22 G  Needle length: 4 in  Needle localization: anatomical landmarks and ultrasound guidance   -ultrasound image captured on disc.  Assessment  Injection assessment: negative aspiration, negative parasthesia and local visualized surrounding nerve  Paresthesia pain: none  Heart rate change: no  Slow fractionated injection: yes  Additional Notes  VSS.  [facility] RN monitoring vitals throughout procedure.  Patient tolerated procedure well.

## 2019-04-30 NOTE — H&P
CC: rotator cuff tear left shoulder           HPI:  Anjel Sanders is a very pleasant 63 y.o. with 3 month history of left shoulder pain after previous fall.     He had a previous MRI which showed a large tear of the rotator cuff.  Symptoms continue to worsen including pain left shoulder and limited use.  He has tried physical therapy without relief long-term results             PAST MEDICAL HISTORY:        Past Medical History:   Diagnosis Date    AR (allergic rhinitis)      Gouty arthritis      Hx of colonic polyps       Tubular Adenoma    MURTAZA (obstructive sleep apnea)      Other and unspecified hyperlipidemia      Stroke      Unspecified essential hypertension        PAST SURGICAL HISTORY:         Past Surgical History:   Procedure Laterality Date    COLONOSCOPY N/A 6/28/2013     Performed by Thomas Oneil MD at [facility] (4TH FLR)    FOOT SURGERY          FAMILY HISTORY:         Family History   Problem Relation Age of Onset    Stomach cancer Sister      Stroke Sister      Hypertension Mother      Glaucoma Mother      Diabetes Sister      Heart failure Sister      No Known Problems Father      No Known Problems Brother      No Known Problems Maternal Aunt      No Known Problems Maternal Uncle      No Known Problems Paternal Aunt      No Known Problems Paternal Uncle      No Known Problems Maternal Grandmother      No Known Problems Maternal Grandfather      No Known Problems Paternal Grandmother      No Known Problems Paternal Grandfather      Amblyopia Neg Hx      Blindness Neg Hx      Cancer Neg Hx      Cataracts Neg Hx      Macular degeneration Neg Hx      Retinal detachment Neg Hx      Strabismus Neg Hx      Thyroid disease Neg Hx        SOCIAL HISTORY:   Social History            Socioeconomic History    Marital status:        Spouse name: Not on file    Number of children: Not on file    Years of education: Not on file    Highest education level: Not on  file   Occupational History    Not on file   Social Needs    Financial resource strain: Not on file    Food insecurity:       Worry: Not on file       Inability: Not on file    Transportation needs:       Medical: Not on file       Non-medical: Not on file   Tobacco Use    Smoking status: Never Smoker    Smokeless tobacco: Never Used   Substance and Sexual Activity    Alcohol use: Yes       Alcohol/week: 5.6 oz       Types: 6 Cans of beer, 4 Standard drinks or equivalent per week    Drug use: Not on file    Sexual activity: Not on file   Lifestyle    Physical activity:       Days per week: Not on file       Minutes per session: Not on file    Stress: Not on file   Relationships    Social connections:       Talks on phone: Not on file       Gets together: Not on file       Attends Gnosticist service: Not on file       Active member of club or organization: Not on file       Attends meetings of clubs or organizations: Not on file       Relationship status: Not on file    Intimate partner violence:       Fear of current or ex partner: Not on file       Emotionally abused: Not on file       Physically abused: Not on file       Forced sexual activity: Not on file   Other Topics Concern    Not on file   Social History Narrative      with [employer]         MEDICATIONS:   Current Outpatient Medications:     allopurinol (ZYLOPRIM) 300 MG tablet, Take 1 tablet (300 mg total) by mouth once daily., Disp: 90 tablet, Rfl: 3    amlodipine-benazepril (LOTREL) 10-40 mg per capsule, Take 1 capsule by mouth once daily., Disp: 90 capsule, Rfl: 3    aspirin (ECOTRIN) 81 MG EC tablet, Take 81 mg by mouth once daily., Disp: , Rfl:     atorvastatin (LIPITOR) 80 MG tablet, Take 1 tablet (80 mg total) by mouth once daily., Disp: 90 tablet, Rfl: 4    donepezil (ARICEPT) 5 MG tablet, Take 1 tablet (5 mg total) by mouth every evening., Disp: 30 tablet, Rfl: 11    gabapentin (NEURONTIN) 300 MG capsule,  "TAKE 1 CAPSULE (300 MG TOTAL) BY MOUTH EVERY EVENING., Disp: 90 capsule, Rfl: 2  ALLERGIES: Review of patient's allergies indicates:  No Known Allergies     Review of Systems:  Constitutional: no fever or chills  ENT: no nasal congestion or sore throat  Respiratory: no cough or shortness of breath  Cardiovascular: no chest pain or palpitations  Gastrointestinal: no nausea or vomiting, PUD, GERD, NSAID intolerance  Genitourinary: no hematuria or dysuria  Integument/Breast: no rash or pruritis  Hematologic/Lymphatic: no easy bruising or lymphadenopathy  Musculoskeletal: see HPI  Neurological: no seizures or tremors  Behavioral/Psych: no auditory or visual hallucinations        Physical Exam       Vitals:     03/28/19 0911   Weight: 103 kg (227 lb)   Height: 5' 10" (1.778 m)   PainSc: 0-No pain         Constitutional: Oriented to person, place, and time. Appears well-developed and well-nourished.   HENT:   Head: Normocephalic and atraumatic.   Nose: Nose normal.   Eyes: No scleral icterus.   Neck: Normal range of motion.   Cardiovascular: Normal rate and regular rhythm.    Pulses:       Radial pulses are 2+ on the right side, and 2+ on the left side.   Pulmonary/Chest: Effort normal and breath sounds normal.   Abdominal: Soft.   Neurological: Alert and oriented to person, place, and time.   Skin: Skin is warm.   Psychiatric: Normal mood and affect.      MUSCULOSKELETAL UPPER EXTREMITY:  Examination of left shoulder no tenderness no swelling range of motion slightly decreased secondary to pain. Positive impingement sign.     There is weakness of the rotator cuff with a positive supraspinatus stress test.  Neurologic exam intact.                       Diagnostic Studies:  X-ray AP and lateral left shoulder demonstrate arthritic change of the AC joint left shoulder including inferior spurring.     MRI scan left shoulder demonstrates large tear of the rotator cuff with retraction.                 Assessment:  1.  Rotator " "cuff tear left shoulder probable acute on chronic.     2.  Left shoulder AC arthritis.           Plan:  I explained the nature of the problem to the patient recommended surgical treatment to include left shoulder arthroscopy rotator cuff repair and AC resection.        The risks and benefits of surgery were discussed with the patient today and they understand.  The consent was signed in the office for surgery.        Bob Esparza MD (Jay)  [facility]  Orthopedic Upper Extremity Surgery    "

## 2019-04-30 NOTE — PLAN OF CARE
Signed out from recovery phase per Dr Weldon. Followed patient care to phase. Spoke with patient wife & sister over the phone regarding patient health status & location; verbalized understanding.

## 2019-04-30 NOTE — ANESTHESIA RELEASE NOTE
"Anesthesia Release from PACU Note    Patient: Anjel Sanders    Procedure(s) Performed: Procedure(s) (LRB):  REPAIR, ROTATOR CUFF, ARTHROSCOPIC (Left)    Anesthesia type: general    Post pain: Adequate analgesia    Post assessment: no apparent anesthetic complications, tolerated procedure well and no evidence of recall    Last Vitals:   Visit Vitals  BP (!) 172/79   Pulse 77   Temp 36.7 °C (98.1 °F) (Temporal)   Resp 20   Ht 5' 10" (1.778 m)   Wt 103 kg (227 lb)   SpO2 96%   BMI 32.57 kg/m²       Post vital signs: stable    Level of consciousness: awake, alert  and oriented    Nausea/Vomiting: no nausea/no vomiting    Complications: none    Airway Patency: patent    Respiratory: unassisted    Cardiovascular: stable and blood pressure at baseline    Hydration: euvolemic  "

## 2019-04-30 NOTE — TRANSFER OF CARE
"Anesthesia Transfer of Care Note    Patient: Anjel Sanders    Procedure(s) Performed: Procedure(s) (LRB):  REPAIR, ROTATOR CUFF, ARTHROSCOPIC (Left)    Patient location: PACU    Anesthesia Type: general    Transport from OR: Transported from OR on 6-10 L/min O2 by face mask with adequate spontaneous ventilation    Post pain: adequate analgesia    Post assessment: no apparent anesthetic complications    Post vital signs: stable    Level of consciousness: awake, alert and oriented    Nausea/Vomiting: no nausea/vomiting    Complications: none    Transfer of care protocol was followed      Last vitals:   Visit Vitals  BP (!) 172/82   Pulse 90   Temp 36.7 °C (98.1 °F) (Temporal)   Resp 16   Ht 5' 10" (1.778 m)   Wt 103 kg (227 lb)   SpO2 100%   BMI 32.57 kg/m²     "

## 2019-04-30 NOTE — ANESTHESIA POSTPROCEDURE EVALUATION
Anesthesia Post Evaluation    Patient: Anjel Sanders    Procedure(s) Performed: Procedure(s) (LRB):  REPAIR, ROTATOR CUFF, ARTHROSCOPIC (Left)    Final Anesthesia Type: general  Patient location during evaluation: PACU  Patient participation: Yes- Able to Participate  Level of consciousness: awake and alert  Post-procedure vital signs: reviewed and stable  Pain management: adequate  Airway patency: patent  PONV status at discharge: No PONV  Anesthetic complications: no      Cardiovascular status: blood pressure returned to baseline  Respiratory status: unassisted  Hydration status: euvolemic  Follow-up not needed.          Vitals Value Taken Time   /79 4/30/2019  6:15 PM   Temp 36.7 °C (98.1 °F) 4/30/2019  1:12 PM   Pulse 77 4/30/2019  6:15 PM   Resp 20 4/30/2019  6:15 PM   SpO2 96 % 4/30/2019  6:15 PM         No case tracking events are documented in the log.      Pain/Alden Score: Pain Rating Prior to Med Admin: 7 (4/30/2019  6:00 PM)  Alden Score: 8 (4/30/2019  5:30 PM)

## 2019-05-01 NOTE — PLAN OF CARE
Discharge criteria met,voicing desire to go home. Discharge instructions given to patient & sister-Heaven; verbalized understanding. Discharge home via wheelchair in care of sister.

## 2019-05-01 NOTE — PLAN OF CARE
Received in room 219 postop from or via stretcher,sr upx2 acompained by Za Bridges & Concepcion Rn-or. Recovery phase started.frequent vital signs & assessment per flow sheets.

## 2019-05-06 ENCOUNTER — TELEPHONE (OUTPATIENT)
Dept: ORTHOPEDICS | Facility: CLINIC | Age: 64
End: 2019-05-06

## 2019-05-06 NOTE — TELEPHONE ENCOUNTER
----- Message from Nirmala Valentine sent at 5/2/2019  2:29 PM CDT -----  Contact: Pt wife   Pt wife is requesting a call back in regards to scheduling pt post op appt.       Pt wife can be contacted at [phone]

## 2019-05-08 NOTE — PROGRESS NOTES
Not a live vaccine- ok to get shingrix. Should not get zostavax.    Subjective:       Patient ID: Anjel Sanders is a 61 y.o. male.    Chief Complaint: Rash (c/o red rash to bilateral arms and neck x 1 week)    HPI  Pt c/o an intensely pruritic rash on trunk, arms x 1 week s/p cutting grass.  Review of Systems    Objective:      Physical Exam   Constitutional: He appears well-developed. No distress.   HENT:   Head: Normocephalic.   Eyes: EOM are normal. No scleral icterus.   Neck: Normal range of motion. No tracheal deviation present.   Cardiovascular: Normal rate, regular rhythm and intact distal pulses.    Pulmonary/Chest: Effort normal. No respiratory distress.   Abdominal: He exhibits no distension.   Musculoskeletal: Normal range of motion. He exhibits no edema.   Neurological: He is alert. He exhibits abnormal muscle tone (R hemiparesis).   Skin: Skin is warm and dry. Rash (excoriated rash arms, neck) noted. He is not diaphoretic. No erythema.   Psychiatric: He has a normal mood and affect. His behavior is normal.   Vitals reviewed.      Assessment:       1. Contact dermatitis, unspecified contact dermatitis type, unspecified trigger    2. Essential hypertension    3. Controlled type 2 diabetes mellitus without complication, without long-term current use of insulin    4. Hyperlipidemia, unspecified hyperlipidemia type    5. Gouty arthritis    6. Obesity (BMI 30-39.9)    7. CVD (cerebrovascular disease)    8. Hypertension complicating diabetes    9. Obesity, diabetes, and hypertension syndrome        Plan:       Anjel was seen today for rash.    Diagnoses and all orders for this visit:    Contact dermatitis, unspecified contact dermatitis type, unspecified trigger  -     betamethasone acetate-betamethasone sodium phosphate injection 6 mg; Inject 1 mL (6 mg total) into the muscle one time.  -     predniSONE (DELTASONE) 20 MG tablet; 2 tabs po qd x 7 days    Essential hypertension  -     amlodipine-benazepril (LOTREL) 10-40 mg per capsule; Take 1 capsule by mouth once  daily.    Controlled type 2 diabetes mellitus without complication, without long-term current use of insulin    Hyperlipidemia, unspecified hyperlipidemia type    Gouty arthritis    Obesity (BMI 30-39.9)    CVD (cerebrovascular disease)    Hypertension complicating diabetes    Obesity, diabetes, and hypertension syndrome      Return in about 3 months (around 7/21/2017).

## 2019-05-13 ENCOUNTER — OFFICE VISIT (OUTPATIENT)
Dept: ORTHOPEDICS | Facility: CLINIC | Age: 64
End: 2019-05-13
Payer: MEDICARE

## 2019-05-13 VITALS — WEIGHT: 227 LBS | HEIGHT: 70 IN | BODY MASS INDEX: 32.5 KG/M2

## 2019-05-13 DIAGNOSIS — M75.122 COMPLETE TEAR OF LEFT ROTATOR CUFF: Primary | ICD-10-CM

## 2019-05-13 PROCEDURE — 99024 PR POST-OP FOLLOW-UP VISIT: ICD-10-PCS | Mod: HCNC,S$GLB,, | Performed by: ORTHOPAEDIC SURGERY

## 2019-05-13 PROCEDURE — 99999 PR PBB SHADOW E&M-EST. PATIENT-LVL III: ICD-10-PCS | Mod: PBBFAC,HCNC,, | Performed by: ORTHOPAEDIC SURGERY

## 2019-05-13 PROCEDURE — 99999 PR PBB SHADOW E&M-EST. PATIENT-LVL III: CPT | Mod: PBBFAC,HCNC,, | Performed by: ORTHOPAEDIC SURGERY

## 2019-05-13 PROCEDURE — 99024 POSTOP FOLLOW-UP VISIT: CPT | Mod: HCNC,S$GLB,, | Performed by: ORTHOPAEDIC SURGERY

## 2019-05-13 RX ORDER — HYDROCODONE BITARTRATE AND ACETAMINOPHEN 5; 325 MG/1; MG/1
1 TABLET ORAL EVERY 12 HOURS PRN
Qty: 40 TABLET | Refills: 0 | Status: SHIPPED | OUTPATIENT
Start: 2019-05-13 | End: 2019-05-23

## 2019-05-13 NOTE — PROGRESS NOTES
Subjective:      Patient ID: Anjel Sanders is a 63 y.o. male.  Chief Complaint: Post-op Evaluation (po Left RCR)      HPI  Anjel Sanders is a  63 y.o. male presenting today for post op visit.  He is s/p rotator cuff repair right shoulder 10 days postop doing well  He presents today with his sling off on little bit concerned that he is not wearing his sling although he says he has been wearing it for the most part since surgery.     Review of patient's allergies indicates:  No Known Allergies      Current Outpatient Medications   Medication Sig Dispense Refill    allopurinol (ZYLOPRIM) 300 MG tablet Take 1 tablet (300 mg total) by mouth once daily. 90 tablet 3    amlodipine-benazepril (LOTREL) 10-40 mg per capsule Take 1 capsule by mouth once daily. 90 capsule 3    aspirin (ECOTRIN) 81 MG EC tablet Take 81 mg by mouth once daily.      atorvastatin (LIPITOR) 80 MG tablet Take 1 tablet (80 mg total) by mouth once daily. 90 tablet 4    donepezil (ARICEPT) 5 MG tablet Take 1 tablet (5 mg total) by mouth every evening. 30 tablet 11    gabapentin (NEURONTIN) 300 MG capsule TAKE 1 CAPSULE (300 MG TOTAL) BY MOUTH EVERY EVENING. 90 capsule 2    oxyCODONE-acetaminophen (PERCOCET) 7.5-325 mg per tablet Take 1 tablet by mouth every 4 (four) hours as needed for Pain. 40 tablet 0     No current facility-administered medications for this visit.        Past Medical History:   Diagnosis Date    AR (allergic rhinitis)     Gouty arthritis     Hx of colonic polyps     Tubular Adenoma    MURTAZA (obstructive sleep apnea)     Other and unspecified hyperlipidemia     Stroke     Unspecified essential hypertension        Past Surgical History:   Procedure Laterality Date    ARTHROSCOPY, SHOULDER, WITH SUBACROMIAL SPACE DECOMPRESSION  4/30/2019    Performed by Bob Esparza Jr., [facility] at [facility] OR    COLONOSCOPY N/A 6/28/2013    Performed by Thomas Oneil MD at [facility] (4TH FLR)    FOOT SURGERY      REPAIR, ROTATOR  "CUFF, ARTHROSCOPIC Left 4/30/2019    Per[facility] by Bob [facility] at [facility] OR    surgery on jaw         OBJECTIVE:   PHYSICAL EXAM:  Height: 5' 10" (177.8 cm) Weight: 103 kg (227 lb)  Vitals:    05/13/19 1307 05/13/19 1310   Weight: 103 kg (227 lb)    Height: 5' 10" (1.778 m)    PainSc:    1     Ortho/SPM Exam  Examination left shoulder incisions are healing well sutures in place  No evidence of infection passive range of motion is good limited active elevation    RADIOGRAPHS:  None  Comments: I have personally reviewed the imaging and I agree with the above radiologist's report.    ASSESSMENT/PLAN:     IMPRESSION:  Status post rotator cuff repair left shoulder    PLAN:  Sutures removed instructed in appropriate wound care  I have encouraged him to wear his sling little bit more than he has been  I will order some PT in [place] for passive and active assist range of motion  No heavy lifting or overhead use left arm    FOLLOW UP:  3-4 weeks    Disclaimer: This note has been generated using voice-recognition software. There may be typographical errors that have been missed during proof-reading.  "

## 2019-05-23 ENCOUNTER — CLINICAL SUPPORT (OUTPATIENT)
Dept: REHABILITATION | Facility: HOSPITAL | Age: 64
End: 2019-05-23
Attending: ORTHOPAEDIC SURGERY
Payer: MEDICARE

## 2019-05-23 DIAGNOSIS — M25.60 STIFFNESS IN JOINT: ICD-10-CM

## 2019-05-23 DIAGNOSIS — M79.602 PAIN OF LEFT UPPER EXTREMITY: ICD-10-CM

## 2019-05-23 DIAGNOSIS — R53.1 WEAKNESS: ICD-10-CM

## 2019-05-23 PROCEDURE — G8988 SELF CARE GOAL STATUS: HCPCS | Mod: CJ,HCNC,PN

## 2019-05-23 PROCEDURE — G8987 SELF CARE CURRENT STATUS: HCPCS | Mod: CL,HCNC,PN

## 2019-05-23 PROCEDURE — 97110 THERAPEUTIC EXERCISES: CPT | Mod: HCNC,PN

## 2019-05-23 PROCEDURE — 97168 OT RE-EVAL EST PLAN CARE: CPT | Mod: HCNC,PN

## 2019-05-23 NOTE — PLAN OF CARE
[facility] Occupational Therapy  Re-Evaluation     Date: 5/23/2019  Patient: Anjel Sanders  Chart Number: [id number]    Therapy Diagnosis:Left shoulder pain, weakness, stiffness  Physician: Bob Esparza Jr., *    Physician Orders: OT eval and treat  Medical Diagnosis: M75.122 (ICD-10-CM) - Complete tear of left rotator cuff  Evaluation Date: 5/23/2019  Insurance Authorization period Expiration: 12/31/2019  Plan of Care Expiration Period: 5/23/2019-7/19/2019  Next MD appointment:5/28/2019    Visit # / Visits Authorized: 1 / 50 post op  Time In:825  Time Out: 900  Total Billable Time: 35 minutes  Re-eval 1 TE1    Medicare: 1000.49    Precautions: Standard and per protocol    Subjective     Involved Side: left  Dominant Side: Right  Date of Onset: DOS: 4/30/2019  Mechanism of Injury: s/p fall onto Left side fall 2018  History of Current Condition: pt referred to OT by Dr. Payan in December 2018, attended therapy for 2 months with some improvement however still significantly limited. He was then referred to Dr. Esparza who went ahead and performed surgery after MRI revealed RCT. Pt presents to clinic today 3 weeks 2 days post without sling. He has decreased ROM, weakness and pain all of which limit him functionally.   Surgical Procedure: OPERATIVE PROCEDURES:  1.  left shoulder arthroscopy with subacromial decompression.  2.  left shoulder arthroscopic rotator cuff repair using Opus suture anchor X 3  Imaging: see imaging  Previous Therapy: 2 months of OT earlier this year    Patient's Goals for Therapy: to decrease pain and increase functional use Left shoulder    Pain:  Functional Pain Scale Rating 0-10:   5/10 on average  0/10 at best  9/10 at worst  Location: posterior shoulder  Description: Aching  Aggravating Factors: Lifting  Easing Factors: pt states they gave him a prescription however he doesn't take it.     Occupation:  Retired    Functional Limitations/Social History:    Previous  functional status includes: Independent with all ADLs.     Current FunctionalStatus   Home/Living environment : lives with their spouse      Limitation of Functional Status as follows:   ADLs/IADLs:     - Feeding: Independent    - Bathing: difficulty reaching behind back and overhead    - Dressing/Grooming: putting on a shirt    - Driving: Independent     Leisure: reaching overhead and behind back, yard work,       Past Medical History/Physical Systems Review:   Anjel Sanders  has a past medical history of AR (allergic rhinitis), Gouty arthritis, colonic polyps, MURTAZA (obstructive sleep apnea), Other and unspecified hyperlipidemia, Stroke, and Unspecified essential hypertension.    Anjel Sanders  has a past surgical history that includes Foot surgery; surgery on jaw; Arthroscopic repair of rotator cuff of shoulder (Left, 4/30/2019); and Arthroscopy of shoulder with decompression of subacromial space (4/30/2019).    Anjel has a current medication list which includes the following prescription(s): allopurinol, amlodipine-benazepril, aspirin, atorvastatin, donepezil, gabapentin, hydrocodone-acetaminophen, and oxycodone-acetaminophen.    Review of patient's allergies indicates:  No Known Allergies       Objective     Sensation Test: Patient denies any numbness/tingling    Observation/Inspection: rounded shoulders    Range of Motion/Strength:   Shoulder  Left*   Right  Pain/Dysfunction with Movement    AROM PROM MMT AROM PROM MMT    Flexion   150 5/5    Extension NT NT NT 55 NT 5/5    Abduction   145 5/5    HorizAdduction NT NT NT 30 NT 5/5    Internal rotation NT 55 NT L4 60 5/5    ER at 90° abd NT 25 NT 70 80 5/5    ER at 0° abd NT 40 NT 50 60 5/5    NT=Not tested  ROM Comments: Firm end feel, Pain at end range{    Painful Arc: none noted    Tenderness upon Palpation:      Positive: Bicipital Groove and Supraspinatus Region    Special Tests: deferred    Scapular Control/Dyskinesis:     Normal / Subtle / Obvious  Comments    Left  Obvious -    Right  Subtle -       CMS Impairment/Limitation/Restriction for FOTO Shoulder Survey    Therapist reviewed FOTO scores for Anjel Sanders on 5/23/2019.   FOTO documents entered into [employer] - see Media section.    Limitation Score: 60%  Category: Self Care    Current : CL = least 60% but < 80% impaired, limited or restricted  Goal: CJ = at least 20% but < 40% impaired, limited or restricted         Treatment     Treatment Time In: 850  Treatment Time Out: 900  Total Treatment time separate from Evaluation time:10    Anjel received therapeutic exercises for 10 minutes including:  - Shoulder flexion with the wand 5 repetitions, Sidelying Abduction 5 repetitions, Sidelying External Rotation 5 repetitions, Standing Dowel Abduction 5 repetitions, shoulder shrugs, scapular retraction    Home Exercise Program/Education:  Issued HEP (see patient instructions in EMR) and educated on modality use for pain management . Exercises were reviewed and Anjel was able to demonstrate them prior to the end of the session.   Pt received a written copy of exercises to perform at home. Anjel demonstrated good  understanding of the education provided.  Pt was advised to perform these exercises free of pain, and to stop performing them if pain occurs.    Patient/Family Education: role of OT, goals for OT, scheduling/cancellations - pt verbalized understanding. Discussed insurance limitations with patient.    Assessment     Anjel Sanders is a 63 y.o. male referred to outpatient occupational therapy and presents with a medical diagnosis of Left shoulder pain, resulting in Decreased ROM, Decreased muscle strength, Increased pain and Joint Stiffness and demonstrates limitations as described in the chart below. Following medical record review it is determined that pt will benefit from occupational therapy services in order to maximize pain free and/or functional use of left shoulder.  The following goals were discussed with the patient and patient is in agreement with them as to be addressed in the treatment plan. The patient's rehab potential is Good.     Anticipated barriers to occupational therapy: none  Pt has no cultural, educational or language barriers to learning provided.    Profile and History Assessment of Occupational Performance Level of Clinical Decision Making Complexity Score   Occupational Profile:   Anjel Sanders is a 63 y.o. male who lives with their spouse and is currently retired. Anjel Sanders has difficulty with  grooming and dressing  housework/household chores and yard work, lifting, carrying, pushing, pulling.  affecting his/her daily functional abilities. His/her main goal for therapy is to decrease pain and increase functional use.     Comorbidities:   gout, history of CVA and HTN    Medical and Therapy History Review:   Expanded               Performance Deficits    Physical:  Joint Mobility  Joint Stability  Muscle Power/Strength  Muscle Endurance  Muscle Tone  Postural Control  Pain    Cognitive:  No Deficits    Psychosocial:    No Deficits     Clinical Decision Making:  low    Assessment Process:  Problem-Focused Assessments    Modification/Need for Assistance:  Not Necessary    Intervention Selection:  Several Treatment Options       low  Based on PMHX, co morbidities , data from assessments and functional level of assistance required with task and clinical presentation directly impacting function.       The following goals were discussed with the patient and patient is in agreement with them as to be addressed in the treatment plan.     Goals:     Short Term Goals to be met in 4 weeks: (4/22/2019)  1) Initiate Hep   2) Pt will increase Left shoulder PROM by 15 degrees grossly for improved performance with overhead ADL's  3) Pt will report 6/10 pain in (Left)shoulder at worst  4) Pt will progress to AROM and strengthening exercises  5) Patient will be able  to achieve less than or equal to 45% on FOTO shoulder survey demonstrating overall improved functional ability with upper extremity.     Long Term Goals to be met by discharge:  1) Independent with HEP  2) Pt will demonstrate (Left) shoulder AROM WFL grossly for [place] with ADL's  3) Pt will demonstrate (Left) shoulder MMT WFL grossly for [place] with functional activities  4) Independent and pain free with ADL's and IADL's  5) Patient will be able to achieve less than or equal to 25% on FOTO shoulder survey demonstrating overall improved functional ability with upper extremity.       Plan   Certification Period/Plan of care expiration: 5/23/2019 to 7/19/2019.    Outpatient Occupational Therapy 1 times weekly for 8 weeks to include the following interventions: Manual therapy/joint mobilizations, Modalities for pain management, Therapeutic exercises/activities., Strengthening, Joint Protection and Energy Conservation.      JUJU Barnhart

## 2019-05-30 ENCOUNTER — CLINICAL SUPPORT (OUTPATIENT)
Dept: REHABILITATION | Facility: HOSPITAL | Age: 64
End: 2019-05-30
Attending: ORTHOPAEDIC SURGERY
Payer: MEDICARE

## 2019-05-30 DIAGNOSIS — M25.60 STIFFNESS IN JOINT: ICD-10-CM

## 2019-05-30 DIAGNOSIS — M79.602 PAIN OF LEFT UPPER EXTREMITY: ICD-10-CM

## 2019-05-30 DIAGNOSIS — R53.1 WEAKNESS: ICD-10-CM

## 2019-05-30 PROCEDURE — 97140 MANUAL THERAPY 1/> REGIONS: CPT | Mod: HCNC,PN

## 2019-05-30 PROCEDURE — 97110 THERAPEUTIC EXERCISES: CPT | Mod: HCNC,PN

## 2019-05-30 NOTE — PROGRESS NOTES
"    Occupational Therapy Daily Treatment Note     Date: 5/30/2019  Name: Anjel Sanders  Clinic Number: [id number]    Therapy Diagnosis: No diagnosis found.  Physician: Bob Esparza Jr., *    Physician Orders: OT eval and treat  Medical Diagnosis: M75.122 (ICD-10-CM) - Complete tear of left rotator cuff  Evaluation Date: 5/23/2019  Insurance Authorization period Expiration: 12/31/2019  Plan of Care Expiration Period: 5/23/2019-7/19/2019  Next MD appointment:5/28/2019     Visit # / Visits Authorized: 2 / 50 post op  Time In:1110  Time Out: 1200  Total Billable Time: 50 minutes  MT1 TE2     Medicare: 1088.59     Precautions: Standard and per protocol    Subjective     Pt reports: "I've been doing the exercises a little bit I've just been busy though."  he was somewhat compliant with home exercise program given last session.   Response to previous treatment:decreased pain  Functional change: good endurance     Pain: 4/10  Location: left shoulder      Objective   Pt 4 weeks 2 days post op no sling    Anjel received the following manual therapy techniques for 10 minutes:   -consisting of patient supine for left shoulder lateral telescoping, upper trapezius soft tissue mobilization, pectoralis lift, subscapularis myofascial release and stretch, PROM with endrange stretching, glenohumeral joint inferior anterior posterior glides grade I-III, gentle shoulder oscillations    Anjel received therapeutic exercises for 40 minutes including:    Exercises        PROM (left) Shoulder Flexion/Abduction/Internal rotation/External Rotation 10x   Supine pectoralis stretch 3/30"   Supine dowel Flexion 0  2/15   Sidelying Abduction 0  2/15   Sidelying External Rotation 0  2/15       Sleeper Stretch NT   pulleys 3'   Wall slides towel  2/15   Theraband Lats yellow  2/15   Theraband Rows yellow  2/15   Dowel ER 2/15         Declined CP  Home Exercises and Education Provided     Education provided:   - Progress towards goals     Written " Home Exercises Provided: Patient instructed to cont prior HEP.  Exercises were reviewed and Anjel was able to demonstrate them prior to the end of the session.  Anjel demonstrated good  understanding of the HEP provided.   .   See EMR under Patient Instructions for exercises provided 5/23/2019       Assessment     Pt participated well today. He was running 10 minutes late for today's session. He also reports noncompliance with HEP. He has soft tissue restrictions in subscapularis and pectoralis however responded well to manual therapy. Pt required moderate cueing for technique provided by therapist with exercises. Meeting post op landmarks.     Anjel is progressing well towards his goals and there are no updates to goals at this time. Pt prognosis is Good.     Pt will continue to benefit from skilled outpatient occupational therapy to address the deficits listed in the problem list on initial evaluation provide pt/family education and to maximize pt's level of independence in the home and community environment.     Anticipated barriers to occupational therapy: none    Pt's spiritual, cultural and educational needs considered and pt agreeable to plan of care and goals.    Goals:  Short Term Goals to be met in 4 weeks: (4/22/2019)  1) Initiate Hep-met, ongoing  2) Pt will increase Left shoulder PROM by 15 degrees grossly for improved performance with overhead ADL's -Not met, progressing  3) Pt will report 6/10 pain in (Left)shoulder at worst -Not met, progressing  4) Pt will progress to AROM and strengthening exercises -Not met, progressing  5) Patient will be able to achieve less than or equal to 45% on FOTO shoulder survey demonstrating overall improved functional ability with upper extremity. -Not met, progressing     Long Term Goals to be met by discharge:  1) Independent with HEP -Not met, progressing  2) Pt will demonstrate (Left) shoulder AROM WFL grossly for [place] with ADL's -Not met, progressing  3) Pt  will demonstrate (Left) shoulder MMT WFL grossly for [place] with functional activities -Not met, progressing  4) Independent and pain free with ADL's and IADL's -Not met, progressing  5) Patient will be able to achieve less than or equal to 25% on FOTO shoulder survey demonstrating overall improved functional ability with upper extremity. -Not met, progressing    Plan   Continue with OT POC  Updates/Grading for next session: progress with ROM per protocol      JUJU Barnhart

## 2019-06-03 ENCOUNTER — CLINICAL SUPPORT (OUTPATIENT)
Dept: REHABILITATION | Facility: HOSPITAL | Age: 64
End: 2019-06-03
Attending: ORTHOPAEDIC SURGERY
Payer: MEDICARE

## 2019-06-03 DIAGNOSIS — M79.602 PAIN OF LEFT UPPER EXTREMITY: ICD-10-CM

## 2019-06-03 DIAGNOSIS — M25.60 STIFFNESS IN JOINT: ICD-10-CM

## 2019-06-03 DIAGNOSIS — R53.1 WEAKNESS: ICD-10-CM

## 2019-06-03 PROCEDURE — 97110 THERAPEUTIC EXERCISES: CPT | Mod: HCNC,PN

## 2019-06-03 PROCEDURE — 97140 MANUAL THERAPY 1/> REGIONS: CPT | Mod: HCNC,PN

## 2019-06-03 NOTE — PROGRESS NOTES
"    Occupational Therapy Daily Treatment Note     Date: 6/3/2019  Name: Anjel Sanders  Clinic Number: [id number]    Therapy Diagnosis:   Encounter Diagnoses   Name Primary?    Pain of left upper extremity     Weakness     Stiffness in joint      Physician: Bob Esparza Jr., *    Physician Orders: OT eval and treat  Medical Diagnosis: M75.122 (ICD-10-CM) - Complete tear of left rotator cuff  Evaluation Date: 5/23/2019  Insurance Authorization period Expiration: 12/31/2019  Plan of Care Expiration Period: 5/23/2019-7/19/2019  Next MD appointment:5/28/2019     Visit # / Visits Authorized: 3 / 50 post op  Time In: 11:05 am  Time Out: 11:50 am  Total Treatment Time: 45 minutes  Total Timed minutes: 30  MT1 TE1     Medicare: 1146.37     Precautions: Standard and per protocol    Subjective     Pt reports: "I do the exercises about 1x/week. And I don't really have time to wear the sling. I care for my sick wife, so I do the cooking and cleaning."  he was somewhat compliant with home exercise program given last session.   Response to previous treatment:decreased pain  Functional change: good endurance     Pain: 4/10  Location: left shoulder      Objective   Pt 4 weeks 6 days post op no sling    Anjel received the following manual therapy techniques for 10 minutes:   -consisting of patient supine for left shoulder lateral telescoping, upper trapezius soft tissue mobilization, pectoralis lift, subscapularis myofascial release and stretch, PROM with endrange stretching, glenohumeral joint inferior anterior posterior glides grade I-III, gentle shoulder oscillations    Anjel received therapeutic exercises for 35 minutes including: (20 min 1:1)    Exercises        PROM (left) Shoulder Flexion/Abduction/Internal rotation/External Rotation 10x   Supine pectoralis stretch 3/30"   Supine dowel Flexion 0  2/15   Sidelying Abduction 0  2/15   Sidelying External Rotation 0  2/15       Sleeper Stretch NT   pulleys 3'   Wall slides " towel  2/15   Theraband Lats yellow  2/15   Theraband Rows yellow  2/15   Dowel ER 2/15         Declined CP  Home Exercises and Education Provided     Education provided:   - Progress towards goals     Written Home Exercises Provided: Patient instructed to cont prior HEP.  Exercises were reviewed and Anjel was able to demonstrate them prior to the end of the session.  Anjel demonstrated good  understanding of the HEP provided.   .   See EMR under Patient Instructions for exercises provided 5/23/2019       Assessment     Pt participated well today.  Pt  Cont to report noncompliance with HEP.  Reviewed again importance of HEP and precautions. Pt cont to have some soft tissue restrictions in pectoralis however responded well again to manual therapy. Pt required min cueing for technique provided by therapist with exercises. Meeting post op landmarks. Concerned pt may be doing too much at home. Pt reported he cooks, cleans, and may be going to mow the lawn. Reviewed precautions and no heavy lifting. Pt verbalized understanding of education, but may need reinforcement.     Anjel is progressing well towards his goals and there are no updates to goals at this time. Pt prognosis is Good.     Pt will continue to benefit from skilled outpatient occupational therapy to address the deficits listed in the problem list on initial evaluation provide pt/family education and to maximize pt's level of independence in the home and community environment.     Anticipated barriers to occupational therapy: none    Pt's spiritual, cultural and educational needs considered and pt agreeable to plan of care and goals.    Goals:  Short Term Goals to be met in 4 weeks: (4/22/2019)  1) Initiate Hep-met, ongoing  2) Pt will increase Left shoulder PROM by 15 degrees grossly for improved performance with overhead ADL's -Not met, progressing  3) Pt will report 6/10 pain in (Left)shoulder at worst -Not met, progressing  4) Pt will progress to AROM and  strengthening exercises -Not met, progressing  5) Patient will be able to achieve less than or equal to 45% on FOTO shoulder survey demonstrating overall improved functional ability with upper extremity. -Not met, progressing     Long Term Goals to be met by discharge:  1) Independent with HEP -Not met, progressing  2) Pt will demonstrate (Left) shoulder AROM WFL grossly for [place] with ADL's -Not met, progressing  3) Pt will demonstrate (Left) shoulder MMT WFL grossly for [place] with functional activities -Not met, progressing  4) Independent and pain free with ADL's and IADL's -Not met, progressing  5) Patient will be able to achieve less than or equal to 25% on FOTO shoulder survey demonstrating overall improved functional ability with upper extremity. -Not met, progressing    Plan   Continue with OT POC  Updates/Grading for next session: progress with ROM per protocol      Stacy Almeida, OT

## 2019-06-10 ENCOUNTER — OFFICE VISIT (OUTPATIENT)
Dept: ORTHOPEDICS | Facility: CLINIC | Age: 64
End: 2019-06-10
Payer: MEDICARE

## 2019-06-10 VITALS — WEIGHT: 227 LBS | BODY MASS INDEX: 32.5 KG/M2 | HEIGHT: 70 IN

## 2019-06-10 DIAGNOSIS — M75.122 COMPLETE TEAR OF LEFT ROTATOR CUFF: Primary | ICD-10-CM

## 2019-06-10 PROCEDURE — 99024 POSTOP FOLLOW-UP VISIT: CPT | Mod: HCNC,S$GLB,, | Performed by: ORTHOPAEDIC SURGERY

## 2019-06-10 PROCEDURE — 99024 PR POST-OP FOLLOW-UP VISIT: ICD-10-PCS | Mod: HCNC,S$GLB,, | Performed by: ORTHOPAEDIC SURGERY

## 2019-06-10 PROCEDURE — 99999 PR PBB SHADOW E&M-EST. PATIENT-LVL III: CPT | Mod: PBBFAC,HCNC,, | Performed by: ORTHOPAEDIC SURGERY

## 2019-06-10 PROCEDURE — 99999 PR PBB SHADOW E&M-EST. PATIENT-LVL III: ICD-10-PCS | Mod: PBBFAC,HCNC,, | Performed by: ORTHOPAEDIC SURGERY

## 2019-06-10 NOTE — PROGRESS NOTES
Subjective:      Patient ID: Anjel Sanders is a 63 y.o. male.  Chief Complaint: Pain, Post-op Evaluation, and Follow-up of the Left Shoulder      HPI  Anjel Sanders is a  63 y.o. male presenting today for post op visit.  He is s/p rotator cuff repair left shoulder about 6 weeks postop doing well currently in therapy.     Review of patient's allergies indicates:  No Known Allergies      Current Outpatient Medications   Medication Sig Dispense Refill    allopurinol (ZYLOPRIM) 300 MG tablet Take 1 tablet (300 mg total) by mouth once daily. 90 tablet 3    amlodipine-benazepril (LOTREL) 10-40 mg per capsule Take 1 capsule by mouth once daily. 90 capsule 3    aspirin (ECOTRIN) 81 MG EC tablet Take 81 mg by mouth once daily.      atorvastatin (LIPITOR) 80 MG tablet Take 1 tablet (80 mg total) by mouth once daily. 90 tablet 4    donepezil (ARICEPT) 5 MG tablet Take 1 tablet (5 mg total) by mouth every evening. 30 tablet 11    gabapentin (NEURONTIN) 300 MG capsule TAKE 1 CAPSULE (300 MG TOTAL) BY MOUTH EVERY EVENING. 90 capsule 2    oxyCODONE-acetaminophen (PERCOCET) 7.5-325 mg per tablet Take 1 tablet by mouth every 4 (four) hours as needed for Pain. 40 tablet 0     No current facility-administered medications for this visit.        Past Medical History:   Diagnosis Date    AR (allergic rhinitis)     Gouty arthritis     Hx of colonic polyps     Tubular Adenoma    MURTAZA (obstructive sleep apnea)     Other and unspecified hyperlipidemia     Stroke     Unspecified essential hypertension        Past Surgical History:   Procedure Laterality Date    ARTHROSCOPY, SHOULDER, WITH SUBACROMIAL SPACE DECOMPRESSION  4/30/2019    Performed by Bob Esparza Jr., [facility] at [facility] OR    COLONOSCOPY N/A 6/28/2013    Performed [facility] Thomas [facility]il [facility] at [facility] ENDO (4TH FLR)    FOOT SURGERY      REPAIR, ROTATOR CUFF, ARTHROSCOPIC Left 4/30/2019    Performed by Bob Esparza Jr., MD at [facility] OR    surgery on jaw    "      OBJECTIVE:   PHYSICAL EXAM:  Height: 5' 10" (177.8 cm) Weight: 103 kg (227 lb)  Vitals:    06/10/19 1405   Weight: 103 kg (227 lb)   Height: 5' 10" (1.778 m)   PainSc:   5   PainLoc: Shoulder     Ortho/SPM Exam  Examination left shoulder incisions well-healed no significant swelling or tenderness range of motion improved passively still limited active elevation and some weakness of the rotator cuff noted    RADIOGRAPHS:  None  Comments: I have personally reviewed the imaging and I agree with the above radiologist's report.    ASSESSMENT/PLAN:     IMPRESSION:  Status post repair rotator cuff left shoulder    PLAN:  Continue therapy increase activities as tolerated no heavy lifting    FOLLOW UP:  4 weeks    Disclaimer: This note has been generated using voice-recognition software. There may be typographical errors that have been missed during proof-reading.  "

## 2019-06-11 ENCOUNTER — TELEPHONE (OUTPATIENT)
Dept: REHABILITATION | Facility: HOSPITAL | Age: 64
End: 2019-06-11

## 2019-06-11 DIAGNOSIS — M79.602 PAIN OF LEFT UPPER EXTREMITY: ICD-10-CM

## 2019-06-11 DIAGNOSIS — M25.60 STIFFNESS IN JOINT: ICD-10-CM

## 2019-06-11 DIAGNOSIS — R53.1 WEAKNESS: ICD-10-CM

## 2019-06-18 ENCOUNTER — CLINICAL SUPPORT (OUTPATIENT)
Dept: REHABILITATION | Facility: HOSPITAL | Age: 64
End: 2019-06-18
Attending: ORTHOPAEDIC SURGERY
Payer: MEDICARE

## 2019-06-18 DIAGNOSIS — M25.60 STIFFNESS IN JOINT: ICD-10-CM

## 2019-06-18 DIAGNOSIS — M79.602 PAIN OF LEFT UPPER EXTREMITY: ICD-10-CM

## 2019-06-18 DIAGNOSIS — R53.1 WEAKNESS: ICD-10-CM

## 2019-06-18 PROCEDURE — 97110 THERAPEUTIC EXERCISES: CPT | Mod: HCNC,PN

## 2019-06-18 PROCEDURE — 97140 MANUAL THERAPY 1/> REGIONS: CPT | Mod: HCNC,PN

## 2019-06-18 NOTE — PROGRESS NOTES
"    Occupational Therapy Daily Treatment Note     Date: 6/18/2019  Name: Anjel Sanders  Clinic Number: [id number]    Therapy Diagnosis:   Encounter Diagnoses   Name Primary?    Pain of left upper extremity     Weakness     Stiffness in joint      Physician: Bob Esparza Jr., *    Physician Orders: OT eval and treat  Medical Diagnosis: M75.122 (ICD-10-CM) - Complete tear of left rotator cuff  Evaluation Date: 5/23/2019  Insurance Authorization period Expiration: 12/31/2019  Plan of Care Expiration Period: 5/23/2019-7/19/2019  Next MD appointment:7/11/2019     Visit # / Visits Authorized: 4 / 50 post op  Time In: 945 am  Time Out: 1035 am  Total Treatment Time: 40 minutes  Total Timed minutes: 40  MT1 TE2     Medicare: 1234.47     Precautions: Standard and per protocol    Subjective     Pt reports: "I was out of town and then I forgot about my appointment."  he was somewhat compliant with home exercise program given last session.   Response to previous treatment:decreased pain  Functional change: good endurance     Pain: 0/10 @rest and 5-6/10 with use  Location: left shoulder      Objective   Pt 7 weeks post op no sling    Anjel received the following manual therapy techniques for 10 minutes:   -consisting of patient supine for left shoulder lateral telescoping, upper trapezius soft tissue mobilization, pectoralis lift, subscapularis myofascial release and stretch, PROM with endrange stretching, glenohumeral joint inferior anterior posterior glides grade I-III, gentle shoulder oscillations    Anjel received therapeutic exercises for 30 minutes including:     Exercises        PROM (left) Shoulder Flexion/Abduction/Internal rotation/External Rotation 10x   Supine pectoralis stretch 3/30"   Supine dowel Flexion 1#  2/15   Sidelying Abduction 0  2/15   Sidelying External Rotation 0  2/15       Sleeper Stretch 3/30"   pulleys 3'   Wall slides towel  2/15   Theraband Lats Red  2/15   Theraband Rows Red  2/15   Dowel " "ER 2/15   Corner stretch 3/30"   IR pulley stretch 3/30"     Range of Motion/Strength:   Shoulder   Left     AROM PROM   Flexion 95 125(+15)   Extension 60 NT   Abduction 90 120(+20)   HorizAdduction 25 NT   Internal rotation S2 65(+10)   ER at 90° abd 40 55(+30)   ER at 0° abd 40 50(+10)   NT=Not tested    Declined CP  Home Exercises and Education Provided     Education provided:   - Progress towards goals     Written Home Exercises Provided: Patient instructed to cont prior HEP.  Exercises were reviewed and Anjel was able to demonstrate them prior to the end of the session.  Anjel demonstrated good  understanding of the HEP provided.   .   See EMR under Patient Instructions for exercises provided 5/23/2019       Assessment   Pt presents to clinic today 7 weeks post op. He has not attended therapy since 6/3/2018 secondary to being out of town and forgetting his appt's. Despite that his ROM has improved some since initial evaluation. He was able to progress with exercises without c/o pain just fatigue. Pt continues to have soft tissue and capsular stiffness-responds fairly to manual therapy. Steadily meeting landmarks.     Anjel is progressing well towards his goals and there are no updates to goals at this time. Pt prognosis is Good.     Pt will continue to benefit from skilled outpatient occupational therapy to address the deficits listed in the problem list on initial evaluation provide pt/family education and to maximize pt's level of independence in the home and community environment.     Anticipated barriers to occupational therapy: none    Pt's spiritual, cultural and educational needs considered and pt agreeable to plan of care and goals.    Goals:  Short Term Goals to be met in 4 weeks: (4/22/2019)  1) Initiate Hep-met, ongoing  2) Pt will increase Left shoulder PROM by 15 degrees grossly for improved performance with overhead ADL's -Not met, progressing  3) Pt will report 6/10 pain in (Left)shoulder at " worst -Not met, progressing  4) Pt will progress to AROM and strengthening exercises -Not met, progressing  5) Patient will be able to achieve less than or equal to 45% on FOTO shoulder survey demonstrating overall improved functional ability with upper extremity. -Not met, progressing     Long Term Goals to be met by discharge:  1) Independent with HEP -Not met, progressing  2) Pt will demonstrate (Left) shoulder AROM WFL grossly for [place] with ADL's -Not met, progressing  3) Pt will demonstrate (Left) shoulder MMT WFL grossly for [place] with functional activities -Not met, progressing  4) Independent and pain free with ADL's and IADL's -Not met, progressing  5) Patient will be able to achieve less than or equal to 25% on FOTO shoulder survey demonstrating overall improved functional ability with upper extremity. -Not met, progressing    Plan   Continue with OT POC  Updates/Grading for next session: progress with ROM per protocol      JUJU Barnhart

## 2019-06-25 ENCOUNTER — CLINICAL SUPPORT (OUTPATIENT)
Dept: REHABILITATION | Facility: HOSPITAL | Age: 64
End: 2019-06-25
Attending: ORTHOPAEDIC SURGERY
Payer: MEDICARE

## 2019-06-25 DIAGNOSIS — M25.60 STIFFNESS IN JOINT: ICD-10-CM

## 2019-06-25 DIAGNOSIS — R53.1 WEAKNESS: ICD-10-CM

## 2019-06-25 DIAGNOSIS — M79.602 PAIN OF LEFT UPPER EXTREMITY: ICD-10-CM

## 2019-06-25 PROCEDURE — 97140 MANUAL THERAPY 1/> REGIONS: CPT | Mod: HCNC,PN

## 2019-06-25 PROCEDURE — 97110 THERAPEUTIC EXERCISES: CPT | Mod: HCNC,PN

## 2019-06-25 NOTE — PROGRESS NOTES
"    Occupational Therapy Daily Treatment Note     Date: 6/25/2019  Name: Anjel Sanders  Clinic Number: [id number]    Therapy Diagnosis:   Encounter Diagnoses   Name Primary?    Pain of left upper extremity     Weakness     Stiffness in joint      Physician: Bob Esparza Jr., *    Physician Orders: OT eval and treat  Medical Diagnosis: M75.122 (ICD-10-CM) - Complete tear of left rotator cuff  Evaluation Date: 5/23/2019  Insurance Authorization period Expiration: 12/31/2019  Plan of Care Expiration Period: 5/23/2019-7/19/2019  Next MD appointment:7/11/2019     Visit # / Visits Authorized: 5 / 50 post op FOTO:38%  Time In: 947 am  Time Out: 1040 am  Total Treatment Time: 53 minutes  Total Timed minutes: 53  MT1 TE3     Medicare: 1354.47     Precautions: Standard and per protocol    Subjective     Pt reports: "I'm okay today."  he was somewhat compliant with home exercise program given 6/18/2019  Response to previous treatment:decreased pain  Functional change: good endurance     Pain: 0/10 @rest and 2-3/10 with use  Location: left shoulder      Objective   Pt 8 weeks post op no sling    Anjel received the following manual therapy techniques for 10 minutes:   -consisting of patient supine for left shoulder lateral telescoping, upper trapezius soft tissue mobilization, pectoralis lift, subscapularis myofascial release and stretch, PROM with endrange stretching, glenohumeral joint inferior anterior posterior glides grade I-III, gentle shoulder oscillations    Anjel received therapeutic exercises for 43 minutes including:     Exercises        PROM (left) Shoulder Flexion/Abduction/Internal rotation/External Rotation 10x   Supine pectoralis stretch/Butterfly stretch 3/30"   Supine dowel Flexion 2#  2/15   Sidelying Abduction 1  2/15   Sidelying External Rotation 1  2/15       Sleeper Stretch 3/30"   pulleys 3'   Wall slides towel  2/15   Theraband Lats/Rows Green  2/15   Theraband IR/ER Green  2/15       Corner " "stretch 3/30"   IR pulley stretch 3/30"     Declined CP  Home Exercises and Education Provided     Education provided:   - Progress towards goals     Written Home Exercises Provided: Patient instructed to cont prior HEP.  Exercises were reviewed and Anjel was able to demonstrate them prior to the end of the session.  Anjel demonstrated good  understanding of the HEP provided.    See EMR under Patient Instructions for exercises provided 6/18/2019     Assessment   Pt presents to clinic today 8 weeks post op. He continues to have end range stiffness however steadily improving. Able to increase weight and resistance today without c/o. Still functionally limited by left shoulder.   Anjel is progressing well towards his goals and there are no updates to goals at this time. Pt prognosis is Good.     Pt will continue to benefit from skilled outpatient occupational therapy to address the deficits listed in the problem list on initial evaluation provide pt/family education and to maximize pt's level of independence in the home and community environment.     Anticipated barriers to occupational therapy: none    Pt's spiritual, cultural and educational needs considered and pt agreeable to plan of care and goals.    Goals:  Short Term Goals to be met in 4 weeks: (4/22/2019)  1) Initiate Hep-met, ongoing  2) Pt will increase Left shoulder PROM by 15 degrees grossly for improved performance with overhead ADL's -met  3) Pt will report 6/10 pain in (Left)shoulder at worst -met  4) Pt will progress to AROM and strengthening exercises -met  5) Patient will be able to achieve less than or equal to 45% on FOTO shoulder survey demonstrating overall improved functional ability with upper extremity. -met     Long Term Goals to be met by discharge:  1) Independent with HEP -Not met, progressing  2) Pt will demonstrate (Left) shoulder AROM WFL grossly for [place] with ADL's -Not met, progressing  3) Pt will demonstrate (Left) shoulder " MMT WFL grossly for [place] with functional activities -Not met, progressing  4) Independent and pain free with ADL's and IADL's -Not met, progressing  5) Patient will be able to achieve less than or equal to 25% on FOTO shoulder survey demonstrating overall improved functional ability with upper extremity. -Not met, progressing    Plan   Continue with OT POC  Updates/Grading for next session: progress with ROM per protocol      JUJU Barnhart

## 2019-07-12 ENCOUNTER — OFFICE VISIT (OUTPATIENT)
Dept: FAMILY MEDICINE | Facility: CLINIC | Age: 64
End: 2019-07-12
Attending: FAMILY MEDICINE
Payer: MEDICARE

## 2019-07-12 ENCOUNTER — LAB VISIT (OUTPATIENT)
Dept: LAB | Facility: HOSPITAL | Age: 64
End: 2019-07-12
Attending: FAMILY MEDICINE
Payer: MEDICARE

## 2019-07-12 VITALS
DIASTOLIC BLOOD PRESSURE: 70 MMHG | HEIGHT: 70 IN | BODY MASS INDEX: 32.16 KG/M2 | WEIGHT: 224.63 LBS | HEART RATE: 65 BPM | SYSTOLIC BLOOD PRESSURE: 126 MMHG | OXYGEN SATURATION: 98 %

## 2019-07-12 DIAGNOSIS — E11.69 DYSLIPIDEMIA ASSOCIATED WITH TYPE 2 DIABETES MELLITUS: ICD-10-CM

## 2019-07-12 DIAGNOSIS — E78.5 DYSLIPIDEMIA ASSOCIATED WITH TYPE 2 DIABETES MELLITUS: ICD-10-CM

## 2019-07-12 DIAGNOSIS — M10.9 GOUTY ARTHRITIS: ICD-10-CM

## 2019-07-12 DIAGNOSIS — E66.9 OBESITY (BMI 30-39.9): ICD-10-CM

## 2019-07-12 DIAGNOSIS — I15.2 HYPERTENSION COMPLICATING DIABETES: ICD-10-CM

## 2019-07-12 DIAGNOSIS — E78.5 DYSLIPIDEMIA ASSOCIATED WITH TYPE 2 DIABETES MELLITUS: Primary | ICD-10-CM

## 2019-07-12 DIAGNOSIS — E11.59 HYPERTENSION COMPLICATING DIABETES: ICD-10-CM

## 2019-07-12 DIAGNOSIS — I10 ESSENTIAL HYPERTENSION: ICD-10-CM

## 2019-07-12 DIAGNOSIS — E11.69 DYSLIPIDEMIA ASSOCIATED WITH TYPE 2 DIABETES MELLITUS: Primary | ICD-10-CM

## 2019-07-12 DIAGNOSIS — E11.9 CONTROLLED TYPE 2 DIABETES MELLITUS WITHOUT COMPLICATION, WITHOUT LONG-TERM CURRENT USE OF INSULIN: ICD-10-CM

## 2019-07-12 DIAGNOSIS — G47.33 OSA (OBSTRUCTIVE SLEEP APNEA): ICD-10-CM

## 2019-07-12 DIAGNOSIS — E78.5 HYPERLIPIDEMIA, UNSPECIFIED HYPERLIPIDEMIA TYPE: ICD-10-CM

## 2019-07-12 DIAGNOSIS — I69.351 HEMIPARESIS AFFECTING RIGHT SIDE AS LATE EFFECT OF CEREBROVASCULAR ACCIDENT: ICD-10-CM

## 2019-07-12 PROBLEM — E66.01 MORBID OBESITY: Status: RESOLVED | Noted: 2019-02-26 | Resolved: 2019-07-12

## 2019-07-12 PROBLEM — E66.01 SEVERE OBESITY (BMI 35.0-39.9) WITH COMORBIDITY: Status: RESOLVED | Noted: 2019-02-26 | Resolved: 2019-07-12

## 2019-07-12 LAB
ALBUMIN SERPL BCP-MCNC: 4.2 G/DL (ref 3.5–5.2)
ALP SERPL-CCNC: 106 U/L (ref 55–135)
ALT SERPL W/O P-5'-P-CCNC: 27 U/L (ref 10–44)
ANION GAP SERPL CALC-SCNC: 9 MMOL/L (ref 8–16)
AST SERPL-CCNC: 22 U/L (ref 10–40)
BILIRUB SERPL-MCNC: 1 MG/DL (ref 0.1–1)
BUN SERPL-MCNC: 15 MG/DL (ref 8–23)
CALCIUM SERPL-MCNC: 9.9 MG/DL (ref 8.7–10.5)
CHLORIDE SERPL-SCNC: 105 MMOL/L (ref 95–110)
CK SERPL-CCNC: 765 U/L (ref 20–200)
CO2 SERPL-SCNC: 28 MMOL/L (ref 23–29)
CREAT SERPL-MCNC: 1.3 MG/DL (ref 0.5–1.4)
EST. GFR  (AFRICAN AMERICAN): >60 ML/MIN/1.73 M^2
EST. GFR  (NON AFRICAN AMERICAN): 58 ML/MIN/1.73 M^2
ESTIMATED AVG GLUCOSE: 128 MG/DL (ref 68–131)
GLUCOSE SERPL-MCNC: 104 MG/DL (ref 70–110)
HBA1C MFR BLD HPLC: 6.1 % (ref 4–5.6)
POTASSIUM SERPL-SCNC: 4 MMOL/L (ref 3.5–5.1)
PROT SERPL-MCNC: 7.7 G/DL (ref 6–8.4)
SODIUM SERPL-SCNC: 142 MMOL/L (ref 136–145)

## 2019-07-12 PROCEDURE — 3044F PR MOST RECENT HEMOGLOBIN A1C LEVEL <7.0%: ICD-10-PCS | Mod: HCNC,CPTII,S$GLB, | Performed by: FAMILY MEDICINE

## 2019-07-12 PROCEDURE — 99999 PR PBB SHADOW E&M-EST. PATIENT-LVL III: CPT | Mod: PBBFAC,HCNC,, | Performed by: FAMILY MEDICINE

## 2019-07-12 PROCEDURE — 3074F SYST BP LT 130 MM HG: CPT | Mod: HCNC,CPTII,S$GLB, | Performed by: FAMILY MEDICINE

## 2019-07-12 PROCEDURE — 36415 COLL VENOUS BLD VENIPUNCTURE: CPT | Mod: HCNC

## 2019-07-12 PROCEDURE — 80053 COMPREHEN METABOLIC PANEL: CPT | Mod: HCNC

## 2019-07-12 PROCEDURE — 99999 PR PBB SHADOW E&M-EST. PATIENT-LVL III: ICD-10-PCS | Mod: PBBFAC,HCNC,, | Performed by: FAMILY MEDICINE

## 2019-07-12 PROCEDURE — 3074F PR MOST RECENT SYSTOLIC BLOOD PRESSURE < 130 MM HG: ICD-10-PCS | Mod: HCNC,CPTII,S$GLB, | Performed by: FAMILY MEDICINE

## 2019-07-12 PROCEDURE — 3008F BODY MASS INDEX DOCD: CPT | Mod: HCNC,CPTII,S$GLB, | Performed by: FAMILY MEDICINE

## 2019-07-12 PROCEDURE — 3044F HG A1C LEVEL LT 7.0%: CPT | Mod: HCNC,CPTII,S$GLB, | Performed by: FAMILY MEDICINE

## 2019-07-12 PROCEDURE — 99214 PR OFFICE/OUTPT VISIT, EST, LEVL IV, 30-39 MIN: ICD-10-PCS | Mod: HCNC,S$GLB,, | Performed by: FAMILY MEDICINE

## 2019-07-12 PROCEDURE — 3008F PR BODY MASS INDEX (BMI) DOCUMENTED: ICD-10-PCS | Mod: HCNC,CPTII,S$GLB, | Performed by: FAMILY MEDICINE

## 2019-07-12 PROCEDURE — 3078F DIAST BP <80 MM HG: CPT | Mod: HCNC,CPTII,S$GLB, | Performed by: FAMILY MEDICINE

## 2019-07-12 PROCEDURE — 83036 HEMOGLOBIN GLYCOSYLATED A1C: CPT | Mod: HCNC

## 2019-07-12 PROCEDURE — 99214 OFFICE O/P EST MOD 30 MIN: CPT | Mod: HCNC,S$GLB,, | Performed by: FAMILY MEDICINE

## 2019-07-12 PROCEDURE — 82550 ASSAY OF CK (CPK): CPT | Mod: HCNC

## 2019-07-12 PROCEDURE — 3078F PR MOST RECENT DIASTOLIC BLOOD PRESSURE < 80 MM HG: ICD-10-PCS | Mod: HCNC,CPTII,S$GLB, | Performed by: FAMILY MEDICINE

## 2019-07-12 RX ORDER — AMLODIPINE AND BENAZEPRIL HYDROCHLORIDE 10; 40 MG/1; MG/1
1 CAPSULE ORAL DAILY
Qty: 90 CAPSULE | Refills: 3 | Status: SHIPPED | OUTPATIENT
Start: 2019-07-12 | End: 2020-05-20 | Stop reason: SDUPTHER

## 2019-07-12 RX ORDER — ALLOPURINOL 300 MG/1
300 TABLET ORAL DAILY
Qty: 90 TABLET | Refills: 3 | Status: SHIPPED | OUTPATIENT
Start: 2019-07-12 | End: 2019-09-26 | Stop reason: SDUPTHER

## 2019-07-12 RX ORDER — ATORVASTATIN CALCIUM 80 MG/1
80 TABLET, FILM COATED ORAL DAILY
Qty: 90 TABLET | Refills: 4 | Status: SHIPPED | OUTPATIENT
Start: 2019-07-12 | End: 2020-05-20 | Stop reason: SDUPTHER

## 2019-07-12 NOTE — PROGRESS NOTES
Subjective:       Patient ID: Anjel Sanders is a 63 y.o. male.    Chief Complaint: Follow-up (shingles)    63 yr old black male with Hypertension, DM II, Hyperlipidemia, gout, ED, MURTAZA, presents today for 6 month follow up. C/o leg cramps.    DM II - diet controlled  -A1C                   6.4 (H)             02/26/2019                 - no s/s of DM - foot exam UTD - eye exam due - on ASA and ACE    HTN - controlled - on prinzide - no side effects - does not need refills    HLD - uncontrolled - on statin -LDLCALC                  79.0                02/26/2019                             - on meds - need labs    ED - controlled - need cialis -     Gout - controlled    -  on allopurinol    Health maintenance  -UTD except labs    Hypertension   This is a chronic problem. The current episode started more than 1 year ago. The problem has been gradually improving since onset. The problem is controlled. Pertinent negatives include no anxiety, blurred vision, chest pain, headaches, malaise/fatigue, neck pain, orthopnea, palpitations, peripheral edema, PND, shortness of breath or sweats. There are no associated agents to hypertension. Risk factors for coronary artery disease include dyslipidemia, obesity and male gender. Past treatments include ACE inhibitors and diuretics. The current treatment provides moderate improvement. There are no compliance problems.  There is no history of angina, CAD/MI, CVA, heart failure, left ventricular hypertrophy or retinopathy. There is no history of chronic renal disease, coarctation of the aorta, hypercortisolism, pheochromocytoma, renovascular disease or a thyroid problem.   Hyperlipidemia   This is a chronic problem. The current episode started more than 1 year ago. The problem is controlled. Recent lipid tests were reviewed and are normal. Exacerbating diseases include obesity. He has no history of chronic renal disease, diabetes, hypothyroidism or liver disease. There are no  known factors aggravating his hyperlipidemia. Associated symptoms include myalgias. Pertinent negatives include no chest pain, focal sensory loss, focal weakness, leg pain or shortness of breath. He is currently on no antihyperlipidemic treatment. The current treatment provides moderate improvement of lipids. There are no compliance problems.  Risk factors for coronary artery disease include dyslipidemia, hypertension, obesity and male sex.   Shoulder Injury    The left shoulder is affected. The incident occurred more than 1 week ago. The injury mechanism was a fall. The quality of the pain is described as cramping and shooting. The pain does not radiate. The pain is at a severity of 6/10. The pain is moderate. Pertinent negatives include no chest pain. The symptoms are aggravated by movement, overhead lifting and palpation. He has tried NSAIDs for the symptoms. The treatment provided moderate relief.     Review of Systems   Constitutional: Negative.  Negative for activity change, diaphoresis, malaise/fatigue and unexpected weight change.   HENT: Negative.  Negative for congestion, ear pain, mouth sores, rhinorrhea and voice change.    Eyes: Negative.  Negative for blurred vision, pain, discharge and visual disturbance.   Respiratory: Negative.  Negative for apnea, cough, shortness of breath and wheezing.    Cardiovascular: Negative.  Negative for chest pain, palpitations, orthopnea and PND.   Gastrointestinal: Negative.  Negative for abdominal distention, anal bleeding, diarrhea and vomiting.   Endocrine: Negative.  Negative for cold intolerance and polyuria.   Genitourinary: Negative.  Negative for decreased urine volume, difficulty urinating, discharge, frequency and scrotal swelling.   Musculoskeletal: Positive for myalgias. Negative for back pain, neck pain and neck stiffness.   Skin: Negative.  Negative for color change and rash.   Allergic/Immunologic: Negative.  Negative for environmental allergies and  immunocompromised state.   Neurological: Negative.  Negative for dizziness, focal weakness, speech difficulty, weakness, light-headedness and headaches.   Hematological: Negative.    Psychiatric/Behavioral: Negative.  Negative for agitation, dysphoric mood and suicidal ideas. The patient is not nervous/anxious.        PMH/PSH/FH/SH/MED/ALLERGY reviewed    Objective:       Vitals:    07/12/19 0825   BP: 126/70   Pulse: 65       Physical Exam   Constitutional: He is oriented to person, place, and time. He appears well-developed and well-nourished. No distress.   HENT:   Head: Normocephalic and atraumatic.   Right Ear: External ear normal.   Left Ear: External ear normal.   Nose: Nose normal.   Mouth/Throat: Oropharynx is clear and moist. No oropharyngeal exudate.   Eyes: Pupils are equal, round, and reactive to light. Conjunctivae and EOM are normal. Right eye exhibits no discharge. Left eye exhibits no discharge. No scleral icterus.   Neck: Normal range of motion. Neck supple. No JVD present. No tracheal deviation present. No thyromegaly present.   Cardiovascular: Normal rate, regular rhythm and intact distal pulses. Exam reveals no gallop and no friction rub.   Murmur (3/6 ESM AORTIC AREA) heard.  Pulmonary/Chest: Effort normal and breath sounds normal. No stridor. No respiratory distress. He has no wheezes. He has no rales. He exhibits no tenderness.   Abdominal: Soft. Bowel sounds are normal. He exhibits no distension and no mass. There is no tenderness. There is no rebound and no guarding. No hernia.   Musculoskeletal: Normal range of motion. He exhibits tenderness (TTP LEFT SHOULDER WITH ROTATOR CUFF IMPINGEMENT). He exhibits no edema.   Lymphadenopathy:     He has no cervical adenopathy.   Neurological: He is alert and oriented to person, place, and time. He has normal reflexes. He displays normal reflexes. No cranial nerve deficit. He exhibits normal muscle tone. Coordination normal.   No deficits   Skin: Skin  is warm and dry. No rash noted. He is not diaphoretic. No erythema. No pallor.   Psychiatric: He has a normal mood and affect. His behavior is normal. Judgment and thought content normal.       Assessment:       1. Dyslipidemia associated with type 2 diabetes mellitus    2. Hemiparesis affecting right side as late effect of cerebrovascular accident    3. Hypertension complicating diabetes    4. Controlled type 2 diabetes mellitus without complication, without long-term current use of insulin    5. Obesity (BMI 30-39.9)    6. MURTAZA (obstructive sleep apnea)    7. Gouty arthritis    8. Essential hypertension    9. Hyperlipidemia, unspecified hyperlipidemia type        Plan:           Anjel was seen today for follow-up.    Diagnoses and all orders for this visit:    Dyslipidemia associated with type 2 diabetes mellitus  -     atorvastatin (LIPITOR) 80 MG tablet; Take 1 tablet (80 mg total) by mouth once daily.  -     Comprehensive metabolic panel; Future  -     CK; Future    Hemiparesis affecting right side as late effect of cerebrovascular accident  -     Comprehensive metabolic panel; Future    Hypertension complicating diabetes  -     amlodipine-benazepril (LOTREL) 10-40 mg per capsule; Take 1 capsule by mouth once daily.  -     Comprehensive metabolic panel; Future    Controlled type 2 diabetes mellitus without complication, without long-term current use of insulin  -     Comprehensive metabolic panel; Future  -     Hemoglobin A1c; Future    Obesity (BMI 30-39.9)    MURTAZA (obstructive sleep apnea)    Gouty arthritis  -     allopurinol (ZYLOPRIM) 300 MG tablet; Take 1 tablet (300 mg total) by mouth once daily.    Essential hypertension    Hyperlipidemia, unspecified hyperlipidemia type    HTN/HLD   -controlled - stable - on meds       HEART MURMUR  -ECHO reassurance      ED and Gout   -controlled     MURTAZA  -stable    CVA  -on ASA and plavix      DM II  -controlled  -diet and exercise only    Obesity  -diet and  exercise  -weight loss      40 minutes spent during this visit of which greater than 50% devoted to face-face counseling and coordination of care regarding diagnosis and management plan     Spent adequate time in obtaining history and explaining differentials       Follow up in about 6 months (around 1/12/2020), or if symptoms worsen or fail to improve.

## 2019-08-22 ENCOUNTER — OFFICE VISIT (OUTPATIENT)
Dept: OPTOMETRY | Facility: CLINIC | Age: 64
End: 2019-08-22
Payer: COMMERCIAL

## 2019-08-22 DIAGNOSIS — H52.03 HYPEROPIA WITH PRESBYOPIA OF BOTH EYES: ICD-10-CM

## 2019-08-22 DIAGNOSIS — H52.4 HYPEROPIA WITH PRESBYOPIA OF BOTH EYES: ICD-10-CM

## 2019-08-22 DIAGNOSIS — Z01.00 EYE EXAM, ROUTINE: Primary | ICD-10-CM

## 2019-08-22 LAB
LEFT EYE DM RETINOPATHY: NEGATIVE
RIGHT EYE DM RETINOPATHY: NEGATIVE

## 2019-08-22 PROCEDURE — 92015 DETERMINE REFRACTIVE STATE: CPT | Mod: S$GLB,,, | Performed by: OPTOMETRIST

## 2019-08-22 PROCEDURE — 92004 PR EYE EXAM, NEW PATIENT,COMPREHESV: ICD-10-PCS | Mod: S$GLB,,, | Performed by: OPTOMETRIST

## 2019-08-22 PROCEDURE — 92015 PR REFRACTION: ICD-10-PCS | Mod: S$GLB,,, | Performed by: OPTOMETRIST

## 2019-08-22 PROCEDURE — 92004 COMPRE OPH EXAM NEW PT 1/>: CPT | Mod: S$GLB,,, | Performed by: OPTOMETRIST

## 2019-08-22 PROCEDURE — 99999 PR PBB SHADOW E&M-EST. PATIENT-LVL II: CPT | Mod: PBBFAC,,, | Performed by: OPTOMETRIST

## 2019-08-22 PROCEDURE — 99999 PR PBB SHADOW E&M-EST. PATIENT-LVL II: ICD-10-PCS | Mod: PBBFAC,,, | Performed by: OPTOMETRIST

## 2019-08-22 NOTE — PROGRESS NOTES
HPI     Patient is here for annual diabetic eyemed vision exam.  Hemoglobin A1C       Date                     Value               Ref Range             Status                07/12/2019               6.1 (H)             4.0 - 5.6 %           Final                  02/26/2019               6.4 (H)             4.0 - 5.6 %           Final                  11/27/2018               6.3 (H)             4.0 - 5.6 %           Final                 Last edited by Yoan Pichardo, OD on 8/22/2019  9:31 AM. (History)            Assessment /Plan     For exam results, see Encounter Report.    Eye exam, routine  -No retinopathy noted today.  Continued control with primary care physician and annual comprehensive eye exam.  -Eyemed    Hyperopia with presbyopia of both eyes  Eyeglass Final Rx     Eyeglass Final Rx       Sphere Cylinder Dist VA Add    Right +1.25 Sphere 20/20 +2.50    Left +1.25 Sphere 20/20 +2.50    Type:  Bifocal    Expiration Date:  8/22/2020                  RTC 1 yr

## 2019-09-11 ENCOUNTER — TELEPHONE (OUTPATIENT)
Dept: FAMILY MEDICINE | Facility: CLINIC | Age: 64
End: 2019-09-11

## 2019-09-11 NOTE — TELEPHONE ENCOUNTER
----- Message from Alice Vasquez sent at 9/11/2019  9:51 AM CDT -----  Contact: [phone]/wife Kalie  Type:  Needs Medical Advice    Who Called: wife  Symptoms (please be specific): rash, itching   How long has patient had these symptoms: since Friday  Pharmacy name and phone #: [facility]  Would the patient rather a call back or a response via [facility]? Call back  Best Call Back Number: [phone]  Additional Information: wants to be seen today or have medicine called in. Have had same symptoms before

## 2019-09-12 ENCOUNTER — OFFICE VISIT (OUTPATIENT)
Dept: FAMILY MEDICINE | Facility: CLINIC | Age: 64
End: 2019-09-12
Attending: FAMILY MEDICINE
Payer: MEDICARE

## 2019-09-12 VITALS
WEIGHT: 224.88 LBS | HEIGHT: 70 IN | BODY MASS INDEX: 32.19 KG/M2 | HEART RATE: 60 BPM | SYSTOLIC BLOOD PRESSURE: 130 MMHG | TEMPERATURE: 98 F | DIASTOLIC BLOOD PRESSURE: 80 MMHG | OXYGEN SATURATION: 99 %

## 2019-09-12 DIAGNOSIS — L50.9 URTICARIA: ICD-10-CM

## 2019-09-12 DIAGNOSIS — L57.8 ACTINIC DERMATITIS: Primary | ICD-10-CM

## 2019-09-12 PROCEDURE — 1101F PT FALLS ASSESS-DOCD LE1/YR: CPT | Mod: HCNC,CPTII,S$GLB, | Performed by: FAMILY MEDICINE

## 2019-09-12 PROCEDURE — 3008F PR BODY MASS INDEX (BMI) DOCUMENTED: ICD-10-PCS | Mod: HCNC,CPTII,S$GLB, | Performed by: FAMILY MEDICINE

## 2019-09-12 PROCEDURE — 3008F BODY MASS INDEX DOCD: CPT | Mod: HCNC,CPTII,S$GLB, | Performed by: FAMILY MEDICINE

## 2019-09-12 PROCEDURE — 96372 PR INJECTION,THERAP/PROPH/DIAG2ST, IM OR SUBCUT: ICD-10-PCS | Mod: HCNC,S$GLB,, | Performed by: FAMILY MEDICINE

## 2019-09-12 PROCEDURE — 3079F DIAST BP 80-89 MM HG: CPT | Mod: HCNC,CPTII,S$GLB, | Performed by: FAMILY MEDICINE

## 2019-09-12 PROCEDURE — 3075F SYST BP GE 130 - 139MM HG: CPT | Mod: HCNC,CPTII,S$GLB, | Performed by: FAMILY MEDICINE

## 2019-09-12 PROCEDURE — 99999 PR PBB SHADOW E&M-EST. PATIENT-LVL III: ICD-10-PCS | Mod: PBBFAC,HCNC,, | Performed by: FAMILY MEDICINE

## 2019-09-12 PROCEDURE — 96372 THER/PROPH/DIAG INJ SC/IM: CPT | Mod: HCNC,S$GLB,, | Performed by: FAMILY MEDICINE

## 2019-09-12 PROCEDURE — 99214 OFFICE O/P EST MOD 30 MIN: CPT | Mod: 25,HCNC,S$GLB, | Performed by: FAMILY MEDICINE

## 2019-09-12 PROCEDURE — 99214 PR OFFICE/OUTPT VISIT, EST, LEVL IV, 30-39 MIN: ICD-10-PCS | Mod: 25,HCNC,S$GLB, | Performed by: FAMILY MEDICINE

## 2019-09-12 PROCEDURE — 3075F PR MOST RECENT SYSTOLIC BLOOD PRESS GE 130-139MM HG: ICD-10-PCS | Mod: HCNC,CPTII,S$GLB, | Performed by: FAMILY MEDICINE

## 2019-09-12 PROCEDURE — 99999 PR PBB SHADOW E&M-EST. PATIENT-LVL III: CPT | Mod: PBBFAC,HCNC,, | Performed by: FAMILY MEDICINE

## 2019-09-12 PROCEDURE — 3079F PR MOST RECENT DIASTOLIC BLOOD PRESSURE 80-89 MM HG: ICD-10-PCS | Mod: HCNC,CPTII,S$GLB, | Performed by: FAMILY MEDICINE

## 2019-09-12 PROCEDURE — 1101F PR PT FALLS ASSESS DOC 0-1 FALLS W/OUT INJ PAST YR: ICD-10-PCS | Mod: HCNC,CPTII,S$GLB, | Performed by: FAMILY MEDICINE

## 2019-09-12 RX ORDER — HYDROXYZINE HYDROCHLORIDE 25 MG/1
25 TABLET, FILM COATED ORAL 3 TIMES DAILY PRN
Qty: 30 TABLET | Refills: 0 | Status: SHIPPED | OUTPATIENT
Start: 2019-09-12 | End: 2019-10-16 | Stop reason: SDUPTHER

## 2019-09-12 RX ORDER — DONEPEZIL HYDROCHLORIDE 5 MG/1
5 TABLET, FILM COATED ORAL NIGHTLY
COMMUNITY
End: 2020-05-20

## 2019-09-12 RX ORDER — TRIAMCINOLONE ACETONIDE 40 MG/ML
40 INJECTION, SUSPENSION INTRA-ARTICULAR; INTRAMUSCULAR
Status: COMPLETED | OUTPATIENT
Start: 2019-09-12 | End: 2019-09-12

## 2019-09-12 RX ADMIN — TRIAMCINOLONE ACETONIDE 40 MG: 40 INJECTION, SUSPENSION INTRA-ARTICULAR; INTRAMUSCULAR at 11:09

## 2019-09-12 NOTE — PROGRESS NOTES
Subjective:       Patient ID: Anjel Sanders is a 64 y.o. male.    Chief Complaint: Rash (flu)    64 yr old black male with Hypertension, DM II, Hyperlipidemia, gout, ED, MURTAZA, presents today for evaluation of rash and itching. Onset 6-7 days ago and gradually worsening. Denies any new medication or food. No one else in family has rash. It started on head, neck, back, chest wall, abdomen and legs. Details as follows -      History as below - reviewed     Rash   This is a new problem. The current episode started 1 to 4 weeks ago. The problem is unchanged. The affected locations include the head, chest, back, abdomen, right arm, left lower leg, left upper leg, right lower leg and right upper leg. The rash is characterized by redness, swelling, itchiness and dryness. He was exposed to nothing. Pertinent negatives include no anorexia, congestion, cough, diarrhea, eye pain, facial edema, fever, nail changes, rhinorrhea, sore throat or vomiting. Past treatments include topical steroids. The treatment provided mild relief. There is no history of allergies, asthma, eczema or varicella.     Review of Systems   Constitutional: Negative.  Negative for activity change, diaphoresis, fever and unexpected weight change.   HENT: Negative.  Negative for congestion, ear pain, mouth sores, rhinorrhea, sore throat and voice change.    Eyes: Negative.  Negative for pain, discharge and visual disturbance.   Respiratory: Negative.  Negative for apnea, cough and wheezing.    Cardiovascular: Negative.  Negative for chest pain and palpitations.   Gastrointestinal: Negative.  Negative for abdominal distention, anal bleeding, anorexia, diarrhea and vomiting.   Endocrine: Negative.  Negative for cold intolerance and polyuria.   Genitourinary: Negative.  Negative for decreased urine volume, difficulty urinating, discharge, frequency and scrotal swelling.   Musculoskeletal: Negative.  Negative for back pain, myalgias and neck stiffness.   Skin:  Positive for rash. Negative for color change and nail changes.   Allergic/Immunologic: Negative.  Negative for environmental allergies and immunocompromised state.   Neurological: Negative.  Negative for dizziness, speech difficulty, weakness and light-headedness.   Hematological: Negative.    Psychiatric/Behavioral: Negative.  Negative for agitation, dysphoric mood and suicidal ideas. The patient is not nervous/anxious.        PMH/PSH/FH/SH/MED/ALLERGY reviewed    Objective:       Vitals:    09/12/19 1020   BP: 130/80   Pulse: 60   Temp: 98 °F (36.7 °C)       Physical Exam   Constitutional: He is oriented to person, place, and time. He appears well-developed and well-nourished.   HENT:   Head: Normocephalic and atraumatic.   Right Ear: External ear normal.   Left Ear: External ear normal.   Nose: Nose normal.   Mouth/Throat: Oropharynx is clear and moist. No oropharyngeal exudate.   Eyes: Pupils are equal, round, and reactive to light. Conjunctivae and EOM are normal. Right eye exhibits no discharge. Left eye exhibits no discharge. No scleral icterus.   Neck: Normal range of motion. Neck supple. No JVD present. No tracheal deviation present. No thyromegaly present.   Cardiovascular: Normal rate, regular rhythm, normal heart sounds and intact distal pulses. Exam reveals no gallop and no friction rub.   No murmur heard.  Pulmonary/Chest: Effort normal and breath sounds normal. No stridor. No respiratory distress. He has no wheezes. He has no rales. He exhibits no tenderness.   Abdominal: Soft. Bowel sounds are normal. He exhibits no distension and no mass. There is no tenderness. There is no rebound and no guarding. No hernia.   Musculoskeletal: Normal range of motion. He exhibits no edema or tenderness.   Lymphadenopathy:     He has no cervical adenopathy.   Neurological: He is alert and oriented to person, place, and time. He has normal reflexes. He displays normal reflexes. No cranial nerve deficit. He exhibits  normal muscle tone. Coordination normal.   Skin: Skin is warm and dry. Rash noted. There is erythema. No pallor.   Patchy, Urticarial rash on head, neck, back, chest wall, abdomen and legs   Psychiatric: He has a normal mood and affect. His behavior is normal. Judgment and thought content normal.       Assessment:       1. Actinic dermatitis    2. Urticaria        Plan:         Anjel was seen today for rash.    Diagnoses and all orders for this visit:    Actinic dermatitis  -     triamcinolone acetonide injection 40 mg  -     hydrOXYzine HCl (ATARAX) 25 MG tablet; Take 1 tablet (25 mg total) by mouth 3 (three) times daily as needed for Itching.    Urticaria  -     triamcinolone acetonide injection 40 mg  -     hydrOXYzine HCl (ATARAX) 25 MG tablet; Take 1 tablet (25 mg total) by mouth 3 (three) times daily as needed for Itching.      Acute dermatitis/urticaria  -continue kenalog cream - use as directed  -atarax prn itching    Spent adequate time in obtaining history and explaining differentials    40 minutes spent during this visit of which greater than 50% devoted to face-face counseling and coordination of care regarding diagnosis and management plan    Follow up in about 4 months (around 1/12/2020), or if symptoms worsen or fail to improve.

## 2019-09-26 DIAGNOSIS — M10.9 GOUTY ARTHRITIS: ICD-10-CM

## 2019-09-26 RX ORDER — ALLOPURINOL 300 MG/1
300 TABLET ORAL DAILY
Qty: 90 TABLET | Refills: 1 | Status: SHIPPED | OUTPATIENT
Start: 2019-09-26 | End: 2020-05-20 | Stop reason: SDUPTHER

## 2019-09-26 NOTE — TELEPHONE ENCOUNTER
----- Message from Lotus Huffman sent at 9/26/2019 10:32 AM CDT -----  Contact: self / [phone]  Patient is requesting a refill on the below. Please advise      allopurinol (ZYLOPRIM) 300 MG tablet        Pharmacy     [facility] [place] [place] [facility] [address] [facility]

## 2019-10-16 DIAGNOSIS — L57.8 ACTINIC DERMATITIS: ICD-10-CM

## 2019-10-16 DIAGNOSIS — L50.9 URTICARIA: ICD-10-CM

## 2019-10-16 RX ORDER — HYDROXYZINE HYDROCHLORIDE 25 MG/1
TABLET, FILM COATED ORAL
Qty: 30 TABLET | Refills: 0 | Status: SHIPPED | OUTPATIENT
Start: 2019-10-16 | End: 2021-12-16

## 2019-12-05 ENCOUNTER — HOSPITAL ENCOUNTER (OUTPATIENT)
Dept: RADIOLOGY | Facility: HOSPITAL | Age: 64
Discharge: HOME | End: 2019-12-05
Attending: ORTHOPAEDIC SURGERY
Payer: MEDICARE

## 2019-12-05 ENCOUNTER — OFFICE VISIT (OUTPATIENT)
Dept: ORTHOPEDICS | Facility: CLINIC | Age: 64
End: 2019-12-05
Payer: MEDICARE

## 2019-12-05 ENCOUNTER — PATIENT OUTREACH (OUTPATIENT)
Dept: ADMINISTRATIVE | Facility: OTHER | Age: 64
End: 2019-12-05

## 2019-12-05 VITALS — BODY MASS INDEX: 32.07 KG/M2 | HEIGHT: 70 IN | WEIGHT: 224 LBS

## 2019-12-05 DIAGNOSIS — M25.511 RIGHT SHOULDER PAIN, UNSPECIFIED CHRONICITY: ICD-10-CM

## 2019-12-05 DIAGNOSIS — M25.512 LEFT SHOULDER PAIN, UNSPECIFIED CHRONICITY: Primary | ICD-10-CM

## 2019-12-05 DIAGNOSIS — M25.511 ACUTE PAIN OF RIGHT SHOULDER: ICD-10-CM

## 2019-12-05 PROCEDURE — 99213 PR OFFICE/OUTPT VISIT, EST, LEVL III, 20-29 MIN: ICD-10-PCS | Mod: HCNC,S$GLB,, | Performed by: ORTHOPAEDIC SURGERY

## 2019-12-05 PROCEDURE — 1100F PTFALLS ASSESS-DOCD GE2>/YR: CPT | Mod: HCNC,CPTII,S$GLB, | Performed by: ORTHOPAEDIC SURGERY

## 2019-12-05 PROCEDURE — 3008F BODY MASS INDEX DOCD: CPT | Mod: HCNC,CPTII,S$GLB, | Performed by: ORTHOPAEDIC SURGERY

## 2019-12-05 PROCEDURE — 99999 PR PBB SHADOW E&M-EST. PATIENT-LVL III: CPT | Mod: PBBFAC,HCNC,, | Performed by: ORTHOPAEDIC SURGERY

## 2019-12-05 PROCEDURE — 99999 PR PBB SHADOW E&M-EST. PATIENT-LVL III: ICD-10-PCS | Mod: PBBFAC,HCNC,, | Performed by: ORTHOPAEDIC SURGERY

## 2019-12-05 PROCEDURE — 73030 XR SHOULDER COMPLETE 2 OR MORE VIEWS RIGHT: ICD-10-PCS | Mod: 26,HCNC,RT, | Performed by: RADIOLOGY

## 2019-12-05 PROCEDURE — 3008F PR BODY MASS INDEX (BMI) DOCUMENTED: ICD-10-PCS | Mod: HCNC,CPTII,S$GLB, | Performed by: ORTHOPAEDIC SURGERY

## 2019-12-05 PROCEDURE — 73030 X-RAY EXAM OF SHOULDER: CPT | Mod: TC,HCNC,PN,RT

## 2019-12-05 PROCEDURE — 1100F PR PT FALLS ASSESS DOC 2+ FALLS/FALL W/INJURY/YR: ICD-10-PCS | Mod: HCNC,CPTII,S$GLB, | Performed by: ORTHOPAEDIC SURGERY

## 2019-12-05 PROCEDURE — 3288F FALL RISK ASSESSMENT DOCD: CPT | Mod: HCNC,CPTII,S$GLB, | Performed by: ORTHOPAEDIC SURGERY

## 2019-12-05 PROCEDURE — 3288F PR FALLS RISK ASSESSMENT DOCUMENTED: ICD-10-PCS | Mod: HCNC,CPTII,S$GLB, | Performed by: ORTHOPAEDIC SURGERY

## 2019-12-05 PROCEDURE — 99213 OFFICE O/P EST LOW 20 MIN: CPT | Mod: HCNC,S$GLB,, | Performed by: ORTHOPAEDIC SURGERY

## 2019-12-05 PROCEDURE — 73030 X-RAY EXAM OF SHOULDER: CPT | Mod: 26,HCNC,RT, | Performed by: RADIOLOGY

## 2019-12-05 RX ORDER — HYDROCODONE BITARTRATE AND ACETAMINOPHEN 5; 325 MG/1; MG/1
1 TABLET ORAL EVERY 4 HOURS PRN
Qty: 40 TABLET | Refills: 0 | Status: SHIPPED | OUTPATIENT
Start: 2019-12-05 | End: 2019-12-15

## 2019-12-05 NOTE — PROGRESS NOTES
Subjective:      Patient ID: Anjel Sanders is a 64 y.o. male.    Chief Complaint: Post-op Evaluation of the Left Shoulder and Injury and Pain of the Right Shoulder      HPI  Anjel Sanders is a  64 y.o. male presenting today for right shoulder pain.  There was a history of trauma.  Onset of symptoms began last week when he fell and sustained injury to his right shoulder  Since then he has been having pain and difficulty with use particularly elevation of the shoulder  It has improved a little bit but still having difficulty.      Review of patient's allergies indicates:  No Known Allergies      Current Outpatient Medications   Medication Sig Dispense Refill    allopurinol (ZYLOPRIM) 300 MG tablet Take 1 tablet (300 mg total) by mouth once daily. 90 tablet 1    amlodipine-benazepril (LOTREL) 10-40 mg per capsule Take 1 capsule by mouth once daily. 90 capsule 3    aspirin (ECOTRIN) 81 MG EC tablet Take 81 mg by mouth once daily.      atorvastatin (LIPITOR) 80 MG tablet Take 1 tablet (80 mg total) by mouth once daily. 90 tablet 4    donepezil (ARICEPT) 5 MG tablet Take 5 mg by mouth every evening.      gabapentin (NEURONTIN) 300 MG capsule TAKE 1 CAPSULE (300 MG TOTAL) BY MOUTH EVERY EVENING. 90 capsule 2    hydrOXYzine HCl (ATARAX) 25 MG tablet TAKE 1 TABLET BY MOUTH THREE TIMES A DAY AS NEEDED FOR ITCHING 30 tablet 0     No current facility-administered medications for this visit.        Past Medical History:   Diagnosis Date    AR (allergic rhinitis)     Gouty arthritis     Hx of colonic polyps     Tubular Adenoma    MURTAZA (obstructive sleep apnea)     Other and unspecified hyperlipidemia     Stroke     Unspecified essential hypertension        Past Surgical History:   Procedure Laterality Date    ARTHROSCOPIC REPAIR OF ROTATOR CUFF OF SHOULDER Left 4/30/2019    Procedure: REPAIR, ROTATOR CUFF, ARTHROSCOPIC;  Surgeon: Bob Esparza Jr., MD;  Location: [facility] OR;  Service: Orthopedics;   "Laterality: Left;  need opus sytem (Jluis notified)  video    ARTHROSCOPY OF SHOULDER WITH DECOMPRESSION OF SUBACROMIAL SPACE  4/30/2019    Procedure: ARTHROSCOPY, SHOULDER, WITH SUBACROMIAL SPACE DECOMPRESSION;  Surgeon: Bob Esparza Jr., MD;  Location: [facility];  Service: Orthopedics;;    FOOT SURGERY      surgery on jaw         Review of Systems:  ROS    OBJECTIVE:     PHYSICAL EXAM:  Height: 5' 10" (177.8 cm) Weight: 101.6 kg (224 lb)  Vitals:    12/05/19 1605   Weight: 101.6 kg (224 lb)   Height: 5' 10" (1.778 m)   PainSc:   4     Well developed, well nourished male in no acute distress  Alert and oriented x 3  HEENT- Normal exam  Lungs- Clear to auscultation  Heart- Regular rate and rhythm  Abdomen- Soft nontender  Extremity exam- examination right shoulder no tenderness no swelling  No bruising  Range of motion slightly decreased secondary to pain  Positive impingement sign  There is weakness of the rotator cuff on the right compared to the left      RADIOGRAPHS:  AP lateral x-rays right shoulder demonstrate no fracture or dislocation  There is elevation of the humeral head however in spurring at the anterolateral acromion  Comments: I have personally reviewed the imaging and I agree with the above radiologist's report.    ASSESSMENT/PLAN:     IMPRESSION:  1.  Right shoulder injury.  2.  Possible rotator cuff tear right shoulder    PLAN:  I explained the nature of the problem the patient  I have ordered an MRI scan right shoulder to check the rotator cuff  Recommended he avoid overhead lifting gentle range of motion to prevent stiffness  Norco given for pain  Follow-up after the MRI is complete       - We talked at length about the anatomy and pathophysiology of   Encounter Diagnoses   Name Primary?    Left shoulder pain, unspecified chronicity Yes    Right shoulder pain, unspecified chronicity     Acute pain of right shoulder            Disclaimer: This note has been generated using voice-recognition " software. There may be typographical errors that have been missed during proof-reading.

## 2019-12-23 ENCOUNTER — PATIENT OUTREACH (OUTPATIENT)
Dept: ADMINISTRATIVE | Facility: HOSPITAL | Age: 64
End: 2019-12-23

## 2019-12-23 DIAGNOSIS — Z12.5 SCREENING PSA (PROSTATE SPECIFIC ANTIGEN): Primary | ICD-10-CM

## 2020-01-04 ENCOUNTER — LAB VISIT (OUTPATIENT)
Dept: LAB | Facility: HOSPITAL | Age: 65
End: 2020-01-04
Attending: FAMILY MEDICINE
Payer: MEDICARE

## 2020-01-04 DIAGNOSIS — Z12.5 SCREENING PSA (PROSTATE SPECIFIC ANTIGEN): ICD-10-CM

## 2020-01-04 LAB — COMPLEXED PSA SERPL-MCNC: 1.6 NG/ML (ref 0–4)

## 2020-01-04 PROCEDURE — 36415 COLL VENOUS BLD VENIPUNCTURE: CPT | Mod: HCNC

## 2020-01-04 PROCEDURE — 84153 ASSAY OF PSA TOTAL: CPT | Mod: HCNC

## 2020-01-06 ENCOUNTER — LAB VISIT (OUTPATIENT)
Dept: LAB | Facility: HOSPITAL | Age: 65
End: 2020-01-06
Attending: FAMILY MEDICINE
Payer: MEDICARE

## 2020-01-06 ENCOUNTER — OFFICE VISIT (OUTPATIENT)
Dept: FAMILY MEDICINE | Facility: CLINIC | Age: 65
End: 2020-01-06
Attending: FAMILY MEDICINE
Payer: MEDICARE

## 2020-01-06 VITALS
WEIGHT: 226.44 LBS | SYSTOLIC BLOOD PRESSURE: 130 MMHG | BODY MASS INDEX: 32.42 KG/M2 | DIASTOLIC BLOOD PRESSURE: 80 MMHG | HEART RATE: 64 BPM | HEIGHT: 70 IN | OXYGEN SATURATION: 98 %

## 2020-01-06 DIAGNOSIS — I69.351 HEMIPARESIS AFFECTING RIGHT SIDE AS LATE EFFECT OF CEREBROVASCULAR ACCIDENT: ICD-10-CM

## 2020-01-06 DIAGNOSIS — E78.5 DYSLIPIDEMIA ASSOCIATED WITH TYPE 2 DIABETES MELLITUS: ICD-10-CM

## 2020-01-06 DIAGNOSIS — Z23 IMMUNIZATION DUE: ICD-10-CM

## 2020-01-06 DIAGNOSIS — E66.9 OBESITY (BMI 30-39.9): ICD-10-CM

## 2020-01-06 DIAGNOSIS — E11.9 CONTROLLED TYPE 2 DIABETES MELLITUS WITHOUT COMPLICATION, WITHOUT LONG-TERM CURRENT USE OF INSULIN: ICD-10-CM

## 2020-01-06 DIAGNOSIS — I15.2 HYPERTENSION COMPLICATING DIABETES: Primary | ICD-10-CM

## 2020-01-06 DIAGNOSIS — E11.69 DYSLIPIDEMIA ASSOCIATED WITH TYPE 2 DIABETES MELLITUS: ICD-10-CM

## 2020-01-06 DIAGNOSIS — E11.59 HYPERTENSION COMPLICATING DIABETES: Primary | ICD-10-CM

## 2020-01-06 LAB
ALBUMIN/CREAT UR: 4.8 UG/MG (ref 0–30)
BILIRUB UR QL STRIP: NEGATIVE
CLARITY UR: CLEAR
COLOR UR: YELLOW
CREAT UR-MCNC: 146 MG/DL (ref 23–375)
GLUCOSE UR QL STRIP: NEGATIVE
HGB UR QL STRIP: NEGATIVE
KETONES UR QL STRIP: NEGATIVE
LEUKOCYTE ESTERASE UR QL STRIP: NEGATIVE
MICROALBUMIN UR DL<=1MG/L-MCNC: 7 UG/ML
NITRITE UR QL STRIP: NEGATIVE
PH UR STRIP: 6 [PH] (ref 5–8)
PROT UR QL STRIP: NEGATIVE
SP GR UR STRIP: 1.02 (ref 1–1.03)
URN SPEC COLLECT METH UR: NORMAL
UROBILINOGEN UR STRIP-ACNC: NEGATIVE EU/DL

## 2020-01-06 PROCEDURE — 3075F PR MOST RECENT SYSTOLIC BLOOD PRESS GE 130-139MM HG: ICD-10-PCS | Mod: HCNC,CPTII,S$GLB, | Performed by: FAMILY MEDICINE

## 2020-01-06 PROCEDURE — 3079F PR MOST RECENT DIASTOLIC BLOOD PRESSURE 80-89 MM HG: ICD-10-PCS | Mod: HCNC,CPTII,S$GLB, | Performed by: FAMILY MEDICINE

## 2020-01-06 PROCEDURE — 3044F PR MOST RECENT HEMOGLOBIN A1C LEVEL <7.0%: ICD-10-PCS | Mod: HCNC,CPTII,S$GLB, | Performed by: FAMILY MEDICINE

## 2020-01-06 PROCEDURE — 1101F PR PT FALLS ASSESS DOC 0-1 FALLS W/OUT INJ PAST YR: ICD-10-PCS | Mod: HCNC,CPTII,S$GLB, | Performed by: FAMILY MEDICINE

## 2020-01-06 PROCEDURE — 99999 PR PBB SHADOW E&M-EST. PATIENT-LVL III: ICD-10-PCS | Mod: PBBFAC,HCNC,, | Performed by: FAMILY MEDICINE

## 2020-01-06 PROCEDURE — 3008F PR BODY MASS INDEX (BMI) DOCUMENTED: ICD-10-PCS | Mod: HCNC,CPTII,S$GLB, | Performed by: FAMILY MEDICINE

## 2020-01-06 PROCEDURE — 99499 RISK ADDL DX/OHS AUDIT: ICD-10-PCS | Mod: HCNC,S$GLB,, | Performed by: FAMILY MEDICINE

## 2020-01-06 PROCEDURE — 3075F SYST BP GE 130 - 139MM HG: CPT | Mod: HCNC,CPTII,S$GLB, | Performed by: FAMILY MEDICINE

## 2020-01-06 PROCEDURE — 99499 UNLISTED E&M SERVICE: CPT | Mod: HCNC,S$GLB,, | Performed by: FAMILY MEDICINE

## 2020-01-06 PROCEDURE — 99214 OFFICE O/P EST MOD 30 MIN: CPT | Mod: 25,HCNC,S$GLB, | Performed by: FAMILY MEDICINE

## 2020-01-06 PROCEDURE — 99214 PR OFFICE/OUTPT VISIT, EST, LEVL IV, 30-39 MIN: ICD-10-PCS | Mod: 25,HCNC,S$GLB, | Performed by: FAMILY MEDICINE

## 2020-01-06 PROCEDURE — 90662 IIV NO PRSV INCREASED AG IM: CPT | Mod: HCNC,S$GLB,, | Performed by: FAMILY MEDICINE

## 2020-01-06 PROCEDURE — G0008 FLU VACCINE - HIGH DOSE (65+) PRESERVATIVE FREE IM: ICD-10-PCS | Mod: HCNC,S$GLB,, | Performed by: FAMILY MEDICINE

## 2020-01-06 PROCEDURE — 81003 URINALYSIS AUTO W/O SCOPE: CPT | Mod: HCNC

## 2020-01-06 PROCEDURE — 3044F HG A1C LEVEL LT 7.0%: CPT | Mod: HCNC,CPTII,S$GLB, | Performed by: FAMILY MEDICINE

## 2020-01-06 PROCEDURE — 3008F BODY MASS INDEX DOCD: CPT | Mod: HCNC,CPTII,S$GLB, | Performed by: FAMILY MEDICINE

## 2020-01-06 PROCEDURE — 82043 UR ALBUMIN QUANTITATIVE: CPT | Mod: HCNC

## 2020-01-06 PROCEDURE — 99999 PR PBB SHADOW E&M-EST. PATIENT-LVL III: CPT | Mod: PBBFAC,HCNC,, | Performed by: FAMILY MEDICINE

## 2020-01-06 PROCEDURE — 90662 FLU VACCINE - HIGH DOSE (65+) PRESERVATIVE FREE IM: ICD-10-PCS | Mod: HCNC,S$GLB,, | Performed by: FAMILY MEDICINE

## 2020-01-06 PROCEDURE — 1101F PT FALLS ASSESS-DOCD LE1/YR: CPT | Mod: HCNC,CPTII,S$GLB, | Performed by: FAMILY MEDICINE

## 2020-01-06 PROCEDURE — 3079F DIAST BP 80-89 MM HG: CPT | Mod: HCNC,CPTII,S$GLB, | Performed by: FAMILY MEDICINE

## 2020-01-06 PROCEDURE — G0008 ADMIN INFLUENZA VIRUS VAC: HCPCS | Mod: HCNC,S$GLB,, | Performed by: FAMILY MEDICINE

## 2020-01-06 NOTE — PROGRESS NOTES
Subjective:       Patient ID: Anjel Sanders is a 64 y.o. male.    Chief Complaint: Follow-up    64 yr old black male with Hypertension, DM II, Hyperlipidemia, gout, ED, MURTAZA, presents today for 6 month follow up. No new complaints today.    DM II - diet controlled  - A1C                   6.1 (H)             07/12/2019          - no s/s of DM - foot exam UTD - eye exam due - on ASA and ACE    HTN - controlled - on prinzide - no side effects - does not need refills    HLD - uncontrolled - on statin -LDLCALC                  79.0                02/26/2019                             - on meds - need labs    ED - controlled - need cialis -     Gout - controlled    -  on allopurinol    Health maintenance  -UTD except labs    Hypertension   This is a chronic problem. The current episode started more than 1 year ago. The problem has been gradually improving since onset. The problem is controlled. Pertinent negatives include no anxiety, blurred vision, chest pain, headaches, malaise/fatigue, neck pain, orthopnea, palpitations, peripheral edema, PND, shortness of breath or sweats. There are no associated agents to hypertension. Risk factors for coronary artery disease include dyslipidemia, obesity and male gender. Past treatments include ACE inhibitors and diuretics. The current treatment provides moderate improvement. There are no compliance problems.  There is no history of angina, CAD/MI, CVA, heart failure, left ventricular hypertrophy or retinopathy. There is no history of chronic renal disease, coarctation of the aorta, hypercortisolism, pheochromocytoma, renovascular disease or a thyroid problem.   Hyperlipidemia   This is a chronic problem. The current episode started more than 1 year ago. The problem is controlled. Recent lipid tests were reviewed and are normal. Exacerbating diseases include obesity. He has no history of chronic renal disease, diabetes, hypothyroidism or liver disease. There are no known  factors aggravating his hyperlipidemia. Associated symptoms include myalgias. Pertinent negatives include no chest pain, focal sensory loss, focal weakness, leg pain or shortness of breath. He is currently on no antihyperlipidemic treatment. The current treatment provides moderate improvement of lipids. There are no compliance problems.  Risk factors for coronary artery disease include dyslipidemia, hypertension, obesity and male sex.   Shoulder Injury    The left shoulder is affected. The incident occurred more than 1 week ago. The injury mechanism was a fall. The quality of the pain is described as cramping and shooting. The pain does not radiate. The pain is at a severity of 6/10. The pain is moderate. Pertinent negatives include no chest pain. The symptoms are aggravated by movement, overhead lifting and palpation. He has tried NSAIDs for the symptoms. The treatment provided moderate relief.     Review of Systems   Constitutional: Negative.  Negative for activity change, diaphoresis, malaise/fatigue and unexpected weight change.   HENT: Negative.  Negative for congestion, ear pain, mouth sores, rhinorrhea and voice change.    Eyes: Negative.  Negative for blurred vision, pain, discharge and visual disturbance.   Respiratory: Negative.  Negative for apnea, cough, shortness of breath and wheezing.    Cardiovascular: Negative.  Negative for chest pain, palpitations, orthopnea and PND.   Gastrointestinal: Negative.  Negative for abdominal distention, anal bleeding, diarrhea and vomiting.   Endocrine: Negative.  Negative for cold intolerance and polyuria.   Genitourinary: Negative.  Negative for decreased urine volume, difficulty urinating, discharge, frequency and scrotal swelling.   Musculoskeletal: Positive for myalgias. Negative for back pain, neck pain and neck stiffness.   Skin: Negative.  Negative for color change and rash.   Allergic/Immunologic: Negative.  Negative for environmental allergies and  immunocompromised state.   Neurological: Negative.  Negative for dizziness, focal weakness, speech difficulty, weakness, light-headedness and headaches.   Hematological: Negative.    Psychiatric/Behavioral: Negative.  Negative for agitation, dysphoric mood and suicidal ideas. The patient is not nervous/anxious.        PMH/PSH/FH/SH/MED/ALLERGY reviewed    Objective:       Vitals:    01/06/20 1027   BP: 130/80   Pulse: 64       Physical Exam   Constitutional: He is oriented to person, place, and time. He appears well-developed and well-nourished. No distress.   HENT:   Head: Normocephalic and atraumatic.   Right Ear: External ear normal.   Left Ear: External ear normal.   Nose: Nose normal.   Mouth/Throat: Oropharynx is clear and moist. No oropharyngeal exudate.   Eyes: Pupils are equal, round, and reactive to light. Conjunctivae and EOM are normal. Right eye exhibits no discharge. Left eye exhibits no discharge. No scleral icterus.   Neck: Normal range of motion. Neck supple. No JVD present. No tracheal deviation present. No thyromegaly present.   Cardiovascular: Normal rate, regular rhythm and intact distal pulses. Exam reveals no gallop and no friction rub.   Murmur (3/6 ESM AORTIC AREA) heard.  Pulmonary/Chest: Effort normal and breath sounds normal. No stridor. No respiratory distress. He has no wheezes. He has no rales. He exhibits no tenderness.   Abdominal: Soft. Bowel sounds are normal. He exhibits no distension and no mass. There is no tenderness. There is no rebound and no guarding. No hernia.   Musculoskeletal: Normal range of motion. He exhibits tenderness (TTP LEFT SHOULDER WITH ROTATOR CUFF IMPINGEMENT). He exhibits no edema.   Lymphadenopathy:     He has no cervical adenopathy.   Neurological: He is alert and oriented to person, place, and time. He has normal reflexes. He displays normal reflexes. No cranial nerve deficit. He exhibits normal muscle tone. Coordination normal.   No deficits   Skin: Skin  is warm and dry. No rash noted. He is not diaphoretic. No erythema. No pallor.   Psychiatric: He has a normal mood and affect. His behavior is normal. Judgment and thought content normal.       Assessment:       1. Hypertension complicating diabetes    2. Dyslipidemia associated with type 2 diabetes mellitus    3. Hemiparesis affecting right side as late effect of cerebrovascular accident    4. Controlled type 2 diabetes mellitus without complication, without long-term current use of insulin    5. Obesity (BMI 30-39.9)    6. Immunization due        Plan:           Anjel was seen today for follow-up.    Diagnoses and all orders for this visit:    Hypertension complicating diabetes  -     CBC auto differential; Future  -     Comprehensive metabolic panel; Future  -     Lipid panel; Future    Dyslipidemia associated with type 2 diabetes mellitus  -     CBC auto differential; Future  -     Comprehensive metabolic panel; Future  -     Lipid panel; Future    Hemiparesis affecting right side as late effect of cerebrovascular accident    Controlled type 2 diabetes mellitus without complication, without long-term current use of insulin  -     CBC auto differential; Future  -     Comprehensive metabolic panel; Future  -     Lipid panel; Future  -     Urinalysis; Future  -     Microalbumin/creatinine urine ratio; Future  -     Hemoglobin A1c; Future    Obesity (BMI 30-39.9)    Immunization due  -     Influenza - High Dose (65+) (PF) (IM)      HTN/HLD   -controlled - stable - on meds       HEART MURMUR  -ECHO reassurance      ED and Gout   -controlled     MURTAZA  -stable    CVA  -on ASA and plavix      DM II  -controlled  -diet and exercise only    Obesity  -diet and exercise  -weight loss      25 minutes spent during this visit of which greater than 50% devoted to face-face counseling and coordination of care regarding diagnosis and management plan     Spent adequate time in obtaining history and explaining differentials        Follow up in about 3 months (around 4/6/2020), or if symptoms worsen or fail to improve.

## 2020-03-26 ENCOUNTER — DOCUMENTATION ONLY (OUTPATIENT)
Dept: REHABILITATION | Facility: HOSPITAL | Age: 65
End: 2020-03-26

## 2020-03-26 DIAGNOSIS — M25.60 STIFFNESS IN JOINT: ICD-10-CM

## 2020-03-26 DIAGNOSIS — R53.1 WEAKNESS: ICD-10-CM

## 2020-03-26 DIAGNOSIS — M79.602 PAIN OF LEFT UPPER EXTREMITY: ICD-10-CM

## 2020-03-26 NOTE — PROGRESS NOTES
Outpatient Therapy Discharge Summary     Name: Anjel Sanders  Clinic Number: [id number]    Therapy Diagnosis:   Encounter Diagnoses   Name Primary?    Pain of left upper extremity     Weakness     Stiffness in joint      Physician: Bob Esparza Jr., *     Physician Orders: OT eval and treat  Medical Diagnosis: M75.122 (ICD-10-CM) - Complete tear of left rotator cuff  Evaluation Date: 5/23/2019  Insurance Authorization period Expiration: 12/31/2019  Plan of Care Expiration Period: 5/23/2019-7/19/2019    Date of Last visit: 6/25/2019  Total Visits Received: 5  Cancelled Visits: -  No Show Visits: -    Assessment    Goals: Short Term Goals to be met in 4 weeks: (4/22/2019)  1) Initiate Hep-met, ongoing  2) Pt will increase Left shoulder PROM by 15 degrees grossly for improved performance with overhead ADL's -met  3) Pt will report 6/10 pain in (Left)shoulder at worst -met  4) Pt will progress to AROM and strengthening exercises -met  5) Patient will be able to achieve less than or equal to 45% on FOTO shoulder survey demonstrating overall improved functional ability with upper extremity. -met     Long Term Goals to be met by discharge:  1) Independent with HEP -Not met, progressing  2) Pt will demonstrate (Left) shoulder AROM WFL grossly for [place] with ADL's -Not met, progressing  3) Pt will demonstrate (Left) shoulder MMT WFL grossly for [place] with functional activities -Not met, progressing  4) Independent and pain free with ADL's and IADL's -Not met, progressing  5) Patient will be able to achieve less than or equal to 25% on FOTO shoulder survey demonstrating overall improved functional ability with upper extremity. -Not met, progressing    Discharge reason: Patient has not attended therapy since 6/25/2019    Plan   This patient is discharged from Occupational Therapy

## 2020-04-03 ENCOUNTER — TELEPHONE (OUTPATIENT)
Dept: FAMILY MEDICINE | Facility: CLINIC | Age: 65
End: 2020-04-03

## 2020-04-03 NOTE — TELEPHONE ENCOUNTER
----- Message from Misa Leone sent at 4/3/2020  3:25 PM CDT -----  Contact: Self [phone]  Patient Returning Your Phone Call

## 2020-05-20 ENCOUNTER — OFFICE VISIT (OUTPATIENT)
Dept: FAMILY MEDICINE | Facility: CLINIC | Age: 65
End: 2020-05-20
Attending: FAMILY MEDICINE
Payer: MEDICARE

## 2020-05-20 VITALS
TEMPERATURE: 98 F | HEIGHT: 70 IN | WEIGHT: 227.06 LBS | BODY MASS INDEX: 32.51 KG/M2 | HEART RATE: 70 BPM | DIASTOLIC BLOOD PRESSURE: 80 MMHG | SYSTOLIC BLOOD PRESSURE: 130 MMHG | OXYGEN SATURATION: 99 %

## 2020-05-20 DIAGNOSIS — I15.2 OBESITY, DIABETES, AND HYPERTENSION SYNDROME: ICD-10-CM

## 2020-05-20 DIAGNOSIS — E78.5 DYSLIPIDEMIA ASSOCIATED WITH TYPE 2 DIABETES MELLITUS: ICD-10-CM

## 2020-05-20 DIAGNOSIS — E11.69 DYSLIPIDEMIA ASSOCIATED WITH TYPE 2 DIABETES MELLITUS: ICD-10-CM

## 2020-05-20 DIAGNOSIS — M54.41 CHRONIC BILATERAL LOW BACK PAIN WITH BILATERAL SCIATICA: ICD-10-CM

## 2020-05-20 DIAGNOSIS — E11.59 HYPERTENSION COMPLICATING DIABETES: ICD-10-CM

## 2020-05-20 DIAGNOSIS — I15.2 HYPERTENSION COMPLICATING DIABETES: ICD-10-CM

## 2020-05-20 DIAGNOSIS — M54.42 CHRONIC BILATERAL LOW BACK PAIN WITH BILATERAL SCIATICA: ICD-10-CM

## 2020-05-20 DIAGNOSIS — M10.9 GOUTY ARTHRITIS: ICD-10-CM

## 2020-05-20 DIAGNOSIS — E66.9 OBESITY, DIABETES, AND HYPERTENSION SYNDROME: ICD-10-CM

## 2020-05-20 DIAGNOSIS — M54.32 BILATERAL SCIATICA: ICD-10-CM

## 2020-05-20 DIAGNOSIS — I63.81 LEFT SIDED LACUNAR STROKE: ICD-10-CM

## 2020-05-20 DIAGNOSIS — E11.9 CONTROLLED TYPE 2 DIABETES MELLITUS WITHOUT COMPLICATION, WITHOUT LONG-TERM CURRENT USE OF INSULIN: Primary | ICD-10-CM

## 2020-05-20 DIAGNOSIS — E11.59 OBESITY, DIABETES, AND HYPERTENSION SYNDROME: ICD-10-CM

## 2020-05-20 DIAGNOSIS — M54.31 BILATERAL SCIATICA: ICD-10-CM

## 2020-05-20 DIAGNOSIS — G47.33 OSA (OBSTRUCTIVE SLEEP APNEA): ICD-10-CM

## 2020-05-20 DIAGNOSIS — E11.69 OBESITY, DIABETES, AND HYPERTENSION SYNDROME: ICD-10-CM

## 2020-05-20 DIAGNOSIS — I69.351 HEMIPARESIS AFFECTING RIGHT SIDE AS LATE EFFECT OF CEREBROVASCULAR ACCIDENT: ICD-10-CM

## 2020-05-20 DIAGNOSIS — G89.29 CHRONIC BILATERAL LOW BACK PAIN WITH BILATERAL SCIATICA: ICD-10-CM

## 2020-05-20 PROCEDURE — 3079F PR MOST RECENT DIASTOLIC BLOOD PRESSURE 80-89 MM HG: ICD-10-PCS | Mod: HCNC,CPTII,S$GLB, | Performed by: FAMILY MEDICINE

## 2020-05-20 PROCEDURE — 99999 PR PBB SHADOW E&M-EST. PATIENT-LVL III: ICD-10-PCS | Mod: PBBFAC,HCNC,, | Performed by: FAMILY MEDICINE

## 2020-05-20 PROCEDURE — 99999 PR PBB SHADOW E&M-EST. PATIENT-LVL III: CPT | Mod: PBBFAC,HCNC,, | Performed by: FAMILY MEDICINE

## 2020-05-20 PROCEDURE — 99499 UNLISTED E&M SERVICE: CPT | Mod: HCNC,S$GLB,, | Performed by: FAMILY MEDICINE

## 2020-05-20 PROCEDURE — 99214 OFFICE O/P EST MOD 30 MIN: CPT | Mod: HCNC,S$GLB,, | Performed by: FAMILY MEDICINE

## 2020-05-20 PROCEDURE — 3008F PR BODY MASS INDEX (BMI) DOCUMENTED: ICD-10-PCS | Mod: HCNC,CPTII,S$GLB, | Performed by: FAMILY MEDICINE

## 2020-05-20 PROCEDURE — 99214 PR OFFICE/OUTPT VISIT, EST, LEVL IV, 30-39 MIN: ICD-10-PCS | Mod: HCNC,S$GLB,, | Performed by: FAMILY MEDICINE

## 2020-05-20 PROCEDURE — 1101F PR PT FALLS ASSESS DOC 0-1 FALLS W/OUT INJ PAST YR: ICD-10-PCS | Mod: HCNC,CPTII,S$GLB, | Performed by: FAMILY MEDICINE

## 2020-05-20 PROCEDURE — 3075F PR MOST RECENT SYSTOLIC BLOOD PRESS GE 130-139MM HG: ICD-10-PCS | Mod: HCNC,CPTII,S$GLB, | Performed by: FAMILY MEDICINE

## 2020-05-20 PROCEDURE — 3044F PR MOST RECENT HEMOGLOBIN A1C LEVEL <7.0%: ICD-10-PCS | Mod: HCNC,CPTII,S$GLB, | Performed by: FAMILY MEDICINE

## 2020-05-20 PROCEDURE — 3079F DIAST BP 80-89 MM HG: CPT | Mod: HCNC,CPTII,S$GLB, | Performed by: FAMILY MEDICINE

## 2020-05-20 PROCEDURE — 3075F SYST BP GE 130 - 139MM HG: CPT | Mod: HCNC,CPTII,S$GLB, | Performed by: FAMILY MEDICINE

## 2020-05-20 PROCEDURE — 1101F PT FALLS ASSESS-DOCD LE1/YR: CPT | Mod: HCNC,CPTII,S$GLB, | Performed by: FAMILY MEDICINE

## 2020-05-20 PROCEDURE — 3008F BODY MASS INDEX DOCD: CPT | Mod: HCNC,CPTII,S$GLB, | Performed by: FAMILY MEDICINE

## 2020-05-20 PROCEDURE — 3044F HG A1C LEVEL LT 7.0%: CPT | Mod: HCNC,CPTII,S$GLB, | Performed by: FAMILY MEDICINE

## 2020-05-20 PROCEDURE — 99499 RISK ADDL DX/OHS AUDIT: ICD-10-PCS | Mod: HCNC,S$GLB,, | Performed by: FAMILY MEDICINE

## 2020-05-20 RX ORDER — TRIAMCINOLONE ACETONIDE 1 MG/G
OINTMENT TOPICAL 2 TIMES DAILY
COMMUNITY
End: 2022-06-16 | Stop reason: SDUPTHER

## 2020-05-20 RX ORDER — ALLOPURINOL 300 MG/1
300 TABLET ORAL DAILY
Qty: 90 TABLET | Refills: 3 | Status: SHIPPED | OUTPATIENT
Start: 2020-05-20 | End: 2021-06-17 | Stop reason: SDUPTHER

## 2020-05-20 RX ORDER — AMLODIPINE AND BENAZEPRIL HYDROCHLORIDE 10; 40 MG/1; MG/1
1 CAPSULE ORAL DAILY
Qty: 90 CAPSULE | Refills: 3 | Status: SHIPPED | OUTPATIENT
Start: 2020-05-20 | End: 2021-06-17 | Stop reason: SDUPTHER

## 2020-05-20 RX ORDER — GABAPENTIN 300 MG/1
300 CAPSULE ORAL NIGHTLY
Qty: 90 CAPSULE | Refills: 3 | Status: SHIPPED | OUTPATIENT
Start: 2020-05-20 | End: 2021-06-17 | Stop reason: SDUPTHER

## 2020-05-20 RX ORDER — ATORVASTATIN CALCIUM 80 MG/1
80 TABLET, FILM COATED ORAL DAILY
Qty: 90 TABLET | Refills: 4 | Status: SHIPPED | OUTPATIENT
Start: 2020-05-20 | End: 2021-06-17 | Stop reason: SDUPTHER

## 2020-05-20 NOTE — PROGRESS NOTES
Subjective:       Patient ID: Anjel Sanders is a 64 y.o. male.    Chief Complaint: Hypertension (Haven't taken BP meds in 3 days); Medication Refill; and Mole (On back)    64 yr old black male with Hypertension, DM II, Hyperlipidemia, gout, ED, MURTAZA, presents today for 6 month follow up. No new complaints today.    DM II - diet controlled  -A1C                   6.3 (H)             01/06/2020                 - no s/s of DM - foot exam UTD - eye exam due - on ASA and ACE    HTN - controlled - on prinzide - no side effects - does not need refills    HLD - uncontrolled - on statin - LDLCALC                  109.8               01/06/2020                              - on meds - need labs    ED - controlled - need cialis -     Gout - controlled    -  on allopurinol    Health maintenance  -UTD except labs    Hypertension   This is a chronic problem. The current episode started more than 1 year ago. The problem has been gradually improving since onset. The problem is controlled. Pertinent negatives include no anxiety, blurred vision, chest pain, headaches, malaise/fatigue, neck pain, orthopnea, palpitations, peripheral edema, PND, shortness of breath or sweats. There are no associated agents to hypertension. Risk factors for coronary artery disease include dyslipidemia, obesity and male gender. Past treatments include ACE inhibitors and diuretics. The current treatment provides moderate improvement. There are no compliance problems.  There is no history of angina, CAD/MI, CVA, heart failure, left ventricular hypertrophy or retinopathy. There is no history of chronic renal disease, coarctation of the aorta, hypercortisolism, pheochromocytoma, renovascular disease or a thyroid problem.   Hyperlipidemia   This is a chronic problem. The current episode started more than 1 year ago. The problem is controlled. Recent lipid tests were reviewed and are normal. Exacerbating diseases include obesity. He has no history of chronic  renal disease, diabetes, hypothyroidism or liver disease. There are no known factors aggravating his hyperlipidemia. Associated symptoms include myalgias. Pertinent negatives include no chest pain, focal sensory loss, focal weakness, leg pain or shortness of breath. He is currently on no antihyperlipidemic treatment. The current treatment provides moderate improvement of lipids. There are no compliance problems.  Risk factors for coronary artery disease include dyslipidemia, hypertension, obesity and male sex.   Shoulder Injury    The left shoulder is affected. The incident occurred more than 1 week ago. The injury mechanism was a fall. The quality of the pain is described as cramping and shooting. The pain does not radiate. The pain is at a severity of 6/10. The pain is moderate. Pertinent negatives include no chest pain. The symptoms are aggravated by movement, overhead lifting and palpation. He has tried NSAIDs for the symptoms. The treatment provided moderate relief.     Review of Systems   Constitutional: Negative.  Negative for activity change, diaphoresis, malaise/fatigue and unexpected weight change.   HENT: Negative.  Negative for congestion, ear pain, mouth sores, rhinorrhea and voice change.    Eyes: Negative.  Negative for blurred vision, pain, discharge and visual disturbance.   Respiratory: Negative.  Negative for apnea, cough, shortness of breath and wheezing.    Cardiovascular: Negative.  Negative for chest pain, palpitations, orthopnea and PND.   Gastrointestinal: Negative.  Negative for abdominal distention, anal bleeding, diarrhea and vomiting.   Endocrine: Negative.  Negative for cold intolerance and polyuria.   Genitourinary: Negative.  Negative for decreased urine volume, difficulty urinating, discharge, frequency and scrotal swelling.   Musculoskeletal: Positive for myalgias. Negative for back pain, neck pain and neck stiffness.   Skin: Negative.  Negative for color change and rash.    Allergic/Immunologic: Negative.  Negative for environmental allergies and immunocompromised state.   Neurological: Negative.  Negative for dizziness, focal weakness, speech difficulty, weakness, light-headedness and headaches.   Hematological: Negative.    Psychiatric/Behavioral: Negative.  Negative for agitation, dysphoric mood and suicidal ideas. The patient is not nervous/anxious.        PMH/PSH/FH/SH/MED/ALLERGY reviewed    Objective:       Vitals:    05/20/20 0950   BP: 130/80   Pulse: 70   Temp: 98.3 °F (36.8 °C)       Physical Exam   Constitutional: He is oriented to person, place, and time. He appears well-developed and well-nourished. No distress.   HENT:   Head: Normocephalic and atraumatic.   Right Ear: External ear normal.   Left Ear: External ear normal.   Nose: Nose normal.   Mouth/Throat: Oropharynx is clear and moist. No oropharyngeal exudate.   Eyes: Pupils are equal, round, and reactive to light. Conjunctivae and EOM are normal. Right eye exhibits no discharge. Left eye exhibits no discharge. No scleral icterus.   Neck: Normal range of motion. Neck supple. No JVD present. No tracheal deviation present. No thyromegaly present.   Cardiovascular: Normal rate, regular rhythm and intact distal pulses. Exam reveals no gallop and no friction rub.   Murmur (3/6 ESM AORTIC AREA) heard.  Pulmonary/Chest: Effort normal and breath sounds normal. No stridor. No respiratory distress. He has no wheezes. He has no rales. He exhibits no tenderness.   Abdominal: Soft. Bowel sounds are normal. He exhibits no distension and no mass. There is no tenderness. There is no rebound and no guarding. No hernia.   Musculoskeletal: Normal range of motion. He exhibits tenderness (TTP LEFT SHOULDER WITH ROTATOR CUFF IMPINGEMENT). He exhibits no edema.   Lymphadenopathy:     He has no cervical adenopathy.   Neurological: He is alert and oriented to person, place, and time. He has normal reflexes. He displays normal reflexes. No  cranial nerve deficit. He exhibits normal muscle tone. Coordination normal.   No deficits   Skin: Skin is warm and dry. No rash noted. He is not diaphoretic. No erythema. No pallor.   Psychiatric: He has a normal mood and affect. His behavior is normal. Judgment and thought content normal.       Assessment:       1. Controlled type 2 diabetes mellitus without complication, without long-term current use of insulin    2. Dyslipidemia associated with type 2 diabetes mellitus    3. Hemiparesis affecting right side as late effect of cerebrovascular accident    4. Hypertension complicating diabetes    5. Obesity, diabetes, and hypertension syndrome    6. MURTAZA (obstructive sleep apnea)    7. Left sided lacunar stroke    8. Chronic bilateral low back pain with bilateral sciatica    9. Bilateral sciatica    10. Gouty arthritis        Plan:           Anjel was seen today for hypertension, medication refill and mole.    Diagnoses and all orders for this visit:    Controlled type 2 diabetes mellitus without complication, without long-term current use of insulin  -     CBC auto differential; Future  -     Comprehensive metabolic panel; Future  -     Lipid Panel; Future  -     Hemoglobin A1C; Future    Dyslipidemia associated with type 2 diabetes mellitus  -     atorvastatin (LIPITOR) 80 MG tablet; Take 1 tablet (80 mg total) by mouth once daily.  -     CBC auto differential; Future  -     Comprehensive metabolic panel; Future  -     Lipid Panel; Future    Hemiparesis affecting right side as late effect of cerebrovascular accident    Hypertension complicating diabetes  -     amLODIPine-benazepriL (LOTREL) 10-40 mg per capsule; Take 1 capsule by mouth once daily.  -     CBC auto differential; Future  -     Comprehensive metabolic panel; Future  -     Lipid Panel; Future    Obesity, diabetes, and hypertension syndrome    MURTAZA (obstructive sleep apnea)    Left sided lacunar stroke    Chronic bilateral low back pain with bilateral  sciatica  -     gabapentin (NEURONTIN) 300 MG capsule; Take 1 capsule (300 mg total) by mouth every evening.    Bilateral sciatica  -     gabapentin (NEURONTIN) 300 MG capsule; Take 1 capsule (300 mg total) by mouth every evening.    Gouty arthritis  -     allopurinoL (ZYLOPRIM) 300 MG tablet; Take 1 tablet (300 mg total) by mouth once daily.        HTN/HLD   -controlled - stable - on meds       HEART MURMUR  -ECHO reassurance      ED and Gout   -controlled     MURTAZA  -stable    CVA  -on ASA and plavix      DM II  -controlled  -diet and exercise only    Obesity  -diet and exercise  -weight loss      25 minutes spent during this visit of which greater than 50% devoted to face-face counseling and coordination of care regarding diagnosis and management plan     Spent adequate time in obtaining history and explaining differentials       Follow up in about 4 weeks (around 6/17/2020), or if symptoms worsen or fail to improve.

## 2020-05-28 ENCOUNTER — TELEPHONE (OUTPATIENT)
Dept: ORTHOPEDICS | Facility: CLINIC | Age: 65
End: 2020-05-28

## 2020-05-28 NOTE — TELEPHONE ENCOUNTER
----- Message from Matt Mueller sent at 5/28/2020  2:46 PM CDT -----  Contact: Pt   Pt would like to be called back regarding scheduling appt due to right shoulder pain.    Pt can be reached at [phone].    Thank You

## 2020-06-05 ENCOUNTER — PATIENT OUTREACH (OUTPATIENT)
Dept: ADMINISTRATIVE | Facility: HOSPITAL | Age: 65
End: 2020-06-05

## 2020-06-15 ENCOUNTER — PATIENT OUTREACH (OUTPATIENT)
Dept: ADMINISTRATIVE | Facility: OTHER | Age: 65
End: 2020-06-15

## 2020-06-16 ENCOUNTER — TELEPHONE (OUTPATIENT)
Dept: ORTHOPEDICS | Facility: CLINIC | Age: 65
End: 2020-06-16

## 2020-06-16 ENCOUNTER — OFFICE VISIT (OUTPATIENT)
Dept: ORTHOPEDICS | Facility: CLINIC | Age: 65
End: 2020-06-16
Payer: MEDICARE

## 2020-06-16 ENCOUNTER — HOSPITAL ENCOUNTER (OUTPATIENT)
Dept: RADIOLOGY | Facility: HOSPITAL | Age: 65
Discharge: HOME | End: 2020-06-16
Attending: ORTHOPAEDIC SURGERY
Payer: MEDICARE

## 2020-06-16 VITALS — WEIGHT: 227 LBS | HEIGHT: 70 IN | BODY MASS INDEX: 32.5 KG/M2

## 2020-06-16 DIAGNOSIS — S46.011D TRAUMATIC COMPLETE TEAR OF RIGHT ROTATOR CUFF, SUBSEQUENT ENCOUNTER: ICD-10-CM

## 2020-06-16 DIAGNOSIS — M25.511 RIGHT SHOULDER PAIN, UNSPECIFIED CHRONICITY: ICD-10-CM

## 2020-06-16 DIAGNOSIS — M25.511 RIGHT SHOULDER PAIN, UNSPECIFIED CHRONICITY: Primary | ICD-10-CM

## 2020-06-16 PROBLEM — S46.011A TRAUMATIC COMPLETE TEAR OF RIGHT ROTATOR CUFF: Status: ACTIVE | Noted: 2020-06-16

## 2020-06-16 PROCEDURE — 73030 X-RAY EXAM OF SHOULDER: CPT | Mod: TC,HCNC,PN,RT

## 2020-06-16 PROCEDURE — 73030 X-RAY EXAM OF SHOULDER: CPT | Mod: 26,HCNC,RT, | Performed by: RADIOLOGY

## 2020-06-16 PROCEDURE — 3288F PR FALLS RISK ASSESSMENT DOCUMENTED: ICD-10-PCS | Mod: HCNC,CPTII,S$GLB, | Performed by: ORTHOPAEDIC SURGERY

## 2020-06-16 PROCEDURE — 99999 PR PBB SHADOW E&M-EST. PATIENT-LVL III: ICD-10-PCS | Mod: PBBFAC,HCNC,, | Performed by: ORTHOPAEDIC SURGERY

## 2020-06-16 PROCEDURE — 3008F PR BODY MASS INDEX (BMI) DOCUMENTED: ICD-10-PCS | Mod: HCNC,CPTII,S$GLB, | Performed by: ORTHOPAEDIC SURGERY

## 2020-06-16 PROCEDURE — 99214 OFFICE O/P EST MOD 30 MIN: CPT | Mod: HCNC,S$GLB,, | Performed by: ORTHOPAEDIC SURGERY

## 2020-06-16 PROCEDURE — 73030 XR SHOULDER COMPLETE 2 OR MORE VIEWS RIGHT: ICD-10-PCS | Mod: 26,HCNC,RT, | Performed by: RADIOLOGY

## 2020-06-16 PROCEDURE — 3008F BODY MASS INDEX DOCD: CPT | Mod: HCNC,CPTII,S$GLB, | Performed by: ORTHOPAEDIC SURGERY

## 2020-06-16 PROCEDURE — 99999 PR PBB SHADOW E&M-EST. PATIENT-LVL III: CPT | Mod: PBBFAC,HCNC,, | Performed by: ORTHOPAEDIC SURGERY

## 2020-06-16 PROCEDURE — 99214 PR OFFICE/OUTPT VISIT, EST, LEVL IV, 30-39 MIN: ICD-10-PCS | Mod: HCNC,S$GLB,, | Performed by: ORTHOPAEDIC SURGERY

## 2020-06-16 PROCEDURE — 1100F PR PT FALLS ASSESS DOC 2+ FALLS/FALL W/INJURY/YR: ICD-10-PCS | Mod: HCNC,CPTII,S$GLB, | Performed by: ORTHOPAEDIC SURGERY

## 2020-06-16 PROCEDURE — 3288F FALL RISK ASSESSMENT DOCD: CPT | Mod: HCNC,CPTII,S$GLB, | Performed by: ORTHOPAEDIC SURGERY

## 2020-06-16 PROCEDURE — 1100F PTFALLS ASSESS-DOCD GE2>/YR: CPT | Mod: HCNC,CPTII,S$GLB, | Performed by: ORTHOPAEDIC SURGERY

## 2020-06-16 RX ORDER — HYDROCODONE BITARTRATE AND ACETAMINOPHEN 5; 325 MG/1; MG/1
1 TABLET ORAL EVERY 4 HOURS PRN
Qty: 30 TABLET | Refills: 0 | Status: SHIPPED | OUTPATIENT
Start: 2020-06-16 | End: 2020-06-26

## 2020-06-16 RX ORDER — HYDROCODONE BITARTRATE AND ACETAMINOPHEN 5; 325 MG/1; MG/1
1 TABLET ORAL EVERY 4 HOURS PRN
Qty: 30 TABLET | Refills: 0 | Status: SHIPPED | OUTPATIENT
Start: 2020-06-16 | End: 2020-06-16 | Stop reason: SDUPTHER

## 2020-06-16 NOTE — PROGRESS NOTES
CC:  Rotator cuff tear traumatic right shoulder        HPI:  Anjel Sanders is a very pleasant 64 y.o. male sustained injury to his right shoulder about 6 months ago in December of last year  At that time I had ordered an MRI scan but apparently was delayed by the COVID crisis  He does continue to have pain in the right shoulder related to the injury he recently had an MRI scan of the right shoulder           PAST MEDICAL HISTORY:   Past Medical History:   Diagnosis Date    AR (allergic rhinitis)     Gouty arthritis     Hx of colonic polyps     Tubular Adenoma    MURTAZA (obstructive sleep apnea)     Other and unspecified hyperlipidemia     Stroke     Unspecified essential hypertension      PAST SURGICAL HISTORY:   Past Surgical History:   Procedure Laterality Date    ARTHROSCOPIC REPAIR OF ROTATOR CUFF OF SHOULDER Left 4/30/2019    Procedure: REPAIR, ROTATOR CUFF, ARTHROSCOPIC;  Surgeon: Bob Esparza Jr., MD;  Location: [facility] OR;  Service: Orthopedics;  Laterality: Left;  need opus sytem (Jluis notified)  video    ARTHROSCOPY OF SHOULDER WITH DECOMPRESSION OF SUBACROMIAL SPACE  4/30/2019    Procedure: ARTHROSCOPY, SHOULDER, WITH SUBACROMIAL SPACE DECOMPRESSION;  Surgeon: Bob Esparza Jr., MD;  Location: [facility] OR;  Service: Orthopedics;;    FOOT SURGERY      surgery on jaw       FAMILY HISTORY:   Family History   Problem Relation Age of Onset    Stomach cancer Sister     Stroke Sister     Hypertension Mother     Glaucoma Mother     Diabetes Sister     Heart failure Sister     No Known Problems Father     No Known Problems Brother     No Known Problems Maternal Aunt     No Known Problems Maternal Uncle     No Known Problems Paternal Aunt     No Known Problems Paternal Uncle     No Known Problems Maternal Grandmother     No Known Problems Maternal Grandfather     No Known Problems Paternal Grandmother     No Known Problems Paternal Grandfather     Amblyopia Neg Hx     Blindness Neg Hx      Cancer Neg Hx     Cataracts Neg Hx     Macular degeneration Neg Hx     Retinal detachment Neg Hx     Strabismus Neg Hx     Thyroid disease Neg Hx      SOCIAL HISTORY:   Social History     Socioeconomic History    Marital status:      Spouse name: Not on file    Number of children: Not on file    Years of education: Not on file    Highest education level: Not on file   Occupational History    Not on file   Social Needs    Financial resource strain: Not on file    Food insecurity     Worry: Not on file     Inability: Not on file    Transportation needs     Medical: Not on file     Non-medical: Not on file   Tobacco Use    Smoking status: Never Smoker    Smokeless tobacco: Never Used   Substance and Sexual Activity    Alcohol use: Yes     Alcohol/week: 9.3 standard drinks     Types: 6 Cans of beer, 4 Standard drinks or equivalent per week     Comment: occas    Drug use: Not on file    Sexual activity: Not on file   Lifestyle    Physical activity     Days per week: Not on file     Minutes per session: Not on file    Stress: Not on file   Relationships    Social connections     Talks on phone: Not on file     Gets together: Not on file     Attends Orthodox service: Not on file     Active member of club or organization: Not on file     Attends meetings of clubs or organizations: Not on file     Relationship status: Not on file   Other Topics Concern    Not on file   Social History Narrative     with [facility]       MEDICATIONS:   Current Outpatient Medications:     allopurinoL (ZYLOPRIM) 300 MG tablet, Take 1 tablet (300 mg total) by mouth once daily., Disp: 90 tablet, Rfl: 3    amLODIPine-benazepriL (LOTREL) 10-40 mg per capsule, Take 1 capsule by mouth once daily., Disp: 90 capsule, Rfl: 3    aspirin (ECOTRIN) 81 MG EC tablet, Take 81 mg by mouth once daily., Disp: , Rfl:     atorvastatin (LIPITOR) 80 MG tablet, Take 1 tablet (80 mg total) by mouth once  "daily., Disp: 90 tablet, Rfl: 4    gabapentin (NEURONTIN) 300 MG capsule, Take 1 capsule (300 mg total) by mouth every evening., Disp: 90 capsule, Rfl: 3    hydrOXYzine HCl (ATARAX) 25 MG tablet, TAKE 1 TABLET BY MOUTH THREE TIMES A DAY AS NEEDED FOR ITCHING, Disp: 30 tablet, Rfl: 0    triamcinolone acetonide 0.1% (KENALOG) 0.1 % ointment, Apply topically 2 (two) times daily., Disp: , Rfl:   ALLERGIES: Review of patient's allergies indicates:  No Known Allergies    Review of Systems:  Constitutional: no fever or chills  ENT: no nasal congestion or sore throat  Respiratory: no cough or shortness of breath  Cardiovascular: no chest pain or palpitations  Gastrointestinal: no nausea or vomiting, PUD, GERD, NSAID intolerance  Genitourinary: no hematuria or dysuria  Integument/Breast: no rash or pruritis  Hematologic/Lymphatic: no easy bruising or lymphadenopathy  Musculoskeletal: see HPI  Neurological: no seizures or tremors  Behavioral/Psych: no auditory or visual hallucinations      Physical Exam   Vitals:    06/16/20 0944   Weight: 103 kg (227 lb)   Height: 5' 10" (1.778 m)   PainSc:   9       Constitutional: Oriented to person, place, and time. Appears well-developed and well-nourished.   HENT:   Head: Normocephalic and atraumatic.   Nose: Nose normal.   Eyes: No scleral icterus.   Neck: Normal range of motion.   Cardiovascular: Normal rate and regular rhythm.    Pulses:       Radial pulses are 2+ on the right side, and 2+ on the left side.   Pulmonary/Chest: Effort normal and breath sounds normal.   Abdominal: Soft.   Neurological: Alert and oriented to person, place, and time.   Skin: Skin is warm.   Psychiatric: Normal mood and affect.     MUSCULOSKELETAL UPPER EXTREMITY:  Examination right shoulder no tenderness no swelling  Range of motion right shoulder is slightly decreased secondary to pain and some stiffness  He does have weakness of the rotator cuff  Positive impingement sign  Positive supraspinatus " "stress test  Positive drop-arm test  Neurologic exam intact            Diagnostic Studies:  MRI right shoulder demonstrates large rotator cuff tear with retraction all the way to the glenoid rim slight elevation of the humeral head  AP lateral x-ray right shoulder demonstrates large bone spur anteriorly and elevation of the humeral head        Assessment:  Chronic traumatic rotator cuff tear right shoulder with significant retraction    Plan:  I explained the nature of the problem to the patient  Think this does require surgery most likely with an open repair as opposed to a scope procedure.  I do not think a scope would be possible to mobilize the tendon and he may in fact need a graft done open repair of the rotator cuff  The risks and benefits of surgery explained to the patient he understands we will set up surgery for the right shoulder as an open repair  The risks associated with the COVID crisis were also explained to the patient      The risks and benefits of surgery were discussed with the patient today and they understand.  The consent was signed in the office for surgery.      Bob Esparza MD (Jay)  [facility]  Orthopedic Upper Extremity Surgery      "

## 2020-06-16 NOTE — LETTER
June 16, 2020      Lars Payan MD  [address]  [address]  [facility] [address]           [facility]  [address]  [facility] [address]  Phone: [phone]          Patient: Anjel Sanders   MR Number: [id number]   YOB: 1955   Date of Visit: 6/16/2020       Dear Dr. Lars Payan:    Thank you for referring Anjel Sanders to me for evaluation. Attached you will find relevant portions of my assessment and plan of care.    If you have questions, please do not hesitate to call me. I look forward to following Anjel Sanders along with you.    Sincerely,    Bob Esparza Jr., MD    Enclosure  CC:  No Recipients    If you would like to receive this communication electronically, please contact [email or web address] or [phone] to request more information on Tweet Category Link access.    For providers and/or their staff who would like to refer a patient to [facility], please contact us through our one-stop-shop provider referral line, [facility], at [phone].    If you feel you have received this communication in error or would no longer like to receive these types of communications, please e-mail [email or web address]

## 2020-06-18 ENCOUNTER — LAB VISIT (OUTPATIENT)
Dept: LAB | Facility: HOSPITAL | Age: 65
End: 2020-06-18
Attending: FAMILY MEDICINE
Payer: MEDICARE

## 2020-06-18 DIAGNOSIS — E78.5 DYSLIPIDEMIA ASSOCIATED WITH TYPE 2 DIABETES MELLITUS: ICD-10-CM

## 2020-06-18 DIAGNOSIS — E11.9 CONTROLLED TYPE 2 DIABETES MELLITUS WITHOUT COMPLICATION, WITHOUT LONG-TERM CURRENT USE OF INSULIN: ICD-10-CM

## 2020-06-18 DIAGNOSIS — E11.59 HYPERTENSION COMPLICATING DIABETES: ICD-10-CM

## 2020-06-18 DIAGNOSIS — I15.2 HYPERTENSION COMPLICATING DIABETES: ICD-10-CM

## 2020-06-18 DIAGNOSIS — E11.69 DYSLIPIDEMIA ASSOCIATED WITH TYPE 2 DIABETES MELLITUS: ICD-10-CM

## 2020-06-18 LAB
ALBUMIN SERPL BCP-MCNC: 4 G/DL (ref 3.5–5.2)
ALP SERPL-CCNC: 84 U/L (ref 55–135)
ALT SERPL W/O P-5'-P-CCNC: 34 U/L (ref 10–44)
ANION GAP SERPL CALC-SCNC: 7 MMOL/L (ref 8–16)
AST SERPL-CCNC: 25 U/L (ref 10–40)
BASOPHILS # BLD AUTO: 0.01 K/UL (ref 0–0.2)
BASOPHILS NFR BLD: 0.2 % (ref 0–1.9)
BILIRUB SERPL-MCNC: 0.9 MG/DL (ref 0.1–1)
BUN SERPL-MCNC: 10 MG/DL (ref 8–23)
CALCIUM SERPL-MCNC: 9.4 MG/DL (ref 8.7–10.5)
CHLORIDE SERPL-SCNC: 106 MMOL/L (ref 95–110)
CHOLEST SERPL-MCNC: 138 MG/DL (ref 120–199)
CHOLEST/HDLC SERPL: 3.5 {RATIO} (ref 2–5)
CO2 SERPL-SCNC: 29 MMOL/L (ref 23–29)
CREAT SERPL-MCNC: 1.2 MG/DL (ref 0.5–1.4)
DIFFERENTIAL METHOD: ABNORMAL
EOSINOPHIL # BLD AUTO: 0.3 K/UL (ref 0–0.5)
EOSINOPHIL NFR BLD: 5.2 % (ref 0–8)
ERYTHROCYTE [DISTWIDTH] IN BLOOD BY AUTOMATED COUNT: 12.9 % (ref 11.5–14.5)
EST. GFR  (AFRICAN AMERICAN): >60 ML/MIN/1.73 M^2
EST. GFR  (NON AFRICAN AMERICAN): >60 ML/MIN/1.73 M^2
ESTIMATED AVG GLUCOSE: 137 MG/DL (ref 68–131)
GLUCOSE SERPL-MCNC: 107 MG/DL (ref 70–110)
HBA1C MFR BLD HPLC: 6.4 % (ref 4–5.6)
HCT VFR BLD AUTO: 42.6 % (ref 40–54)
HDLC SERPL-MCNC: 40 MG/DL (ref 40–75)
HDLC SERPL: 29 % (ref 20–50)
HGB BLD-MCNC: 13.5 G/DL (ref 14–18)
IMM GRANULOCYTES # BLD AUTO: 0.01 K/UL (ref 0–0.04)
IMM GRANULOCYTES NFR BLD AUTO: 0.2 % (ref 0–0.5)
LDLC SERPL CALC-MCNC: 83.8 MG/DL (ref 63–159)
LYMPHOCYTES # BLD AUTO: 1.8 K/UL (ref 1–4.8)
LYMPHOCYTES NFR BLD: 38.1 % (ref 18–48)
MCH RBC QN AUTO: 28.3 PG (ref 27–31)
MCHC RBC AUTO-ENTMCNC: 31.7 G/DL (ref 32–36)
MCV RBC AUTO: 89 FL (ref 82–98)
MONOCYTES # BLD AUTO: 0.6 K/UL (ref 0.3–1)
MONOCYTES NFR BLD: 12.3 % (ref 4–15)
NEUTROPHILS # BLD AUTO: 2.1 K/UL (ref 1.8–7.7)
NEUTROPHILS NFR BLD: 44 % (ref 38–73)
NONHDLC SERPL-MCNC: 98 MG/DL
NRBC BLD-RTO: 0 /100 WBC
PLATELET # BLD AUTO: 236 K/UL (ref 150–350)
PMV BLD AUTO: 10.6 FL (ref 9.2–12.9)
POTASSIUM SERPL-SCNC: 4.1 MMOL/L (ref 3.5–5.1)
PROT SERPL-MCNC: 7.7 G/DL (ref 6–8.4)
RBC # BLD AUTO: 4.77 M/UL (ref 4.6–6.2)
SODIUM SERPL-SCNC: 142 MMOL/L (ref 136–145)
TRIGL SERPL-MCNC: 71 MG/DL (ref 30–150)
WBC # BLD AUTO: 4.8 K/UL (ref 3.9–12.7)

## 2020-06-18 PROCEDURE — 36415 COLL VENOUS BLD VENIPUNCTURE: CPT | Mod: HCNC

## 2020-06-18 PROCEDURE — 80053 COMPREHEN METABOLIC PANEL: CPT | Mod: HCNC

## 2020-06-18 PROCEDURE — 83036 HEMOGLOBIN GLYCOSYLATED A1C: CPT | Mod: HCNC

## 2020-06-18 PROCEDURE — 80061 LIPID PANEL: CPT | Mod: HCNC

## 2020-06-18 PROCEDURE — 85025 COMPLETE CBC W/AUTO DIFF WBC: CPT | Mod: HCNC

## 2020-06-19 ENCOUNTER — OFFICE VISIT (OUTPATIENT)
Dept: FAMILY MEDICINE | Facility: CLINIC | Age: 65
End: 2020-06-19
Attending: FAMILY MEDICINE
Payer: MEDICARE

## 2020-06-19 VITALS
DIASTOLIC BLOOD PRESSURE: 76 MMHG | HEIGHT: 70 IN | OXYGEN SATURATION: 99 % | HEART RATE: 63 BPM | SYSTOLIC BLOOD PRESSURE: 126 MMHG | BODY MASS INDEX: 32.32 KG/M2 | TEMPERATURE: 98 F | WEIGHT: 225.75 LBS

## 2020-06-19 DIAGNOSIS — E78.5 DYSLIPIDEMIA ASSOCIATED WITH TYPE 2 DIABETES MELLITUS: ICD-10-CM

## 2020-06-19 DIAGNOSIS — E11.69 DYSLIPIDEMIA ASSOCIATED WITH TYPE 2 DIABETES MELLITUS: ICD-10-CM

## 2020-06-19 DIAGNOSIS — I15.2 HYPERTENSION COMPLICATING DIABETES: Primary | ICD-10-CM

## 2020-06-19 DIAGNOSIS — E11.9 CONTROLLED TYPE 2 DIABETES MELLITUS WITHOUT COMPLICATION, WITHOUT LONG-TERM CURRENT USE OF INSULIN: ICD-10-CM

## 2020-06-19 DIAGNOSIS — E11.59 HYPERTENSION COMPLICATING DIABETES: Primary | ICD-10-CM

## 2020-06-19 DIAGNOSIS — G47.33 OSA (OBSTRUCTIVE SLEEP APNEA): ICD-10-CM

## 2020-06-19 DIAGNOSIS — I69.351 HEMIPARESIS AFFECTING RIGHT SIDE AS LATE EFFECT OF CEREBROVASCULAR ACCIDENT: ICD-10-CM

## 2020-06-19 DIAGNOSIS — E66.9 OBESITY (BMI 30-39.9): ICD-10-CM

## 2020-06-19 PROCEDURE — 3008F PR BODY MASS INDEX (BMI) DOCUMENTED: ICD-10-PCS | Mod: HCNC,CPTII,S$GLB, | Performed by: FAMILY MEDICINE

## 2020-06-19 PROCEDURE — 99214 PR OFFICE/OUTPT VISIT, EST, LEVL IV, 30-39 MIN: ICD-10-PCS | Mod: HCNC,S$GLB,, | Performed by: FAMILY MEDICINE

## 2020-06-19 PROCEDURE — 3078F DIAST BP <80 MM HG: CPT | Mod: HCNC,CPTII,S$GLB, | Performed by: FAMILY MEDICINE

## 2020-06-19 PROCEDURE — 1101F PR PT FALLS ASSESS DOC 0-1 FALLS W/OUT INJ PAST YR: ICD-10-PCS | Mod: HCNC,CPTII,S$GLB, | Performed by: FAMILY MEDICINE

## 2020-06-19 PROCEDURE — 3078F PR MOST RECENT DIASTOLIC BLOOD PRESSURE < 80 MM HG: ICD-10-PCS | Mod: HCNC,CPTII,S$GLB, | Performed by: FAMILY MEDICINE

## 2020-06-19 PROCEDURE — 3044F HG A1C LEVEL LT 7.0%: CPT | Mod: HCNC,CPTII,S$GLB, | Performed by: FAMILY MEDICINE

## 2020-06-19 PROCEDURE — 3074F PR MOST RECENT SYSTOLIC BLOOD PRESSURE < 130 MM HG: ICD-10-PCS | Mod: HCNC,CPTII,S$GLB, | Performed by: FAMILY MEDICINE

## 2020-06-19 PROCEDURE — 3044F PR MOST RECENT HEMOGLOBIN A1C LEVEL <7.0%: ICD-10-PCS | Mod: HCNC,CPTII,S$GLB, | Performed by: FAMILY MEDICINE

## 2020-06-19 PROCEDURE — 3008F BODY MASS INDEX DOCD: CPT | Mod: HCNC,CPTII,S$GLB, | Performed by: FAMILY MEDICINE

## 2020-06-19 PROCEDURE — 1101F PT FALLS ASSESS-DOCD LE1/YR: CPT | Mod: HCNC,CPTII,S$GLB, | Performed by: FAMILY MEDICINE

## 2020-06-19 PROCEDURE — 99999 PR PBB SHADOW E&M-EST. PATIENT-LVL IV: CPT | Mod: PBBFAC,HCNC,, | Performed by: FAMILY MEDICINE

## 2020-06-19 PROCEDURE — 99214 OFFICE O/P EST MOD 30 MIN: CPT | Mod: HCNC,S$GLB,, | Performed by: FAMILY MEDICINE

## 2020-06-19 PROCEDURE — 99999 PR PBB SHADOW E&M-EST. PATIENT-LVL IV: ICD-10-PCS | Mod: PBBFAC,HCNC,, | Performed by: FAMILY MEDICINE

## 2020-06-19 PROCEDURE — 3074F SYST BP LT 130 MM HG: CPT | Mod: HCNC,CPTII,S$GLB, | Performed by: FAMILY MEDICINE

## 2020-06-19 NOTE — PROGRESS NOTES
Subjective:       Patient ID: Anjel Sanders is a 64 y.o. male.    Chief Complaint: Results (Labs)    64 yr old black male with Hypertension, DM II, Hyperlipidemia, gout, ED, MURTAZA, presents today for 6 month follow up. No new complaints today.    DM II - diet controlled  -\A1C                   6.4 (H)             06/18/2020                 - no s/s of DM - foot exam UTD - eye exam due - on ASA and ACE    HTN - controlled - on prinzide - no side effects - does not need refills    HLD - uncontrolled - on statin -  LDLCALC                  83.8                06/18/2020                                   - on meds - need labs    ED - controlled - need cialis -     Gout - controlled    -  on allopurinol    Health maintenance  -UTD except labs    Hypertension  This is a chronic problem. The current episode started more than 1 year ago. The problem has been gradually improving since onset. The problem is controlled. Pertinent negatives include no anxiety, blurred vision, chest pain, headaches, malaise/fatigue, neck pain, orthopnea, palpitations, peripheral edema, PND or sweats. There are no associated agents to hypertension. Risk factors for coronary artery disease include dyslipidemia, obesity and male gender. Past treatments include ACE inhibitors and diuretics. The current treatment provides moderate improvement. There are no compliance problems.  There is no history of angina, CAD/MI, CVA, heart failure, left ventricular hypertrophy or retinopathy. There is no history of chronic renal disease, coarctation of the aorta, hypercortisolism, pheochromocytoma, renovascular disease or a thyroid problem.   Hyperlipidemia  This is a chronic problem. The current episode started more than 1 year ago. The problem is controlled. Recent lipid tests were reviewed and are normal. Exacerbating diseases include obesity. He has no history of chronic renal disease, diabetes, hypothyroidism or liver disease. There are no known factors  aggravating his hyperlipidemia. Associated symptoms include myalgias. Pertinent negatives include no chest pain, focal sensory loss, focal weakness or leg pain. He is currently on no antihyperlipidemic treatment. The current treatment provides moderate improvement of lipids. There are no compliance problems.  Risk factors for coronary artery disease include dyslipidemia, hypertension, obesity and male sex.   Shoulder Injury   The left shoulder is affected. The incident occurred more than 1 week ago. The injury mechanism was a fall. The quality of the pain is described as cramping and shooting. The pain does not radiate. The pain is at a severity of 6/10. The pain is moderate. Pertinent negatives include no chest pain. The symptoms are aggravated by movement, overhead lifting and palpation. He has tried NSAIDs for the symptoms. The treatment provided moderate relief.     Review of Systems   Constitutional: Negative.  Negative for activity change, diaphoresis, malaise/fatigue and unexpected weight change.   HENT: Negative.  Negative for nasal congestion, ear pain, mouth sores, rhinorrhea and voice change.    Eyes: Negative.  Negative for blurred vision, pain, discharge and visual disturbance.   Respiratory: Negative.  Negative for apnea, cough and wheezing.    Cardiovascular: Negative.  Negative for chest pain, palpitations, orthopnea and PND.   Gastrointestinal: Negative.  Negative for abdominal distention, anal bleeding, diarrhea and vomiting.   Endocrine: Negative.  Negative for cold intolerance and polyuria.   Genitourinary: Negative.  Negative for decreased urine volume, difficulty urinating, discharge, frequency and scrotal swelling.   Musculoskeletal: Positive for myalgias. Negative for back pain, leg pain, neck pain and neck stiffness.   Integumentary:  Negative for color change and rash. Negative.   Allergic/Immunologic: Negative.  Negative for environmental allergies.   Neurological: Negative.  Negative for  dizziness, focal weakness, speech difficulty, weakness, light-headedness and headaches.   Hematological: Negative.    Psychiatric/Behavioral: Negative.  Negative for agitation, dysphoric mood and suicidal ideas. The patient is not nervous/anxious.      PMH/PSH/FH/SH/MED/ALLERGY reviewed        Objective:       Vitals:    06/19/20 0839   BP: 126/76   Pulse: 63   Temp: 98.4 °F (36.9 °C)       Physical Exam  Constitutional:       General: He is not in acute distress.     Appearance: He is well-developed. He is not diaphoretic.   HENT:      Head: Normocephalic and atraumatic.      Right Ear: External ear normal.      Left Ear: External ear normal.      Nose: Nose normal.      Mouth/Throat:      Pharynx: No oropharyngeal exudate.   Eyes:      General: No scleral icterus.        Right eye: No discharge.         Left eye: No discharge.      Conjunctiva/sclera: Conjunctivae normal.      Pupils: Pupils are equal, round, and reactive to light.   Neck:      Musculoskeletal: Normal range of motion and neck supple.      Thyroid: No thyromegaly.      Vascular: No JVD.      Trachea: No tracheal deviation.   Cardiovascular:      Rate and Rhythm: Normal rate and regular rhythm.      Heart sounds: Murmur (3/6 ESM AORTIC AREA) present. No friction rub. No gallop.    Pulmonary:      Effort: Pulmonary effort is normal. No respiratory distress.      Breath sounds: Normal breath sounds. No stridor. No wheezing or rales.   Chest:      Chest wall: No tenderness.   Abdominal:      General: Bowel sounds are normal. There is no distension.      Palpations: Abdomen is soft. There is no mass.      Tenderness: There is no abdominal tenderness. There is no guarding or rebound.      Hernia: No hernia is present.   Musculoskeletal: Normal range of motion.         General: Tenderness (TTP LEFT SHOULDER WITH ROTATOR CUFF IMPINGEMENT) present.   Lymphadenopathy:      Cervical: No cervical adenopathy.   Skin:     General: Skin is warm and dry.       Coloration: Skin is not pale.      Findings: No erythema or rash.   Neurological:      Mental Status: He is alert and oriented to person, place, and time.      Cranial Nerves: No cranial nerve deficit.      Motor: No abnormal muscle tone.      Coordination: Coordination normal.      Deep Tendon Reflexes: Reflexes are normal and symmetric. Reflexes normal.      Comments: No deficits   Psychiatric:         Behavior: Behavior normal.         Thought Content: Thought content normal.         Judgment: Judgment normal.         Assessment:       1. Hypertension complicating diabetes    2. Dyslipidemia associated with type 2 diabetes mellitus    3. Controlled type 2 diabetes mellitus without complication, without long-term current use of insulin    4. Obesity (BMI 30-39.9)    5. MURTAZA (obstructive sleep apnea)    6. Hemiparesis affecting right side as late effect of cerebrovascular accident        Plan:           Anjel was seen today for results.    Diagnoses and all orders for this visit:    Hypertension complicating diabetes    Dyslipidemia associated with type 2 diabetes mellitus    Controlled type 2 diabetes mellitus without complication, without long-term current use of insulin    Obesity (BMI 30-39.9)    MURTAZA (obstructive sleep apnea)    Hemiparesis affecting right side as late effect of cerebrovascular accident        HTN/HLD   -controlled - stable - on meds       HEART MURMUR  -ECHO reassurance      ED and Gout   -controlled     MURTAZA  -stable    CVA  -on ASA and plavix      DM II  -controlled  -diet and exercise only    Obesity  -diet and exercise  -weight loss      25 minutes spent during this visit of which greater than 50% devoted to face-face counseling and coordination of care regarding diagnosis and management plan     Spent adequate time in obtaining history and explaining differentials     Follow up in about 6 months (around 12/19/2020), or if symptoms worsen or fail to improve.

## 2020-07-16 ENCOUNTER — ANESTHESIA EVENT (OUTPATIENT)
Dept: SURGERY | Facility: HOSPITAL | Age: 65
End: 2020-07-16
Payer: MEDICARE

## 2020-07-22 ENCOUNTER — TELEPHONE (OUTPATIENT)
Dept: SURGERY | Facility: HOSPITAL | Age: 65
End: 2020-07-22

## 2020-07-31 ENCOUNTER — HOSPITAL ENCOUNTER (OUTPATIENT)
Dept: PREADMISSION TESTING | Facility: HOSPITAL | Age: 65
Discharge: HOME | End: 2020-07-31
Attending: ORTHOPAEDIC SURGERY
Payer: MEDICARE

## 2020-07-31 ENCOUNTER — TELEPHONE (OUTPATIENT)
Dept: ORTHOPEDICS | Facility: CLINIC | Age: 65
End: 2020-07-31

## 2020-07-31 VITALS
HEART RATE: 63 BPM | DIASTOLIC BLOOD PRESSURE: 78 MMHG | HEIGHT: 70 IN | OXYGEN SATURATION: 98 % | TEMPERATURE: 99 F | RESPIRATION RATE: 18 BRPM | BODY MASS INDEX: 32.07 KG/M2 | WEIGHT: 224 LBS | SYSTOLIC BLOOD PRESSURE: 152 MMHG

## 2020-07-31 DIAGNOSIS — Z01.818 PREOP TESTING: Primary | ICD-10-CM

## 2020-07-31 DIAGNOSIS — S46.011D TRAUMATIC COMPLETE TEAR OF RIGHT ROTATOR CUFF, SUBSEQUENT ENCOUNTER: ICD-10-CM

## 2020-07-31 DIAGNOSIS — M25.511 RIGHT SHOULDER PAIN, UNSPECIFIED CHRONICITY: ICD-10-CM

## 2020-07-31 PROCEDURE — 93010 EKG 12-LEAD: ICD-10-PCS | Mod: HCNC,,, | Performed by: INTERNAL MEDICINE

## 2020-07-31 PROCEDURE — 93010 ELECTROCARDIOGRAM REPORT: CPT | Mod: HCNC,,, | Performed by: INTERNAL MEDICINE

## 2020-07-31 PROCEDURE — 93005 ELECTROCARDIOGRAM TRACING: CPT | Mod: HCNC

## 2020-07-31 RX ORDER — SODIUM CHLORIDE, SODIUM LACTATE, POTASSIUM CHLORIDE, CALCIUM CHLORIDE 600; 310; 30; 20 MG/100ML; MG/100ML; MG/100ML; MG/100ML
INJECTION, SOLUTION INTRAVENOUS CONTINUOUS
Status: CANCELLED | OUTPATIENT
Start: 2020-07-31

## 2020-07-31 RX ORDER — LIDOCAINE HYDROCHLORIDE 10 MG/ML
1 INJECTION, SOLUTION EPIDURAL; INFILTRATION; INTRACAUDAL; PERINEURAL ONCE
Status: CANCELLED | OUTPATIENT
Start: 2020-07-31 | End: 2020-07-31

## 2020-07-31 NOTE — ANESTHESIA PREPROCEDURE EVALUATION
07/31/2020  Anjel Sanders is a 64 y.o., male scheduled for right RCR on 8/4/2020.    Past Medical History:   Diagnosis Date    AR (allergic rhinitis)     Gouty arthritis     Hx of colonic polyps     Tubular Adenoma    MURTAZA (obstructive sleep apnea)     Other and unspecified hyperlipidemia     Stroke     Unspecified essential hypertension      Past Surgical History:   Procedure Laterality Date    ARTHROSCOPIC REPAIR OF ROTATOR CUFF OF SHOULDER Left 4/30/2019    Procedure: REPAIR, ROTATOR CUFF, ARTHROSCOPIC;  Surgeon: Bob Esparza Jr., MD;  Location: [facility] OR;  Service: Orthopedics;  Laterality: Left;  need opus sytem (Jluis notified)  video    ARTHROSCOPY OF SHOULDER WITH DECOMPRESSION OF SUBACROMIAL SPACE  4/30/2019    Procedure: ARTHROSCOPY, SHOULDER, WITH SUBACROMIAL SPACE DECOMPRESSION;  Surgeon: Bob Esparza Jr., MD;  Location: [facility] OR;  Service: Orthopedics;;    FOOT SURGERY      surgery on jaw         Anesthesia Evaluation    I have reviewed the Patient Summary Reports.    I have reviewed the Nursing Notes. I have reviewed the NPO Status.   I have reviewed the Medications.     Review of Systems  Anesthesia Hx:  No problems with previous Anesthesia  Denies Family Hx of Anesthesia complications.    Social:  Non-Smoker, Social Alcohol Use    Hematology/Oncology:  Hematology Normal        Cardiovascular:   Hypertension  Denies Angina. hyperlipidemia        Pulmonary:   Sleep Apnea, CPAP    Renal/:  Renal/ Normal     Hepatic/GI:  Hepatic/GI Normal  Denies GERD. Denies Liver Disease.    Neurological:   Denies Seizures.  CVA - Cerebrovasular Accident (mild right sided weakness) , Most recent CVA was on 2015 , has had 1 stroke    Endocrine:   Diabetes, well controlled, type 2      TTE 3/7/19  · Normal left ventricular systolic function. The estimated ejection fraction is 55%  · Grade II  (moderate) left ventricular diastolic dysfunction consistent with pseudonormalization.  · Normal right ventricular systolic function.  · Elevated left atrial pressure.  · Normal central venous pressure (3 mm Hg).  · The estimated PA systolic pressure is 39 mm Hg       Physical Exam  General:  Obesity    Airway/Jaw/Neck:  Airway Findings: Mouth Opening: Small, but > 3cm Tongue: Normal  General Airway Assessment: Adult  Mallampati: IV         Dental:  Dental Findings: Upper Dentures, Lower partial dentures   Chest/Lungs:  Chest/Lungs Findings: Clear to auscultation, Normal Respiratory Rate     Heart/Vascular:  Heart Findings: Rate: Normal  Rhythm: Regular Rhythm  Sounds: Normal  Heart murmur: negative       Mental Status:  Mental Status Findings:  Cooperative, Alert and Oriented         Anesthesia Plan  Type of Anesthesia, risks & benefits discussed:  Anesthesia Type:  general, regional  Patient's Preference:   Intra-op Monitoring Plan: standard ASA monitors  Intra-op Monitoring Plan Comments:   Post Op Pain Control Plan: multimodal analgesia and peripheral nerve block  Post Op Pain Control Plan Comments:   Induction:   IV  Beta Blocker:         Informed Consent:    ASA Score: 3     Day of Surgery Review of History & Physical:        Anesthesia Plan Notes: Anesthesia consent to be signed prior to procedure on 8/4/2020  Patient to have Covid testing Saturday 8/1 at  in [place]          Ready For Surgery From Anesthesia Perspective.

## 2020-07-31 NOTE — DISCHARGE INSTRUCTIONS
Your surgery is scheduled for 8/4/20.    Please report to [facility] on the 1st Floor at 7:45 a.m.    THIS TIME IS SUBJECT TO CHANGE.  YOU WILL RECEIVE A PHONE CALL THE DAY BEFORE SURGERY BY 3:30 PM TO CONFIRM YOUR TIME OF ARRIVAL.  IF YOU HAVE NOT RECEIVED A PHONE CALL BY 3:30 PM THE DAY BEFORE YOUR SURGERY PLEASE CALL [phone]     INSTRUCTIONS IMPORTANT!!!  ¨ Do not eat or drink after 12 midnight-including water. OK to brush teeth, no   gum, candy or mints!      ____  Proceed to [facility] on 7/31/20 for additional testing.        ____  No powder, lotions or creams to surgical area.  ____  Please remove all jewelry, including piercings and leave at home.  ____  No money or valuables needed. Please leave at home.  ____  Please bring any documents given by your doctor.  ____  If going home the same day, arrange for a ride home. You will not be able to             drive if Anesthesia was used.  ____  Wear loose fitting clothing. Allow for dressings, bandages.  ____  Stop Aspirin, Ibuprofen, Motrin and Aleve at least 3-5 days before surgery, unless otherwise instructed by your doctor, or the nurse.   You MAY use Tylenol/acetaminophen until day of surgery.  ____  Wash the surgical area with Hibiclens the night before surgery, and again the             morning of surgery.  Be sure to rinse hibiclens off completely (if instructed by   nurse).  ____  If you take diabetic medication, do not take am of surgery unless instructed by Doctor.  ____  Call MD for temperature above 101 degrees or any other signs of infection such as Urinary (bladder) infection, Upper respiratory infection, skin boils, etc.  ____ Stop taking any Fish Oil supplement or any Vitamins that contain Vitamin E at least 5 days prior to surgery.  ____ Do Not wear your contact lenses the day of your procedure.  You may wear your glasses.      ____Do not shave surgical site for 3 days prior to surgery.  ____ Practice Good hand washing  before, during, and after procedure.      I have read or had read and explained to me, and understand the above information.  Additional comments or instructions:  For additional questions call [phone]      ANESTHESIA SIDE EFFECTS  -For the first 24 hours after surgery:  Do not drive, use heavy equipment, make important decisions, or drink alcohol  -It is normal to feel sleepy for several hours.  Rest until you are more awake.  -Have someone stay with you, if needed.  They can watch for problems and help keep you safe.  -Some possible post anesthesia side effects include: nausea and vomiting, sore throat and hoarseness, sleepiness, and dizziness.        Pre-Op Bathing Instructions    Before surgery, you can play an important role in your own health.    Because skin is not sterile, we need to be sure that your skin is as free of germs as possible. By following the instructions below, you can reduce the number of germs on your skin before surgery.    IMPORTANT: You will need to shower with a special soap called Hibiclens*, available at any pharmacy.  If you are allergic to Chlorhexidine (the antiseptic in Hibiclens), use an antibacterial soap such as Dial Soap for your preoperative shower.  You will shower with Hibiclens both the night before your surgery and the morning of your surgery.  Do not use Hibiclens on the head, face or genitals to avoid injury to those areas.    STEP #1: THE NIGHT BEFORE YOUR SURGERY     1. Do not shave the area of your body where your surgery will be performed.  2. Shower and wash your hair and body as usual with your normal soap and shampoo.  3. Rinse your hair and body thoroughly after you shower to remove all soap residue.  4. With your hand, apply one packet of Hibiclens soap to the surgical site.   5. Wash the site gently for five (5) minutes. Do not scrub your skin too hard.   6. Do not wash with your regular soap after Hibiclens is used.  7. Rinse your body thoroughly.  8. Pat  yourself dry with a clean, soft towel.  9. Do not use lotion, cream, or powder.  10. Wear clean clothes.    STEP #2: THE MORNING OF YOUR SURGERY     1. Repeat Step #1.    * Not to be used by people allergic to Chlorhexidine.          Shoulder Arthroscopy  The shoulder is your bodys most flexible joint. It lets the arm move in almost any direction. But this flexibility has a price -- it makes the joint prone to injury. If you have a shoulder problem, a surgical procedure called arthroscopy can help.     A camera in the arthroscope allows your surgeon to view your shoulder joint on a monitor.   Your orthopaedic evaluation  Your doctor will ask about your symptoms and the history of your shoulder problem. He or she will examine your shoulder and may give you tests, such as an X-ray or MRI. These help your doctor find the cause of your shoulder problem.  Arthroscopy: Looking inside your joint  Arthroscopy is a procedure that allows your doctor to see and work inside your shoulder joint. Your doctor makes small incision in your shoulder and inserts a long, thin, lighted instrument, called an arthroscope. During surgery, the arthroscope sends live video images from inside your joint to a screen that your doctor views. Using these images, your doctor can diagnose and treat your shoulder problem. Because arthroscopy uses much smaller incisions than open surgery, recovery is often shorter and less painful.  Risks and possible complications of shoulder arthroscopy  · Stiffness or ongoing pain in your shoulder  · Bleeding or blood clots  · Infection  · Damage to nerves or blood vessels  You may still need open surgery after having arthroscopy.  Date Last Reviewed: 9/10/2015  © 7315-8557 [employer]. [address] [place] [address], [place] [address]. All rights reserved. This information is not intended as a substitute for professional medical care. Always follow your healthcare professional's instructions.

## 2020-08-01 ENCOUNTER — LAB VISIT (OUTPATIENT)
Dept: URGENT CARE | Facility: CLINIC | Age: 65
End: 2020-08-01
Payer: MEDICARE

## 2020-08-01 VITALS — OXYGEN SATURATION: 99 % | HEART RATE: 66 BPM | TEMPERATURE: 98 F

## 2020-08-01 DIAGNOSIS — Z01.818 PREOP TESTING: ICD-10-CM

## 2020-08-01 PROCEDURE — U0003 INFECTIOUS AGENT DETECTION BY NUCLEIC ACID (DNA OR RNA); SEVERE ACUTE RESPIRATORY SYNDROME CORONAVIRUS 2 (SARS-COV-2) (CORONAVIRUS DISEASE [COVID-19]), AMPLIFIED PROBE TECHNIQUE, MAKING USE OF HIGH THROUGHPUT TECHNOLOGIES AS DESCRIBED BY CMS-2020-01-R: HCPCS | Mod: HCNC

## 2020-08-02 LAB — SARS-COV-2 RNA RESP QL NAA+PROBE: NOT DETECTED

## 2020-08-04 ENCOUNTER — ANESTHESIA (OUTPATIENT)
Dept: SURGERY | Facility: HOSPITAL | Age: 65
End: 2020-08-04
Payer: MEDICARE

## 2020-08-04 ENCOUNTER — HOSPITAL ENCOUNTER (OUTPATIENT)
Facility: HOSPITAL | Age: 65
Discharge: HOME | End: 2020-08-04
Attending: ORTHOPAEDIC SURGERY | Admitting: ORTHOPAEDIC SURGERY
Payer: MEDICARE

## 2020-08-04 VITALS
SYSTOLIC BLOOD PRESSURE: 159 MMHG | OXYGEN SATURATION: 94 % | BODY MASS INDEX: 32.21 KG/M2 | WEIGHT: 225 LBS | TEMPERATURE: 98 F | HEART RATE: 84 BPM | DIASTOLIC BLOOD PRESSURE: 87 MMHG | HEIGHT: 70 IN | RESPIRATION RATE: 15 BRPM

## 2020-08-04 DIAGNOSIS — M25.511 RIGHT SHOULDER PAIN, UNSPECIFIED CHRONICITY: ICD-10-CM

## 2020-08-04 DIAGNOSIS — Z01.818 PREOP TESTING: ICD-10-CM

## 2020-08-04 DIAGNOSIS — S46.011D TRAUMATIC COMPLETE TEAR OF RIGHT ROTATOR CUFF, SUBSEQUENT ENCOUNTER: ICD-10-CM

## 2020-08-04 PROCEDURE — 71000016 HC POSTOP RECOV ADDL HR: Mod: HCNC | Performed by: ORTHOPAEDIC SURGERY

## 2020-08-04 PROCEDURE — 23412 REPAIR ROTATOR CUFF CHRONIC: CPT | Mod: AS,HCNC,RT, | Performed by: PHYSICIAN ASSISTANT

## 2020-08-04 PROCEDURE — 23412 PR REPAIR ROTATOR CUFF,CHRONIC: ICD-10-PCS | Mod: HCNC,RT,, | Performed by: ORTHOPAEDIC SURGERY

## 2020-08-04 PROCEDURE — 63600175 PHARM REV CODE 636 W HCPCS: Mod: HCNC | Performed by: ORTHOPAEDIC SURGERY

## 2020-08-04 PROCEDURE — 63600175 PHARM REV CODE 636 W HCPCS: Mod: HCNC | Performed by: NURSE PRACTITIONER

## 2020-08-04 PROCEDURE — 27201423 OPTIME MED/SURG SUP & DEVICES STERILE SUPPLY: Mod: HCNC | Performed by: ORTHOPAEDIC SURGERY

## 2020-08-04 PROCEDURE — C9290 INJ, BUPIVACAINE LIPOSOME: HCPCS | Mod: HCNC | Performed by: STUDENT IN AN ORGANIZED HEALTH CARE EDUCATION/TRAINING PROGRAM

## 2020-08-04 PROCEDURE — 64415 NJX AA&/STRD BRCH PLXS IMG: CPT | Mod: HCNC | Performed by: STUDENT IN AN ORGANIZED HEALTH CARE EDUCATION/TRAINING PROGRAM

## 2020-08-04 PROCEDURE — 71000015 HC POSTOP RECOV 1ST HR: Mod: HCNC | Performed by: ORTHOPAEDIC SURGERY

## 2020-08-04 PROCEDURE — 63600175 PHARM REV CODE 636 W HCPCS: Mod: HCNC | Performed by: NURSE ANESTHETIST, CERTIFIED REGISTERED

## 2020-08-04 PROCEDURE — 25000003 PHARM REV CODE 250: Mod: HCNC | Performed by: ORTHOPAEDIC SURGERY

## 2020-08-04 PROCEDURE — 23412 REPAIR ROTATOR CUFF CHRONIC: CPT | Mod: HCNC,RT,, | Performed by: ORTHOPAEDIC SURGERY

## 2020-08-04 PROCEDURE — 27201121 HC TRAY,SPINAL-HYPERBARIC: Mod: HCNC | Performed by: STUDENT IN AN ORGANIZED HEALTH CARE EDUCATION/TRAINING PROGRAM

## 2020-08-04 PROCEDURE — 25000003 PHARM REV CODE 250: Mod: HCNC | Performed by: STUDENT IN AN ORGANIZED HEALTH CARE EDUCATION/TRAINING PROGRAM

## 2020-08-04 PROCEDURE — 71000033 HC RECOVERY, INTIAL HOUR: Mod: HCNC | Performed by: ORTHOPAEDIC SURGERY

## 2020-08-04 PROCEDURE — C1713 ANCHOR/SCREW BN/BN,TIS/BN: HCPCS | Mod: HCNC | Performed by: ORTHOPAEDIC SURGERY

## 2020-08-04 PROCEDURE — 36000711: Mod: HCNC | Performed by: ORTHOPAEDIC SURGERY

## 2020-08-04 PROCEDURE — 37000008 HC ANESTHESIA 1ST 15 MINUTES: Mod: HCNC | Performed by: ORTHOPAEDIC SURGERY

## 2020-08-04 PROCEDURE — 23412 PR REPAIR ROTATOR CUFF,CHRONIC: ICD-10-PCS | Mod: AS,HCNC,RT, | Performed by: PHYSICIAN ASSISTANT

## 2020-08-04 PROCEDURE — 63600175 PHARM REV CODE 636 W HCPCS: Mod: HCNC | Performed by: STUDENT IN AN ORGANIZED HEALTH CARE EDUCATION/TRAINING PROGRAM

## 2020-08-04 PROCEDURE — 76942 ECHO GUIDE FOR BIOPSY: CPT | Mod: HCNC | Performed by: STUDENT IN AN ORGANIZED HEALTH CARE EDUCATION/TRAINING PROGRAM

## 2020-08-04 PROCEDURE — 25000003 PHARM REV CODE 250: Mod: HCNC | Performed by: NURSE ANESTHETIST, CERTIFIED REGISTERED

## 2020-08-04 PROCEDURE — 71000039 HC RECOVERY, EACH ADD'L HOUR: Mod: HCNC | Performed by: ORTHOPAEDIC SURGERY

## 2020-08-04 PROCEDURE — 37000009 HC ANESTHESIA EA ADD 15 MINS: Mod: HCNC | Performed by: ORTHOPAEDIC SURGERY

## 2020-08-04 PROCEDURE — 36000710: Mod: HCNC | Performed by: ORTHOPAEDIC SURGERY

## 2020-08-04 DEVICE — ANCHOR SUT FT BIOCRKSCR 5.5MM: Type: IMPLANTABLE DEVICE | Site: SHOULDER | Status: FUNCTIONAL

## 2020-08-04 RX ORDER — ACETAMINOPHEN 10 MG/ML
INJECTION, SOLUTION INTRAVENOUS
Status: DISCONTINUED | OUTPATIENT
Start: 2020-08-04 | End: 2020-08-04

## 2020-08-04 RX ORDER — BACITRACIN 50000 [IU]/1
INJECTION, POWDER, FOR SOLUTION INTRAMUSCULAR
Status: DISCONTINUED | OUTPATIENT
Start: 2020-08-04 | End: 2020-08-04 | Stop reason: HOSPADM

## 2020-08-04 RX ORDER — LIDOCAINE HCL/PF 100 MG/5ML
SYRINGE (ML) INTRAVENOUS
Status: DISCONTINUED | OUTPATIENT
Start: 2020-08-04 | End: 2020-08-04

## 2020-08-04 RX ORDER — SODIUM CHLORIDE 0.9 % (FLUSH) 0.9 %
10 SYRINGE (ML) INJECTION
Status: DISCONTINUED | OUTPATIENT
Start: 2020-08-04 | End: 2020-08-04 | Stop reason: HOSPADM

## 2020-08-04 RX ORDER — LIDOCAINE HYDROCHLORIDE 10 MG/ML
1 INJECTION, SOLUTION EPIDURAL; INFILTRATION; INTRACAUDAL; PERINEURAL ONCE
Status: DISCONTINUED | OUTPATIENT
Start: 2020-08-04 | End: 2020-08-04 | Stop reason: HOSPADM

## 2020-08-04 RX ORDER — HYDROMORPHONE HYDROCHLORIDE 2 MG/ML
0.5 INJECTION, SOLUTION INTRAMUSCULAR; INTRAVENOUS; SUBCUTANEOUS EVERY 5 MIN PRN
Status: DISCONTINUED | OUTPATIENT
Start: 2020-08-04 | End: 2020-08-04 | Stop reason: HOSPADM

## 2020-08-04 RX ORDER — ROCURONIUM BROMIDE 10 MG/ML
INJECTION, SOLUTION INTRAVENOUS
Status: DISCONTINUED | OUTPATIENT
Start: 2020-08-04 | End: 2020-08-04

## 2020-08-04 RX ORDER — KETOROLAC TROMETHAMINE 30 MG/ML
15 INJECTION, SOLUTION INTRAMUSCULAR; INTRAVENOUS ONCE
Status: COMPLETED | OUTPATIENT
Start: 2020-08-04 | End: 2020-08-04

## 2020-08-04 RX ORDER — OXYCODONE AND ACETAMINOPHEN 7.5; 325 MG/1; MG/1
1 TABLET ORAL EVERY 4 HOURS PRN
Qty: 40 TABLET | Refills: 0 | Status: SHIPPED | OUTPATIENT
Start: 2020-08-04 | End: 2020-08-17 | Stop reason: SDUPTHER

## 2020-08-04 RX ORDER — SODIUM CHLORIDE, SODIUM LACTATE, POTASSIUM CHLORIDE, CALCIUM CHLORIDE 600; 310; 30; 20 MG/100ML; MG/100ML; MG/100ML; MG/100ML
INJECTION, SOLUTION INTRAVENOUS CONTINUOUS
Status: DISCONTINUED | OUTPATIENT
Start: 2020-08-04 | End: 2020-08-04 | Stop reason: HOSPADM

## 2020-08-04 RX ORDER — CEFAZOLIN SODIUM 2 G/50ML
2 SOLUTION INTRAVENOUS
Status: DISCONTINUED | OUTPATIENT
Start: 2020-08-04 | End: 2020-08-04 | Stop reason: HOSPADM

## 2020-08-04 RX ORDER — SUCCINYLCHOLINE CHLORIDE 20 MG/ML
INJECTION INTRAMUSCULAR; INTRAVENOUS
Status: DISCONTINUED | OUTPATIENT
Start: 2020-08-04 | End: 2020-08-04

## 2020-08-04 RX ORDER — GLYCOPYRROLATE 0.2 MG/ML
INJECTION INTRAMUSCULAR; INTRAVENOUS
Status: DISCONTINUED | OUTPATIENT
Start: 2020-08-04 | End: 2020-08-04

## 2020-08-04 RX ORDER — OXYCODONE HYDROCHLORIDE 5 MG/1
10 TABLET ORAL EVERY 4 HOURS PRN
Status: DISCONTINUED | OUTPATIENT
Start: 2020-08-04 | End: 2020-08-04 | Stop reason: HOSPADM

## 2020-08-04 RX ORDER — DEXAMETHASONE SODIUM PHOSPHATE 4 MG/ML
INJECTION, SOLUTION INTRA-ARTICULAR; INTRALESIONAL; INTRAMUSCULAR; INTRAVENOUS; SOFT TISSUE
Status: DISCONTINUED | OUTPATIENT
Start: 2020-08-04 | End: 2020-08-04

## 2020-08-04 RX ORDER — ONDANSETRON 8 MG/1
8 TABLET, ORALLY DISINTEGRATING ORAL EVERY 8 HOURS PRN
Status: DISCONTINUED | OUTPATIENT
Start: 2020-08-04 | End: 2020-08-04 | Stop reason: HOSPADM

## 2020-08-04 RX ORDER — SODIUM CHLORIDE 9 MG/ML
INJECTION, SOLUTION INTRAVENOUS CONTINUOUS
Status: DISCONTINUED | OUTPATIENT
Start: 2020-08-04 | End: 2020-08-04 | Stop reason: HOSPADM

## 2020-08-04 RX ORDER — ONDANSETRON 2 MG/ML
4 INJECTION INTRAMUSCULAR; INTRAVENOUS ONCE AS NEEDED
Status: DISCONTINUED | OUTPATIENT
Start: 2020-08-04 | End: 2020-08-04 | Stop reason: HOSPADM

## 2020-08-04 RX ORDER — ONDANSETRON HYDROCHLORIDE 2 MG/ML
INJECTION, SOLUTION INTRAMUSCULAR; INTRAVENOUS
Status: DISCONTINUED | OUTPATIENT
Start: 2020-08-04 | End: 2020-08-04

## 2020-08-04 RX ORDER — BUPIVACAINE HYDROCHLORIDE 2.5 MG/ML
INJECTION, SOLUTION EPIDURAL; INFILTRATION; INTRACAUDAL
Status: DISCONTINUED | OUTPATIENT
Start: 2020-08-04 | End: 2020-08-04

## 2020-08-04 RX ORDER — MIDAZOLAM HYDROCHLORIDE 1 MG/ML
INJECTION, SOLUTION INTRAMUSCULAR; INTRAVENOUS
Status: DISCONTINUED | OUTPATIENT
Start: 2020-08-04 | End: 2020-08-04

## 2020-08-04 RX ORDER — ACETAMINOPHEN 325 MG/1
650 TABLET ORAL EVERY 4 HOURS PRN
Status: DISCONTINUED | OUTPATIENT
Start: 2020-08-04 | End: 2020-08-04 | Stop reason: HOSPADM

## 2020-08-04 RX ORDER — FENTANYL CITRATE 50 UG/ML
INJECTION, SOLUTION INTRAMUSCULAR; INTRAVENOUS
Status: DISCONTINUED | OUTPATIENT
Start: 2020-08-04 | End: 2020-08-04

## 2020-08-04 RX ORDER — PROPOFOL 10 MG/ML
VIAL (ML) INTRAVENOUS
Status: DISCONTINUED | OUTPATIENT
Start: 2020-08-04 | End: 2020-08-04

## 2020-08-04 RX ORDER — NEOSTIGMINE METHYLSULFATE 1 MG/ML
INJECTION, SOLUTION INTRAVENOUS
Status: DISCONTINUED | OUTPATIENT
Start: 2020-08-04 | End: 2020-08-04

## 2020-08-04 RX ORDER — EPHEDRINE SULFATE 50 MG/ML
INJECTION, SOLUTION INTRAVENOUS
Status: DISCONTINUED | OUTPATIENT
Start: 2020-08-04 | End: 2020-08-04

## 2020-08-04 RX ORDER — PHENYLEPHRINE HYDROCHLORIDE 10 MG/ML
INJECTION INTRAVENOUS
Status: DISCONTINUED | OUTPATIENT
Start: 2020-08-04 | End: 2020-08-04

## 2020-08-04 RX ADMIN — ACETAMINOPHEN 1000 MG: 10 INJECTION, SOLUTION INTRAVENOUS at 11:08

## 2020-08-04 RX ADMIN — OXYCODONE HYDROCHLORIDE 10 MG: 5 TABLET ORAL at 03:08

## 2020-08-04 RX ADMIN — LIDOCAINE HYDROCHLORIDE 50 MG: 20 INJECTION, SOLUTION INTRAVENOUS at 11:08

## 2020-08-04 RX ADMIN — BUPIVACAINE HYDROCHLORIDE 10 ML: 2.5 INJECTION, SOLUTION EPIDURAL; INFILTRATION; INTRACAUDAL; PERINEURAL at 10:08

## 2020-08-04 RX ADMIN — MIDAZOLAM 5 MG: 1 INJECTION INTRAMUSCULAR; INTRAVENOUS at 10:08

## 2020-08-04 RX ADMIN — EPHEDRINE SULFATE 5 MG: 50 INJECTION, SOLUTION INTRAMUSCULAR; INTRAVENOUS; SUBCUTANEOUS at 12:08

## 2020-08-04 RX ADMIN — EPHEDRINE SULFATE 10 MG: 50 INJECTION, SOLUTION INTRAMUSCULAR; INTRAVENOUS; SUBCUTANEOUS at 11:08

## 2020-08-04 RX ADMIN — PHENYLEPHRINE HYDROCHLORIDE 150 MCG: 10 INJECTION INTRAVENOUS at 12:08

## 2020-08-04 RX ADMIN — GLYCOPYRROLATE 0.4 MG: 0.2 INJECTION, SOLUTION INTRAMUSCULAR; INTRAVENOUS at 12:08

## 2020-08-04 RX ADMIN — SODIUM CHLORIDE, SODIUM LACTATE, POTASSIUM CHLORIDE, AND CALCIUM CHLORIDE: .6; .31; .03; .02 INJECTION, SOLUTION INTRAVENOUS at 12:08

## 2020-08-04 RX ADMIN — CEFAZOLIN SODIUM 2 G: 2 SOLUTION INTRAVENOUS at 10:08

## 2020-08-04 RX ADMIN — PHENYLEPHRINE HYDROCHLORIDE 100 MCG: 10 INJECTION INTRAVENOUS at 12:08

## 2020-08-04 RX ADMIN — FENTANYL CITRATE 100 MCG: 50 INJECTION, SOLUTION INTRAMUSCULAR; INTRAVENOUS at 11:08

## 2020-08-04 RX ADMIN — KETOROLAC TROMETHAMINE 15 MG: 30 INJECTION, SOLUTION INTRAMUSCULAR at 03:08

## 2020-08-04 RX ADMIN — BUPIVACAINE 10 ML: 13.3 INJECTION, SUSPENSION, LIPOSOMAL INFILTRATION at 10:08

## 2020-08-04 RX ADMIN — SUCCINYLCHOLINE CHLORIDE 120 MG: 20 INJECTION, SOLUTION INTRAMUSCULAR; INTRAVENOUS at 11:08

## 2020-08-04 RX ADMIN — ROCURONIUM BROMIDE 35 MG: 10 INJECTION, SOLUTION INTRAVENOUS at 11:08

## 2020-08-04 RX ADMIN — NEOSTIGMINE METHYLSULFATE 2.5 MG: 1 INJECTION INTRAVENOUS at 12:08

## 2020-08-04 RX ADMIN — ROCURONIUM BROMIDE 5 MG: 10 INJECTION, SOLUTION INTRAVENOUS at 11:08

## 2020-08-04 RX ADMIN — EPHEDRINE SULFATE 25 MG: 50 INJECTION, SOLUTION INTRAMUSCULAR; INTRAVENOUS; SUBCUTANEOUS at 11:08

## 2020-08-04 RX ADMIN — PROPOFOL 100 MG: 10 INJECTION, EMULSION INTRAVENOUS at 11:08

## 2020-08-04 RX ADMIN — ONDANSETRON 8 MG: 2 INJECTION, SOLUTION INTRAMUSCULAR; INTRAVENOUS at 12:08

## 2020-08-04 RX ADMIN — SODIUM CHLORIDE, SODIUM LACTATE, POTASSIUM CHLORIDE, AND CALCIUM CHLORIDE: .6; .31; .03; .02 INJECTION, SOLUTION INTRAVENOUS at 08:08

## 2020-08-04 RX ADMIN — DEXAMETHASONE SODIUM PHOSPHATE 4 MG: 4 INJECTION, SOLUTION INTRA-ARTICULAR; INTRALESIONAL; INTRAMUSCULAR; INTRAVENOUS; SOFT TISSUE at 11:08

## 2020-08-04 NOTE — H&P
CC:  Rotator cuff tear traumatic right shoulder           HPI:  Anjel Sanders is a very pleasant 64 y.o. male sustained injury to his right shoulder about 6 months ago in December of last year  At that time I had ordered an MRI scan but apparently was delayed by the COVID crisis  He does continue to have pain in the right shoulder related to the injury he recently had an MRI scan of the right shoulder              PAST MEDICAL HISTORY:        Past Medical History:   Diagnosis Date    AR (allergic rhinitis)      Gouty arthritis      Hx of colonic polyps       Tubular Adenoma    MURTAZA (obstructive sleep apnea)      Other and unspecified hyperlipidemia      Stroke      Unspecified essential hypertension       PAST SURGICAL HISTORY:         Past Surgical History:   Procedure Laterality Date    ARTHROSCOPIC REPAIR OF ROTATOR CUFF OF SHOULDER Left 4/30/2019     Procedure: REPAIR, ROTATOR CUFF, ARTHROSCOPIC;  Surgeon: Bob Esparza Jr., MD;  Location: [facility] OR;  Service: Orthopedics;  Laterality: Left;  need opus sytem (Jluis notified)  video    ARTHROSCOPY OF SHOULDER WITH DECOMPRESSION OF SUBACROMIAL SPACE   4/30/2019     Procedure: ARTHROSCOPY, SHOULDER, WITH SUBACROMIAL SPACE DECOMPRESSION;  Surgeon: Bob Esparza Jr., MD;  Location: [facility] OR;  Service: Orthopedics;;    FOOT SURGERY        surgery on jaw         FAMILY HISTORY:         Family History   Problem Relation Age of Onset    Stomach cancer Sister      Stroke Sister      Hypertension Mother      Glaucoma Mother      Diabetes Sister      Heart failure Sister      No Known Problems Father      No Known Problems Brother      No Known Problems Maternal Aunt      No Known Problems Maternal Uncle      No Known Problems Paternal Aunt      No Known Problems Paternal Uncle      No Known Problems Maternal Grandmother      No Known Problems Maternal Grandfather      No Known Problems Paternal Grandmother      No Known Problems Paternal  Grandfather      Amblyopia Neg Hx      Blindness Neg Hx      Cancer Neg Hx      Cataracts Neg Hx      Macular degeneration Neg Hx      Retinal detachment Neg Hx      Strabismus Neg Hx      Thyroid disease Neg Hx       SOCIAL HISTORY:   Social History               Socioeconomic History    Marital status:        Spouse name: Not on file    Number of children: Not on file    Years of education: Not on file    Highest education level: Not on file   Occupational History    Not on file   Social Needs    Financial resource strain: Not on file    Food insecurity       Worry: Not on file       Inability: Not on file    Transportation needs       Medical: Not on file       Non-medical: Not on file   Tobacco Use    Smoking status: Never Smoker    Smokeless tobacco: Never Used   Substance and Sexual Activity    Alcohol use: Yes       Alcohol/week: 9.3 standard drinks       Types: 6 Cans of beer, 4 Standard drinks or equivalent per week       Comment: occas    Drug use: Not on file    Sexual activity: Not on file   Lifestyle    Physical activity       Days per week: Not on file       Minutes per session: Not on file    Stress: Not on file   Relationships    Social connections       Talks on phone: Not on file       Gets together: Not on file       Attends Hindu service: Not on file       Active member of club or organization: Not on file       Attends meetings of clubs or organizations: Not on file       Relationship status: Not on file   Other Topics Concern    Not on file   Social History Narrative      with [facility] [employer]           MEDICATIONS:   Current Outpatient Medications:     allopurinoL (ZYLOPRIM) 300 MG tablet, Take 1 tablet (300 mg total) by mouth once daily., Disp: 90 tablet, Rfl: 3    amLODIPine-benazepriL (LOTREL) 10-40 mg per capsule, Take 1 capsule by mouth once daily., Disp: 90 capsule, Rfl: 3    aspirin (ECOTRIN) 81 MG EC tablet, Take 81 mg by mouth  "once daily., Disp: , Rfl:     atorvastatin (LIPITOR) 80 MG tablet, Take 1 tablet (80 mg total) by mouth once daily., Disp: 90 tablet, Rfl: 4    gabapentin (NEURONTIN) 300 MG capsule, Take 1 capsule (300 mg total) by mouth every evening., Disp: 90 capsule, Rfl: 3    hydrOXYzine HCl (ATARAX) 25 MG tablet, TAKE 1 TABLET BY MOUTH THREE TIMES A DAY AS NEEDED FOR ITCHING, Disp: 30 tablet, Rfl: 0    triamcinolone acetonide 0.1% (KENALOG) 0.1 % ointment, Apply topically 2 (two) times daily., Disp: , Rfl:   ALLERGIES: Review of patient's allergies indicates:  No Known Allergies     Review of Systems:  Constitutional: no fever or chills  ENT: no nasal congestion or sore throat  Respiratory: no cough or shortness of breath  Cardiovascular: no chest pain or palpitations  Gastrointestinal: no nausea or vomiting, PUD, GERD, NSAID intolerance  Genitourinary: no hematuria or dysuria  Integument/Breast: no rash or pruritis  Hematologic/Lymphatic: no easy bruising or lymphadenopathy  Musculoskeletal: see HPI  Neurological: no seizures or tremors  Behavioral/Psych: no auditory or visual hallucinations        Physical Exam       Vitals:     06/16/20 0944   Weight: 103 kg (227 lb)   Height: 5' 10" (1.778 m)   PainSc:   9        Constitutional: Oriented to person, place, and time. Appears well-developed and well-nourished.   HENT:   Head: Normocephalic and atraumatic.   Nose: Nose normal.   Eyes: No scleral icterus.   Neck: Normal range of motion.   Cardiovascular: Normal rate and regular rhythm.    Pulses:       Radial pulses are 2+ on the right side, and 2+ on the left side.   Pulmonary/Chest: Effort normal and breath sounds normal.   Abdominal: Soft.   Neurological: Alert and oriented to person, place, and time.   Skin: Skin is warm.   Psychiatric: Normal mood and affect.      MUSCULOSKELETAL UPPER EXTREMITY:  Examination right shoulder no tenderness no swelling  Range of motion right shoulder is slightly decreased secondary to " "pain and some stiffness  He does have weakness of the rotator cuff  Positive impingement sign  Positive supraspinatus stress test  Positive drop-arm test  Neurologic exam intact                 Diagnostic Studies:  MRI right shoulder demonstrates large rotator cuff tear with retraction all the way to the glenoid rim slight elevation of the humeral head  AP lateral x-ray right shoulder demonstrates large bone spur anteriorly and elevation of the humeral head           Assessment:  Chronic traumatic rotator cuff tear right shoulder with significant retraction     Plan:  I explained the nature of the problem to the patient  Think this does require surgery most likely with an open repair as opposed to a scope procedure.  I do not think a scope would be possible to mobilize the tendon and he may in fact need a graft done open repair of the rotator cuff  The risks and benefits of surgery explained to the patient he understands we will set up surgery for the right shoulder as an open repair  The risks associated with the COVID crisis were also explained to the patient        The risks and benefits of surgery were discussed with the patient today and they understand.  The consent was signed in the office for surgery.        Bob Esparza MD (Jay)  [facility]  Orthopedic Upper Extremity Surgery       "

## 2020-08-04 NOTE — ANESTHESIA PROCEDURE NOTES
Intubation  Performed by: Carolina Zavaleta CRNA  Authorized by: Carolina Zavaleta CRNA     Intubation:     Induction:  Intravenous    Intubated:  Postinduction    Mask Ventilation:  Easy with oral airway    Attempts:  1    Attempted By:  CRNA    Method of Intubation:  Direct    Blade:  Kathryn 3    Laryngeal View Grade: Grade IIA - cords partially seen      Difficult Airway Encountered?: No      Complications:  None    Airway Device:  Oral endotracheal tube    Airway Device Size:  7.5    Style/Cuff Inflation:  Cuffed    Tube secured:  22    Secured at:  The lips    Placement Verified By:  Capnometry    Complicating Factors:  None    Findings Post-Intubation:  BS equal bilateral

## 2020-08-04 NOTE — INTERVAL H&P NOTE
The patient has been examined and the H&P has been reviewed:    I concur with the findings and no changes have occurred since H&P was written.    Surgery risks, benefits and alternative options discussed and understood by patient/family.          Active Hospital Problems    Diagnosis  POA    Right shoulder pain [M25.511]  Yes      Resolved Hospital Problems   No resolved problems to display.

## 2020-08-04 NOTE — OP NOTE
Operative Note       Surgery Date: 8/4/2020     Surgeon(s) and Role:     * Bob Esparza Jr., MD - Primary    Pre-op Diagnosis:  Right shoulder pain, unspecified chronicity [M25.511]  Traumatic complete tear of right rotator cuff, subsequent encounter [S46.011D]    Post-op Diagnosis: Post-Op Diagnosis Codes:     * Right shoulder pain, unspecified chronicity [M25.511]     * Traumatic complete tear of right rotator cuff, subsequent encounter [S46.011D]    Procedure(s) (LRB):  REPAIR, ROTATOR CUFF (Right)    Anesthesia: General    Procedure in Detail/Findings:  Preop diagnosis:  Massive tear rotator cuff right shoulder.    Postop diagnosis:  Same.    Operative procedure:  1.  Open repair of rotator cuff right shoulder including acromioplasty using Arthrex suture anchors X 4.    2.  Subscapularis tendon transfer for rotator cuff repair right shoulder open.    Surgeon:  Isaias.    First assistant:  Michael    Anesthesia:  General.    EBL:  50 cc.    Complications:  None.    Specimen:  None.    Operative procedure in detail as follows:    After operative consent was obtained patient brought the operating room placed supine operating table..  Anesthesia by general endotracheal method performed by the anesthesia staff.  After the patient was asleep carefully placed beach chair position head secured right shoulder secured in the right shoulder and upper extremity prepped and draped out in the normal sterile fashion.    An anterolateral skin incision made with a 10 blade directly over the AC joint and lateral deltoid full-thickness skin flaps were raised anterolateral flap of tissue including the anterior deltoid reflected off the acromion deep retractors were placed the humeral head was noted to be articulating with the undersurface of the acromion the CA ligament divided and acromioplasty performed removing the undersurface of the acromion in the anterior Hawk after smoothing the undersurface of the acromion attention  turned to the rotator cuff  Rotator cuff was severely retracted all the way to the glenoid in the back and half way to the ground all glenoid in the front.  Was not really possible to mobilize the supraspinatus or infraspinatus tendons the teres minor was mobilized up a bit.    At this point is subscapularis transfer was performed dividing the upper 50% of the subscapularis reflected off of bone and then mobilizing it by removing adhesions and scar tissue of the subscapular was brought up to the 12 o'clock position.  In this position 2 suture anchors were placed to secure it to the lateral edge of the articular surface the biceps tendon was left intact.    The teres minor what remained of the infraspinatus were then mobilized from posterior to anterior and brought up again to help cover almost to the 12 o'clock position secured with 2 additional Arthrex suture anchors along the articular surface of the humeral head.  Good repair was achieved range of motion checked.  The wound thoroughly irrigated with antibiotic saline solution hemostasis achieved with Bovie anterior flap of tissue repaired to bone with 1.  Ethibond suture the deltoid split repaired with 1.  Vicryl subcutaneous tissue closed with 2 O Vicryl Steri-Strips on the skin  Sterile dressing applied followed by a pillow sling the patient extubated and brought to the recovery room in stable condition all sponge needle counts reported as correct no complications    Estimated Blood Loss: * No values recorded between 8/4/2020 11:25 AM and 8/4/2020  1:01 PM *           Specimens (From admission, onward)    None        Implants:   Implant Name Type Inv. Item Serial No.  Lot No. LRB No. Used Action   ANCHOR SUT FT BIOCRKSCR 5.5MM - [id number]  ANCHOR SUT FT BIOCRKSCR 5.5MM  [employer] [id number] Right 3 Implanted   ANCHOR SUT FT BIOCRKSCR 5.5MM - [id number]  ANCHOR SUT FT BIOCRKSCR 5.5MM  [employer] [id number] Right 1 Implanted              Disposition: PACU -  hemodynamically stable.           Condition: Good    Attestation:  I was present and scrubbed for the entire procedure.           Discharge Note    Admit Date: 8/4/2020    Attending Physician: Bob Esparza Jr., MD     Discharge Physician: Bob Esparza Jr., MD    Final Diagnosis: Post-Op Diagnosis Codes:     * Right shoulder pain, unspecified chronicity [M25.511]     * Traumatic complete tear of right rotator cuff, subsequent encounter [S46.011D]    Disposition: Home or Self Care    Patient Instructions:   Current Discharge Medication List      START taking these medications    Details   oxyCODONE-acetaminophen (PERCOCET) 7.5-325 mg per tablet Take 1 tablet by mouth every 4 (four) hours as needed.  Qty: 40 tablet, Refills: 0    Comments: Quantity prescribed more than 7 day supply? Yes, quantity medically necessary         CONTINUE these medications which have NOT CHANGED    Details   allopurinoL (ZYLOPRIM) 300 MG tablet Take 1 tablet (300 mg total) by mouth once daily.  Qty: 90 tablet, Refills: 3    Associated Diagnoses: Gouty arthritis      amLODIPine-benazepriL (LOTREL) 10-40 mg per capsule Take 1 capsule by mouth once daily.  Qty: 90 capsule, Refills: 3    Comments: .  Associated Diagnoses: Hypertension complicating diabetes      aspirin (ECOTRIN) 81 MG EC tablet Take 81 mg by mouth once daily.      atorvastatin (LIPITOR) 80 MG tablet Take 1 tablet (80 mg total) by mouth once daily.  Qty: 90 tablet, Refills: 4    Associated Diagnoses: Dyslipidemia associated with type 2 diabetes mellitus      gabapentin (NEURONTIN) 300 MG capsule Take 1 capsule (300 mg total) by mouth every evening.  Qty: 90 capsule, Refills: 3    Associated Diagnoses: Chronic bilateral low back pain with bilateral sciatica; Bilateral sciatica      hydrOXYzine HCl (ATARAX) 25 MG tablet TAKE 1 TABLET BY MOUTH THREE TIMES A DAY AS NEEDED FOR ITCHING  Qty: 30 tablet, Refills: 0    Associated Diagnoses: Actinic dermatitis; Urticaria       triamcinolone acetonide 0.1% (KENALOG) 0.1 % ointment Apply topically 2 (two) times daily.             Discharge Procedure Orders (must include Diet, Follow-up, Activity)   Discharge Procedure Orders (must include Diet, Follow-up, Activity)   COVID-19 Routine Screening   Standing Status: Future Number of Occurrences: 1 Standing Exp. Date: 09/27/21     Order Specific Question Answer Comments   Is the patient symptomatic? No    Is this needed for pre-procedure or pre-op testing? Yes    Diagnosis: Preop testing [012021]      Diet general     Call MD for:  temperature >100.4     Call MD for:  persistent nausea and vomiting     Call MD for:  severe uncontrolled pain     Ice to affected area     Remove dressing in 72 hours     Shower on day dressing removed (No bath)        Discharge Date: No discharge date for patient encounter.

## 2020-08-04 NOTE — ANESTHESIA POSTPROCEDURE EVALUATION
Anesthesia Post Evaluation    Patient: Anjel Sanders    Procedure(s) Performed: Procedure(s) (LRB):  REPAIR, ROTATOR CUFF (Right)    Final Anesthesia Type: general    Patient location during evaluation: PACU  Patient participation: Yes- Able to Participate  Level of consciousness: awake and alert  Post-procedure vital signs: reviewed and stable  Pain management: adequate  Airway patency: patent    PONV status at discharge: No PONV  Anesthetic complications: no      Cardiovascular status: blood pressure returned to baseline and hemodynamically stable  Respiratory status: unassisted  Hydration status: euvolemic  Follow-up not needed.          Vitals Value Taken Time   /74 08/04/20 1515   Temp 36.5 °C (97.7 °F) 08/04/20 1515   Pulse 75 08/04/20 1515   Resp 17 08/04/20 1515   SpO2 94 % 08/04/20 1515         Event Time   Out of Recovery 14:08:00         Pain/Alden Score: Pain Rating Prior to Med Admin: 7 (8/4/2020  3:08 PM)  Alden Score: 10 (8/4/2020  3:08 PM)

## 2020-08-04 NOTE — TRANSFER OF CARE
"Anesthesia Transfer of Care Note    Patient: Anjel Sanders    Procedure(s) Performed: Procedure(s) (LRB):  REPAIR, ROTATOR CUFF (Right)    Patient location: PACU    Anesthesia Type: general and regional    Transport from OR: Transported from OR on 6-10 L/min O2 by face mask with adequate spontaneous ventilation    Post pain: adequate analgesia    Post assessment: no apparent anesthetic complications    Post vital signs: stable    Level of consciousness: responds to stimulation    Nausea/Vomiting: no nausea/vomiting    Complications: none    Transfer of care protocol was followed      Last vitals:   Visit Vitals  BP (!) 161/87   Pulse 60   Temp 36.6 °C (97.9 °F) (Skin)   Resp 14   Ht 5' 10" (1.778 m)   Wt 102.1 kg (225 lb)   SpO2 100%   BMI 32.28 kg/m²     "

## 2020-08-04 NOTE — PLAN OF CARE
1023: timeout completed with Dr. Poe and Dr. Melgar for right interscalene nerve block; consents x 2 verified; 2mg versed ivp given by Dr. Poe    1024: nerve block started; vss    1027: nerve block finished; vss; pt tolerated well

## 2020-08-04 NOTE — DISCHARGE INSTRUCTIONS
Discharge Instructions for Open Rotator Cuff Repair  You had a procedure called open rotator cuff repair. The rotator cuff consists of the muscles and tendons that surround your shoulder. The rotator cuff keeps the top of your upper arm bone (humerus) securely in the shoulder joint. Your doctor made an incision near your shoulder blade and repaired the torn muscles or tendons in your shoulder. Here are instructions to follow when caring for your arm at home.  Activity        · After surgery, rest your arm and relax for the rest of day.  · If you had general anesthesia (were put to sleep for the procedure), dont operate power tools or machinery, drink alcohol, or make any major decisions for at least 24 hours after surgery.  · Wear your sling, brace, or immobilizer, as directed.  · Dont drive a car until your doctor says its OK. And never drive while taking opioid pain medicine.  · Flex your wrist and wiggle your fingers often to help blood flow  ·   Incision care  · Check your incision daily for redness, tenderness, or drainage.  · Dont soak in a bathtub, hot tub, or pool until your doctor says its OK.  · Wait several days after your surgery to start showering, or until your doctor says it's OK. Then shower as needed. Carefully wash your incision with soap and water. Gently pat it dry. Dont rub the incision, or apply creams or lotions.  · Your incision was closed using sutures, staples, or strips of tape. If you have sutures or staples, they may need to be removed up to 2 to 3 weeks after surgery. Allow the strips of tape to fall off on their own.  Home care  · Use pain medicine as directed by your doctor.  · Apply an ice pack or bag of frozen peas--or something similar--wrapped in a thin towel on your shoulder to reduce swelling for the first 48 hours. Leave the ice pack on for 20 minutes; then take it off for 20 minutes. Repeat as needed.  · Take your temperature daily for 7 days after your surgery.  Report a fever above 100.4°F (38°C)  to your doctor. Fever may be a sign of infection.  Follow-up care  Follow up with your healthcare provider, or as advised.      When to call your healthcare provider  Call 911 right away if you have any of the following:  · Chest pain  · Shortness of breath  Otherwise, call your healthcare provider right away if you have any of these:  · Increasing shoulder pain or pain not relieved by medicine  · Pain or swelling in the arm on the side of your surgery  · Numbness, tingling, coolness, or blue-gray color of your arm or fingers on the side of your surgery  · Fever above 100.4°F (38°C), or as advised  · Shaking chills  · Drainage or oozing, redness, or warmth at the incision  · Nausea or vomiting   Date Last Reviewed: 7/1/2016 © 2000-2017 [employer]. [address] [place] [address]. All rights reserved. This information is not intended as a substitute for professional medical care. Always follow your healthcare professional's instructions.          Acetaminophen; Oxycodone tablets  What is this medicine?  ACETAMINOPHEN; OXYCODONE (a set a RA bonny fen; ox i KOE done) is a pain reliever. It is used to treat moderate to severe pain.  How should I use this medicine?  Take this medicine by mouth with a full glass of water. Follow the directions on the prescription label. You can take it with or without food. If it upsets your stomach, take it with food. Take your medicine at regular intervals. Do not take it more often than directed.  A special MedGuide will be given to you by the pharmacist with each prescription and refill. Be sure to read this information carefully each time.  Talk to your pediatrician regarding the use of this medicine in children. Special care may be needed.  What side effects may I notice from receiving this medicine?  Side effects that you should report to your doctor or health care professional as soon as possible:  · allergic reactions  like skin rash, itching or hives, swelling of the face, lips, or tongue  · breathing problems  · confusion  · redness, blistering, peeling or loosening of the skin, including inside the mouth  · signs and symptoms of liver injury like dark yellow or brown urine; general ill feeling or flu-like symptoms; light-colored stools; loss of appetite; nausea; right upper belly pain; unusually weak or tired; yellowing of the eyes or skin  · signs and symptoms of low blood pressure like dizziness; feeling faint or lightheaded, falls; unusually weak or tired  · trouble passing urine or change in the amount of urine  Side effects that usually do not require medical attention (report to your doctor or health care professional if they continue or are bothersome):  · constipation  · dry mouth  · nausea, vomiting  · tiredness  What may interact with this medicine?  This medicine may interact with the following medications:  · alcohol  · antihistamines for allergy, cough and cold  · antiviral medicines for HIV or AIDS  · atropine  · certain antibiotics like clarithromycin, erythromycin, linezolid, rifampin  · certain medicines for anxiety or sleep  · certain medicines for bladder problems like oxybutynin, tolterodine  · certain medicines for depression like amitriptyline, fluoxetine, sertraline  · certain medicines for fungal infections like ketoconazole, itraconazole, voriconazole  · certain medicines for migraine headache like almotriptan, eletriptan, frovatriptan, naratriptan, rizatriptan, sumatriptan, zolmitriptan  · certain medicines for nausea or vomiting like dolasetron, ondansetron, palonosetron  · certain medicines for Parkinson's disease like benztropine, trihexyphenidyl  · certain medicines for seizures like phenobarbital, phenytoin, primidone  · certain medicines for stomach problems like dicyclomine, hyoscyamine  · certain medicines for travel sickness like scopolamine  · diuretics  · general anesthetics like halothane,  isoflurane, methoxyflurane, propofol  · ipratropium  · local anesthetics like lidocaine, pramoxine, tetracaine  · MAOIs like Carbex, Eldepryl, Marplan, Nardil, and Parnate  · medicines that relax muscles for surgery  · methylene blue  · nilotinib  · other medicines with acetaminophen  · other narcotic medicines for pain or cough  · phenothiazines like chlorpromazine, mesoridazine, prochlorperazine, thioridazine  What if I miss a dose?  If you miss a dose, take it as soon as you can. If it is almost time for your next dose, take only that dose. Do not take double or extra doses.  Where should I keep my medicine?  Keep out of the reach of children. This medicine can be abused. Keep your medicine in a safe place to protect it from theft. Do not share this medicine with anyone. Selling or giving away this medicine is dangerous and against the law.  This medicine may cause accidental overdose and death if it taken by other adults, children, or pets. Mix any unused medicine with a substance like cat litter or coffee grounds. Then throw the medicine away in a sealed container like a sealed bag or a coffee can with a lid. Do not use the medicine after the expiration date.  Store at room temperature between 20 and 25 degrees C (68 and 77 degrees F).  What should I tell my health care provider before I take this medicine?  They need to know if you have any of these conditions:  · brain tumor  · Crohn's disease, inflammatory bowel disease, or ulcerative colitis  · drug abuse or addiction  · head injury  · heart or circulation problems  · if you often drink alcohol  · kidney disease or problems going to the bathroom  · liver disease  · lung disease, asthma, or breathing problems  · an unusual or allergic reaction to acetaminophen, oxycodone, other opioid analgesics, other medicines, foods, dyes, or preservatives  · pregnant or trying to get pregnant  · breast-feeding  What should I watch for while using this medicine?  Tell  your doctor or health care professional if your pain does not go away, if it gets worse, or if you have new or a different type of pain. You may develop tolerance to the medicine. Tolerance means that you will need a higher dose of the medication for pain relief. Tolerance is normal and is expected if you take this medicine for a long time.  Do not suddenly stop taking your medicine because you may develop a severe reaction. Your body becomes used to the medicine. This does NOT mean you are addicted. Addiction is a behavior related to getting and using a drug for a non-medical reason. If you have pain, you have a medical reason to take pain medicine. Your doctor will tell you how much medicine to take. If your doctor wants you to stop the medicine, the dose will be slowly lowered over time to avoid any side effects.  There are different types of narcotic medicines (opiates). If you take more than one type at the same time or if you are taking another medicine that also causes drowsiness, you may have more side effects. Give your health care provider a list of all medicines you use. Your doctor will tell you how much medicine to take. Do not take more medicine than directed. Call emergency for help if you have problems breathing or unusual sleepiness.  Do not take other medicines that contain acetaminophen with this medicine. Always read labels carefully. If you have questions, ask your doctor or pharmacist.  If you take too much acetaminophen get medical help right away. Too much acetaminophen can be very dangerous and cause liver damage. Even if you do not have symptoms, it is important to get help right away.  You may get drowsy or dizzy. Do not drive, use machinery, or do anything that needs mental alertness until you know how this medicine affects you. Do not stand or sit up quickly, especially if you are an older patient. This reduces the risk of dizzy or fainting spells. Alcohol may interfere with the effect  of this medicine. Avoid alcoholic drinks.  The medicine will cause constipation. Try to have a bowel movement at least every 2 to 3 days. If you do not have a bowel movement for 3 days, call your doctor or health care professional.  Your mouth may get dry. Chewing sugarless gum or sucking hard candy, and drinking plenty or water may help. Contact your doctor if the problem does not go away or is severe.  NOTE:This sheet is a summary. It may not cover all possible information. If you have questions about this medicine, talk to your doctor, pharmacist, or health care provider. Copyright© 2017 [employer]          ANESTHESIA  -For the first 24 hours after surgery:  Do not drive, use heavy equipment, make important decisions, or drink alcohol  -It is normal to feel sleepy for several hours.  Rest until you are more awake.  -Have someone stay with you, if needed.  They can watch for problems and help keep you safe.  -Some possible post anesthesia side effects include: nausea and vomiting, sore throat and hoarseness, sleepiness, and dizziness.    PAIN  -If you have pain after surgery, pain medicine will help you feel better.  Take it as directed, before pain becomes severe.  Most pain relievers taken by mouth need at least 20-30 minutes to start working.  -Do not drive or drink alcohol while taking pain medicine.  -Pain medication can upset your stomach.  Taking them with a little food may help.  -Other ways to help control pain: elevation, ice, and relaxation  -Call your surgeon if still having unmanageable pain an hour after taking pain medicine.  -Pain medicine can cause constipation.  Taking an over-the counter stool softener while on prescription pain medicine and drinking plenty of fluids can prevent this side effect.  -Call your surgeon if you have severe side effects like: breathing problems, trouble waking up, dizziness, confusion, or severe constipation.    NAUSEA  -Some people have nausea after surgery.  This  is often because of anesthesia, pain, pain medicine, or the stress of surgery.  -Do not push yourself to eat.  Start off with clear liquids and soup.  Slowly move to solid foods.  Don't eat fatty, rich, spicy foods at first.  Eat smaller amounts.  -If you develop persistent nausea and vomiting please notify your surgeon immediately.    BLEEDING  -Different types of surgery require different types of care and dressing changes.  It is important to follow all instructions and advice from your surgeon.  Change dressing as directed.  Call your surgeon for any concerns regarding postop bleeding.    SIGNS OF INFECTION  -Signs of infection include: fever, swelling, drainage, and redness  -Notify your surgeon if you have a fever of 100.4 F (38.0 C) or higher.  -Notify your surgeon if you notice redness, swelling, increased pain, pus, or a foul smell at the incision site.

## 2020-08-04 NOTE — OP NOTE
Certification of Assistant at Surgery       Surgery Date: 8/4/2020     Participating Surgeons:  Surgeon(s) and Role:     * Bob Esparza Jr., MD - Primary    Procedures:  Procedure(s) (LRB):  REPAIR, ROTATOR CUFF (Right)    Assistant Surgeon's Certification of Necessity:  I understand that section 1842 (b) (6) (d) of the Social Security Act generally prohibits Medicare Part B reasonable charge payment for the services of assistants at surgery in teaching hospitals when qualified residents are available to furnish such services. I certify that the services for which payment is claimed were medically necessary, and that no qualified resident was available to perform the services. I further understand that these services are subject to post-payment review by the Medicare carrier.      Mayank Rodriguez PA-C    08/04/2020  12:58 PM

## 2020-08-04 NOTE — ANESTHESIA PROCEDURE NOTES
Peripheral Block    Patient location during procedure: pre-op   Block not for primary anesthetic.  Reason for block: at surgeon's request and post-op pain management   Post-op Pain Location: right arm  Start time: 8/4/2020 10:24 AM  Timeout: 8/4/2020 10:23 AM   End time: 8/4/2020 10:27 AM    Staffing  Authorizing Provider: Diego Poe MD  Performing Provider: Ananya Melgar MD    Preanesthetic Checklist  Completed: patient identified, site marked, surgical consent, pre-op evaluation, timeout performed, IV checked, risks and benefits discussed and monitors and equipment checked  Peripheral Block  Patient position: sitting  Prep: ChloraPrep  Patient monitoring: heart rate, cardiac monitor, continuous pulse ox and frequent blood pressure checks  Block type: interscalene  Laterality: right  Injection technique: single shot  Needle  Needle type: Echogenic   Needle gauge: 20 G  Needle length: 4 in  Needle localization: anatomical landmarks and ultrasound guidance   -ultrasound image captured on disc.  Assessment  Injection assessment: negative aspiration, negative parasthesia and local visualized surrounding nerve  Paresthesia pain: none  Heart rate change: no  Slow fractionated injection: yes

## 2020-08-06 ENCOUNTER — TELEPHONE (OUTPATIENT)
Dept: INTERNAL MEDICINE | Facility: CLINIC | Age: 65
End: 2020-08-06

## 2020-08-06 NOTE — TELEPHONE ENCOUNTER
----- Message from Annette Guerrero sent at 8/6/2020  8:46 AM CDT -----  Regarding: resullts  Contact: Patient [phone]  Calling to get test results.  Name of test (lab, xray, etc.):     Date of test:  8/1/2020  Ordering provider: Isaias  Where was the test performed:  [facility]  Would the patient rather a call back or a response via [facility]?:  Please call  Comments:

## 2020-08-17 ENCOUNTER — OFFICE VISIT (OUTPATIENT)
Dept: ORTHOPEDICS | Facility: CLINIC | Age: 65
End: 2020-08-17
Payer: MEDICARE

## 2020-08-17 VITALS — HEIGHT: 70 IN | BODY MASS INDEX: 32.21 KG/M2 | WEIGHT: 225 LBS

## 2020-08-17 DIAGNOSIS — Z98.890 S/P ROTATOR CUFF REPAIR: ICD-10-CM

## 2020-08-17 DIAGNOSIS — M75.122 COMPLETE TEAR OF LEFT ROTATOR CUFF, UNSPECIFIED WHETHER TRAUMATIC: Primary | ICD-10-CM

## 2020-08-17 PROCEDURE — 99999 PR PBB SHADOW E&M-EST. PATIENT-LVL IV: ICD-10-PCS | Mod: PBBFAC,HCNC,, | Performed by: ORTHOPAEDIC SURGERY

## 2020-08-17 PROCEDURE — 99999 PR PBB SHADOW E&M-EST. PATIENT-LVL IV: CPT | Mod: PBBFAC,HCNC,, | Performed by: ORTHOPAEDIC SURGERY

## 2020-08-17 PROCEDURE — 99024 POSTOP FOLLOW-UP VISIT: CPT | Mod: HCNC,S$GLB,, | Performed by: ORTHOPAEDIC SURGERY

## 2020-08-17 PROCEDURE — 99024 PR POST-OP FOLLOW-UP VISIT: ICD-10-PCS | Mod: HCNC,S$GLB,, | Performed by: ORTHOPAEDIC SURGERY

## 2020-08-17 RX ORDER — OXYCODONE AND ACETAMINOPHEN 7.5; 325 MG/1; MG/1
1 TABLET ORAL EVERY 4 HOURS PRN
Qty: 40 TABLET | Refills: 0 | Status: SHIPPED | OUTPATIENT
Start: 2020-08-17 | End: 2021-12-16

## 2020-08-17 NOTE — PROGRESS NOTES
"Subjective:      Patient ID: Anjel Sanders is a 64 y.o. male.    Chief Complaint: Post-op Evaluation of the Right Shoulder      HPI: Anjel Sanders Is here for postop visit.  He is just under 2 weeks status post RIGHT open rotator cuff repair with 4 anchors and acromioplasty. Overall, he is doing well. Pain is tolerable with Percocet. He has been complaint with the sling.    Past Medical History:   Diagnosis Date    AR (allergic rhinitis)     Gouty arthritis     Hx of colonic polyps     Tubular Adenoma    MURTAZA (obstructive sleep apnea)     Other and unspecified hyperlipidemia     Stroke     Unspecified essential hypertension        Current Outpatient Medications:     allopurinoL (ZYLOPRIM) 300 MG tablet, Take 1 tablet (300 mg total) by mouth once daily., Disp: 90 tablet, Rfl: 3    amLODIPine-benazepriL (LOTREL) 10-40 mg per capsule, Take 1 capsule by mouth once daily., Disp: 90 capsule, Rfl: 3    aspirin (ECOTRIN) 81 MG EC tablet, Take 81 mg by mouth once daily., Disp: , Rfl:     atorvastatin (LIPITOR) 80 MG tablet, Take 1 tablet (80 mg total) by mouth once daily., Disp: 90 tablet, Rfl: 4    gabapentin (NEURONTIN) 300 MG capsule, Take 1 capsule (300 mg total) by mouth every evening., Disp: 90 capsule, Rfl: 3    hydrOXYzine HCl (ATARAX) 25 MG tablet, TAKE 1 TABLET BY MOUTH THREE TIMES A DAY AS NEEDED FOR ITCHING, Disp: 30 tablet, Rfl: 0    oxyCODONE-acetaminophen (PERCOCET) 7.5-325 mg per tablet, Take 1 tablet by mouth every 4 (four) hours as needed., Disp: 40 tablet, Rfl: 0    triamcinolone acetonide 0.1% (KENALOG) 0.1 % ointment, Apply topically 2 (two) times daily., Disp: , Rfl:   Review of patient's allergies indicates:  No Known Allergies    Ht 5' 10" (1.778 m)   Wt 102.1 kg (225 lb)   BMI 32.28 kg/m²     Review of Systems   Constitution: Negative for chills and fever.   Cardiovascular: Negative for chest pain and palpitations.   Respiratory: Negative for shortness of breath and wheezing.  "   Skin: Negative for poor wound healing and rash.   Musculoskeletal: Positive for joint pain and stiffness.   Gastrointestinal: Negative for nausea and vomiting.   Genitourinary: Negative for dysuria and hematuria.   Neurological: Negative for seizures and tremors.   Psychiatric/Behavioral: Negative for altered mental status.   Allergic/Immunologic: Negative for environmental allergies and persistent infections.         Objective:    Ortho Exam     Right Shoulder  Skin: steri strips in palce. A few removed to reveal a well approximated and healing incision without sign of infection..  Atrophy: none noted.  Tenderness to palpation: Mild global.  PROM (deg): abduction-45, flexion-45, rotation- unrestricted, painful rotation- absent.  Rotator cuff:  Not performed, Impingement test- not performed.  Cross arm adduction test- not performed.  Instability testing: negative.   Distal neuro: normal, no muscle wasting or atrophy.  Pulses: Positive peripheral pulses..    GEN: Well developed, well nourished  male. AAOX3. No acute distress.   Normocephalic, atraumatic.   REGGIE  Breathing unlabored.  Mood and affect  appropriate.         Assessment:     Imaging: No new imaging.        1. Complete tear of left rotator cuff, unspecified whether traumatic    2. S/P rotator cuff repair          Plan:       Orders Placed This Encounter    Ambulatory referral/consult to Physical/Occupational Therapy    oxyCODONE-acetaminophen (PERCOCET) 7.5-325 mg per tablet       patient is progressing as expected postop   proper wound care was discussed   Steri-Strips will fall off on their own   encourage pendulum exercises and ROM elbow   continue sling    start  Therapy   pain med refill    Follow up in about 3 weeks (around 9/7/2020).

## 2020-08-20 ENCOUNTER — CLINICAL SUPPORT (OUTPATIENT)
Dept: REHABILITATION | Facility: HOSPITAL | Age: 65
End: 2020-08-20
Payer: MEDICARE

## 2020-08-20 DIAGNOSIS — R29.898 WEAKNESS OF RIGHT UPPER EXTREMITY: ICD-10-CM

## 2020-08-20 DIAGNOSIS — R26.89 DECREASED FUNCTIONAL MOBILITY: ICD-10-CM

## 2020-08-20 DIAGNOSIS — M25.611 DECREASED RANGE OF MOTION OF RIGHT SHOULDER: ICD-10-CM

## 2020-08-20 DIAGNOSIS — Z98.890 S/P ROTATOR CUFF REPAIR: ICD-10-CM

## 2020-08-20 PROCEDURE — 97162 PT EVAL MOD COMPLEX 30 MIN: CPT | Mod: HCNC,PO

## 2020-08-20 NOTE — PLAN OF CARE
[facility]  Physical Therapy Initial Evaluation    Date: 8/20/2020   Name: Anjel Sanders  Clinic Number: [id number]    Therapy Diagnosis:   Encounter Diagnoses   Name Primary?    S/P rotator cuff repair     Decreased range of motion of right shoulder     Weakness of right upper extremity      Physician: Sridevi Vital PA-C    Physician Orders: PT Eval and Treat   Medical Diagnosis from Referral: Z98.890 (ICD-10-CM) - S/P rotator cuff repair  Evaluation Date: 8/20/2020  Authorization Period Expiration: 08/17/2021  Plan of Care Expiration: 10/20/2020  Visit # / Visits authorized: 1/ 1    Time In: 11:45 am  Time Out: 12:30 pm  Total Appointment Time (timed & untimed codes): 45 minutes    Precautions: Standard and Diabetes    Subjective   Date of onset: sx 8/04/2020  History of current condition - Anjel reports: he forgot his sling this morning as he was rushing around to get to therapy on time that he forgot to put it back on. Since his surgery he is having difficulty with dressing, bathing, driving(patient drove himself to therapy evaluation without the sling), trouble putting key in the ignition of his truck, trouble with sweeping and other household duties, sometimes trouble with getting out the bed, and is unable to work in his yard.      Medical History:   Past Medical History:   Diagnosis Date    AR (allergic rhinitis)     Gouty arthritis     Hx of colonic polyps     Tubular Adenoma    MURTAZA (obstructive sleep apnea)     Other and unspecified hyperlipidemia     Stroke     Unspecified essential hypertension        Surgical History:   Anjel Sanders  has a past surgical history that includes Foot surgery; surgery on jaw; Arthroscopic repair of rotator cuff of shoulder (Left, 4/30/2019); Arthroscopy of shoulder with decompression of subacromial space (4/30/2019); and Rotator cuff repair (Right, 8/4/2020).    Medications:   Anjel has a current medication list which  "includes the following prescription(s): allopurinol, amlodipine-benazepril, aspirin, atorvastatin, gabapentin, hydroxyzine hcl, oxycodone-acetaminophen, and triamcinolone acetonide 0.1%.    Allergies:   Review of patient's allergies indicates:  No Known Allergies     Imaging, x-rays: Right shoulder 6/16/2020  FINDINGS:  Distal right clavicular and acromial hypertrophy is again demonstrated.  The right humeral head again has "high-riding" appearance with overall stable radiographic appearance of the included osseous structures, soft tissues and right upper lung.  The right humeral head remains normal in contour with minor hypertrophy again demonstrated along its inferior articular margin and no acute osseous abnormalities are identified in the included regions.     Impression:     Stable three-view appearance of the right shoulder.    Prior Therapy: PT previously for L shoulder  Social History: single story home, 6 steps with 1 handrail, lives with their family  Occupation: retired  Prior Level of Function: independent  Current Level of Function: Min A with ADLs    Pain:  Current 3/10, worst 10/10, best 3/10   Location: right shoulder   Description: feels like a pain  Aggravating Factors: Lifting, Getting out of bed/chair and moving R shoulder  Easing Factors: pain medication, ice and rest    Pts goals: to get my arm right    Objective     Observation: Patient is a 64 year old male, who presents to the clinic in no apparent distress. He arrived to the clinic without his sling and he drove himself to the clinic. Patient is R handed.    Posture: forward shoulders, R arm in slight adduction resting in lap      Passive Range of Motion:   Shoulder Left Right   Flexion 150 90   Abduction 165 80   ER at 0 50 30   ER at 90 60 NT   IR 70 70      Active Range of Motion:   Shoulder Left Right   Flexion 135 55   Abduction 155 60   ER at 0 50 30   ER at 90 NT NT   IR 70 70     Upper Extremity Strength   (L) UE (R) UE   Shoulder " flexion: 3+/5 2-/5   Shoulder Abduction: 3+/5 2-/5   Shoulder ER 4-/5 2-/5   Shoulder IR 5/5 2-/5   Lower Trap* NT NT   Middle Trap* NT NT   Rhomboids* NT NT   *not assessed today as patient was unable to lie prone    Special Tests: N/A due to recent surgery    Joint Mobility: hypomobile A/P, P/A, inferior glide on R    Palpation: tender over incision    Sensation: light touch intact    Flexibility: B pec tightness    Scapular Control/Dyskinesis: unable to assess due to limited AROM on R      Limitation/Restriction for FOTO Shoulder Survey    Therapist reviewed FOTO scores for Anjel Sanders on 8/20/2020.   FOTO documents entered into [employer] - see Media section.    Limitation Score: 56%         TREATMENT     Home Exercises and Patient Education Provided    Education provided:   - compliance with HEP  - role of PT and goals for PT    Written Home Exercises Provided: to be established next visit.    Date  8/20/2020   VISIT 1/1   FOTO 1/5   POC EXP. DATE 10/20/2020   VISIT AMOUNT  MEDICARE TOTAL    FACE-TO-FACE 9/20/2020   PROM Next   TABLE:    Supine pec str w/ towel --   Snow angels --   Butterflies  --   Wand flexion Next?   Wand abduction Next?   Wand ER Next?   Side lying flexion --   Side lying abduction --   Side lying ER --   PRONE:    Shoulder flexion --   Shoulder scaption --   Shoulder abduction --   SEATED:    Scapula squeezes Next   Shoulder rolls next       STANDING:    Door way stretch --   Wall walks --   Theraband  - W's  - T's  - I's  - Rows  - ER   --  --  --  --  -           Initials DG         Assessment   Anjel is a 64 y.o. male referred to outpatient Physical Therapy with a medical diagnosis of S/P rotator cuff repair. Pt presents with decreased AROM of right shoulder, weakness of right UE, pec tightness, and decreased independence with ADLs. Due to his decreased ROM, muscle weakness, and muscle tightness he has difficulty with bathing, dressing, driving, performing his usual household duties,  performing bed mobility, and his usual yard work. His current score on FOTO Shoulder Survey indicates he is 56% impaired, limited, or restricted.     Pt prognosis is Good.   Pt will benefit from skilled outpatient Physical Therapy to address the deficits stated above and in the chart below, provide pt/family education, and to maximize pt's level of independence.     Plan of care discussed with patient: Yes  Pt's spiritual, cultural and educational needs considered and patient is agreeable to the plan of care and goals as stated below:     Anticipated Barriers for therapy: none    Medical Necessity is demonstrated by the following  History  Co-morbidities and personal factors that may impact the plan of care Co-morbidities:   diabetes, history of CVA, HTN and level of undertstanding of current condition    Personal Factors:   no deficits     moderate   Examination  Body Structures and Functions, activity limitations and participation restrictions that may impact the plan of care Body Regions:   upper extremities    Body Systems:    ROM  strength  motor control    Participation Restrictions:   Difficulty with bathing, dressing, driving, performing usual household duties, yard work, lifting, and reaching.    Activity limitations:   Learning and applying knowledge  no deficits    General Tasks and Commands  no deficits    Communication  no deficits    Mobility  lifting and carrying objects  driving (bike, car, motorcycle)    Self care  washing oneself (bathing, drying, washing hands)  caring for body parts (brushing teeth, shaving, grooming)  dressing    Domestic Life  doing house work (cleaning house, washing dishes, laundry)  yard work    Interactions/Relationships  no deficits    Life Areas  no deficits    Community and Social Life  community life  recreation and leisure         moderate   Clinical Presentation evolving clinical presentation with changing clinical characteristics moderate   Decision Making/  Complexity Score: moderate     Goals:  Short Term Goals: 4 weeks   1. This patient will be independent with a basic HEP.  2. This patient will have R shoulder active range of motion of 110 degrees flexion in order to bathe with no complaints of pain.  3. This patient will increase R shoulder strength 1 grade in order to be able to drive with no increase in symptoms.   4. This patient will have a pain rating of 4/10 at worst with ADLs.   5. Patient will be able to achieve greater than or equal to 54 on the FOTO Shoulder Survey indicating patient is 46% impaired, limited, or restricted and demonstrating overall improved functional ability with upper extremity.    Long Term Goals: 8 weeks   1. This patient will be independent with an updated HEP.  2. This patient will have R shoulder active range of motion equal to L shoulder active range of motion  in order to dress with no complaints of pain.  3. This patient will have R shoulder strength of 5/5 in order to be able to perform his yard work with no increase in symptoms.   4. This patient will have a pain rating of 1/10 at worst with ADLs.   5. Patient will be able to achieve greater than or equal to 64 on the FOTO Shoulder Survey indicating patient is 34% impaired, limited, or restricted and demonstrating overall improved functional ability with upper extremity.       Plan   Plan of care Certification: 8/20/2020 to 10/20/2020.    Outpatient Physical Therapy 2 times weekly for 8 weeks to include the following interventions: Manual Therapy, Moist Heat/ Ice, Neuromuscular Re-ed, Patient Education, Therapeutic Exercise and IASTM. Dry needling with manual therapy techniques to decrease pain, inflammation and swelling, increase circulation and promote healing process.    Katharina Perez, PT

## 2020-08-31 NOTE — PROGRESS NOTES
"  Physical Therapy Treatment Note     Name: Anjel Sanders  Clinic Number: [id number]    Therapy Diagnosis:   Encounter Diagnoses   Name Primary?    Decreased range of motion of right shoulder     Weakness of right upper extremity     Decreased functional mobility      Physician: Sridevi Vital PA-C    Visit Date: 9/1/2020    Physician Orders: PT Eval and Treat   Medical Diagnosis from Referral: Z98.890 (ICD-10-CM) - S/P rotator cuff repair  Evaluation Date: 8/20/2020  Authorization Period Expiration: 02/27/2021  Plan of Care Expiration: 10/20/2020  Visit # / Visits authorized: 1/ 60    Time In: 8:00 am  Time Out: 8:36 am  Total Billable Time: 34 minutes    Precautions: Standard    Subjective     Pt reports: only hurts when he uses it.  Patient drove  He was not compliant with home exercise program.  Response to previous treatment: not that much soreness   Functional change: none    Pain: 1/10  Location: right shoulder      Objective     Anjel received therapeutic exercises to develop strength, ROM and flexibility for 34 minutes including:    Date  9/01/2020 8/20/2020   VISIT 1/60 1/1   FOTO 2/5 1/5   POC EXP. DATE 10/20/2020 10/20/2020   VISIT AMOUNT  MC total 60.64  143.52  82.88  82.88   FACE-TO-FACE 9/20/2020 9/20/2020   PROM flex, abd, ER 5' Next   TABLE:      Supine pec str w/ towel Next? --   Snow angels -- --   Butterflies  -- --   Wand flexion 1 x 10 Next?   Wand abduction 1 x 10 Next?   Wand ER Next? Next?   SA punches 1 x 10    Side lying flexion -- --   Side lying abduction -- --   Side lying ER -- --   PRONE:      Shoulder flexion -- --   Shoulder scaption -- --   Shoulder abduction -- --   SEATED:      Scapula squeezes 10 x 3" Next   Elbow flex/ext 1 x 10 next   Pulley flex 2'     STANDING:      Door way stretch -- --   Wall walks -- --   Theraband  - W's  - T's  - I's  - Rows  - ER   --  --  --  --  --    --  --  --  --  -    Codman's 1 x 10 ea            Initials DG DG       Home Exercises " Provided and Patient Education Provided     Education provided:   - compliance with HEP    Written Home Exercises Provided: Patient instructed to cont prior HEP.  Exercises were reviewed and Anjel was able to demonstrate them prior to the end of the session.  Anjel demonstrated fair  understanding of the education provided.     See EMR under Patient Instructions for exercises provided 8/27/2020.    Assessment     Anjel was able to begin making progress towards his goals as he was able to perform all of today's activities with no increase in symptoms prior to leaving the clinic. He required frequent verbal and tactile cues to avoid substitutions with all exercises. He had difficulty relaxing with passive flexion and abduction in supine, secondary to anticipating pain with movement. Patient arrived today wearing his sling, but he continues to drive himself to his therapy appointments despite taking his pain medication at night to help him sleep.   Anjel is progressing well towards his goals.   Pt prognosis is Good.     Pt will continue to benefit from skilled outpatient physical therapy to address the deficits listed in the problem list box on initial evaluation, provide pt/family education and to maximize pt's level of independence in the home and community environment.     Pt's spiritual, cultural and educational needs considered and pt agreeable to plan of care and goals.     Anticipated barriers to physical therapy: none    Goals:   Short Term Goals: 4 weeks   1. This patient will be independent with a basic HEP. In progress, not met  2. This patient will have R shoulder active range of motion of 110 degrees flexion in order to bathe with no complaints of pain.In progress, not met  3. This patient will increase R shoulder strength 1 grade in order to be able to drive with no increase in symptoms. In progress, not met  4. This patient will have a pain rating of 4/10 at worst with ADLs. In progress, not met  5.  Patient will be able to achieve greater than or equal to 54 on the FOTO Shoulder Survey indicating patient is 46% impaired, limited, or restricted and demonstrating overall improved functional ability with upper extremity. In progress, not met     Long Term Goals: 8 weeks   1. This patient will be independent with an updated HEP. In progress, not met  2. This patient will have R shoulder active range of motion equal to L shoulder active range of motion  in order to dress with no complaints of pain. In progress, not met  3. This patient will have R shoulder strength of 5/5 in order to be able to perform his yard work with no increase in symptoms. In progress, not met  4. This patient will have a pain rating of 1/10 at worst with ADLs. In progress, not met  5. Patient will be able to achieve greater than or equal to 64 on the FOTO Shoulder Survey indicating patient is 34% impaired, limited, or restricted and demonstrating overall improved functional ability with upper extremity. In progress, not met    Plan     Continue with the plan of care and increase reps with all exercises next visit. Attempt to begin supine pec stretch next visit.    Katharina Perez, PT

## 2020-09-01 ENCOUNTER — CLINICAL SUPPORT (OUTPATIENT)
Dept: REHABILITATION | Facility: HOSPITAL | Age: 65
End: 2020-09-01
Payer: MEDICARE

## 2020-09-01 DIAGNOSIS — R29.898 WEAKNESS OF RIGHT UPPER EXTREMITY: ICD-10-CM

## 2020-09-01 DIAGNOSIS — R26.89 DECREASED FUNCTIONAL MOBILITY: ICD-10-CM

## 2020-09-01 DIAGNOSIS — M25.611 DECREASED RANGE OF MOTION OF RIGHT SHOULDER: ICD-10-CM

## 2020-09-01 PROCEDURE — 97110 THERAPEUTIC EXERCISES: CPT | Mod: HCNC,PO

## 2020-09-01 NOTE — PATIENT INSTRUCTIONS
ROM: Flexion - Wand (Supine)        Lie on back holding wand. Raise arms over head.   Repeat 10 times per set. Do 2 sets per session. Do 2 sessions per day.     [email or web address]     Copyright © [employer]. All rights reserved.   ROM: Abduction - Wand        Holding wand with left hand palm up, push wand directly out to side, leading with other hand palm down, until stretch is felt.   Repeat 10 times per set. Do 2 sets per session. Do 2 sessions per day.     [email or web address]     Copyright © [employer]. All rights reserved.   Scapular Retraction (Standing)        With arms at sides, pinch shoulder blades together.  Repeat 10 times per set. Do 2 sets per session. Do 2 sessions per day.     [email or web address]     Copyright © [employer]. All rights reserved.

## 2020-09-02 NOTE — PROGRESS NOTES
"  Physical Therapy Treatment Note     Name: Anjel Sanders  Clinic Number: [id number]    Therapy Diagnosis:   Encounter Diagnoses   Name Primary?    Decreased range of motion of right shoulder Yes    Weakness of right upper extremity     Decreased functional mobility      Physician: Sridevi Vital PA-C    Visit Date: 9/3/2020    Physician Orders: PT Eval and Treat   Medical Diagnosis from Referral: Z98.890 (ICD-10-CM) - S/P rotator cuff repair  Evaluation Date: 8/20/2020  Authorization Period Expiration: 02/27/2021  Plan of Care Expiration: 10/20/2020  Visit # / Visits authorized: 2 / 60    Time In: 8:53 am  Time Out: 9:30 am  Total Billable Time: 37 minutes    Precautions: Standard    Subjective     Pt reports: only hurts when he uses it.  Patient drove  He was not compliant with home exercise program.  Response to previous treatment: not that much soreness   Functional change: none    Pain: 1/10  Location: right shoulder      Objective     Anjel received therapeutic exercises to develop strength, ROM and flexibility for 37 minutes including:    Date  9/03/2020 9/01/2020 8/20/2020   VISIT 2/60 1/60 1/1   DOS 8/05/2020      FOTO 3/5 2/5 1/5   POC EXP. DATE 10/20/2020 10/20/2020 10/20/2020   VISIT AMOUNT  MC total 60.64  204.16 60.64  143.52  82.88  82.88   FACE-TO-FACE 9/20/2020 9/20/2020 9/20/2020         PROM flex, abd, ER 5' 5' Next   TABLE:       Supine pec str w/ towel 1' no towel Next? --   Snow angels  -- --   Butterflies   -- --   Wand flexion 1 x 15 1 x 10 Next?   Wand abduction 1 x 15 1 x 10 Next?   Wand ER 1 x 15 Next? Next?   SA punches 1 x 15 1 x 10    Side lying flexion -- -- --   Side lying abduction -- -- --   Side lying ER -- -- --         PRONE:       Shoulder flexion -- -- --   Shoulder scaption -- -- --   Shoulder abduction -- -- --         SEATED:       Scapula squeezes 10 x 3" 10 x 3" Next   Elbow flex/ext 2 x 10 1 x 10 next   Pulley flex 2' 2'           STANDING:       Door way stretch  " "-- --   Wall walks  -- --   Theraband  - W's  - T's  - I's  - Rows  - ER    --  --  --  --  --    --  --  --  --  -   Codman's 20 x ea  Cw/ccw 1 x 10 ea     Isometrics:  - Flexion  - Ext  - ABD  - IR   - ER   10 x 3"  10 x 3"  10 x 3"  10 x 3" (gentle)  10 x 3"             Initials SB DG DG       Home Exercises Provided and Patient Education Provided     Education provided:   - compliance with HEP    Written Home Exercises Provided: Patient instructed to cont prior HEP.  Exercises were reviewed and Anjel was able to demonstrate them prior to the end of the session.  Anjel demonstrated fair  understanding of the education provided.     See EMR under Patient Instructions for exercises provided 8/27/2020.    Assessment     Anjel returns to therapy on this date with slight muscle soreness from previous visit. Pt's treatment time was limited due to pt arriving late to appointment. Pt was able to complete today's exercises and progressions well with discomfort noted with wand exercises. Pt was able to progress to isometric strengthening today without increase in symptoms noted. Pt demo'd decreased ROM in elbow extension today due to muscle guarding. Pt will continue to progress ROM and strengthening exercises as tolerated.    Anjel is progressing well towards his goals.   Pt prognosis is Good.     Pt will continue to benefit from skilled outpatient physical therapy to address the deficits listed in the problem list box on initial evaluation, provide pt/family education and to maximize pt's level of independence in the home and community environment.     Pt's spiritual, cultural and educational needs considered and pt agreeable to plan of care and goals.     Anticipated barriers to physical therapy: none    Goals:   Short Term Goals: 4 weeks   1. This patient will be independent with a basic HEP. In progress, not met  2. This patient will have R shoulder active range of motion of 110 degrees flexion in order to bathe with " no complaints of pain.In progress, not met  3. This patient will increase R shoulder strength 1 grade in order to be able to drive with no increase in symptoms. In progress, not met  4. This patient will have a pain rating of 4/10 at worst with ADLs. In progress, not met  5. Patient will be able to achieve greater than or equal to 54 on the FOTO Shoulder Survey indicating patient is 46% impaired, limited, or restricted and demonstrating overall improved functional ability with upper extremity. In progress, not met     Long Term Goals: 8 weeks   1. This patient will be independent with an updated HEP. In progress, not met  2. This patient will have R shoulder active range of motion equal to L shoulder active range of motion  in order to dress with no complaints of pain. In progress, not met  3. This patient will have R shoulder strength of 5/5 in order to be able to perform his yard work with no increase in symptoms. In progress, not met  4. This patient will have a pain rating of 1/10 at worst with ADLs. In progress, not met  5. Patient will be able to achieve greater than or equal to 64 on the FOTO Shoulder Survey indicating patient is 34% impaired, limited, or restricted and demonstrating overall improved functional ability with upper extremity. In progress, not met    Plan     Continue with the plan of care as tolerated. Increase sets with all exercises next session.     Ernestina Baxter, PTA 1/6

## 2020-09-03 ENCOUNTER — CLINICAL SUPPORT (OUTPATIENT)
Dept: REHABILITATION | Facility: HOSPITAL | Age: 65
End: 2020-09-03
Payer: MEDICARE

## 2020-09-03 DIAGNOSIS — R29.898 WEAKNESS OF RIGHT UPPER EXTREMITY: ICD-10-CM

## 2020-09-03 DIAGNOSIS — R26.89 DECREASED FUNCTIONAL MOBILITY: ICD-10-CM

## 2020-09-03 DIAGNOSIS — M25.611 DECREASED RANGE OF MOTION OF RIGHT SHOULDER: Primary | ICD-10-CM

## 2020-09-03 PROCEDURE — 97110 THERAPEUTIC EXERCISES: CPT | Mod: HCNC,PO,CQ

## 2020-09-08 ENCOUNTER — CLINICAL SUPPORT (OUTPATIENT)
Dept: REHABILITATION | Facility: HOSPITAL | Age: 65
End: 2020-09-08
Payer: MEDICARE

## 2020-09-08 DIAGNOSIS — R26.89 DECREASED FUNCTIONAL MOBILITY: ICD-10-CM

## 2020-09-08 DIAGNOSIS — M25.611 DECREASED RANGE OF MOTION OF RIGHT SHOULDER: ICD-10-CM

## 2020-09-08 DIAGNOSIS — R29.898 WEAKNESS OF RIGHT UPPER EXTREMITY: ICD-10-CM

## 2020-09-08 PROCEDURE — 97110 THERAPEUTIC EXERCISES: CPT | Mod: HCNC,PO

## 2020-09-08 NOTE — PATIENT INSTRUCTIONS
ROM: External / Internal Rotation - Wand        Holding wand with right hand palm up, push out from body with other hand, palm down. Keep both elbows bent. Repeat 10 times per set. Do 2 sets per session. Do 2 sessions per day.     [email or web address].[email or web address]     Copyright © [employer]. All rights reserved.           SA punches    Lay flat on your back on a stable surface with elbows straight and arms extended up toward the ceiling. Lift shoulder blades off the ground while keeping elbows locked. Do 2 sets of 10 reps.

## 2020-09-08 NOTE — PROGRESS NOTES
"  Physical Therapy Treatment Note     Name: Anjel Sanders  Clinic Number: [id number]    Therapy Diagnosis:   Encounter Diagnoses   Name Primary?    Decreased range of motion of right shoulder     Weakness of right upper extremity     Decreased functional mobility      Physician: Sridevi Vital PA-C    Visit Date: 9/8/2020    Physician Orders: PT Eval and Treat   Medical Diagnosis from Referral: Z98.890 (ICD-10-CM) - S/P rotator cuff repair  Evaluation Date: 8/20/2020  Authorization Period Expiration: 02/27/2021  Plan of Care Expiration: 10/20/2020  Visit # / Visits authorized: 3 / 60    Time In: 8:10 am  Time Out: 8:45 am  Total Billable Time: 31 minutes    Precautions: Standard    Subjective     Pt reports: no pain today, not doing his HEP because we didn't give him a cane or pictures.   He was not compliant with home exercise program.  Response to previous treatment: not that much soreness   Functional change: none    Pain: 0/10  Location: right shoulder      Objective     Anjel received therapeutic exercises to develop strength, ROM and flexibility for 31 minutes including:    Date  09/08/2020 9/03/2020 9/01/2020 8/20/2020   VISIT 3/60 2/60 1/60 1/1   DOS 8/05/2020       FOTO 4/5 3/5 2/5 1/5   POC EXP. DATE 10/20/2020 10/20/2020 10/20/2020 10/20/2020   VISIT AMOUNT  MC total 60.64  264.80 60.64  204.16 60.64  143.52  82.88  82.88   FACE-TO-FACE 9/20/2020 9/20/2020 9/20/2020 9/20/2020          PROM flex, abd, ER Not today 5' 5' Next   TABLE:        Supine pec str w/ towel 1' 30" 1' no towel Next? --   Snow angels --  -- --   Butterflies  --  -- --   Wand flexion 2 x 10 1 x 15 1 x 10 Next?   Wand abduction 2 x 10 1 x 15 1 x 10 Next?   Wand ER 2 x 10 1 x 15 Next? Next?   SA punches 2 x 10 1 x 15 1 x 10    Side lying flexion -- -- -- --   Side lying abduction -- -- -- --   Side lying ER -- -- -- --          PRONE:        Shoulder flexion -- -- -- --   Shoulder scaption -- -- -- --   Shoulder abduction -- -- " "-- --          SEATED:        Scapula squeezes 15 x 3" 10 x 3" 10 x 3" Next   Elbow flex/ext 2 x 10 2 x 10 1 x 10 next   Pulley flex, abd 2' ea 2' 2'     Post. Shoulder roll 1 x 10      STANDING:        Door way stretch --  -- --   Wall walks --  -- --   Theraband  - W's  - T's  - I's  - Rows  - ER   --  ---  --  --  --    --  --  --  --  --    --  --  --  --  -   Codman's 20 x ea  CW/CCW 20 x ea  Cw/ccw 1 x 10 ea     Isometrics:  - Flexion  - Ext  - ABD  - IR   - ER   15 x 3"  15 x 3"  15 x 3"  15 x 3"  15 x 3"   10 x 3"  10 x 3"  10 x 3"  10 x 3" (gentle)  10 x 3"              Initials DG SB DG DG       Home Exercises Provided and Patient Education Provided     Education provided:   - compliance with HEP  - patient given another set of handouts of his HEP     Written Home Exercises Provided: Patient instructed to cont prior HEP.  Exercises were reviewed and Anjel was able to demonstrate them prior to the end of the session.  Anjel demonstrated fair  understanding of the education provided.     See EMR under Patient Instructions for exercises provided 8/27/2020.    Assessment     Anjel continues to require frequent verbal cues to perform his exercises correctly and to avoid over using his upper traps. He was given a second set of handouts of his HEP and instructed to perform all parts of it daily. He was able to perform all of today's activities with no increase in symptoms prior to leaving the clinic.   Anjel is progressing well towards his goals.   Pt prognosis is Good.     Pt will continue to benefit from skilled outpatient physical therapy to address the deficits listed in the problem list box on initial evaluation, provide pt/family education and to maximize pt's level of independence in the home and community environment.     Pt's spiritual, cultural and educational needs considered and pt agreeable to plan of care and goals.     Anticipated barriers to physical therapy: none    Goals:   Short Term Goals: 4 " weeks   1. This patient will be independent with a basic HEP. In progress, not met  2. This patient will have R shoulder active range of motion of 110 degrees flexion in order to bathe with no complaints of pain.In progress, not met  3. This patient will increase R shoulder strength 1 grade in order to be able to drive with no increase in symptoms. In progress, not met  4. This patient will have a pain rating of 4/10 at worst with ADLs. In progress, not met  5. Patient will be able to achieve greater than or equal to 54 on the FOTO Shoulder Survey indicating patient is 46% impaired, limited, or restricted and demonstrating overall improved functional ability with upper extremity. In progress, not met     Long Term Goals: 8 weeks   1. This patient will be independent with an updated HEP. In progress, not met  2. This patient will have R shoulder active range of motion equal to L shoulder active range of motion  in order to dress with no complaints of pain. In progress, not met  3. This patient will have R shoulder strength of 5/5 in order to be able to perform his yard work with no increase in symptoms. In progress, not met  4. This patient will have a pain rating of 1/10 at worst with ADLs. In progress, not met  5. Patient will be able to achieve greater than or equal to 64 on the FOTO Shoulder Survey indicating patient is 34% impaired, limited, or restricted and demonstrating overall improved functional ability with upper extremity. In progress, not met    Plan     Continue with the plan of care as tolerated. Increase sets with all exercises next session.     Katharina Perez, PT

## 2020-09-10 ENCOUNTER — OFFICE VISIT (OUTPATIENT)
Dept: ORTHOPEDICS | Facility: CLINIC | Age: 65
End: 2020-09-10
Payer: MEDICARE

## 2020-09-10 VITALS — WEIGHT: 225 LBS | BODY MASS INDEX: 32.21 KG/M2 | HEIGHT: 70 IN

## 2020-09-10 DIAGNOSIS — S46.011D TRAUMATIC COMPLETE TEAR OF RIGHT ROTATOR CUFF, SUBSEQUENT ENCOUNTER: Primary | ICD-10-CM

## 2020-09-10 PROCEDURE — 99999 PR PBB SHADOW E&M-EST. PATIENT-LVL III: CPT | Mod: PBBFAC,HCNC,, | Performed by: ORTHOPAEDIC SURGERY

## 2020-09-10 PROCEDURE — 99999 PR PBB SHADOW E&M-EST. PATIENT-LVL III: ICD-10-PCS | Mod: PBBFAC,HCNC,, | Performed by: ORTHOPAEDIC SURGERY

## 2020-09-10 PROCEDURE — 99024 PR POST-OP FOLLOW-UP VISIT: ICD-10-PCS | Mod: HCNC,S$GLB,, | Performed by: ORTHOPAEDIC SURGERY

## 2020-09-10 PROCEDURE — 99024 POSTOP FOLLOW-UP VISIT: CPT | Mod: HCNC,S$GLB,, | Performed by: ORTHOPAEDIC SURGERY

## 2020-09-10 RX ORDER — IBUPROFEN 600 MG/1
600 TABLET ORAL 2 TIMES DAILY WITH MEALS
Qty: 60 TABLET | Refills: 1 | Status: SHIPPED | OUTPATIENT
Start: 2020-09-10 | End: 2020-11-09

## 2020-09-10 RX ORDER — OXYCODONE AND ACETAMINOPHEN 5; 325 MG/1; MG/1
1 TABLET ORAL EVERY 12 HOURS PRN
Qty: 60 TABLET | Refills: 0 | Status: SHIPPED | OUTPATIENT
Start: 2020-09-10 | End: 2021-12-16

## 2020-09-10 NOTE — PROGRESS NOTES
Subjective:      Patient ID: Anjel Sanders is a 65 y.o. male.  Chief Complaint: Post-op Evaluation of the Right Shoulder      HPI  Anjel Sanders is a  65 y.o. male presenting today for post op visit.  He is s/p open repair massive tear rotator cuff right shoulder now 6 weeks postop doing well currently in therapy using the sling.     Review of patient's allergies indicates:  No Known Allergies      Current Outpatient Medications   Medication Sig Dispense Refill    allopurinoL (ZYLOPRIM) 300 MG tablet Take 1 tablet (300 mg total) by mouth once daily. 90 tablet 3    amLODIPine-benazepriL (LOTREL) 10-40 mg per capsule Take 1 capsule by mouth once daily. 90 capsule 3    aspirin (ECOTRIN) 81 MG EC tablet Take 81 mg by mouth once daily.      atorvastatin (LIPITOR) 80 MG tablet Take 1 tablet (80 mg total) by mouth once daily. 90 tablet 4    gabapentin (NEURONTIN) 300 MG capsule Take 1 capsule (300 mg total) by mouth every evening. 90 capsule 3    hydrOXYzine HCl (ATARAX) 25 MG tablet TAKE 1 TABLET BY MOUTH THREE TIMES A DAY AS NEEDED FOR ITCHING 30 tablet 0    oxyCODONE-acetaminophen (PERCOCET) 7.5-325 mg per tablet Take 1 tablet by mouth every 4 (four) hours as needed. 40 tablet 0    triamcinolone acetonide 0.1% (KENALOG) 0.1 % ointment Apply topically 2 (two) times daily.      ibuprofen (ADVIL,MOTRIN) 600 MG tablet Take 1 tablet (600 mg total) by mouth 2 (two) times daily with meals. 60 tablet 1    oxyCODONE-acetaminophen (PERCOCET) 5-325 mg per tablet Take 1 tablet by mouth every 12 (twelve) hours as needed for Pain. 60 tablet 0     No current facility-administered medications for this visit.        Past Medical History:   Diagnosis Date    AR (allergic rhinitis)     Gouty arthritis     Hx of colonic polyps     Tubular Adenoma    MURTAZA (obstructive sleep apnea)     Other and unspecified hyperlipidemia     Stroke     Unspecified essential hypertension        Past Surgical History:   Procedure  "Laterality Date    ARTHROSCOPIC REPAIR OF ROTATOR CUFF OF SHOULDER Left 4/30/2019    Procedure: REPAIR, ROTATOR CUFF, ARTHROSCOPIC;  Surgeon: Bob Esparza Jr., MD;  Location: [facility] OR;  Service: Orthopedics;  Laterality: Left;  need opus sytem (Jluis notified)  video    ARTHROSCOPY OF SHOULDER WITH DECOMPRESSION OF SUBACROMIAL SPACE  4/30/2019    Procedure: ARTHROSCOPY, SHOULDER, WITH SUBACROMIAL SPACE DECOMPRESSION;  Surgeon: Bob Esparza Jr., MD;  Location: [facility] OR;  Service: Orthopedics;;    FOOT SURGERY      ROTATOR CUFF REPAIR Right 8/4/2020    Procedure: REPAIR, ROTATOR CUFF;  Surgeon: Bob Esparza Jr., MD;  Location: [facility] OR;  Service: Orthopedics;  Laterality: Right;  open no scope  need arthrex anchors (Marylin notified per [place] 7/21, EF) Confirmed with Harry 1000 8/3/2020 KB    surgery on jaw         OBJECTIVE:   PHYSICAL EXAM:  Height: 5' 10" (177.8 cm) Weight: 102.1 kg (225 lb)  Vitals:    09/10/20 1002   Weight: 102.1 kg (225 lb)   Height: 5' 10" (1.778 m)   PainSc:   8     Ortho/SPM Exam  Examination right shoulder incision well healed mild swelling no evidence of infection range of motion limited secondary to stiffness and some mild pain  Neurologic exam intact    RADIOGRAPHS:  None  Comments: I have personally reviewed the imaging and I agree with the above radiologist's report.    ASSESSMENT/PLAN:     IMPRESSION:  Status post open repair massive tear right shoulder    PLAN:  Continue therapy he can wean out of the sling at home no heavy lifting or overhead use Light movements only  Percocet refill but I want him to start weaning off of this and substitute with Motrin 600    FOLLOW UP:  4-6 weeks    Disclaimer: This note has been generated using voice-recognition software. There may be typographical errors that have been missed during proof-reading.    "

## 2020-09-15 ENCOUNTER — CLINICAL SUPPORT (OUTPATIENT)
Dept: REHABILITATION | Facility: HOSPITAL | Age: 65
End: 2020-09-15
Payer: MEDICARE

## 2020-09-15 DIAGNOSIS — M25.611 DECREASED RANGE OF MOTION OF RIGHT SHOULDER: ICD-10-CM

## 2020-09-15 DIAGNOSIS — R26.89 DECREASED FUNCTIONAL MOBILITY: ICD-10-CM

## 2020-09-15 DIAGNOSIS — R29.898 WEAKNESS OF RIGHT UPPER EXTREMITY: ICD-10-CM

## 2020-09-15 PROCEDURE — 97110 THERAPEUTIC EXERCISES: CPT | Mod: HCNC,PO

## 2020-09-15 NOTE — PROGRESS NOTES
"  Physical Therapy Treatment Note     Name: Anjel Sanders  Clinic Number: [id number]    Therapy Diagnosis:   Encounter Diagnoses   Name Primary?    Decreased range of motion of right shoulder     Weakness of right upper extremity     Decreased functional mobility      Physician: Sridevi Vital PA-C    Visit Date: 9/15/2020    Physician Orders: PT Eval and Treat   Medical Diagnosis from Referral: Z98.890 (ICD-10-CM) - S/P rotator cuff repair  Evaluation Date: 8/20/2020  Authorization Period Expiration: 02/27/2021  Plan of Care Expiration: 10/20/2020  Visit # / Visits authorized: 4 / 60    Time In: 8:03 am  Time Out: 8:430 am  Total Billable Time: 36 minutes    Precautions: Standard    Subjective     Pt reports: doing ok this morning, he has been taking off his sling more at home.   He was not compliant with home exercise program.  Response to previous treatment: not that much soreness   Functional change: none    Pain: 3/10  Location: right shoulder      Objective     Anjel received therapeutic exercises to develop strength, ROM and flexibility for 36 minutes including:    Date  09/15/2020 09/08/2020 9/03/2020 9/01/2020 8/20/2020   VISIT 4/60 3/60 2/60 1/60 1/1   DOS 8/05/2020        FOTO 5/5 4/5 3/5 2/5 1/5   POC EXP. DATE 10/20/2020 10/20/2020 10/20/2020 10/20/2020 10/20/2020   VISIT AMOUNT  MC total 60.64  325.4 60.64  264.80 60.64  204.16 60.64  143.52  82.88  82.88   FACE-TO-FACE 9/20/2020 9/20/2020 9/20/2020 9/20/2020 9/20/2020           PROM flex, abd, ER Not today Not today 5' 5' Next   TABLE:         Supine pec str w/ towel 1 towel  2' 1' 30" 1' no towel Next? --   Snow angels 1 x 10 --  -- --   Butterflies  -- --  -- --   Wand flexion 2 x 10 2 x 10 1 x 15 1 x 10 Next?   Wand abduction 2 x 10 2 x 10 1 x 15 1 x 10 Next?   Wand ER 2 x 10 2 x 10 1 x 15 Next? Next?   SA punches 2 x 10 2 x 10 1 x 15 1 x 10    Reverse kyler's 1 x 10 cw/ccw       Side lying flexion --- -- -- -- --   Side lying abduction -- " "-- -- -- --   Side lying ER -- -- -- -- --           PRONE:         Shoulder flexion -- -- -- -- --   Shoulder scaption -- -- -- -- --   Shoulder abduction -- -- -- -- --           SEATED:         Scapula squeezes 20 x 3" 15 x 3" 10 x 3" 10 x 3" Next   Elbow flex/ext -- 2 x 10 2 x 10 1 x 10 next   Pulley flex, abd 2' ea 2' ea 2' 2'     Post. Shoulder roll 1 x 15 1 x 10      STANDING:         Door way stretch -- --  -- --   Wall walks -- --  -- --   Theraband  - W's  - T's  - I's  - Rows  - ER   --  --  --  --  --   --  ---  --  --  --    --  --  --  --  --    --  --  --  --  -   Codman's Not today 20 x ea  CW/CCW 20 x ea  Cw/ccw 1 x 10 ea     Isometrics:  - Flexion  - Ext  - ABD  - IR   - ER   20 x 3"  20 x 3"  20 x 3"  20 x 3"  20 x 3"   15 x 3"  15 x 3"  15 x 3"  15 x 3"  15 x 3"   10 x 3"  10 x 3"  10 x 3"  10 x 3" (gentle)  10 x 3"               Initials DG DG SB DG DG       Home Exercises Provided and Patient Education Provided     Education provided:   - compliance with HEP  - patient given another set of handouts of his HEP     Written Home Exercises Provided: Patient instructed to cont prior HEP.  Exercises were reviewed and Anjel was able to demonstrate them prior to the end of the session.  Anjel demonstrated fair  understanding of the education provided.     See EMR under Patient Instructions for exercises provided 8/27/2020.    Assessment     Anjel was able to complete all of today's progressions and new exercise with no increase in symptoms prior to leaving the clinic. He continues to require verbal and tactile cues to avoid substitutions with pulleys, keeping his elbow extended for SA punches, and to move through his full available ROM with wand exercises.    Anjel is progressing well towards his goals.   Pt prognosis is Good.     Pt will continue to benefit from skilled outpatient physical therapy to address the deficits listed in the problem list box on initial evaluation, provide pt/family education " and to maximize pt's level of independence in the home and community environment.     Pt's spiritual, cultural and educational needs considered and pt agreeable to plan of care and goals.     Anticipated barriers to physical therapy: none    Goals:   Short Term Goals: 4 weeks   1. This patient will be independent with a basic HEP. In progress, not met  2. This patient will have R shoulder active range of motion of 110 degrees flexion in order to bathe with no complaints of pain.In progress, not met  3. This patient will increase R shoulder strength 1 grade in order to be able to drive with no increase in symptoms. In progress, not met  4. This patient will have a pain rating of 4/10 at worst with ADLs. In progress, not met  5. Patient will be able to achieve greater than or equal to 54 on the FOTO Shoulder Survey indicating patient is 46% impaired, limited, or restricted and demonstrating overall improved functional ability with upper extremity. In progress, not met     Long Term Goals: 8 weeks   1. This patient will be independent with an updated HEP. In progress, not met  2. This patient will have R shoulder active range of motion equal to L shoulder active range of motion  in order to dress with no complaints of pain. In progress, not met  3. This patient will have R shoulder strength of 5/5 in order to be able to perform his yard work with no increase in symptoms. In progress, not met  4. This patient will have a pain rating of 1/10 at worst with ADLs. In progress, not met  5. Patient will be able to achieve greater than or equal to 64 on the FOTO Shoulder Survey indicating patient is 34% impaired, limited, or restricted and demonstrating overall improved functional ability with upper extremity. In progress, not met    Plan     Continue with the plan of care as tolerated. Begin wall walks next visit.     Katharina Perez, PT

## 2020-09-17 ENCOUNTER — CLINICAL SUPPORT (OUTPATIENT)
Dept: REHABILITATION | Facility: HOSPITAL | Age: 65
End: 2020-09-17
Payer: MEDICARE

## 2020-09-17 DIAGNOSIS — R29.898 WEAKNESS OF RIGHT UPPER EXTREMITY: ICD-10-CM

## 2020-09-17 DIAGNOSIS — M25.611 DECREASED RANGE OF MOTION OF RIGHT SHOULDER: ICD-10-CM

## 2020-09-17 DIAGNOSIS — R26.89 DECREASED FUNCTIONAL MOBILITY: ICD-10-CM

## 2020-09-17 PROCEDURE — 97110 THERAPEUTIC EXERCISES: CPT | Mod: HCNC,PO

## 2020-09-17 NOTE — PROGRESS NOTES
"  Physical Therapy Treatment Note     Name: Anjel Sanders  Clinic Number: [id number]    Therapy Diagnosis:   Encounter Diagnoses   Name Primary?    Decreased range of motion of right shoulder     Weakness of right upper extremity     Decreased functional mobility      Physician: Sridevi Vital PA-C    Visit Date: 9/17/2020    Physician Orders: PT Eval and Treat   Medical Diagnosis from Referral: Z98.890 (ICD-10-CM) - S/P rotator cuff repair  Evaluation Date: 8/20/2020  Authorization Period Expiration: 02/27/2021  Plan of Care Expiration: 10/20/2020  Visit # / Visits authorized: 6 / 60    Time In: 8:03 am  Time Out: 8:39 am  Total Billable Time: 36 minutes    Precautions: Standard    Subjective     Pt reports: having pain since his last visit, has been doing his HEP  He was compliant with home exercise program.  Response to previous treatment: increased muscle soreness  Functional change: none    Pain: 5/10  Location: right shoulder      Objective     Anjel received therapeutic exercises to develop strength, ROM and flexibility for 36 minutes including:    Date  09/17/2020 09/15/2020 09/08/2020 9/03/2020 9/01/2020 8/20/2020   VISIT 5/60 4/60 3/60 2/60 1/60 1/1   DOS 8/05/2020         FOTO 6/5 5/5 4/5 3/5 2/5 1/5   POC EXP. DATE 10/20/2020 10/20/2020 10/20/2020 10/20/2020 10/20/2020 10/20/2020   VISIT AMOUNT  MC total 60.64  386.04 60.64  325.4 60.64  264.80 60.64  204.16 60.64  143.52  82.88  82.88   FACE-TO-FACE 09/20/2020 9/20/2020 9/20/2020 9/20/2020 9/20/2020 9/20/2020            PROM flex, abd, ER Not today Not today Not today 5' 5' Next   TABLE:          Supine pec str w/ towel 1 towel roll  2' 1 towel  2' 1' 30" 1' no towel Next? --   Snow angels 2 x 10 1 x 10 --  -- --   Butterflies  -- -- --  -- --   Wand flexion 2 x 10 2 x 10 2 x 10 1 x 15 1 x 10 Next?   Wand abduction 2 x 10 2 x 10 2 x 10 1 x 15 1 x 10 Next?   Wand ER 2 x 10 2 x 10 2 x 10 1 x 15 Next? Next?   SA punches 2 x 10 2 x 10 2 x 10 1 x 15 " "1 x 10    Reverse codman's 1 x 15 cw/ccw 1 x 10 cw/ccw       Side lying flexion -- --- -- -- -- --   Side lying abduction -- -- -- -- -- --   Side lying ER -- -- -- -- -- --            PRONE:          Shoulder flexion -- -- -- -- -- --   Shoulder scaption -- -- -- -- -- --   Shoulder abduction -- -- -- -- -- --            SEATED:          Scapula squeezes 20 x 3" 20 x 3" 15 x 3" 10 x 3" 10 x 3" Next   Elbow flex/ext -- -- 2 x 10 2 x 10 1 x 10 next   Pulley flex, abd 2' ea 2' ea 2' ea 2' 2'     Post. Shoulder roll 2 x 10 1 x 15 1 x 10      STANDING:          Door way stretch -- -- --  -- --   Wall walks -- -- --  -- --   Theraband  - W's  - T's  - I's  - Rows  - ER   --  --  --  ---  --   --  --  --  --  --   --  ---  --  --  --    --  --  --  --  --    --  --  --  --  -   Codman's Not today Not today 20 x ea  CW/CCW 20 x ea  Cw/ccw 1 x 10 ea     Isometrics:  - Flexion  - Ext  - ABD  - IR   - ER   20 x 3"  20 x 3"  20 x 3"  20 x 3"  20 x 3"   20 x 3"  20 x 3"  20 x 3"  20 x 3"  20 x 3"   15 x 3"  15 x 3"  15 x 3"  15 x 3"  15 x 3"   10 x 3"  10 x 3"  10 x 3"  10 x 3" (gentle)  10 x 3"                Initials DG DG DG SB DG DG     FOTO Shoulder Survey 41%    Home Exercises Provided and Patient Education Provided     Education provided:   - compliance with HEP  - patient given another set of handouts of his HEP     Written Home Exercises Provided: Patient instructed to cont prior HEP.  Exercises were reviewed and Anjel was able to demonstrate them prior to the end of the session.  Anjel demonstrated fair  understanding of the education provided.     See EMR under Patient Instructions for exercises provided 8/27/2020.    Assessment     Anjel continues to require verbal cues to only perform the prescribed number of reps with each exercise. He also continues to require verbal and tactile cues to avoid overusing his upper trap with his exercises. When ambulating without the sling he continues to hold his arm in IR and " adduction across his abdomen. His current score on FOTO Shoulder Survey indicates he is 41% impaired, limited, or restricted.   Anjel is progressing well towards his goals.   Pt prognosis is Good.     Pt will continue to benefit from skilled outpatient physical therapy to address the deficits listed in the problem list box on initial evaluation, provide pt/family education and to maximize pt's level of independence in the home and community environment.     Pt's spiritual, cultural and educational needs considered and pt agreeable to plan of care and goals.     Anticipated barriers to physical therapy: none    Goals:   Short Term Goals: 4 weeks   1. This patient will be independent with a basic HEP. In progress, not met  2. This patient will have R shoulder active range of motion of 110 degrees flexion in order to bathe with no complaints of pain.In progress, not met  3. This patient will increase R shoulder strength 1 grade in order to be able to drive with no increase in symptoms. In progress, not met  4. This patient will have a pain rating of 4/10 at worst with ADLs. In progress, not met  5. Patient will be able to achieve greater than or equal to 54 on the FOTO Shoulder Survey indicating patient is 46% impaired, limited, or restricted and demonstrating overall improved functional ability with upper extremity. In progress, not met     Long Term Goals: 8 weeks   1. This patient will be independent with an updated HEP. In progress, not met  2. This patient will have R shoulder active range of motion equal to L shoulder active range of motion  in order to dress with no complaints of pain. In progress, not met  3. This patient will have R shoulder strength of 5/5 in order to be able to perform his yard work with no increase in symptoms. In progress, not met  4. This patient will have a pain rating of 1/10 at worst with ADLs. In progress, not met  5. Patient will be able to achieve greater than or equal to 64 on the  FOTO Shoulder Survey indicating patient is 34% impaired, limited, or restricted and demonstrating overall improved functional ability with upper extremity. In progress, not met    Plan     Continue with the plan of care as tolerated. Begin wall walks next visit.     Katharina Perez, PT

## 2020-09-22 ENCOUNTER — CLINICAL SUPPORT (OUTPATIENT)
Dept: REHABILITATION | Facility: HOSPITAL | Age: 65
End: 2020-09-22
Payer: MEDICARE

## 2020-09-22 ENCOUNTER — DOCUMENTATION ONLY (OUTPATIENT)
Dept: REHABILITATION | Facility: HOSPITAL | Age: 65
End: 2020-09-22

## 2020-09-22 DIAGNOSIS — R26.89 DECREASED FUNCTIONAL MOBILITY: ICD-10-CM

## 2020-09-22 DIAGNOSIS — R29.898 WEAKNESS OF RIGHT UPPER EXTREMITY: ICD-10-CM

## 2020-09-22 DIAGNOSIS — M25.611 DECREASED RANGE OF MOTION OF RIGHT SHOULDER: Primary | ICD-10-CM

## 2020-09-22 DIAGNOSIS — M25.611 DECREASED RANGE OF MOTION OF RIGHT SHOULDER: ICD-10-CM

## 2020-09-22 PROCEDURE — 97110 THERAPEUTIC EXERCISES: CPT | Mod: HCNC,PO

## 2020-09-22 NOTE — PROGRESS NOTES
"  Physical Therapy Treatment Note     Name: Anjel Sanders  Clinic Number: [id number]    Therapy Diagnosis:   Encounter Diagnoses   Name Primary?    Decreased range of motion of right shoulder     Weakness of right upper extremity     Decreased functional mobility      Physician: Sridevi Vital PA-C    Visit Date: 9/22/2020    Physician Orders: PT Eval and Treat   Medical Diagnosis from Referral: Z98.890 (ICD-10-CM) - S/P rotator cuff repair  Evaluation Date: 8/20/2020  Authorization Period Expiration: 02/27/2021  Plan of Care Expiration: 10/20/2020  Visit # / Visits authorized: 6 / 60    Time In: 8:04 am  Time Out: 9:42 am  Total Billable Time: 34 minutes    Precautions: Standard    Subjective     Pt reports: shoulder is coming along, still stiff  He was compliant with home exercise program.  Response to previous treatment: increased muscle soreness  Functional change: none    Pain: 3/10  Location: right shoulder      Objective     Anjel received therapeutic exercises to develop strength, ROM and flexibility for 34 minutes including:    Date  09/22/2020 09/17/2020 09/15/2020 09/08/2020 9/03/2020 9/01/2020 8/20/2020   VISIT 6/60 5/60 4/60 3/60 2/60 1/60 1/1   DOS 8/05/2020          FOTO -- 6/5 5/5 4/5 3/5 2/5 1/5   POC EXP. DATE 10/20/2020 10/20/2020 10/20/2020 10/20/2020 10/20/2020 10/20/2020 10/20/2020   VISIT AMOUNT  MC total 60.64  446.68 60.64  386.04 60.64  325.4 60.64  264.80 60.64  204.16 60.64  143.52  82.88  82.88   FACE-TO-FACE 10/22/2020 09/20/2020 9/20/2020 9/20/2020 9/20/2020 9/20/2020 9/20/2020             PROM flex, abd, ER 5' Not today Not today Not today 5' 5' Next   TABLE:           Supine pec str w/ towel 1 towel  2' 1 towel roll  2' 1 towel  2' 1' 30" 1' no towel Next? --   Snow angels 2 x 10  2 x 10 1 x 10 --  -- --   Butterflies  -- -- -- --  -- --   Wand flexion 2 x 10 2 x 10 2 x 10 2 x 10 1 x 15 1 x 10 Next?   Wand abduction 2 x 10 2 x 10 2 x 10 2 x 10 1 x 15 1 x 10 Next?   Mackenzie ER 2 " "x 10 2 x 10 2 x 10 2 x 10 1 x 15 Next? Next?   SA punches 1 x 10 x 1# 2 x 10 2 x 10 2 x 10 1 x 15 1 x 10    Reverse myra's 2 x 10 cw/ccw 1 x 15 cw/ccw 1 x 10 cw/ccw       Side lying flexion -- -- --- -- -- -- --   Side lying abduction -- -- -- -- -- -- --   Side lying ER -- -- -- -- -- -- --             PRONE:           Shoulder flexion -- -- -- -- -- -- --   Shoulder scaption -- -- -- -- -- -- --   Shoulder abduction -- -- -- -- -- -- --             SEATED:           Scapula squeezes 20 x 3" 20 x 3" 20 x 3" 15 x 3" 10 x 3" 10 x 3" Next   Elbow flex/ext -- -- -- 2 x 10 2 x 10 1 x 10 next   Pulley flex, abd 2' ea 2' ea 2' ea 2' ea 2' 2'     Post. Shoulder roll 2 x 10 2 x 10 1 x 15 1 x 10      STANDING:           Door way stretch -- -- -- --  -- --   Wall walks 1 x 10 -- -- --  -- --   Theraband  - W's  - T's  - I's  - Rows  - ER   --  --  --  --  --   --  --  --  ---  --   --  --  --  --  --   --  ---  --  --  --    --  --  --  --  --    --  --  --  --  -   Myra's Not today Not today Not today 20 x ea  CW/CCW 20 x ea  Cw/ccw 1 x 10 ea     Isometrics:  - Flexion  - Ext  - ABD  - IR   - ER   Not today  Not today  Not today  Not today  Not today   20 x 3"  20 x 3"  20 x 3"  20 x 3"  20 x 3"   20 x 3"  20 x 3"  20 x 3"  20 x 3"  20 x 3"   15 x 3"  15 x 3"  15 x 3"  15 x 3"  15 x 3"   10 x 3"  10 x 3"  10 x 3"  10 x 3" (gentle)  10 x 3"                 Initials DG DG DG DG SB DG DG     AAROM R shoulder flexion 135 degrees supine, seated 90 degrees    Home Exercises Provided and Patient Education Provided     Education provided:   - compliance with HEP  - patient given another set of handouts of his HEP     Written Home Exercises Provided: Patient instructed to cont prior HEP.  Exercises were reviewed and Anjel was able to demonstrate them prior to the end of the session.  Anjel demonstrated fair  understanding of the education provided.     See EMR under Patient Instructions for exercises provided " 8/27/2020.    Assessment     Anjel was able to demonstrate an increase in AROM and strength as noted above. He required occasional verbal and tactile cues to fully extend his elbow with SA punches and wand flexion. He performed all of today's activities with no increase in symptoms prior to leaving the clinic.   Anjel is progressing well towards his goals.   Pt prognosis is Good.     Pt will continue to benefit from skilled outpatient physical therapy to address the deficits listed in the problem list box on initial evaluation, provide pt/family education and to maximize pt's level of independence in the home and community environment.     Pt's spiritual, cultural and educational needs considered and pt agreeable to plan of care and goals.     Anticipated barriers to physical therapy: none    Goals:   Short Term Goals: 4 weeks   1. This patient will be independent with a basic HEP. In progress, not met  2. This patient will have R shoulder active range of motion of 110 degrees flexion in order to bathe with no complaints of pain.In progress, not met  3. This patient will increase R shoulder strength 1 grade in order to be able to drive with no increase in symptoms. In progress, not met  4. This patient will have a pain rating of 4/10 at worst with ADLs. In progress, not met  5. Patient will be able to achieve greater than or equal to 54 on the FOTO Shoulder Survey indicating patient is 46% impaired, limited, or restricted and demonstrating overall improved functional ability with upper extremity. In progress, not met     Long Term Goals: 8 weeks   1. This patient will be independent with an updated HEP. In progress, not met  2. This patient will have R shoulder active range of motion equal to L shoulder active range of motion  in order to dress with no complaints of pain. In progress, not met  3. This patient will have R shoulder strength of 5/5 in order to be able to perform his yard work with no increase in  symptoms. In progress, not met  4. This patient will have a pain rating of 1/10 at worst with ADLs. In progress, not met  5. Patient will be able to achieve greater than or equal to 64 on the FOTO Shoulder Survey indicating patient is 34% impaired, limited, or restricted and demonstrating overall improved functional ability with upper extremity. In progress, not met    Plan      Resume isometrics next visit.     Katharina Perez, PT

## 2020-09-22 NOTE — PROGRESS NOTES
Face to Face PTA Conference performed with Ernestina Baxter PTA regarding patient's current status, overall progress, and plan of care. Pt will be seen by a physical therapist minimally every 6th visit or every 30 days.    Katharina Perez, PT  9/22/2020    Face to Face PTA Conference performed with Katharina Perez PT regarding patient's current status, overall progress, and plan of care. Pt will be seen by a physical therapist minimally every 6th visit or every 30 days.    Ernestina Baxter PTA  9/22/2020

## 2020-09-23 NOTE — PROGRESS NOTES
"  Physical Therapy Treatment Note     Name: Anjel Sanders  Clinic Number: [id number]    Therapy Diagnosis:   Encounter Diagnoses   Name Primary?    Decreased range of motion of right shoulder Yes    Weakness of right upper extremity     Decreased functional mobility      Physician: Sridevi Vital PA-C    Visit Date: 9/24/2020    Physician Orders: PT Eval and Treat   Medical Diagnosis from Referral: Z98.890 (ICD-10-CM) - S/P rotator cuff repair  Evaluation Date: 8/20/2020  Authorization Period Expiration: 02/27/2021  Plan of Care Expiration: 10/20/2020  Visit # / Visits authorized: 7 / 60    Time In: 8:00 am  Time Out: 8:40 am  Total Billable Time: 40 minutes    Precautions: Standard    Subjective     Pt reports: shoulder is hurting more today and doesn't know why.   He was compliant with home exercise program.  Response to previous treatment: increased muscle soreness  Functional change: none    Pain: 6/10  Location: right shoulder      Objective     Anjel received therapeutic exercises to develop strength, ROM and flexibility for 40 minutes including:    Date  09/24/2020 09/22/2020 09/17/2020 09/15/2020 09/08/2020 9/03/2020 9/01/2020 8/20/2020   VISIT 7/60 6/60 5/60 4/60 3/60 2/60 1/60 1/1   DOS 8/05/2020           FOTO -- -- 6/5 5/5 4/5 3/5 2/5 1/5   POC EXP. DATE 10/20/2020 10/20/2020 10/20/2020 10/20/2020 10/20/2020 10/20/2020 10/20/2020 10/20/2020   VISIT AMOUNT  MC total 90.96  537.64 60.64  446.68 60.64  386.04 60.64  325.4 60.64  264.80 60.64  204.16 60.64  143.52  82.88  82.88   FACE-TO-FACE 10/22/2020 10/22/2020 09/20/2020 9/20/2020 9/20/2020 9/20/2020 9/20/2020 9/20/2020              PROM flex, abd, ER 5' 5' Not today Not today Not today 5' 5' Next   TABLE:            Supine pec str w/ towel 1 towel  2' 1 towel  2' 1 towel roll  2' 1 towel  2' 1' 30" 1' no towel Next? --   Snow angels  2 x 10  2 x 10 1 x 10 --  -- --   Butterflies   -- -- -- --  -- --   Wand flexion 2 x 10 2 x 10 2 x 10 2 x 10 2 x " "10 1 x 15 1 x 10 Next?   Wand abduction 2 x 10 2 x 10 2 x 10 2 x 10 2 x 10 1 x 15 1 x 10 Next?   Wand ER 2 x 10 2 x 10 2 x 10 2 x 10 2 x 10 1 x 15 Next? Next?   SA punches 2 x 10 x 1# 1 x 10 x 1# 2 x 10 2 x 10 2 x 10 1 x 15 1 x 10    Reverse codman's 2 x 10 ea cw/ccw 2 x 10 cw/ccw 1 x 15 cw/ccw 1 x 10 cw/ccw       Side lying flexion -- -- -- --- -- -- -- --   Side lying abduction -- -- -- -- -- -- -- --   Side lying ER -- -- -- -- -- -- -- --              PRONE:            Shoulder flexion -- -- -- -- -- -- -- --   Shoulder scaption -- -- -- -- -- -- -- --   Shoulder abduction -- -- -- -- -- -- -- --              SEATED:            Scapula squeezes 20 x 3" 20 x 3" 20 x 3" 20 x 3" 15 x 3" 10 x 3" 10 x 3" Next   Elbow flex/ext -- -- -- -- 2 x 10 2 x 10 1 x 10 next   Pulley flex, abd 2' ea 2' ea 2' ea 2' ea 2' ea 2' 2'     Post. Shoulder roll 2 x 10 2 x 10 2 x 10 1 x 15 1 x 10      STANDING:            Doorway stretch -- -- -- -- --  -- --   Wall walks 1 x 10 1 x 10 -- -- --  -- --   Theraband  - W's  - T's  - I's  - Rows  - ER NT   --  --  --  --  --   --  --  --  ---  --   --  --  --  --  --   --  ---  --  --  --    --  --  --  --  --    --  --  --  --  -   Myra's D/C Not today Not today Not today 20 x ea  CW/CCW 20 x ea  Cw/ccw 1 x 10 ea     Isometrics:  - Flexion  - Ext  - ABD  - IR   - ER   20 x 3"  20 x 3"  20 x 3"  20 x 3"  20 x 3"   Not today  Not today  Not today  Not today  Not today   20 x 3"  20 x 3"  20 x 3"  20 x 3"  20 x 3"   20 x 3"  20 x 3"  20 x 3"  20 x 3"  20 x 3"   15 x 3"  15 x 3"  15 x 3"  15 x 3"  15 x 3"   10 x 3"  10 x 3"  10 x 3"  10 x 3" (gentle)  10 x 3"                  Initials SB DG DG DG DG SB DG DG         Home Exercises Provided and Patient Education Provided     Education provided:   - compliance with HEP  - patient given another set of handouts of his HEP     Written Home Exercises Provided: Patient instructed to cont prior HEP.  Exercises were reviewed and Anjel was able to " demonstrate them prior to the end of the session.  Anjel demonstrated fair  understanding of the education provided.     See EMR under Patient Instructions for exercises provided 8/27/2020.    Assessment     Anjel was able to demonstrate an increase in AROM and strength as noted above. He required occasional verbal and tactile cues to fully extend his elbow with SA punches and wand flexion. He performed all of today's activities with no increase in symptoms prior to leaving the clinic.   Anjel is progressing well towards his goals.   Pt prognosis is Good.     Pt will continue to benefit from skilled outpatient physical therapy to address the deficits listed in the problem list box on initial evaluation, provide pt/family education and to maximize pt's level of independence in the home and community environment.     Pt's spiritual, cultural and educational needs considered and pt agreeable to plan of care and goals.     Anticipated barriers to physical therapy: none    Goals:   Short Term Goals: 4 weeks   1. This patient will be independent with a basic HEP. In progress, not met  2. This patient will have R shoulder active range of motion of 110 degrees flexion in order to bathe with no complaints of pain.In progress, not met  3. This patient will increase R shoulder strength 1 grade in order to be able to drive with no increase in symptoms. In progress, not met  4. This patient will have a pain rating of 4/10 at worst with ADLs. In progress, not met  5. Patient will be able to achieve greater than or equal to 54 on the FOTO Shoulder Survey indicating patient is 46% impaired, limited, or restricted and demonstrating overall improved functional ability with upper extremity. In progress, not met     Long Term Goals: 8 weeks   1. This patient will be independent with an updated HEP. In progress, not met  2. This patient will have R shoulder active range of motion equal to L shoulder active range of motion  in order to  dress with no complaints of pain. In progress, not met  3. This patient will have R shoulder strength of 5/5 in order to be able to perform his yard work with no increase in symptoms. In progress, not met  4. This patient will have a pain rating of 1/10 at worst with ADLs. In progress, not met  5. Patient will be able to achieve greater than or equal to 64 on the FOTO Shoulder Survey indicating patient is 34% impaired, limited, or restricted and demonstrating overall improved functional ability with upper extremity. In progress, not met    Plan     Continue with planned PT POC as tolerated. Increase reps with isometrics next visit.     Ernestina Baxter, PTA 1/6

## 2020-09-24 ENCOUNTER — CLINICAL SUPPORT (OUTPATIENT)
Dept: REHABILITATION | Facility: HOSPITAL | Age: 65
End: 2020-09-24
Payer: MEDICARE

## 2020-09-24 DIAGNOSIS — R29.898 WEAKNESS OF RIGHT UPPER EXTREMITY: ICD-10-CM

## 2020-09-24 DIAGNOSIS — M25.611 DECREASED RANGE OF MOTION OF RIGHT SHOULDER: Primary | ICD-10-CM

## 2020-09-24 DIAGNOSIS — R26.89 DECREASED FUNCTIONAL MOBILITY: ICD-10-CM

## 2020-09-24 PROCEDURE — 97110 THERAPEUTIC EXERCISES: CPT | Mod: HCNC,PO,CQ

## 2020-09-28 NOTE — PROGRESS NOTES
"  Physical Therapy Treatment Note     Name: Anjel Sanders  Clinic Number: [id number]    Therapy Diagnosis:   Encounter Diagnoses   Name Primary?    Decreased range of motion of right shoulder Yes    Weakness of right upper extremity     Decreased functional mobility      Physician: Sridevi Vital PA-C    Visit Date: 9/29/2020    Physician Orders: PT Eval and Treat   Medical Diagnosis from Referral: Z98.890 (ICD-10-CM) - S/P rotator cuff repair  Evaluation Date: 8/20/2020  Authorization Period Expiration: 02/27/2021  Plan of Care Expiration: 10/20/2020  Visit # / Visits authorized: 8 / 60    Time In: 8:05 am  Time Out: 8:45 am  Total Billable Time: 40 minutes    Precautions: Standard    Subjective     Pt reports: shoulder is hurting more today and doesn't know why.   He was compliant with home exercise program.  Response to previous treatment: increased muscle soreness  Functional change: none    Pain: 6/10  Location: right shoulder      Objective     Anjel received therapeutic exercises to develop strength, ROM and flexibility for 40 minutes including:    Date  09/29/2020 09/24/2020 09/22/2020 09/17/2020 09/15/2020 09/08/2020 9/03/2020 9/01/2020 8/20/2020   VISIT 8/60 7/60 6/60 5/60 4/60 3/60 2/60 1/60 1/1   DOS 8/05/2020            FOTO -- -- -- 6/5 5/5 4/5 3/5 2/5 1/5   POC EXP. DATE 10/20/2020 10/20/2020 10/20/2020 10/20/2020 10/20/2020 10/20/2020 10/20/2020 10/20/2020 10/20/2020   VISIT AMOUNT  MC total 90.96  628.60 90.96  537.64 60.64  446.68 60.64  386.04 60.64  325.4 60.64  264.80 60.64  204.16 60.64  143.52  82.88  82.88   FACE-TO-FACE 10/22/2020 10/22/2020 10/22/2020 09/20/2020 9/20/2020 9/20/2020 9/20/2020 9/20/2020 9/20/2020               PROM flex, abd, ER NT 5' 5' Not today Not today Not today 5' 5' Next   TABLE:             Supine pec str w/ towel 1 towel  2' 1 towel  2' 1 towel  2' 1 towel roll  2' 1 towel  2' 1' 30" 1' no towel Next? --   Snow angels NT 2 x 10 2 x 10  2 x 10 1 x 10 --  -- -- " "  Butterflies  --  -- -- -- --  -- --   Wand flexion 3 x 10 2 x 10 2 x 10 2 x 10 2 x 10 2 x 10 1 x 15 1 x 10 Next?   Wand abduction 3 x 10 2 x 10 2 x 10 2 x 10 2 x 10 2 x 10 1 x 15 1 x 10 Next?   Wand ER 3 x 10 2 x 10 2 x 10 2 x 10 2 x 10 2 x 10 1 x 15 Next? Next?   SA punches 2 x 10 x 1# 2 x 10 x 1# 1 x 10 x 1# 2 x 10 2 x 10 2 x 10 1 x 15 1 x 10    Reverse codman's 2 x 10 CW only   2 x 10 ea cw/ccw 2 x 10 cw/ccw 1 x 15 cw/ccw 1 x 10 cw/ccw       Side lying flexion -- -- -- -- --- -- -- -- --   Side lying abduction -- -- -- -- -- -- -- -- --   Side lying ER -- -- -- -- -- -- -- -- --               PRONE:             Shoulder flexion -- -- -- -- -- -- -- -- --   Shoulder scaption -- -- -- -- -- -- -- -- --   Shoulder abduction -- -- -- -- -- -- -- -- --               SEATED:             Scapula squeezes NT 20 x 3" 20 x 3" 20 x 3" 20 x 3" 15 x 3" 10 x 3" 10 x 3" Next   Elbow flex/ext -- -- -- -- -- 2 x 10 2 x 10 1 x 10 next   Pulley flex, abd 2' ea 2' ea 2' ea 2' ea 2' ea 2' ea 2' 2'     Post. Shoulder roll NT 2 x 10 2 x 10 2 x 10 1 x 15 1 x 10                  STANDING:             Doorway stretch -- -- -- -- -- --  -- --   Wall walks 1 x 15 1 x 10 1 x 10 -- -- --  -- --   Theraband  - W's  - T's  - I's  - Rows  - ER         3 x 10 RTB NT   --  --  --  --  --   --  --  --  ---  --   --  --  --  --  --   --  ---  --  --  --    --  --  --  --  --    --  --  --  --  -   Codman's -- D/C Not today Not today Not today 20 x ea  CW/CCW 20 x ea  Cw/ccw 1 x 10 ea     Isometrics:  - Flexion  - Ext  - ABD  - IR   - ER 30 x 3" ea     20 x 3"  20 x 3"  20 x 3"  20 x 3"  20 x 3"   Not today  Not today  Not today  Not today  Not today   20 x 3"  20 x 3"  20 x 3"  20 x 3"  20 x 3"   20 x 3"  20 x 3"  20 x 3"  20 x 3"  20 x 3"   15 x 3"  15 x 3"  15 x 3"  15 x 3"  15 x 3"   10 x 3"  10 x 3"  10 x 3"  10 x 3" (gentle)  10 x 3"     Wand:  - functional IR (behind back)  - Extension Next visit?            Initials SB SB DG DG DG DG SB DG " DG         Home Exercises Provided and Patient Education Provided     Education provided:   - compliance with HEP  - patient given another set of handouts of his HEP     Written Home Exercises Provided: Patient instructed to cont prior HEP.  Exercises were reviewed and Anjel was able to demonstrate them prior to the end of the session.  Anjel demonstrated fair  understanding of the education provided.     See EMR under Patient Instructions for exercises provided 8/27/2020.    Assessment     Anjel returns to therapy today with increased stiffness reported today. Pt required occasional verbal cueing to keep pain-free ROM. He performed all of today's activities with no increase in symptoms prior to leaving the clinic.     Anjel is progressing well towards his goals.   Pt prognosis is Good.     Pt will continue to benefit from skilled outpatient physical therapy to address the deficits listed in the problem list box on initial evaluation, provide pt/family education and to maximize pt's level of independence in the home and community environment.     Pt's spiritual, cultural and educational needs considered and pt agreeable to plan of care and goals.     Anticipated barriers to physical therapy: none    Goals:   Short Term Goals: 4 weeks   1. This patient will be independent with a basic HEP. In progress, not met  2. This patient will have R shoulder active range of motion of 110 degrees flexion in order to bathe with no complaints of pain.In progress, not met  3. This patient will increase R shoulder strength 1 grade in order to be able to drive with no increase in symptoms. In progress, not met  4. This patient will have a pain rating of 4/10 at worst with ADLs. In progress, not met  5. Patient will be able to achieve greater than or equal to 54 on the FOTO Shoulder Survey indicating patient is 46% impaired, limited, or restricted and demonstrating overall improved functional ability with upper extremity. In progress,  not met     Long Term Goals: 8 weeks   1. This patient will be independent with an updated HEP. In progress, not met  2. This patient will have R shoulder active range of motion equal to L shoulder active range of motion  in order to dress with no complaints of pain. In progress, not met  3. This patient will have R shoulder strength of 5/5 in order to be able to perform his yard work with no increase in symptoms. In progress, not met  4. This patient will have a pain rating of 1/10 at worst with ADLs. In progress, not met  5. Patient will be able to achieve greater than or equal to 64 on the FOTO Shoulder Survey indicating patient is 34% impaired, limited, or restricted and demonstrating overall improved functional ability with upper extremity. In progress, not met    Plan     Continue with planned PT POC as tolerated. Add standing wand exercises.     Ernestina Baxter, PTA 2/6

## 2020-09-29 ENCOUNTER — CLINICAL SUPPORT (OUTPATIENT)
Dept: REHABILITATION | Facility: HOSPITAL | Age: 65
End: 2020-09-29
Payer: MEDICARE

## 2020-09-29 DIAGNOSIS — R26.89 DECREASED FUNCTIONAL MOBILITY: ICD-10-CM

## 2020-09-29 DIAGNOSIS — R29.898 WEAKNESS OF RIGHT UPPER EXTREMITY: ICD-10-CM

## 2020-09-29 DIAGNOSIS — M25.611 DECREASED RANGE OF MOTION OF RIGHT SHOULDER: Primary | ICD-10-CM

## 2020-09-29 PROCEDURE — 97110 THERAPEUTIC EXERCISES: CPT | Mod: HCNC,PO,CQ

## 2020-09-30 NOTE — PROGRESS NOTES
Physical Therapy Treatment Note     Name: Anjel Sanders  Clinic Number: [id number]    Therapy Diagnosis:   Encounter Diagnoses   Name Primary?    Decreased range of motion of right shoulder Yes    Weakness of right upper extremity     Decreased functional mobility      Physician: Sridevi Vital PA-C    Visit Date: 10/1/2020    Physician Orders: PT Eval and Treat   Medical Diagnosis from Referral: Z98.890 (ICD-10-CM) - S/P rotator cuff repair  Evaluation Date: 8/20/2020  Authorization Period Expiration: 02/27/2021  Plan of Care Expiration: 10/20/2020  Visit # / Visits authorized: 9 / 60    Time In: 8:10 am  Time Out: 8:45 am  Total Billable Time: 35 minutes    Precautions: Standard    Subjective     Pt reports: his shoulder is feeling better today. Able to raise his arm up in bed without pain.   He was compliant with home exercise program.  Response to previous treatment: increased muscle soreness  Functional change: none    Pain: 6/10  Location: right shoulder      Objective     Anjel received therapeutic exercises to develop strength, ROM and flexibility for 35 minutes including:    Date  10/01/2020 09/29/2020 09/24/2020 09/22/2020 09/17/2020 09/15/2020 09/08/2020 9/03/2020 9/01/2020 8/20/2020   VISIT 9/60 8/60 7/60 6/60 5/60 4/60 3/60 2/60 1/60 1/1   DOS 8/05/2020             FOTO  -- -- -- 6/5 5/5 4/5 3/5 2/5 1/5   POC EXP. DATE 10/20/2020 10/20/2020 10/20/2020 10/20/2020 10/20/2020 10/20/2020 10/20/2020 10/20/2020 10/20/2020 10/20/2020   VISIT AMOUNT  MC total 60.64  689.24 90.96  628.60 90.96  537.64 60.64  446.68 60.64  386.04 60.64  325.4 60.64  264.80 60.64  204.16 60.64  143.52  82.88  82.88   FACE-TO-FACE 10/22/2020 10/22/2020 10/22/2020 10/22/2020 09/20/2020 9/20/2020 9/20/2020 9/20/2020 9/20/2020 9/20/2020                PROM flex, abd, ER NT NT 5' 5' Not today Not today Not today 5' 5' Next   TABLE:              Supine pec str w/ towel 1 towel  2' 1 towel  2' 1 towel  2' 1 towel  2' 1 towel  "roll  2' 1 towel  2' 1' 30" 1' no towel Next? --   Snow angels NT NT 2 x 10 2 x 10  2 x 10 1 x 10 --  -- --   Butterflies  -- --  -- -- -- --  -- --   Wand flexion NT 3 x 10 2 x 10 2 x 10 2 x 10 2 x 10 2 x 10 1 x 15 1 x 10 Next?   Wand abduction NT 3 x 10 2 x 10 2 x 10 2 x 10 2 x 10 2 x 10 1 x 15 1 x 10 Next?   Wand ER NT 3 x 10 2 x 10 2 x 10 2 x 10 2 x 10 2 x 10 1 x 15 Next? Next?   SA punches NT 2 x 10 x 1# 2 x 10 x 1# 1 x 10 x 1# 2 x 10 2 x 10 2 x 10 1 x 15 1 x 10    Reverse codman's NT 2 x 10 CW only   2 x 10 ea cw/ccw 2 x 10 cw/ccw 1 x 15 cw/ccw 1 x 10 cw/ccw       Side lying flexion -- -- -- -- -- --- -- -- -- --   Side lying abduction -- -- -- -- -- -- -- -- -- --   Side lying ER -- -- -- -- -- -- -- -- -- --                PRONE:              Shoulder flexion -- -- -- -- -- -- -- -- -- --   Shoulder scaption -- -- -- -- -- -- -- -- -- --   Shoulder abduction -- -- -- -- -- -- -- -- -- --                SEATED:              Scapula squeezes 20 x 3" NT 20 x 3" 20 x 3" 20 x 3" 20 x 3" 15 x 3" 10 x 3" 10 x 3" Next   Elbow flex/ext -- -- -- -- -- -- 2 x 10 2 x 10 1 x 10 next   Pulley flex, abd 2' ea 2' ea 2' ea 2' ea 2' ea 2' ea 2' ea 2' 2'     Post. Shoulder roll 2 x 10 NT 2 x 10 2 x 10 2 x 10 1 x 15 1 x 10                   STANDING:              Doorway stretch -- -- -- -- -- -- --  -- --   Wall walks Flex 1 x 15  ABD 1 x 10 1 x 15 1 x 10 1 x 10 -- -- --  -- --   Theraband  - W's  - T's  - I's  - Rows  - ER         3 x 10 GTB           3 x 10 RTB NT   --  --  --  --  --   --  --  --  ---  --   --  --  --  --  --   --  ---  --  --  --    --  --  --  --  --    --  --  --  --  -   Codman's -- -- D/C Not today Not today Not today 20 x ea  CW/CCW 20 x ea  Cw/ccw 1 x 10 ea     Isometrics:  - Flexion  - Ext  - ABD  - IR   - ER 30 x 3" ea   30 x 3" ea     20 x 3"  20 x 3"  20 x 3"  20 x 3"  20 x 3"   Not today  Not today  Not today  Not today  Not today   20 x 3"  20 x 3"  20 x 3"  20 x 3"  20 x 3"   20 x 3"  20 x " "3"  20 x 3"  20 x 3"  20 x 3"   15 x 3"  15 x 3"  15 x 3"  15 x 3"  15 x 3"   10 x 3"  10 x 3"  10 x 3"  10 x 3" (gentle)  10 x 3"     Wand:  - functional IR (behind back)  - Extension   2 x 10    2 x 10 Next visit?            Initials SB SB SB DG DG DG DG SB DG DG       Anjel received the following manual therapy techniques: Soft tissue Mobilization were applied to the: R UE for 5 minutes, including:  - IASTM to bicep and anterior forearm for improved elbow extension      Home Exercises Provided and Patient Education Provided     Education provided:   - compliance with HEP    Written Home Exercises Provided: Patient instructed to cont prior HEP.  Exercises were reviewed and Anjel was able to demonstrate them prior to the end of the session.  Anjel demonstrated fair  understanding of the education provided.     See EMR under Patient Instructions for exercises provided 8/27/2020.    Assessment     Anjel returns to therapy today with improved stiffness reported today and has been able to lift arm at home without pain. Pt required occasional verbal cueing to keep pain-free ROM. Treatment time was limited due to pt arriving 10 minutes late to appointment. He performed all of today's activities and progressions with no increase in symptoms prior to leaving the clinic. Pt required manual therapy for biceps tightness this session due to pain reported with elbow extension during pec stretching. Pt demo'd improving ROM with wand IR and extension stretches.      Anjel is progressing well towards his goals.   Pt prognosis is Good.     Pt will continue to benefit from skilled outpatient physical therapy to address the deficits listed in the problem list box on initial evaluation, provide pt/family education and to maximize pt's level of independence in the home and community environment.     Pt's spiritual, cultural and educational needs considered and pt agreeable to plan of care and goals.     Anticipated barriers to physical " therapy: none    Goals:   Short Term Goals: 4 weeks   1. This patient will be independent with a basic HEP. In progress, not met  2. This patient will have R shoulder active range of motion of 110 degrees flexion in order to bathe with no complaints of pain.In progress, not met  3. This patient will increase R shoulder strength 1 grade in order to be able to drive with no increase in symptoms. In progress, not met  4. This patient will have a pain rating of 4/10 at worst with ADLs. In progress, not met  5. Patient will be able to achieve greater than or equal to 54 on the FOTO Shoulder Survey indicating patient is 46% impaired, limited, or restricted and demonstrating overall improved functional ability with upper extremity. In progress, not met     Long Term Goals: 8 weeks   1. This patient will be independent with an updated HEP. In progress, not met  2. This patient will have R shoulder active range of motion equal to L shoulder active range of motion  in order to dress with no complaints of pain. In progress, not met  3. This patient will have R shoulder strength of 5/5 in order to be able to perform his yard work with no increase in symptoms. In progress, not met  4. This patient will have a pain rating of 1/10 at worst with ADLs. In progress, not met  5. Patient will be able to achieve greater than or equal to 64 on the FOTO Shoulder Survey indicating patient is 34% impaired, limited, or restricted and demonstrating overall improved functional ability with upper extremity. In progress, not met    Plan     Continue with planned PT POC as tolerated. Resume full routine next visit.    Ernestina Baxter, PTA 3/6

## 2020-10-01 ENCOUNTER — CLINICAL SUPPORT (OUTPATIENT)
Dept: REHABILITATION | Facility: HOSPITAL | Age: 65
End: 2020-10-01
Payer: MEDICARE

## 2020-10-01 DIAGNOSIS — R26.89 DECREASED FUNCTIONAL MOBILITY: ICD-10-CM

## 2020-10-01 DIAGNOSIS — M25.611 DECREASED RANGE OF MOTION OF RIGHT SHOULDER: Primary | ICD-10-CM

## 2020-10-01 DIAGNOSIS — R29.898 WEAKNESS OF RIGHT UPPER EXTREMITY: ICD-10-CM

## 2020-10-01 PROCEDURE — 97110 THERAPEUTIC EXERCISES: CPT | Mod: HCNC,PO,CQ

## 2020-10-06 ENCOUNTER — CLINICAL SUPPORT (OUTPATIENT)
Dept: REHABILITATION | Facility: HOSPITAL | Age: 65
End: 2020-10-06
Payer: MEDICARE

## 2020-10-06 DIAGNOSIS — R29.898 WEAKNESS OF RIGHT UPPER EXTREMITY: ICD-10-CM

## 2020-10-06 DIAGNOSIS — R26.89 DECREASED FUNCTIONAL MOBILITY: ICD-10-CM

## 2020-10-06 DIAGNOSIS — M25.611 DECREASED RANGE OF MOTION OF RIGHT SHOULDER: ICD-10-CM

## 2020-10-06 PROCEDURE — 97110 THERAPEUTIC EXERCISES: CPT | Mod: HCNC,PO

## 2020-10-06 NOTE — PROGRESS NOTES
Physical Therapy Treatment Note     Name: Anjel Sanders  Clinic Number: [id number]    Therapy Diagnosis:   Encounter Diagnoses   Name Primary?    Decreased range of motion of right shoulder     Weakness of right upper extremity     Decreased functional mobility      Physician: Sridevi Vital PA-C    Visit Date: 10/6/2020    Physician Orders: PT Eval and Treat   Medical Diagnosis from Referral: Z98.890 (ICD-10-CM) - S/P rotator cuff repair  Evaluation Date: 8/20/2020  Authorization Period Expiration: 02/27/2021  Plan of Care Expiration: 10/20/2020  Visit # / Visits authorized: 10 / 60    Time In: 8:08 am  Time Out: 8:45 am  Total Billable Time: 33 minutes    Precautions: Standard    Subjective     Pt reports: still having pain in his shoulder and he woke up with pain this morning. He has not been performing his HEP on a regular basis as he does not have any of the equipment at home.   He was not compliant with home exercise program.  Response to previous treatment: increased muscle soreness  Functional change: none    Pain: 4/10  Location: right shoulder      Objective     Anjel received therapeutic exercises to develop strength, ROM and flexibility for 33 minutes including:    Date  10/06/2020 10/01/2020 09/29/2020 09/24/2020 09/22/2020 09/17/2020 09/15/2020 09/08/2020 9/03/2020 9/01/2020 8/20/2020   VISIT 10/60 9/60 8/60 7/60 6/60 5/60 4/60 3/60 2/60 1/60 1/1   DOS 8/05/2020              FOTO --  -- -- -- 6/5 5/5 4/5 3/5 2/5 1/5   POC EXP. DATE 10/20/2020 10/20/2020 10/20/2020 10/20/2020 10/20/2020 10/20/2020 10/20/2020 10/20/2020 10/20/2020 10/20/2020 10/20/2020   VISIT AMOUNT  MC total 60.64  749.88 60.64  689.24 90.96  628.60 90.96  537.64 60.64  446.68 60.64  386.04 60.64  325.4 60.64  264.80 60.64  204.16 60.64  143.52  82.88  82.88   FACE-TO-FACE 10/23/2020 10/22/2020 10/22/2020 10/22/2020 10/22/2020 09/20/2020 9/20/2020 9/20/2020 9/20/2020 9/20/2020 9/20/2020                 PROM flex, abd, ER Not  "today NT NT 5' 5' Not today Not today Not today 5' 5' Next   TABLE:               Supine pec str w/ towel 1 towel  2' 1 towel  2' 1 towel  2' 1 towel  2' 1 towel  2' 1 towel roll  2' 1 towel  2' 1' 30" 1' no towel Next? --   Snow angels 2 x 10 NT NT 2 x 10 2 x 10  2 x 10 1 x 10 --  -- --   Butterflies   -- --  -- -- -- --  -- --   Wand flexion 2 x 10 NT 3 x 10 2 x 10 2 x 10 2 x 10 2 x 10 2 x 10 1 x 15 1 x 10 Next?   Wand abduction 2 x 10 NT 3 x 10 2 x 10 2 x 10 2 x 10 2 x 10 2 x 10 1 x 15 1 x 10 Next?   Wand ER 2 x 10 NT 3 x 10 2 x 10 2 x 10 2 x 10 2 x 10 2 x 10 1 x 15 Next? Next?   SA punches Not today NT 2 x 10 x 1# 2 x 10 x 1# 1 x 10 x 1# 2 x 10 2 x 10 2 x 10 1 x 15 1 x 10    Reverse codman's Not today NT 2 x 10 CW only   2 x 10 ea cw/ccw 2 x 10 cw/ccw 1 x 15 cw/ccw 1 x 10 cw/ccw       Side lying flexion -- -- -- -- -- -- --- -- -- -- --   Side lying abduction -- -- -- -- -- -- -- -- -- -- --   Side lying ER -- -- -- -- -- -- -- -- -- -- --                 PRONE:               Shoulder flexion -- -- -- -- -- -- -- -- -- -- --   Shoulder scaption -- -- -- -- -- -- -- -- -- -- --   Shoulder abduction -- -- -- -- -- -- -- -- -- -- --                 SEATED:               Scapula squeezes Not today 20 x 3" NT 20 x 3" 20 x 3" 20 x 3" 20 x 3" 15 x 3" 10 x 3" 10 x 3" Next   Elbow flex/ext -- -- -- -- -- -- -- 2 x 10 2 x 10 1 x 10 next   Pulley flex, abd 2' ea 2' ea 2' ea 2' ea 2' ea 2' ea 2' ea 2' ea 2' 2'     Post. Shoulder roll D/C 2 x 10 NT 2 x 10 2 x 10 2 x 10 1 x 15 1 x 10                    STANDING:               Doorway stretch  -- -- -- -- -- -- --  -- --   Wall walks Flex 2 x 10  Abd 1 x 15 Flex 1 x 15  ABD 1 x 10 1 x 15 1 x 10 1 x 10 -- -- --  -- --   Theraband  - W's  - T's  - I's  - Rows  - ER   --  --  --  Not today  --         3 x 10 GTB           3 x 10 RTB NT   --  --  --  --  --   --  --  --  ---  --   --  --  --  --  --   --  ---  --  --  --    --  --  --  --  --    --  --  --  --  -   Myra's  -- -- " "D/C Not today Not today Not today 20 x ea  CW/CCW 20 x ea  Cw/ccw 1 x 10 ea     Isometrics:  - Flexion  - Ext  - ABD  - IR   - ER   30 x 3"  Not today  Not today  30 x 3"  30 x 3" 30 x 3" ea   30 x 3" ea     20 x 3"  20 x 3"  20 x 3"  20 x 3"  20 x 3"   Not today  Not today  Not today  Not today  Not today   20 x 3"  20 x 3"  20 x 3"  20 x 3"  20 x 3"   20 x 3"  20 x 3"  20 x 3"  20 x 3"  20 x 3"   15 x 3"  15 x 3"  15 x 3"  15 x 3"  15 x 3"   10 x 3"  10 x 3"  10 x 3"  10 x 3" (gentle)  10 x 3"     Wand:  - functional IR (behind back)  - Extension   2 x 10    2 x 10   2 x 10    2 x 10 Next visit?            Initials DG SB SB SB DG DG DG DG SB DG DG       Anjel received the following manual therapy techniques: Soft tissue Mobilization were applied to the: R UE for 0 minutes, including:  - IASTM to bicep and anterior forearm for improved elbow extension      Home Exercises Provided and Patient Education Provided     Education provided:   - again the importance of performing his HEP on a regular basis.     Written Home Exercises Provided: Patient instructed to cont prior HEP.  Exercises were reviewed and Anjel was able to demonstrate them prior to the end of the session.  Anjel demonstrated fair  understanding of the education provided.     See EMR under Patient Instructions for exercises provided 8/27/2020.    Assessment     Anjel required frequent verbal and tactile cues to avoid substitutions with all exercises and to move through his full available ROM. He did not perform all of his usual exercises due to arriving late for his appointment. He did perform all of today's exercises with no increase in symptoms prior to leaving the clinic.      Anjel is progressing well towards his goals.   Pt prognosis is Good.     Pt will continue to benefit from skilled outpatient physical therapy to address the deficits listed in the problem list box on initial evaluation, provide pt/family education and to maximize pt's level of " independence in the home and community environment.     Pt's spiritual, cultural and educational needs considered and pt agreeable to plan of care and goals.     Anticipated barriers to physical therapy: none    Goals:   Short Term Goals: 4 weeks   1. This patient will be independent with a basic HEP. In progress, not met  2. This patient will have R shoulder active range of motion of 110 degrees flexion in order to bathe with no complaints of pain.In progress, not met  3. This patient will increase R shoulder strength 1 grade in order to be able to drive with no increase in symptoms. In progress, not met  4. This patient will have a pain rating of 4/10 at worst with ADLs. In progress, not met  5. Patient will be able to achieve greater than or equal to 54 on the FOTO Shoulder Survey indicating patient is 46% impaired, limited, or restricted and demonstrating overall improved functional ability with upper extremity. In progress, not met     Long Term Goals: 8 weeks   1. This patient will be independent with an updated HEP. In progress, not met  2. This patient will have R shoulder active range of motion equal to L shoulder active range of motion  in order to dress with no complaints of pain. In progress, not met  3. This patient will have R shoulder strength of 5/5 in order to be able to perform his yard work with no increase in symptoms. In progress, not met  4. This patient will have a pain rating of 1/10 at worst with ADLs. In progress, not met  5. Patient will be able to achieve greater than or equal to 64 on the FOTO Shoulder Survey indicating patient is 34% impaired, limited, or restricted and demonstrating overall improved functional ability with upper extremity. In progress, not met    Plan     Continue with planned PT POC as tolerated. Attempt to resume his usual exercises next visit.     Katharina Perez, PT

## 2020-10-06 NOTE — PATIENT INSTRUCTIONS
ROM: Extension - Wand (Standing)        Stand holding wand behind back. Raise arms as far as possible.  Repeat ____ times per set. Do ____ sets per session. Do ____ sessions per day.     [email or web address].[email or web address]     Copyright © [employer]. All rights reserved.

## 2020-10-08 ENCOUNTER — CLINICAL SUPPORT (OUTPATIENT)
Dept: REHABILITATION | Facility: HOSPITAL | Age: 65
End: 2020-10-08
Payer: MEDICARE

## 2020-10-08 DIAGNOSIS — M25.611 DECREASED RANGE OF MOTION OF RIGHT SHOULDER: ICD-10-CM

## 2020-10-08 DIAGNOSIS — R26.89 DECREASED FUNCTIONAL MOBILITY: ICD-10-CM

## 2020-10-08 DIAGNOSIS — R29.898 WEAKNESS OF RIGHT UPPER EXTREMITY: ICD-10-CM

## 2020-10-08 PROCEDURE — 97110 THERAPEUTIC EXERCISES: CPT | Mod: HCNC,PO

## 2020-10-08 NOTE — PROGRESS NOTES
Physical Therapy Treatment Note     Name: Anjel Sanders  Clinic Number: [id number]    Therapy Diagnosis:   Encounter Diagnoses   Name Primary?    Decreased range of motion of right shoulder     Weakness of right upper extremity     Decreased functional mobility      Physician: Sridevi Vital PA-C    Visit Date: 10/8/2020    Physician Orders: PT Eval and Treat   Medical Diagnosis from Referral: Z98.890 (ICD-10-CM) - S/P rotator cuff repair  Evaluation Date: 8/20/2020  Authorization Period Expiration: 02/27/2021  Plan of Care Expiration: 10/20/2020  Visit # / Visits authorized: 11 / 60    Time In: 9:45 am  Time Out: 10:30 am  Total Billable Time: 45 minutes    Precautions: Standard    Subjective     Pt reports: continued stiffness and pain in shoulder causing difficulty with reaching and lifting activities.   He was not compliant with home exercise program.  Response to previous treatment: increased muscle soreness  Functional change: none    Pain: 6/10  Location: right shoulder      Objective     Anjel received therapeutic exercises to develop strength, ROM and flexibility for 38 minutes including:    Date  10/8/2020 10/06/2020 10/01/2020 09/29/2020 09/24/2020 09/22/2020 09/17/2020 09/15/2020 09/08/2020 9/03/2020 9/01/2020 8/20/2020   VISIT 11/60 10/60 9/60 8/60 7/60 6/60 5/60 4/60 3/60 2/60 1/60 1/1   DOS 8/05/2020               FOTO -- --  -- -- -- 6/5 5/5 4/5 3/5 2/5 1/5   POC EXP. DATE 10/20/2020 10/20/2020 10/20/2020 10/20/2020 10/20/2020 10/20/2020 10/20/2020 10/20/2020 10/20/2020 10/20/2020 10/20/2020 10/20/2020   VISIT AMOUNT  MC total 90.96  840.84 60.64  749.88 60.64  689.24 90.96  628.60 90.96  537.64 60.64  446.68 60.64  386.04 60.64  325.4 60.64  264.80 60.64  204.16 60.64  143.52  82.88  82.88   FACE-TO-FACE 10/23/2020 10/23/2020 10/22/2020 10/22/2020 10/22/2020 10/22/2020 09/20/2020 9/20/2020 9/20/2020 9/20/2020 9/20/2020 9/20/2020                  PROM flex, abd, ER 5' Not today NT NT 5' 5'  "Not today Not today Not today 5' 5' Next   TABLE:                Supine pec str w/ towel 1 towel  2' 1 towel  2' 1 towel  2' 1 towel  2' 1 towel  2' 1 towel  2' 1 towel roll  2' 1 towel  2' 1' 30" 1' no towel Next? --   Snow angels 2 x 10 2 x 10 NT NT 2 x 10 2 x 10  2 x 10 1 x 10 --  -- --   Butterflies  --  -- --  -- -- -- --  -- --   Wand flexion 2 x 10 2 x 10 NT 3 x 10 2 x 10 2 x 10 2 x 10 2 x 10 2 x 10 1 x 15 1 x 10 Next?   Wand abduction 2 x 10 2 x 10 NT 3 x 10 2 x 10 2 x 10 2 x 10 2 x 10 2 x 10 1 x 15 1 x 10 Next?   Wand ER 2 x 10 2 x 10 NT 3 x 10 2 x 10 2 x 10 2 x 10 2 x 10 2 x 10 1 x 15 Next? Next?   SA punches 3 x 10 Not today NT 2 x 10 x 1# 2 x 10 x 1# 1 x 10 x 1# 2 x 10 2 x 10 2 x 10 1 x 15 1 x 10    Reverse codman's 30 x cw/ccw Not today NT 2 x 10 CW only   2 x 10 ea cw/ccw 2 x 10 cw/ccw 1 x 15 cw/ccw 1 x 10 cw/ccw       Side lying flexion -- -- -- -- -- -- -- --- -- -- -- --   Side lying abduction -- -- -- -- -- -- -- -- -- -- -- --   Side lying ER -- -- -- -- -- -- -- -- -- -- -- --                  PRONE:                Shoulder flexion -- -- -- -- -- -- -- -- -- -- -- --   Shoulder scaption -- -- -- -- -- -- -- -- -- -- -- --   Shoulder abduction -- -- -- -- -- -- -- -- -- -- -- --                  SEATED:                Scapula squeezes -- Not today 20 x 3" NT 20 x 3" 20 x 3" 20 x 3" 20 x 3" 15 x 3" 10 x 3" 10 x 3" Next   Pulley flex, abd 2' ea 2' ea 2' ea 2' ea 2' ea 2' ea 2' ea 2' ea 2' ea 2' 2'                    STANDING:                Doorway stretch 3 x 10"  -- -- -- -- -- -- --  -- --   Wall walks Flex 2 x 10  Not today Flex 2 x 10  Abd 1 x 15 Flex 1 x 15  ABD 1 x 10 1 x 15 1 x 10 1 x 10 -- -- --  -- --   Theraband  - W's  - T's  - I's  - Rows  - ER   --  --  --  3 x 10 GTB  --   --  --  --  Not today  --         3 x 10 GTB           3 x 10 RTB NT   --  --  --  --  --   --  --  --  ---  --   --  --  --  --  --   --  ---  --  --  --    --  --  --  --  --    --  --  --  --  -   Isometrics:  - " "Flexion  - Ext  - ABD  - IR   - ER   30 x 3"  30 x 3"  30 x 3"  30 x 3"  30 x 3"   30 x 3"  Not today  Not today  30 x 3"  30 x 3" 30 x 3" ea   30 x 3" ea     20 x 3"  20 x 3"  20 x 3"  20 x 3"  20 x 3"   Not today  Not today  Not today  Not today  Not today   20 x 3"  20 x 3"  20 x 3"  20 x 3"  20 x 3"   20 x 3"  20 x 3"  20 x 3"  20 x 3"  20 x 3"   15 x 3"  15 x 3"  15 x 3"  15 x 3"  15 x 3"   10 x 3"  10 x 3"  10 x 3"  10 x 3" (gentle)  10 x 3"     Wand:  - functional IR (behind back)  - Extension   2 x 10    2 x 10   2 x 10    2 x 10   2 x 10    2 x 10 Next visit?            Initials MA DG SB SB SB DG DG DG DG SB DG DG       Anjel received the following manual therapy techniques: Soft tissue Mobilization were applied to the: R UE for 5 minutes, including:  - IASTM to bicep and anterior forearm for improved elbow extension      Home Exercises Provided and Patient Education Provided     Education provided:   - again the importance of performing his HEP on a regular basis.     Written Home Exercises Provided: Patient instructed to cont prior HEP.  Exercises were reviewed and Anjel was able to demonstrate them prior to the end of the session.  Anjel demonstrated fair  understanding of the education provided.     See EMR under Patient Instructions for exercises provided 8/27/2020.    Assessment     Anjel is progressed with exercises on this date and continues to require frequent verbal cues to avoid upper trap substitution and to perform exercises through available range.  He has not been consistently performing HEP and daily stretching and was re-educated on importance of daily stretching to progress with mobility.  Pt will require progression of ROM exercises in order to improve functional mobility during ADL's.      Anjel is progressing well towards his goals.   Pt prognosis is Good.     Pt will continue to benefit from skilled outpatient physical therapy to address the deficits listed in the problem list box on " initial evaluation, provide pt/family education and to maximize pt's level of independence in the home and community environment.     Pt's spiritual, cultural and educational needs considered and pt agreeable to plan of care and goals.     Anticipated barriers to physical therapy: none    Goals:   Short Term Goals: 4 weeks   1. This patient will be independent with a basic HEP. In progress, not met  2. This patient will have R shoulder active range of motion of 110 degrees flexion in order to bathe with no complaints of pain.In progress, not met  3. This patient will increase R shoulder strength 1 grade in order to be able to drive with no increase in symptoms. In progress, not met  4. This patient will have a pain rating of 4/10 at worst with ADLs. In progress, not met  5. Patient will be able to achieve greater than or equal to 54 on the FOTO Shoulder Survey indicating patient is 46% impaired, limited, or restricted and demonstrating overall improved functional ability with upper extremity. In progress, not met     Long Term Goals: 8 weeks   1. This patient will be independent with an updated HEP. In progress, not met  2. This patient will have R shoulder active range of motion equal to L shoulder active range of motion  in order to dress with no complaints of pain. In progress, not met  3. This patient will have R shoulder strength of 5/5 in order to be able to perform his yard work with no increase in symptoms. In progress, not met  4. This patient will have a pain rating of 1/10 at worst with ADLs. In progress, not met  5. Patient will be able to achieve greater than or equal to 64 on the FOTO Shoulder Survey indicating patient is 34% impaired, limited, or restricted and demonstrating overall improved functional ability with upper extremity. In progress, not met    Plan     Continue with planned PT POC as tolerated. Attempt side lying exercises next visit.    Tj Zaragoza, PT

## 2020-10-12 NOTE — PROGRESS NOTES
Physical Therapy Treatment Note     Name: Anjel Sanders  Clinic Number: [id number]    Therapy Diagnosis:   Encounter Diagnoses   Name Primary?    Decreased range of motion of right shoulder     Weakness of right upper extremity     Decreased functional mobility      Physician: Sridevi Vital PA-C    Visit Date: 10/13/2020    Physician Orders: PT Eval and Treat   Medical Diagnosis from Referral: Z98.890 (ICD-10-CM) - S/P rotator cuff repair  Evaluation Date: 8/20/2020  Authorization Period Expiration: 02/27/2021  Plan of Care Expiration: 10/20/2020  Visit # / Visits authorized: 12 / 60    Time In: 9:36 am  Time Out: 10:15 am   Total Billable Time: 35 minutes    Precautions: Standard    Subjective     Pt reports: his shoulder still feels stiff this morning.  He was not compliant with home exercise program.  Response to previous treatment: increased muscle soreness  Functional change: none    Pain: 3/10  Location: right shoulder      Objective     Anjel received therapeutic exercises to develop strength, ROM and flexibility for 35 minutes including:    Date  10/12/2020 10/8/2020 10/06/2020 10/01/2020 09/29/2020 09/24/2020 09/22/2020 09/17/2020 09/15/2020 09/08/2020 9/03/2020 9/01/2020 8/20/2020   VISIT 12/60 11/60 10/60 9/60 8/60 7/60 6/60 5/60 4/60 3/60 2/60 1/60 1/1   DOS 8/05/2020                FOTO -- -- --  -- -- -- 6/5 5/5 4/5 3/5 2/5 1/5   POC EXP. DATE 10/20/2020 10/20/2020 10/20/2020 10/20/2020 10/20/2020 10/20/2020 10/20/2020 10/20/2020 10/20/2020 10/20/2020 10/20/2020 10/20/2020 10/20/2020   VISIT AMOUNT  MC total 60.64  901.48 90.96  840.84 60.64  749.88 60.64  689.24 90.96  628.60 90.96  537.64 60.64  446.68 60.64  386.04 60.64  325.4 60.64  264.80 60.64  204.16 60.64  143.52  82.88  82.88   FACE-TO-FACE 10/23/2020 10/23/2020 10/23/2020 10/22/2020 10/22/2020 10/22/2020 10/22/2020 09/20/2020 9/20/2020 9/20/2020 9/20/2020 9/20/2020 9/20/2020                   PROM flex, abd, ER Not today 5' Not  "today NT NT 5' 5' Not today Not today Not today 5' 5' Next   TABLE:                 Supine pec str w/ towel Not today 1 towel  2' 1 towel  2' 1 towel  2' 1 towel  2' 1 towel  2' 1 towel  2' 1 towel roll  2' 1 towel  2' 1' 30" 1' no towel Next? --   Snow angels Not today 2 x 10 2 x 10 NT NT 2 x 10 2 x 10  2 x 10 1 x 10 --  -- --   Butterflies  -- --  -- --  -- -- -- --  -- --   Wand flexion Not today 2 x 10 2 x 10 NT 3 x 10 2 x 10 2 x 10 2 x 10 2 x 10 2 x 10 1 x 15 1 x 10 Next?   Wand abduction Not today 2 x 10 2 x 10 NT 3 x 10 2 x 10 2 x 10 2 x 10 2 x 10 2 x 10 1 x 15 1 x 10 Next?   Wand ER Not today 2 x 10 2 x 10 NT 3 x 10 2 x 10 2 x 10 2 x 10 2 x 10 2 x 10 1 x 15 Next? Next?   SA punches Not today 3 x 10 Not today NT 2 x 10 x 1# 2 x 10 x 1# 1 x 10 x 1# 2 x 10 2 x 10 2 x 10 1 x 15 1 x 10    Reverse codman's Not today 30 x cw/ccw Not today NT 2 x 10 CW only   2 x 10 ea cw/ccw 2 x 10 cw/ccw 1 x 15 cw/ccw 1 x 10 cw/ccw       Side lying flexion -- -- -- -- -- -- -- -- --- -- -- -- --   Side lying abduction -- -- -- -- -- -- -- -- -- -- -- -- --   Side lying ER -- -- -- -- -- -- -- -- -- -- -- -- --                   PRONE:                 Shoulder flexion -- -- -- -- -- -- -- -- -- -- -- -- --   Shoulder scaption -- -- -- -- -- -- -- -- -- -- -- -- --   Shoulder abduction -- -- -- -- -- -- -- -- -- -- -- -- --                   SEATED:                 Scapula squeezes -- -- Not today 20 x 3" NT 20 x 3" 20 x 3" 20 x 3" 20 x 3" 15 x 3" 10 x 3" 10 x 3" Next   Pulley flex, abd 2' ea 2' ea 2' ea 2' ea 2' ea 2' ea 2' ea 2' ea 2' ea 2' ea 2' 2'     Active ROM  Flexion  Abd   1 x 10  1 x 10               STANDING:                 Doorway stretch 3 x 10" 3 x 10"  -- -- -- -- -- -- --  -- --   Wall walks Flex 2 x 10  Not today Flex 2 x 10  Not today Flex 2 x 10  Abd 1 x 15 Flex 1 x 15  ABD 1 x 10 1 x 15 1 x 10 1 x 10 -- -- --  -- --   Theraband  - W's  - T's  - I's  - [facility]  -    --  --  1 x 10 GTB  3 x 10 GTB  -- " "  --  --  --  3 x 10 GTB  --   --  --  --  Not today  --         3 x 10 GTB           3 x 10 RTB NT   --  --  --  --  --   --  --  --  ---  --   --  --  --  --  --   --  ---  --  --  --    --  --  --  --  --    --  --  --  --  -   Isometrics:  - Flexion  - Ext  - ABD  - IR   - ER   30 x 3"  30 x 3"  30 x 3"  30 x 3"  30 x 3"   30 x 3"  30 x 3"  30 x 3"  30 x 3"  30 x 3"   30 x 3"  Not today  Not today  30 x 3"  30 x 3" 30 x 3" ea   30 x 3" ea     20 x 3"  20 x 3"  20 x 3"  20 x 3"  20 x 3"   Not today  Not today  Not today  Not today  Not today   20 x 3"  20 x 3"  20 x 3"  20 x 3"  20 x 3"   20 x 3"  20 x 3"  20 x 3"  20 x 3"  20 x 3"   15 x 3"  15 x 3"  15 x 3"  15 x 3"  15 x 3"   10 x 3"  10 x 3"  10 x 3"  10 x 3" (gentle)  10 x 3"     Wand:  - functional IR (behind back)  - Extension   2 x 10    2 x 10   2 x 10    2 x 10   2 x 10    2 x 10   2 x 10    2 x 10 Next visit?            Initials DG MA DG SB SB SB DG DG DG DG SB DG DG       Anjel received the following manual therapy techniques: Soft tissue Mobilization were applied to the: R UE for 0 minutes, including:  - IASTM to bicep and anterior forearm for improved elbow extension      Home Exercises Provided and Patient Education Provided     Education provided:   - again the importance of performing his HEP on a regular basis.     Written Home Exercises Provided: Patient instructed to cont prior HEP.  Exercises were reviewed and Anjel was able to demonstrate them prior to the end of the session.  Anjel demonstrated fair  understanding of the education provided.     See EMR under Patient Instructions for exercises provided 8/27/2020.    Assessment     Anjel continues to require frequent verbal and tactile cues to avoid overusing his R upper trap, side bending his trunk or extending his back with flexion and abduction exercises. He also continues to require frequent verbal cues to perform all of his exercises correctly. He was able to perform all of today's " exercises with no increase in symptoms prior to leaving the clinic.      Anjel is progressing well towards his goals.   Pt prognosis is Good.     Pt will continue to benefit from skilled outpatient physical therapy to address the deficits listed in the problem list box on initial evaluation, provide pt/family education and to maximize pt's level of independence in the home and community environment.     Pt's spiritual, cultural and educational needs considered and pt agreeable to plan of care and goals.     Anticipated barriers to physical therapy: none    Goals:   Short Term Goals: 4 weeks   1. This patient will be independent with a basic HEP. In progress, not met  2. This patient will have R shoulder active range of motion of 110 degrees flexion in order to bathe with no complaints of pain.In progress, not met  3. This patient will increase R shoulder strength 1 grade in order to be able to drive with no increase in symptoms. In progress, not met  4. This patient will have a pain rating of 4/10 at worst with ADLs. In progress, not met  5. Patient will be able to achieve greater than or equal to 54 on the FOTO Shoulder Survey indicating patient is 46% impaired, limited, or restricted and demonstrating overall improved functional ability with upper extremity. In progress, not met     Long Term Goals: 8 weeks   1. This patient will be independent with an updated HEP. In progress, not met  2. This patient will have R shoulder active range of motion equal to L shoulder active range of motion  in order to dress with no complaints of pain. In progress, not met  3. This patient will have R shoulder strength of 5/5 in order to be able to perform his yard work with no increase in symptoms. In progress, not met  4. This patient will have a pain rating of 1/10 at worst with ADLs. In progress, not met  5. Patient will be able to achieve greater than or equal to 64 on the FOTO Shoulder Survey indicating patient is 34%  impaired, limited, or restricted and demonstrating overall improved functional ability with upper extremity. In progress, not met    Plan     Continue with planned PT POC as tolerated. Begin side lying exercises next visit.    Katharina Perez, PT

## 2020-10-13 ENCOUNTER — CLINICAL SUPPORT (OUTPATIENT)
Dept: REHABILITATION | Facility: HOSPITAL | Age: 65
End: 2020-10-13
Payer: MEDICARE

## 2020-10-13 DIAGNOSIS — R26.89 DECREASED FUNCTIONAL MOBILITY: ICD-10-CM

## 2020-10-13 DIAGNOSIS — R29.898 WEAKNESS OF RIGHT UPPER EXTREMITY: ICD-10-CM

## 2020-10-13 DIAGNOSIS — M25.611 DECREASED RANGE OF MOTION OF RIGHT SHOULDER: ICD-10-CM

## 2020-10-13 PROCEDURE — 97110 THERAPEUTIC EXERCISES: CPT | Mod: HCNC,PO

## 2020-10-15 ENCOUNTER — CLINICAL SUPPORT (OUTPATIENT)
Dept: REHABILITATION | Facility: HOSPITAL | Age: 65
End: 2020-10-15
Payer: MEDICARE

## 2020-10-15 DIAGNOSIS — R26.89 DECREASED FUNCTIONAL MOBILITY: ICD-10-CM

## 2020-10-15 DIAGNOSIS — R29.898 WEAKNESS OF RIGHT UPPER EXTREMITY: ICD-10-CM

## 2020-10-15 DIAGNOSIS — M25.611 DECREASED RANGE OF MOTION OF RIGHT SHOULDER: ICD-10-CM

## 2020-10-15 PROCEDURE — 97110 THERAPEUTIC EXERCISES: CPT | Mod: HCNC,PO

## 2020-10-15 NOTE — PROGRESS NOTES
Physical Therapy Treatment Note     Name: Anjel Sanders  Clinic Number: [id number]    Therapy Diagnosis:   Encounter Diagnoses   Name Primary?    Decreased range of motion of right shoulder     Weakness of right upper extremity     Decreased functional mobility      Physician: Sridevi Vital PA-C    Visit Date: 10/15/2020    Physician Orders: PT Eval and Treat   Medical Diagnosis from Referral: Z98.890 (ICD-10-CM) - S/P rotator cuff repair  Evaluation Date: 8/20/2020  Authorization Period Expiration: 02/27/2021  Plan of Care Expiration: 10/20/2020  Visit # / Visits authorized: 13 / 60    Time In: 8:00 am  Time Out: 8:32 am   Total Billable Time: 32 minutes    Precautions: Standard    Subjective     Pt reports: doing ok this morning, it's loosening up.  He was not compliant with home exercise program.  Response to previous treatment: increased muscle soreness  Functional change: none    Pain: 3/10  Location: right shoulder      Objective     Anjel received therapeutic exercises to develop strength, ROM and flexibility for 35 minutes including:    Date  10/15/2020 10/12/2020 10/8/2020 10/06/2020 10/01/2020 09/29/2020 09/24/2020 09/22/2020 09/17/2020 09/15/2020 09/08/2020 9/03/2020 9/01/2020 8/20/2020   VISIT 13/60 12/60 11/60 10/60 9/60 8/60 7/60 6/60 5/60 4/60 3/60 2/60 1/60 1/1   DOS 8/05/2020                 FOTO -- -- -- --  -- -- -- 6/5 5/5 4/5 3/5 2/5 1/5   POC EXP. DATE 10/20/2020 10/20/2020 10/20/2020 10/20/2020 10/20/2020 10/20/2020 10/20/2020 10/20/2020 10/20/2020 10/20/2020 10/20/2020 10/20/2020 10/20/2020 10/20/2020   VISIT AMOUNT  MC total 60.64  962.12 60.64  901.48 90.96  840.84 60.64  749.88 60.64  689.24 90.96  628.60 90.96  537.64 60.64  446.68 60.64  386.04 60.64  325.4 60.64  264.80 60.64  204.16 60.64  143.52  82.88  82.88   FACE-TO-FACE 10/23/2020 10/23/2020 10/23/2020 10/23/2020 10/22/2020 10/22/2020 10/22/2020 10/22/2020 09/20/2020 9/20/2020 9/20/2020 9/20/2020 9/20/2020 9/20/2020      "               PROM flex, abd, ER Not today Not today 5' Not today NT NT 5' 5' Not today Not today Not today 5' 5' Next   TABLE:                  Supine pec str w/ towel Not today Not today 1 towel  2' 1 towel  2' 1 towel  2' 1 towel  2' 1 towel  2' 1 towel  2' 1 towel roll  2' 1 towel  2' 1' 30" 1' no towel Next? --   Snow angels Not today Not today 2 x 10 2 x 10 NT NT 2 x 10 2 x 10  2 x 10 1 x 10 --  -- --   Butterflies  -- -- --  -- --  -- -- -- --  -- --   Wand flexion 1 x 10 Not today 2 x 10 2 x 10 NT 3 x 10 2 x 10 2 x 10 2 x 10 2 x 10 2 x 10 1 x 15 1 x 10 Next?   Wand abduction 1 x 10 Not today 2 x 10 2 x 10 NT 3 x 10 2 x 10 2 x 10 2 x 10 2 x 10 2 x 10 1 x 15 1 x 10 Next?   Wand ER 1 x 10 Not today 2 x 10 2 x 10 NT 3 x 10 2 x 10 2 x 10 2 x 10 2 x 10 2 x 10 1 x 15 Next? Next?   SA punches Not today Not today 3 x 10 Not today NT 2 x 10 x 1# 2 x 10 x 1# 1 x 10 x 1# 2 x 10 2 x 10 2 x 10 1 x 15 1 x 10    Reverse codman's Not today Not today 30 x cw/ccw Not today NT 2 x 10 CW only   2 x 10 ea cw/ccw 2 x 10 cw/ccw 1 x 15 cw/ccw 1 x 10 cw/ccw       Side lying flexion 1 x 10 -- -- -- -- -- -- -- -- --- -- -- -- --   Side lying abduction 1 x 10 -- -- -- -- -- -- -- -- -- -- -- -- --   Side lying ER 1 x 10 -- -- -- -- -- -- -- -- -- -- -- -- --                    PRONE:                  Shoulder flexion -- -- -- -- -- -- -- -- -- -- -- -- -- --   Shoulder scaption -- -- -- -- -- -- -- -- -- -- -- -- -- --   Shoulder abduction -- -- -- -- -- -- -- -- -- -- -- -- -- --                    SEATED:                  Scapula squeezes -- -- -- Not today 20 x 3" NT 20 x 3" 20 x 3" 20 x 3" 20 x 3" 15 x 3" 10 x 3" 10 x 3" Next   Pulley flex, abd 2' ea 2' ea 2' ea 2' ea 2' ea 2' ea 2' ea 2' ea 2' ea 2' ea 2' ea 2' 2'     Active ROM  Flexion  Abd   2 x 10  2 x 10   1 x 10  1 x 10               STANDING:                  Doorway stretch Not today 3 x 10" 3 x 10"  -- -- -- -- -- -- --  -- --   Wall walks Flex 2 x 10 Flex 2 x 10  Not " "today Flex 2 x 10  Not today Flex 2 x 10  Abd 1 x 15 Flex 1 x 15  ABD 1 x 10 1 x 15 1 x 10 1 x 10 -- -- --  -- --   Theraband  - W's  - T's  - I's  - Rows  - ER  - IR   --  1 x 10 RTB  2 x 10 GTB  3 x 10 GTB  1 x 10 YTB  1 x 10 RTB   --  --  1 x 10 GTB  3 x 10 GTB  --   --  --  --  3 x 10 GTB  --   --  --  --  Not today  --         3 x 10 GTB           3 x 10 RTB NT   --  --  --  --  --   --  --  --  ---  --   --  --  --  --  --   --  ---  --  --  --    --  --  --  --  --    --  --  --  --  -   Isometrics:  - Flexion  - Ext  - ABD  - IR   - ER   Not today  Not today  Not today  Not today  Not today   30 x 3"  30 x 3"  30 x 3"  30 x 3"  30 x 3"   30 x 3"  30 x 3"  30 x 3"  30 x 3"  30 x 3"   30 x 3"  Not today  Not today  30 x 3"  30 x 3" 30 x 3" ea   30 x 3" ea     20 x 3"  20 x 3"  20 x 3"  20 x 3"  20 x 3"   Not today  Not today  Not today  Not today  Not today   20 x 3"  20 x 3"  20 x 3"  20 x 3"  20 x 3"   20 x 3"  20 x 3"  20 x 3"  20 x 3"  20 x 3"   15 x 3"  15 x 3"  15 x 3"  15 x 3"  15 x 3"   10 x 3"  10 x 3"  10 x 3"  10 x 3" (gentle)  10 x 3"     Wand:  - functional IR (behind back)  - Extension   2 x 10    2 x 10   2 x 10    2 x 10   2 x 10    2 x 10   2 x 10    2 x 10   2 x 10    2 x 10 Next visit?            Initials DG DG MA DG SB SB SB DG DG DG DG SB DG DG       Anjel received the following manual therapy techniques: Soft tissue Mobilization were applied to the: R UE for 0 minutes, including:  - IASTM to bicep and anterior forearm for improved elbow extension    Home Exercises Provided and Patient Education Provided     Education provided:   - again the importance of performing his HEP on a regular basis.     Written Home Exercises Provided: Patient instructed to cont prior HEP.  Exercises were reviewed and Anjel was able to demonstrate them prior to the end of the session.  Anjel demonstrated fair  understanding of the education provided.     See EMR under Patient Instructions for exercises provided " 8/27/2020.    Assessment     Anjel continues to require verbal cues to avoid substitutions with all exercises especially with the pulleys and active ROM. He performed all of today's progressions and new exercises with no increase in symptoms prior to leaving the clinic.      Anjel is progressing well towards his goals.   Pt prognosis is Good.     Pt will continue to benefit from skilled outpatient physical therapy to address the deficits listed in the problem list box on initial evaluation, provide pt/family education and to maximize pt's level of independence in the home and community environment.     Pt's spiritual, cultural and educational needs considered and pt agreeable to plan of care and goals.     Anticipated barriers to physical therapy: none    Goals:   Short Term Goals: 4 weeks   1. This patient will be independent with a basic HEP. In progress, not met  2. This patient will have R shoulder active range of motion of 110 degrees flexion in order to bathe with no complaints of pain.In progress, not met  3. This patient will increase R shoulder strength 1 grade in order to be able to drive with no increase in symptoms. In progress, not met  4. This patient will have a pain rating of 4/10 at worst with ADLs. In progress, not met  5. Patient will be able to achieve greater than or equal to 54 on the FOTO Shoulder Survey indicating patient is 46% impaired, limited, or restricted and demonstrating overall improved functional ability with upper extremity. In progress, not met     Long Term Goals: 8 weeks   1. This patient will be independent with an updated HEP. In progress, not met  2. This patient will have R shoulder active range of motion equal to L shoulder active range of motion  in order to dress with no complaints of pain. In progress, not met  3. This patient will have R shoulder strength of 5/5 in order to be able to perform his yard work with no increase in symptoms. In progress, not met  4. This  patient will have a pain rating of 1/10 at worst with ADLs. In progress, not met  5. Patient will be able to achieve greater than or equal to 64 on the FOTO Shoulder Survey indicating patient is 34% impaired, limited, or restricted and demonstrating overall improved functional ability with upper extremity. In progress, not met    Plan     Continue with planned PT POC as tolerated. Increase reps with side lying exercises next visit.    Katharina Perez, PT

## 2020-10-19 ENCOUNTER — CLINICAL SUPPORT (OUTPATIENT)
Dept: REHABILITATION | Facility: HOSPITAL | Age: 65
End: 2020-10-19
Payer: MEDICARE

## 2020-10-19 DIAGNOSIS — R29.898 WEAKNESS OF RIGHT UPPER EXTREMITY: ICD-10-CM

## 2020-10-19 DIAGNOSIS — M25.611 DECREASED RANGE OF MOTION OF RIGHT SHOULDER: ICD-10-CM

## 2020-10-19 DIAGNOSIS — R26.89 DECREASED FUNCTIONAL MOBILITY: ICD-10-CM

## 2020-10-19 PROCEDURE — 97110 THERAPEUTIC EXERCISES: CPT | Mod: HCNC,PO

## 2020-10-19 NOTE — PROGRESS NOTES
Physical Therapy Treatment Note     Name: Anjel Sanders  Clinic Number: [id number]    Therapy Diagnosis:   Encounter Diagnoses   Name Primary?    Decreased range of motion of right shoulder     Weakness of right upper extremity     Decreased functional mobility      Physician: Sridevi Vital PA-C    Visit Date: 10/19/2020    Physician Orders: PT Eval and Treat   Medical Diagnosis from Referral: Z98.890 (ICD-10-CM) - S/P rotator cuff repair  Evaluation Date: 8/20/2020  Authorization Period Expiration: 02/27/2021  Plan of Care Expiration: 10/20/2020  Visit # / Visits authorized: 14 / 60    Time In: 8:00 am  Time Out: 8:40 am   Total Billable Time: 40 minutes    Precautions: Standard    Subjective     Pt reports: continues to have limited functional mobility during normal daily activities.  He was not compliant with home exercise program.  Response to previous treatment: increased muscle soreness  Functional change: none    Pain: 3/10  Location: right shoulder      Objective     Anjel received therapeutic exercises to develop strength, ROM and flexibility for 40 minutes including:    Date  10/19/2020 10/15/2020 10/12/2020 10/8/2020 10/06/2020 10/01/2020 09/29/2020 09/24/2020 09/22/2020 09/17/2020 09/15/2020 09/08/2020 9/03/2020 9/01/2020 8/20/2020   VISIT 14/60 13/60 12/60 11/60 10/60 9/60 8/60 7/60 6/60 5/60 4/60 3/60 2/60 1/60 1/1   DOS 8/05/2020                  FOTO  -- -- -- --  -- -- -- 6/5 5/5 4/5 3/5 2/5 1/5   POC EXP. DATE 10/20/2020 10/20/2020 10/20/2020 10/20/2020 10/20/2020 10/20/2020 10/20/2020 10/20/2020 10/20/2020 10/20/2020 10/20/2020 10/20/2020 10/20/2020 10/20/2020 10/20/2020   VISIT AMOUNT  MC total 90.96  1053.08 60.64  962.12 60.64  901.48 90.96  840.84 60.64  749.88 60.64  689.24 90.96  628.60 90.96  537.64 60.64  446.68 60.64  386.04 60.64  325.4 60.64  264.80 60.64  204.16 60.64  143.52  82.88  82.88   FACE-TO-FACE 10/23/2020 10/23/2020 10/23/2020 10/23/2020 10/23/2020 10/22/2020  "10/22/2020 10/22/2020 10/22/2020 09/20/2020 9/20/2020 9/20/2020 9/20/2020 9/20/2020 9/20/2020                     PROM flex, abd, ER 5' Not today Not today 5' Not today NT NT 5' 5' Not today Not today Not today 5' 5' Next   TABLE:                   Supine pec str w/ towel Not today Not today Not today 1 towel  2' 1 towel  2' 1 towel  2' 1 towel  2' 1 towel  2' 1 towel  2' 1 towel roll  2' 1 towel  2' 1' 30" 1' no towel Next? --   Snow angels Not today Not today Not today 2 x 10 2 x 10 NT NT 2 x 10 2 x 10  2 x 10 1 x 10 --  -- --   Butterflies  -- -- -- --  -- --  -- -- -- --  -- --   Wand flexion 1 x 10 1 x 10 Not today 2 x 10 2 x 10 NT 3 x 10 2 x 10 2 x 10 2 x 10 2 x 10 2 x 10 1 x 15 1 x 10 Next?   Wand abduction 1 x 10 1 x 10 Not today 2 x 10 2 x 10 NT 3 x 10 2 x 10 2 x 10 2 x 10 2 x 10 2 x 10 1 x 15 1 x 10 Next?   Wand ER 1 x 10 1 x 10 Not today 2 x 10 2 x 10 NT 3 x 10 2 x 10 2 x 10 2 x 10 2 x 10 2 x 10 1 x 15 Next? Next?   SA punches 3 x 10 Not today Not today 3 x 10 Not today NT 2 x 10 x 1# 2 x 10 x 1# 1 x 10 x 1# 2 x 10 2 x 10 2 x 10 1 x 15 1 x 10    Reverse codman's 30 x cw/ccw Not today Not today 30 x cw/ccw Not today NT 2 x 10 CW only   2 x 10 ea cw/ccw 2 x 10 cw/ccw 1 x 15 cw/ccw 1 x 10 cw/ccw       Side lying flexion 1 x 15 1 x 10 -- -- -- -- -- -- -- -- --- -- -- -- --   Side lying abduction 1 x 15 1 x 10 -- -- -- -- -- -- -- -- -- -- -- -- --   Side lying ER 1 x 15 1 x 10 -- -- -- -- -- -- -- -- -- -- -- -- --                     PRONE:                   Shoulder flexion -- -- -- -- -- -- -- -- -- -- -- -- -- -- --   Shoulder scaption -- -- -- -- -- -- -- -- -- -- -- -- -- -- --   Shoulder abduction -- -- -- -- -- -- -- -- -- -- -- -- -- -- --                     SEATED:                   Scapula squeezes  -- -- -- Not today 20 x 3" NT 20 x 3" 20 x 3" 20 x 3" 20 x 3" 15 x 3" 10 x 3" 10 x 3" Next   Pulley flex, abd 2' 2' ea 2' ea 2' ea 2' ea 2' ea 2' ea 2' ea 2' ea 2' ea 2' ea 2' ea 2' 2'     Active " "ROM  Flexion  Abd   2 x 10  2 x 10   2 x 10  2 x 10   1 x 10  1 x 10               STANDING:                   Doorway stretch 3 x 10" Not today 3 x 10" 3 x 10"  -- -- -- -- -- -- --  -- --   Wall walks Flex 2 x 10 Flex 2 x 10 Flex 2 x 10  Not today Flex 2 x 10  Not today Flex 2 x 10  Abd 1 x 15 Flex 1 x 15  ABD 1 x 10 1 x 15 1 x 10 1 x 10 -- -- --  -- --   Theraband  - W's  - T's  - I's  - Rows  - ER  - IR   --  --  3 x 10 GTB  3 x 10 BTB  2 x 10 YTB  2 x 10 RTB   --  1 x 10 RTB  2 x 10 GTB  3 x 10 GTB  1 x 10 YTB  1 x 10 RTB   --  --  1 x 10 GTB  3 x 10 GTB  --   --  --  --  3 x 10 GTB  --   --  --  --  Not today  --         3 x 10 GTB           3 x 10 RTB NT   --  --  --  --  --   --  --  --  ---  --   --  --  --  --  --   --  ---  --  --  --    --  --  --  --  --    --  --  --  --  -   Isometrics:  - Flexion  - Ext  - ABD  - IR   - ER   Not today  Not today  Not today  Not today  Not today   Not today  Not today  Not today  Not today  Not today   30 x 3"  30 x 3"  30 x 3"  30 x 3"  30 x 3"   30 x 3"  30 x 3"  30 x 3"  30 x 3"  30 x 3"   30 x 3"  Not today  Not today  30 x 3"  30 x 3" 30 x 3" ea   30 x 3" ea     20 x 3"  20 x 3"  20 x 3"  20 x 3"  20 x 3"   Not today  Not today  Not today  Not today  Not today   20 x 3"  20 x 3"  20 x 3"  20 x 3"  20 x 3"   20 x 3"  20 x 3"  20 x 3"  20 x 3"  20 x 3"   15 x 3"  15 x 3"  15 x 3"  15 x 3"  15 x 3"   10 x 3"  10 x 3"  10 x 3"  10 x 3" (gentle)  10 x 3"     Wand:  - functional IR (behind back)  - Extension   2 x 10    2 x 10   2 x 10    2 x 10   2 x 10    2 x 10   2 x 10    2 x 10   2 x 10    2 x 10   2 x 10    2 x 10 Next visit?            Initials MA DG DG MA DG SB SB SB DG DG DG DG SB DG DG     AROM in standing:  Flexion = 60 deg  Abduction = 50 deg  Functional ER reaches to C1  Functional IR reaches to L5    Anjel received the following manual therapy techniques: Soft tissue Mobilization were applied to the: R UE for 0 minutes, including:  - IASTM to bicep and " anterior forearm for improved elbow extension    Home Exercises Provided and Patient Education Provided     Education provided:   - again the importance of performing his HEP on a regular basis.     Written Home Exercises Provided: Patient instructed to cont prior HEP.  Exercises were reviewed and Anjel was able to demonstrate them prior to the end of the session.  Anjel demonstrated fair  understanding of the education provided.     See EMR under Patient Instructions for exercises provided 8/27/2020.    Assessment     Anjel continues to have difficulty performing AROM without upper trap substitution and requires verbal cues to perform resisted ER without trunk rotation.  Pt continues to have limited functional mobility of right UE causing difficulty performing ADL's.  He will require continued treatment to improve ROM and strength of right shoulder in order to return to prior level of function.       Anjel is progressing well towards his goals.   Pt prognosis is Good.     Pt will continue to benefit from skilled outpatient physical therapy to address the deficits listed in the problem list box on initial evaluation, provide pt/family education and to maximize pt's level of independence in the home and community environment.     Pt's spiritual, cultural and educational needs considered and pt agreeable to plan of care and goals.     Anticipated barriers to physical therapy: none    Goals:   Short Term Goals: 4 weeks   1. This patient will be independent with a basic HEP. In progress, not met  2. This patient will have R shoulder active range of motion of 110 degrees flexion in order to bathe with no complaints of pain.In progress, not met  3. This patient will increase R shoulder strength 1 grade in order to be able to drive with no increase in symptoms. In progress, not met  4. This patient will have a pain rating of 4/10 at worst with ADLs. In progress, not met  5. Patient will be able to achieve greater than or  equal to 54 on the FOTO Shoulder Survey indicating patient is 46% impaired, limited, or restricted and demonstrating overall improved functional ability with upper extremity. In progress, not met     Long Term Goals: 8 weeks   1. This patient will be independent with an updated HEP. In progress, not met  2. This patient will have R shoulder active range of motion equal to L shoulder active range of motion  in order to dress with no complaints of pain. In progress, not met  3. This patient will have R shoulder strength of 5/5 in order to be able to perform his yard work with no increase in symptoms. In progress, not met  4. This patient will have a pain rating of 1/10 at worst with ADLs. In progress, not met  5. Patient will be able to achieve greater than or equal to 64 on the FOTO Shoulder Survey indicating patient is 34% impaired, limited, or restricted and demonstrating overall improved functional ability with upper extremity. In progress, not met    Plan     Continue with planned PT POC as tolerated. Continue progression of side lying exercises.     Tj Zaragoza, PT

## 2020-10-22 ENCOUNTER — DOCUMENTATION ONLY (OUTPATIENT)
Dept: REHABILITATION | Facility: HOSPITAL | Age: 65
End: 2020-10-22

## 2020-10-22 ENCOUNTER — OFFICE VISIT (OUTPATIENT)
Dept: ORTHOPEDICS | Facility: CLINIC | Age: 65
End: 2020-10-22
Payer: MEDICARE

## 2020-10-22 ENCOUNTER — CLINICAL SUPPORT (OUTPATIENT)
Dept: REHABILITATION | Facility: HOSPITAL | Age: 65
End: 2020-10-22
Payer: MEDICARE

## 2020-10-22 VITALS — RESPIRATION RATE: 14 BRPM

## 2020-10-22 DIAGNOSIS — R29.898 WEAKNESS OF RIGHT UPPER EXTREMITY: ICD-10-CM

## 2020-10-22 DIAGNOSIS — M25.611 DECREASED RANGE OF MOTION OF RIGHT SHOULDER: Primary | ICD-10-CM

## 2020-10-22 DIAGNOSIS — S46.011D TRAUMATIC COMPLETE TEAR OF RIGHT ROTATOR CUFF, SUBSEQUENT ENCOUNTER: Primary | ICD-10-CM

## 2020-10-22 DIAGNOSIS — R26.89 DECREASED FUNCTIONAL MOBILITY: ICD-10-CM

## 2020-10-22 DIAGNOSIS — M25.611 DECREASED RANGE OF MOTION OF RIGHT SHOULDER: ICD-10-CM

## 2020-10-22 PROCEDURE — 99024 PR POST-OP FOLLOW-UP VISIT: ICD-10-PCS | Mod: HCNC,S$GLB,, | Performed by: PHYSICIAN ASSISTANT

## 2020-10-22 PROCEDURE — 99999 PR PBB SHADOW E&M-EST. PATIENT-LVL III: CPT | Mod: PBBFAC,HCNC,, | Performed by: PHYSICIAN ASSISTANT

## 2020-10-22 PROCEDURE — 99999 PR PBB SHADOW E&M-EST. PATIENT-LVL III: ICD-10-PCS | Mod: PBBFAC,HCNC,, | Performed by: PHYSICIAN ASSISTANT

## 2020-10-22 PROCEDURE — 99024 POSTOP FOLLOW-UP VISIT: CPT | Mod: HCNC,S$GLB,, | Performed by: PHYSICIAN ASSISTANT

## 2020-10-22 PROCEDURE — 97110 THERAPEUTIC EXERCISES: CPT | Mod: HCNC,PO

## 2020-10-22 NOTE — PROGRESS NOTES
Physical Therapy Treatment Note     Name: Anjel Sanders  Clinic Number: [id number]    Therapy Diagnosis:   Encounter Diagnoses   Name Primary?    Decreased range of motion of right shoulder     Weakness of right upper extremity     Decreased functional mobility      Physician: Sridevi Vital PA-C    Visit Date: 10/22/2020    Physician Orders: PT Eval and Treat   Medical Diagnosis from Referral: Z98.890 (ICD-10-CM) - S/P rotator cuff repair  Evaluation Date: 8/20/2020  Authorization Period Expiration: 02/27/2021  Plan of Care Expiration: 10/20/2020  Visit # / Visits authorized: 15 / 60    Time In: 8:10 am  Time Out: 8:45 am  Total Billable Time: 35 minutes    Precautions: Standard    Subjective     Pt reports: he feels his shoulder is not getting better fast enough, he is not performing his HEP except every once in a awhile.   He was not compliant with home exercise program.  Response to previous treatment: increased muscle soreness  Functional change: none    Pain: 3/10  Location: right shoulder      Objective     Anjel received therapeutic exercises to develop strength, ROM and flexibility for 35 minutes including:    Date  10/22/2020 10/19/2020 10/15/2020 10/12/2020 10/8/2020 10/06/2020 10/01/2020 09/29/2020 09/24/2020 09/22/2020 09/17/2020 09/15/2020 09/08/2020 9/03/2020 9/01/2020 8/20/2020   VISIT 15/60 14/60 13/60 12/60 11/60 10/60 9/60 8/60 7/60 6/60 5/60 4/60 3/60 2/60 1/60 1/1   DOS 8/05/2020                   FOTO --  -- -- -- --  -- -- -- 6/5 5/5 4/5 3/5 2/5 1/5   POC EXP. DATE 11/22/2020 10/20/2020 10/20/2020 10/20/2020 10/20/2020 10/20/2020 10/20/2020 10/20/2020 10/20/2020 10/20/2020 10/20/2020 10/20/2020 10/20/2020 10/20/2020 10/20/2020 10/20/2020   VISIT AMOUNT  MC total 60.64  1113.72 90.96  1053.08 60.64  962.12 60.64  901.48 90.96  840.84 60.64  749.88 60.64  689.24 90.96  628.60 90.96  537.64 60.64  446.68 60.64  386.04 60.64  325.4 60.64  264.80 60.64  204.16 60.64  143.52  82.88  82.88  "  FACE-TO-FACE 11/22/2020 10/23/2020 10/23/2020 10/23/2020 10/23/2020 10/23/2020 10/22/2020 10/22/2020 10/22/2020 10/22/2020 09/20/2020 9/20/2020 9/20/2020 9/20/2020 9/20/2020 9/20/2020                      PROM flex, abd, ER Not today 5' Not today Not today 5' Not today NT NT 5' 5' Not today Not today Not today 5' 5' Next   TABLE:                    Supine pec str w/ towel Not today Not today Not today Not today 1 towel  2' 1 towel  2' 1 towel  2' 1 towel  2' 1 towel  2' 1 towel  2' 1 towel roll  2' 1 towel  2' 1' 30" 1' no towel Next? --   Snow angels Not today Not today Not today Not today 2 x 10 2 x 10 NT NT 2 x 10 2 x 10  2 x 10 1 x 10 --  -- --   Butterflies  -- -- -- -- --  -- --  -- -- -- --  -- --   Wand flexion 1 x 10 1 x 10 1 x 10 Not today 2 x 10 2 x 10 NT 3 x 10 2 x 10 2 x 10 2 x 10 2 x 10 2 x 10 1 x 15 1 x 10 Next?   Wand abduction 1 x 10 1 x 10 1 x 10 Not today 2 x 10 2 x 10 NT 3 x 10 2 x 10 2 x 10 2 x 10 2 x 10 2 x 10 1 x 15 1 x 10 Next?   Wand ER 1 x 10 1 x 10 1 x 10 Not today 2 x 10 2 x 10 NT 3 x 10 2 x 10 2 x 10 2 x 10 2 x 10 2 x 10 1 x 15 Next? Next?   SA punches Not today 3 x 10 Not today Not today 3 x 10 Not today NT 2 x 10 x 1# 2 x 10 x 1# 1 x 10 x 1# 2 x 10 2 x 10 2 x 10 1 x 15 1 x 10    Reverse codman's Not today 30 x cw/ccw Not today Not today 30 x cw/ccw Not today NT 2 x 10 CW only   2 x 10 ea cw/ccw 2 x 10 cw/ccw 1 x 15 cw/ccw 1 x 10 cw/ccw       Side lying flexion 2 x 10 1 x 15 1 x 10 -- -- -- -- -- -- -- -- --- -- -- -- --   Side lying abduction 2 x 10 1 x 15 1 x 10 -- -- -- -- -- -- -- -- -- -- -- -- --   Side lying ER 2 x 10 1 x 15 1 x 10 -- -- -- -- -- -- -- -- -- -- -- -- --                      PRONE:                    Shoulder flexion -- -- -- -- -- -- -- -- -- -- -- -- -- -- -- --   Shoulder scaption -- -- -- -- -- -- -- -- -- -- -- -- -- -- -- --   Shoulder abduction -- -- -- -- -- -- -- -- -- -- -- -- -- -- -- --                      SEATED:                    Scapula squeezes " "--  -- -- -- Not today 20 x 3" NT 20 x 3" 20 x 3" 20 x 3" 20 x 3" 15 x 3" 10 x 3" 10 x 3" Next   Pulley flex, abd 2' ea 2' 2' ea 2' ea 2' ea 2' ea 2' ea 2' ea 2' ea 2' ea 2' ea 2' ea 2' ea 2' 2'     Active ROM  Flexion  Abd   2 x 10  Not today   2 x 10  2 x 10   2 x 10  2 x 10   1 x 10  1 x 10               STANDING:                    Doorway stretch Not today 3 x 10" Not today 3 x 10" 3 x 10"  -- -- -- -- -- -- --  -- --   Wall walks Flex 2 x 10 Flex 2 x 10 Flex 2 x 10 Flex 2 x 10  Not today Flex 2 x 10  Not today Flex 2 x 10  Abd 1 x 15 Flex 1 x 15  ABD 1 x 10 1 x 15 1 x 10 1 x 10 -- -- --  -- --   Theraband  - W's  - T's  - I's  - Rows  - ER  - IR   --  --  Not today  Not today  Not today  Not today   --  --  3 x 10 GTB  3 x 10 BTB  2 x 10 YTB  2 x 10 RTB   --  1 x 10 RTB  2 x 10 GTB  3 x 10 GTB  1 x 10 YTB  1 x 10 RTB   --  --  1 x 10 GTB  3 x 10 GTB  --   --  --  --  3 x 10 GTB  --   --  --  --  Not today  --         3 x 10 GTB           3 x 10 RTB NT   --  --  --  --  --   --  --  --  ---  --   --  --  --  --  --   --  ---  --  --  --    --  --  --  --  --    --  --  --  --  -   Isometrics:  - Flexion  - Ext  - ABD  - IR   - ER   Not today  Not today  Not today  Not today  Not today   Not today  Not today  Not today  Not today  Not today   Not today  Not today  Not today  Not today  Not today   30 x 3"  30 x 3"  30 x 3"  30 x 3"  30 x 3"   30 x 3"  30 x 3"  30 x 3"  30 x 3"  30 x 3"   30 x 3"  Not today  Not today  30 x 3"  30 x 3" 30 x 3" ea   30 x 3" ea     20 x 3"  20 x 3"  20 x 3"  20 x 3"  20 x 3"   Not today  Not today  Not today  Not today  Not today   20 x 3"  20 x 3"  20 x 3"  20 x 3"  20 x 3"   20 x 3"  20 x 3"  20 x 3"  20 x 3"  20 x 3"   15 x 3"  15 x 3"  15 x 3"  15 x 3"  15 x 3"   10 x 3"  10 x 3"  10 x 3"  10 x 3" (gentle)  10 x 3"     Wand:  - functional IR (behind back)  - Extension   Not today    Not today   2 x 10    2 x 10   2 x 10    2 x 10   2 x 10    2 x 10   2 x 10    2 x 10   2 x " 10    2 x 10   2 x 10    2 x 10 Next visit?            Initials DG MA DG DG MA DG SB SB SB DG DG DG DG SB DG DG     PROM Range of Motion:   Shoulder Left Right   Flexion 150 135   Abduction 165 110   ER at 90 60 30   IR 70 70     Active Range of Motion:   Shoulder Left Right   Flexion 135 70   Abduction 155 80   ER at 90 C7 C2   IR T12 L5      Upper Extremity Strength    (L) UE (R) UE   Shoulder flexion: 3+/5 2+/5   Shoulder Abduction: 3+/5 2+/5   Shoulder ER 4-/5 2+/5   Shoulder IR 5/5 2+/5   Lower Trap* NT NT   Middle Trap* NT NT   Rhomboids* NT NT       Anjel received the following manual therapy techniques: Soft tissue Mobilization were applied to the: R UE for 0 minutes, including:  - IASTM to bicep and anterior forearm for improved elbow extension    Home Exercises Provided and Patient Education Provided     Education provided:   - again the importance of performing his HEP on a regular basis.     Written Home Exercises Provided: Patient instructed to cont prior HEP.  Exercises were reviewed and Anjel was able to demonstrate them prior to the end of the session.  Anjel demonstrated fair  understanding of the education provided.     See EMR under Patient Instructions for exercises provided 8/27/2020.    Assessment     Anjel continues to be limited with his AROM and strength due to complaints of pain with all activities and he is not performing his HEP on a regular basis. He has been educated multiple times on the importance of performing his HEP on a regular basis. His AROM and strength is improving slowly compared to his initial evaluation, as noted above. He continues to be limited his his ADLs by his limited AROM and strength. He was able to perform all of today's exercises with no increase in symptoms prior to leaving the clinic. He requires constant verbal and tactile cues to avoid substitutions and to perform all of his exercises correctly.      Anjel is progressing slowly towards his goals.   Pt  prognosis is Good.     Pt will continue to benefit from skilled outpatient physical therapy to address the deficits listed in the problem list box on initial evaluation, provide pt/family education and to maximize pt's level of independence in the home and community environment.     Pt's spiritual, cultural and educational needs considered and pt agreeable to plan of care and goals.     Anticipated barriers to physical therapy: none    Goals:   Short Term Goals: 4 weeks   1. This patient will be independent with a basic HEP. In progress, not met  2. This patient will have R shoulder active range of motion of 110 degrees flexion in order to bathe with no complaints of pain.In progress, not met  3. This patient will increase R shoulder strength 1 grade in order to be able to drive with no increase in symptoms. In progress, not met  4. This patient will have a pain rating of 4/10 at worst with ADLs. In progress, not met  5. Patient will be able to achieve greater than or equal to 54 on the FOTO Shoulder Survey indicating patient is 46% impaired, limited, or restricted and demonstrating overall improved functional ability with upper extremity. In progress, not met     Long Term Goals: 8 weeks   1. This patient will be independent with an updated HEP. In progress, not met  2. This patient will have R shoulder active range of motion equal to L shoulder active range of motion  in order to dress with no complaints of pain. In progress, not met  3. This patient will have R shoulder strength of 5/5 in order to be able to perform his yard work with no increase in symptoms. In progress, not met  4. This patient will have a pain rating of 1/10 at worst with ADLs. In progress, not met  5. Patient will be able to achieve greater than or equal to 64 on the FOTO Shoulder Survey indicating patient is 34% impaired, limited, or restricted and demonstrating overall improved functional ability with upper extremity. In progress, not  met    Plan     Continue with planned PT POC as tolerated. Increase reps with side lying exercises and resume PROM next visit.     Katharina Perez, PT

## 2020-10-22 NOTE — PATIENT INSTRUCTIONS
Abduction (Side-Lying)        Lie on left side. Raise arm above head. Keep palm forward.  Repeat 10 times per set. Do 3 sets per session. Do 2 sessions per day.     [email or web address]     Copyright © [employer]. All rights reserved.     FLEXION: Side-Lying - Elbow Extended (Active)        Lie on left side, top arm straight. Raise arm forward and above head with elbow straight.   Complete 3 sets of 10 repetitions. Perform 2 sessions per day.    Copyright © [employer]. All rights reserved.     Progressive Resisted: External Rotation (Side-Lying)        Towel under arm, raise right forearm toward ceiling. Keep elbow bent and at side.  Repeat 10 times per set. Do 3 sets per session. Do 2 sessions per day.     [email or web address]     Copyright © [employer]. All rights reserved.     ROM: External / Internal Rotation - Wand        Holding wand with right hand palm up, push out from body with other hand, palm down. Keep both elbows bent. When stretch is felt. Repeat to other side, leading with same hand. Keep elbows bent.  Repeat 10 times per set. Do 2 sets per session. Do 2 sessions per day.     [email or web address]     Copyright © [employer]. All rights reserved.

## 2020-10-22 NOTE — PROGRESS NOTES
Face to Face PTA Conference performed with Ernestina Baxter PTA regarding patient's current status, overall progress, and plan of care. Pt will be seen by a physical therapist minimally every 6th visit or every 30 days.    Katharina Perez, PT  10/22/2020    Face to Face PTA Conference performed with Katharina Perez PT regarding patient's current status, overall progress, and plan of care. Pt will be seen by a physical therapist minimally every 6th visit or every 30 days.    Ernestina Baxter PTA  10/22/2020

## 2020-10-22 NOTE — PLAN OF CARE
Outpatient Therapy Updated Plan of Care     Visit Date: 10/22/2020  Name: Anjel Sanders  Clinic Number: [id number]    Therapy Diagnosis:   Encounter Diagnoses   Name Primary?    Decreased range of motion of right shoulder     Weakness of right upper extremity     Decreased functional mobility      Physician: Sridevi Vital PA-C    Physician Orders: PT Eval and Treat   Medical Diagnosis from Referral: Z98.890 (ICD-10-CM) - S/P rotator cuff repair  Evaluation Date: 8/20/2020    Total Visits Received: 15  Cancelled Visits: 3  No Show Visits: 1    Current Certification Period:  08/20/2020 to 10/20/2020  Precautions:  Standard and Diabetes  Visits from Evaluation Date:  15  Functional Level Prior to Evaluation:  Min A with ADLs    Subjective     Update: Pt reports: he feels his shoulder is not getting better fast enough, he is not performing his HEP except every once in a awhile.   He was not compliant with home exercise program.  Response to previous treatment: increased muscle soreness  Functional change: none     Pain: 3/10  Location: right shoulder      Objective     Update: PROM Range of Motion:   Shoulder Left Right   Flexion 150 135   Abduction 165 110   ER at 90 60 30   IR 70 70      Active Range of Motion:   Shoulder Left Right   Flexion 135 70   Abduction 155 80   ER at 90 C7 C2   IR T12 L5      Upper Extremity Strength    (L) UE (R) UE   Shoulder flexion: 3+/5 2+/5   Shoulder Abduction: 3+/5 2+/5   Shoulder ER 4-/5 2+/5   Shoulder IR 5/5 2+/5       Assessment     Update: Anjel continues to be limited with his AROM and strength due to complaints of pain with all activities and he is not performing his HEP on a regular basis. He has been educated multiple times on the importance of performing his HEP on a regular basis. His AROM and strength is improving slowly compared to his initial evaluation, as noted above. He continues to be limited his his ADLs by his limited AROM and strength. He was able to  perform all of today's exercises with no increase in symptoms prior to leaving the clinic. He requires constant verbal and tactile cues to avoid substitutions and to perform all of his exercises correctly.      Anjel is progressing slowly towards his goals    Previous Short Term Goals Status:   Short Term Goals: 4 weeks   1. This patient will be independent with a basic HEP. In progress, not met  2. This patient will have R shoulder active range of motion of 110 degrees flexion in order to bathe with no complaints of pain.In progress, not met  3. This patient will increase R shoulder strength 1 grade in order to be able to drive with no increase in symptoms. In progress, not met  4. This patient will have a pain rating of 4/10 at worst with ADLs. In progress, not met  5. Patient will be able to achieve greater than or equal to 54 on the FOTO Shoulder Survey indicating patient is 46% impaired, limited, or restricted and demonstrating overall improved functional ability with upper extremity. In progress, not met     Long Term Goals: 8 weeks   1. This patient will be independent with an updated HEP. In progress, not met  2. This patient will have R shoulder active range of motion equal to L shoulder active range of motion  in order to dress with no complaints of pain. In progress, not met  3. This patient will have R shoulder strength of 5/5 in order to be able to perform his yard work with no increase in symptoms. In progress, not met  4. This patient will have a pain rating of 1/10 at worst with ADLs. In progress, not met  5. Patient will be able to achieve greater than or equal to 64 on the FOTO Shoulder Survey indicating patient is 34% impaired, limited, or restricted and demonstrating overall improved functional ability with upper extremity. In progress, not met    New Short Term Goals Status:   Continue with all STGs and LTGs.s  Long Term Goal Status:   continue per initial plan of care.  Reasons for  Recertification of Therapy:   Continued weakness, decreased ROM, decreased ADLs    Plan     Updated Certification Period: 10/22/2020 to 11/22/2020  Recommended Treatment Plan: 2 times per week for 4 weeks: Manual Therapy, Moist Heat/ Ice, Neuromuscular Re-ed, Patient Education, Therapeutic Exercise and IASTM. Dry needling with manual therapy techniques to decrease pain, inflammation and swelling, increase circulation and promote healing process   Other Recommendations: none    Katharina Perez, PT  10/22/2020      I CERTIFY THE NEED FOR THESE SERVICES FURNISHED UNDER THIS PLAN OF TREATMENT AND WHILE UNDER MY CARE    Physician's comments:        Physician's Signature: ___________________________________________________

## 2020-10-23 NOTE — PROGRESS NOTES
Subjective:      Patient ID: Anjel Sanders is a 65 y.o. male.    Chief Complaint: Post-op Evaluation (2 month)      HPI: Mr. Anjel Sanders is a 65-year-old male in clinic for postoperative follow-up.  Patient is 3 months status post open repair massive tear of the right rotator cuff.  Patient is doing well at this time.  He reports that he is in therapy and making good progress.  He reports that there is still some soreness in the soft tissues around the shoulder, but pain is much improved    Past Medical History:   Diagnosis Date    AR (allergic rhinitis)     Gouty arthritis     Hx of colonic polyps     Tubular Adenoma    MURTAZA (obstructive sleep apnea)     Other and unspecified hyperlipidemia     Stroke     Unspecified essential hypertension        Current Outpatient Medications:     allopurinoL (ZYLOPRIM) 300 MG tablet, Take 1 tablet (300 mg total) by mouth once daily., Disp: 90 tablet, Rfl: 3    amLODIPine-benazepriL (LOTREL) 10-40 mg per capsule, Take 1 capsule by mouth once daily., Disp: 90 capsule, Rfl: 3    aspirin (ECOTRIN) 81 MG EC tablet, Take 81 mg by mouth once daily., Disp: , Rfl:     atorvastatin (LIPITOR) 80 MG tablet, Take 1 tablet (80 mg total) by mouth once daily., Disp: 90 tablet, Rfl: 4    gabapentin (NEURONTIN) 300 MG capsule, Take 1 capsule (300 mg total) by mouth every evening., Disp: 90 capsule, Rfl: 3    ibuprofen (ADVIL,MOTRIN) 600 MG tablet, Take 1 tablet (600 mg total) by mouth 2 (two) times daily with meals., Disp: 60 tablet, Rfl: 1    oxyCODONE-acetaminophen (PERCOCET) 5-325 mg per tablet, Take 1 tablet by mouth every 12 (twelve) hours as needed for Pain., Disp: 60 tablet, Rfl: 0    triamcinolone acetonide 0.1% (KENALOG) 0.1 % ointment, Apply topically 2 (two) times daily., Disp: , Rfl:     hydrOXYzine HCl (ATARAX) 25 MG tablet, TAKE 1 TABLET BY MOUTH THREE TIMES A DAY AS NEEDED FOR ITCHING (Patient not taking: Reported on 10/22/2020), Disp: 30 tablet, Rfl: 0     oxyCODONE-acetaminophen (PERCOCET) 7.5-325 mg per tablet, Take 1 tablet by mouth every 4 (four) hours as needed. (Patient not taking: Reported on 10/22/2020), Disp: 40 tablet, Rfl: 0  Review of patient's allergies indicates:  No Known Allergies    Resp 14     ROS      Objective:    Ortho Exam   Right shoulder:  Incision well healed, no signs of infection  ROM still limited secondary to stiffness, but it is improving  Elbow ROM full  Sensation and pulses intact    GEN: Well developed, well nourished male. AAOX3. No acute distress.   Normocephalic, atraumatic.   REGGIE  Breathing unlabored.  Mood and affect appropriate.        Assessment:     Imaging:  No new imaging ordered today        1. Traumatic complete tear of right rotator cuff, subsequent encounter          Plan:       Encouraged the patient on the progress he is making.  Continue therapy to improve ROM and strength.  Patient is okay to remain out of the sling, as he has already self weaned, but should avoid overhead/heavy lifting     Follow up in about 4 weeks (around 11/19/2020).

## 2020-10-24 ENCOUNTER — HOSPITAL ENCOUNTER (EMERGENCY)
Facility: HOSPITAL | Age: 65
Discharge: HOME | End: 2020-10-24
Attending: FAMILY MEDICINE
Payer: MEDICARE

## 2020-10-24 VITALS
OXYGEN SATURATION: 100 % | SYSTOLIC BLOOD PRESSURE: 204 MMHG | BODY MASS INDEX: 32.07 KG/M2 | DIASTOLIC BLOOD PRESSURE: 100 MMHG | TEMPERATURE: 98 F | WEIGHT: 224 LBS | HEART RATE: 61 BPM | RESPIRATION RATE: 18 BRPM | HEIGHT: 70 IN

## 2020-10-24 DIAGNOSIS — L30.9 DERMATITIS: Primary | ICD-10-CM

## 2020-10-24 PROCEDURE — 25000003 PHARM REV CODE 250: Mod: HCNC,ER | Performed by: FAMILY MEDICINE

## 2020-10-24 PROCEDURE — 96372 THER/PROPH/DIAG INJ SC/IM: CPT | Mod: HCNC,ER

## 2020-10-24 PROCEDURE — 63600175 PHARM REV CODE 636 W HCPCS: Mod: HCNC,ER | Performed by: FAMILY MEDICINE

## 2020-10-24 PROCEDURE — 99284 EMERGENCY DEPT VISIT MOD MDM: CPT | Mod: 25,HCNC,ER

## 2020-10-24 RX ORDER — PREDNISONE 20 MG/1
40 TABLET ORAL DAILY
Qty: 10 TABLET | Refills: 0 | Status: SHIPPED | OUTPATIENT
Start: 2020-10-24 | End: 2020-10-29

## 2020-10-24 RX ORDER — METHYLPREDNISOLONE SOD SUCC 125 MG
125 VIAL (EA) INJECTION
Status: COMPLETED | OUTPATIENT
Start: 2020-10-24 | End: 2020-10-24

## 2020-10-24 RX ORDER — DIPHENHYDRAMINE HCL 25 MG
25 CAPSULE ORAL EVERY 4 HOURS PRN
Qty: 20 CAPSULE | Refills: 0 | Status: SHIPPED | OUTPATIENT
Start: 2020-10-24 | End: 2021-12-16

## 2020-10-24 RX ORDER — DIPHENHYDRAMINE HCL 25 MG
25 CAPSULE ORAL
Status: COMPLETED | OUTPATIENT
Start: 2020-10-24 | End: 2020-10-24

## 2020-10-24 RX ADMIN — DIPHENHYDRAMINE HYDROCHLORIDE 25 MG: 25 CAPSULE ORAL at 07:10

## 2020-10-24 RX ADMIN — METHYLPREDNISOLONE SODIUM SUCCINATE 125 MG: 125 INJECTION, POWDER, FOR SOLUTION INTRAMUSCULAR; INTRAVENOUS at 08:10

## 2020-10-24 NOTE — ED PROVIDER NOTES
Encounter Date: 10/24/2020       History     Chief Complaint   Patient presents with    Rash     c/o rash to face since yesterday. Pt thinks its poison ivy or oak. Pt was working in yard yesterday. Dark patches noted to face. States rash is itchy.      65-year-old male working in [employer] noticed rash on his face with burning sensation and hyper pigmentation.  He has not taken any medication.  No lip or tongue swelling.  No wheezing.  Similar kind of skin reaction noted to his upper limbs.  Patient thinks it is poison ivy.    The history is provided by the patient.     Review of patient's allergies indicates:  No Known Allergies  Past Medical History:   Diagnosis Date    AR (allergic rhinitis)     Gouty arthritis     Hx of colonic polyps     Tubular Adenoma    MURTAZA (obstructive sleep apnea)     Other and unspecified hyperlipidemia     Stroke     Unspecified essential hypertension      Past Surgical History:   Procedure Laterality Date    ARTHROSCOPIC REPAIR OF ROTATOR CUFF OF SHOULDER Left 4/30/2019    Procedure: REPAIR, ROTATOR CUFF, ARTHROSCOPIC;  Surgeon: Bob Esparza Jr., MD;  Location: [facility] OR;  Service: Orthopedics;  Laterality: Left;  need opus sytem (Jluis notified)  video    ARTHROSCOPY OF SHOULDER WITH DECOMPRESSION OF SUBACROMIAL SPACE  4/30/2019    Procedure: ARTHROSCOPY, SHOULDER, WITH SUBACROMIAL SPACE DECOMPRESSION;  Surgeon: Bob Esparza Jr., MD;  Location: [facility] OR;  Service: Orthopedics;;    FOOT SURGERY      ROTATOR CUFF REPAIR Right 8/4/2020    Procedure: REPAIR, ROTATOR CUFF;  Surgeon: Bob Esparza Jr., MD;  Location: [facility] OR;  Service: Orthopedics;  Laterality: Right;  open no scope  need arthrex anchors (Marylin notified per [place] 7/21, EF) Confirmed with Harry Nava 8/3/2020 KB    surgery on jaw       Family History   Problem Relation Age of Onset    Stomach cancer Sister     Stroke Sister     Hypertension Mother     Glaucoma Mother     Diabetes Sister     Heart failure  Sister     No Known Problems Father     No Known Problems Brother     No Known Problems Maternal Aunt     No Known Problems Maternal Uncle     No Known Problems Paternal Aunt     No Known Problems Paternal Uncle     No Known Problems Maternal Grandmother     No Known Problems Maternal Grandfather     No Known Problems Paternal Grandmother     No Known Problems Paternal Grandfather     Amblyopia Neg Hx     Blindness Neg Hx     Cancer Neg Hx     Cataracts Neg Hx     Macular degeneration Neg Hx     Retinal detachment Neg Hx     Strabismus Neg Hx     Thyroid disease Neg Hx      Social History     Tobacco Use    Smoking status: Never Smoker    Smokeless tobacco: Never Used   Substance Use Topics    Alcohol use: Yes     Alcohol/week: 9.3 standard drinks     Types: 6 Cans of beer, 4 Standard drinks or equivalent per week     Comment: occas    Drug use: Not on file     Review of Systems   Constitutional: Negative for activity change, appetite change, chills and fever.   HENT: Negative for congestion, ear discharge, rhinorrhea, sinus pressure, sinus pain, sore throat and trouble swallowing.    Eyes: Negative for photophobia, pain, discharge, redness, itching and visual disturbance.   Respiratory: Negative for cough, chest tightness, shortness of breath and wheezing.    Cardiovascular: Negative for chest pain, palpitations and leg swelling.   Gastrointestinal: Negative for abdominal distention, abdominal pain, constipation, diarrhea, nausea and vomiting.   Genitourinary: Negative for dysuria, flank pain, frequency and hematuria.   Musculoskeletal: Negative for back pain, gait problem, neck pain and neck stiffness.   Skin: Positive for rash. Negative for wound.   Neurological: Negative for dizziness, tremors, seizures, syncope, speech difficulty, weakness, light-headedness, numbness and headaches.   Psychiatric/Behavioral: Negative for behavioral problems, confusion, hallucinations and sleep disturbance.  The patient is not nervous/anxious.    All other systems reviewed and are negative.      Physical Exam     Initial Vitals [10/24/20 0749]   BP Pulse Resp Temp SpO2   (!) 204/100 61 18 98.4 °F (36.9 °C) 100 %      MAP       --         Physical Exam    Nursing note and vitals reviewed.  Constitutional: Vital signs are normal. He appears well-developed and well-nourished. He is active.   HENT:   Head: Normocephalic and atraumatic.   Right Ear: Tympanic membrane normal.   Left Ear: Tympanic membrane normal.   Nose: Nose normal.   Mouth/Throat: Oropharynx is clear and moist.   Eyes: Conjunctivae and lids are normal.   Neck: Trachea normal, normal range of motion and full passive range of motion without pain. Neck supple. Normal range of motion present. No neck rigidity.   Cardiovascular: Normal rate, regular rhythm, S1 normal, S2 normal, normal heart sounds, intact distal pulses and normal pulses.   Pulmonary/Chest: Breath sounds normal.   Abdominal: Soft. Normal appearance and bowel sounds are normal. He exhibits no distension. There is no abdominal tenderness.   Musculoskeletal: Normal range of motion.   Lymphadenopathy:     He has no cervical adenopathy.   Neurological: He is alert and oriented to person, place, and time. He has normal reflexes. No cranial nerve deficit or sensory deficit. GCS score is 15. GCS eye subscore is 4. GCS verbal subscore is 5. GCS motor subscore is 6.   Skin: Skin is warm and intact. Capillary refill takes less than 2 seconds. Rash noted. No abrasion and no bruising noted.   Hyperpigmented papular rash on his face.   Psychiatric: He has a normal mood and affect. His speech is normal and behavior is normal. Judgment and thought content normal. He is not actively hallucinating. Cognition and memory are normal. He is attentive.         ED Course   Procedures  Labs Reviewed - No data to display       Imaging Results    None          Medical Decision Making:   ED Management:  Allergic reaction  to face and upper limbs.  Dermatitis probably poison ivy as per patient.  Will treat him with steroid and Benadryl along with a prescription.  Advised apply moisturizer twice per day.  Follow up PCP or to the ER if symptoms persist or worsen.                             Clinical Impression:     ICD-10-CM ICD-9-CM   1. Dermatitis  L30.9 692.9                      Disposition:   Disposition: Discharged  Condition: Stable     ED Disposition Condition    Discharge Stable        ED Prescriptions     Medication Sig Dispense Start Date End Date Auth. Provider    predniSONE (DELTASONE) 20 MG tablet Take 2 tablets (40 mg total) by mouth once daily. for 5 days 10 tablet 10/24/2020 10/29/2020 Andi Powers MD    diphenhydrAMINE (BENADRYL) 25 mg capsule Take 1 capsule (25 mg total) by mouth every 4 (four) hours as needed for Itching or Allergies. 20 capsule 10/24/2020  Andi Powers MD        Follow-up Information     Follow up With Specialties Details Why Contact Info    Lars Payan MD Internal Medicine   [address] [facility]  [address]  [facility] [address] [phone]                                         Andi Powers MD  10/24/20 [id number]

## 2020-11-02 NOTE — PROGRESS NOTES
Physical Therapy Treatment Note     Name: Anjel Sanders  Clinic Number: [id number]    Therapy Diagnosis:   Encounter Diagnoses   Name Primary?    Decreased range of motion of right shoulder Yes    Weakness of right upper extremity     Decreased functional mobility      Physician: Sridevi Vital PA-C    Visit Date: 11/3/2020    Physician Orders: PT Eval and Treat   Medical Diagnosis from Referral: Z98.890 (ICD-10-CM) - S/P rotator cuff repair  Evaluation Date: 8/20/2020  Authorization Period Expiration: 02/27/2021  Plan of Care Expiration: 10/20/2020  Visit # / Visits authorized: 16 / 60    Time In: 8:00 am  Time Out: 8:45 am  Total Billable Time: 45 minutes    Precautions: Standard    Subjective     Pt reports: feeling good after the week off of therapy.   He was not compliant with home exercise program.  Response to previous treatment: increased muscle soreness  Functional change: none    Pain: 3/10  Location: right shoulder      Objective     Anjel received therapeutic exercises to develop strength, ROM and flexibility for 45 minutes including:    Date  11/03/2020 10/22/2020 10/19/2020 10/15/2020 10/12/2020 10/8/2020 10/06/2020 10/01/2020 09/29/2020 09/24/2020 09/22/2020 09/17/2020 09/15/2020 09/08/2020 9/03/2020 9/01/2020 8/20/2020   VISIT 16/60 15/60 14/60 13/60 12/60 11/60 10/60 9/60 8/60 7/60 6/60 5/60 4/60 3/60 2/60 1/60 1/1   DOS 8/05/2020                    FOTO  --  -- -- -- --  -- -- -- 6/5 5/5 4/5 3/5 2/5 1/5   POC EXP. DATE 11/22/2020 11/22/2020 10/20/2020 10/20/2020 10/20/2020 10/20/2020 10/20/2020 10/20/2020 10/20/2020 10/20/2020 10/20/2020 10/20/2020 10/20/2020 10/20/2020 10/20/2020 10/20/2020 10/20/2020   VISIT AMOUNT  MC total 90.96  1204.68 60.64  1113.72 90.96  1053.08 60.64  962.12 60.64  901.48 90.96  840.84 60.64  749.88 60.64  689.24 90.96  628.60 90.96  537.64 60.64  446.68 60.64  386.04 60.64  325.4 60.64  264.80 60.64  204.16 60.64  143.52  82.88  82.88   FACE-TO-FACE 11/22/2020  "11/22/2020 10/23/2020 10/23/2020 10/23/2020 10/23/2020 10/23/2020 10/22/2020 10/22/2020 10/22/2020 10/22/2020 09/20/2020 9/20/2020 9/20/2020 9/20/2020 9/20/2020 9/20/2020                       PROM flex, abd, ER 5' Not today 5' Not today Not today 5' Not today NT NT 5' 5' Not today Not today Not today 5' 5' Next   TABLE:                     Supine pec str w/ towel -- Not today Not today Not today Not today 1 towel  2' 1 towel  2' 1 towel  2' 1 towel  2' 1 towel  2' 1 towel  2' 1 towel roll  2' 1 towel  2' 1' 30" 1' no towel Next? --   Snow angels -- Not today Not today Not today Not today 2 x 10 2 x 10 NT NT 2 x 10 2 x 10  2 x 10 1 x 10 --  -- --   Butterflies  -- -- -- -- -- --  -- --  -- -- -- --  -- --   Wand flexion 1 x 10 1 x 10 1 x 10 1 x 10 Not today 2 x 10 2 x 10 NT 3 x 10 2 x 10 2 x 10 2 x 10 2 x 10 2 x 10 1 x 15 1 x 10 Next?   Wand abduction Standing  1 x 10 1 x 10 1 x 10 1 x 10 Not today 2 x 10 2 x 10 NT 3 x 10 2 x 10 2 x 10 2 x 10 2 x 10 2 x 10 1 x 15 1 x 10 Next?   Wand ER Standing  1 x 10 1 x 10 1 x 10 1 x 10 Not today 2 x 10 2 x 10 NT 3 x 10 2 x 10 2 x 10 2 x 10 2 x 10 2 x 10 1 x 15 Next? Next?   SA punches -- Not today 3 x 10 Not today Not today 3 x 10 Not today NT 2 x 10 x 1# 2 x 10 x 1# 1 x 10 x 1# 2 x 10 2 x 10 2 x 10 1 x 15 1 x 10    Reverse codman's -- Not today 30 x cw/ccw Not today Not today 30 x cw/ccw Not today NT 2 x 10 CW only   2 x 10 ea cw/ccw 2 x 10 cw/ccw 1 x 15 cw/ccw 1 x 10 cw/ccw       Side lying flexion 2 x 10 2 x 10 1 x 15 1 x 10 -- -- -- -- -- -- -- -- --- -- -- -- --   Side lying abduction 2 x 10 2 x 10 1 x 15 1 x 10 -- -- -- -- -- -- -- -- -- -- -- -- --   Side lying ER 2 x 10 2 x 10 1 x 15 1 x 10 -- -- -- -- -- -- -- -- -- -- -- -- --                       PRONE:                     Shoulder flexion -- -- -- -- -- -- -- -- -- -- -- -- -- -- -- -- --   Shoulder scaption -- -- -- -- -- -- -- -- -- -- -- -- -- -- -- -- --   Shoulder abduction -- -- -- -- -- -- -- -- -- -- -- -- " "-- -- -- -- --                       SEATED:                     Scapula squeezes  --  -- -- -- Not today 20 x 3" NT 20 x 3" 20 x 3" 20 x 3" 20 x 3" 15 x 3" 10 x 3" 10 x 3" Next   Pulley flex, abd 2' ea 2' ea 2' 2' ea 2' ea 2' ea 2' ea 2' ea 2' ea 2' ea 2' ea 2' ea 2' ea 2' ea 2' 2'     Active ROM  - Flexion  - Abd   NT  2 x 10   2 x 10  Not today   2 x 10  2 x 10   2 x 10  2 x 10   1 x 10  1 x 10                                   STANDING:                     Doorway stretch -- Not today 3 x 10" Not today 3 x 10" 3 x 10"  -- -- -- -- -- -- --  -- --   Wall walks Flex 2 x 10 Flex 2 x 10 Flex 2 x 10 Flex 2 x 10 Flex 2 x 10  Not today Flex 2 x 10  Not today Flex 2 x 10  Abd 1 x 15 Flex 1 x 15  ABD 1 x 10 1 x 15 1 x 10 1 x 10 -- -- --  -- --   Theraband  - W's  - T's  - I's    - Rows  - ER  - IR  - Punch out   --  --  2 x 10 GTB  1 x 10 BTB  3 x 10 BTB  2 x 10 RTB  2 x 10 RTB  Next    --  --  Not today  Not today  Not today  Not today   --  --  3 x 10 GTB  3 x 10 BTB  2 x 10 YTB  2 x 10 RTB   --  1 x 10 RTB  2 x 10 GTB  3 x 10 GTB  1 x 10 YTB  1 x 10 RTB   --  --  1 x 10 GTB  3 x 10 GTB  --   --  --  --  3 x 10 GTB  --   --  --  --  Not today  --         3 x 10 GTB           3 x 10 RTB NT   --  --  --  --  --   --  --  --  ---  --   --  --  --  --  --   --  ---  --  --  --    --  --  --  --  --    --  --  --  --  -   Isometrics:  - Flexion  - Ext  - ABD  - IR   - ER D/C   Not today  Not today  Not today  Not today  Not today   Not today  Not today  Not today  Not today  Not today   Not today  Not today  Not today  Not today  Not today   30 x 3"  30 x 3"  30 x 3"  30 x 3"  30 x 3"   30 x 3"  30 x 3"  30 x 3"  30 x 3"  30 x 3"   30 x 3"  Not today  Not today  30 x 3"  30 x 3" 30 x 3" ea   30 x 3" ea     20 x 3"  20 x 3"  20 x 3"  20 x 3"  20 x 3"   Not today  Not today  Not today  Not today  Not today   20 x 3"  20 x 3"  20 x 3"  20 x 3"  20 x 3"   20 x 3"  20 x 3"  20 x 3"  20 x 3"  20 x 3"   15 x 3"  15 x 3"  15 x " "3"  15 x 3"  15 x 3"   10 x 3"  10 x 3"  10 x 3"  10 x 3" (gentle)  10 x 3"     Wand:  - functional IR (behind back)  - Extension D/C   Not today    Not today   2 x 10    2 x 10   2 x 10    2 x 10   2 x 10    2 x 10   2 x 10    2 x 10   2 x 10    2 x 10   2 x 10    2 x 10 Next visit?            Initials SB DG MA DG DG MA DG SB SB SB DG DG DG DG SB DG DG       Anjel received the following manual therapy techniques: Soft tissue Mobilization were applied to the: R UE for 0 minutes, including:  - IASTM to bicep and anterior forearm for improved elbow extension    Home Exercises Provided and Patient Education Provided     Education provided:   - again the importance of performing his HEP on a regular basis.   - updated HEP    Written Home Exercises Provided: Patient instructed to cont prior HEP.  Exercises were reviewed and Anjel was able to demonstrate them prior to the end of the session.  Anjel demonstrated fair  understanding of the education provided.     See EMR under Patient Instructions for exercises provided 11/03/2020.    Assessment     Anjel returns to therapy after a week off due to poison oak outbreak. Pt was able to complete full routine above with modifications and progressions made well without increase in symptoms reported. Pt continues to demonstrate decreased AROM due to weakness and pain, although with corrections to elbow extension and postural substitutions was able to improve. Pt required tactile cue for trunk substitution during flexion AROM and transitioned wand exercises to standing due to poor technique lying supine. Pt was given updated HEP for strengthening with theraband and was educated multiple times on importance of beginning those exercises at home to see improvements in strength. R shoulder was found to have increased soft tissue adhesions noted in R shoulder with manual therapy and would benefit from continued manual therapy to reduce the risk of adhesive capsulitis.      Anjel is " progressing slowly towards his goals.   Pt prognosis is Good.     Pt will continue to benefit from skilled outpatient physical therapy to address the deficits listed in the problem list box on initial evaluation, provide pt/family education and to maximize pt's level of independence in the home and community environment.     Pt's spiritual, cultural and educational needs considered and pt agreeable to plan of care and goals.     Anticipated barriers to physical therapy: none    Goals:   Short Term Goals: 4 weeks   1. This patient will be independent with a basic HEP. In progress, not met  2. This patient will have R shoulder active range of motion of 110 degrees flexion in order to bathe with no complaints of pain.In progress, not met  3. This patient will increase R shoulder strength 1 grade in order to be able to drive with no increase in symptoms. In progress, not met  4. This patient will have a pain rating of 4/10 at worst with ADLs. In progress, not met  5. Patient will be able to achieve greater than or equal to 54 on the FOTO Shoulder Survey indicating patient is 46% impaired, limited, or restricted and demonstrating overall improved functional ability with upper extremity. In progress, not met     Long Term Goals: 8 weeks   1. This patient will be independent with an updated HEP. In progress, not met  2. This patient will have R shoulder active range of motion equal to L shoulder active range of motion  in order to dress with no complaints of pain. In progress, not met  3. This patient will have R shoulder strength of 5/5 in order to be able to perform his yard work with no increase in symptoms. In progress, not met  4. This patient will have a pain rating of 1/10 at worst with ADLs. In progress, not met  5. Patient will be able to achieve greater than or equal to 64 on the FOTO Shoulder Survey indicating patient is 34% impaired, limited, or restricted and demonstrating overall improved functional ability  with upper extremity. In progress, not met    Plan     Continue with planned PT POC as tolerated.     Ernestina Baxter, PTA 1/6

## 2020-11-03 ENCOUNTER — CLINICAL SUPPORT (OUTPATIENT)
Dept: REHABILITATION | Facility: HOSPITAL | Age: 65
End: 2020-11-03
Payer: MEDICARE

## 2020-11-03 DIAGNOSIS — R26.89 DECREASED FUNCTIONAL MOBILITY: ICD-10-CM

## 2020-11-03 DIAGNOSIS — R29.898 WEAKNESS OF RIGHT UPPER EXTREMITY: ICD-10-CM

## 2020-11-03 DIAGNOSIS — M25.611 DECREASED RANGE OF MOTION OF RIGHT SHOULDER: Primary | ICD-10-CM

## 2020-11-03 PROCEDURE — 97110 THERAPEUTIC EXERCISES: CPT | Mod: HCNC,PO,CQ

## 2020-11-05 ENCOUNTER — CLINICAL SUPPORT (OUTPATIENT)
Dept: REHABILITATION | Facility: HOSPITAL | Age: 65
End: 2020-11-05
Payer: MEDICARE

## 2020-11-05 DIAGNOSIS — R26.89 DECREASED FUNCTIONAL MOBILITY: ICD-10-CM

## 2020-11-05 DIAGNOSIS — R29.898 WEAKNESS OF RIGHT UPPER EXTREMITY: ICD-10-CM

## 2020-11-05 DIAGNOSIS — M25.611 DECREASED RANGE OF MOTION OF RIGHT SHOULDER: Primary | ICD-10-CM

## 2020-11-05 PROCEDURE — 97110 THERAPEUTIC EXERCISES: CPT | Mod: HCNC,PO,CQ

## 2020-11-05 NOTE — PROGRESS NOTES
Physical Therapy Treatment Note     Name: Anjel Sanders  Clinic Number: [id number]    Therapy Diagnosis:   Encounter Diagnoses   Name Primary?    Decreased range of motion of right shoulder Yes    Weakness of right upper extremity     Decreased functional mobility      Physician: Sridevi Vital PA-C    Visit Date: 11/5/2020    Physician Orders: PT Eval and Treat   Medical Diagnosis from Referral: Z98.890 (ICD-10-CM) - S/P rotator cuff repair  Evaluation Date: 8/20/2020  Authorization Period Expiration: 02/27/2021  Plan of Care Expiration: 10/20/2020  Visit # / Visits authorized: 17 / 60    Time In: 8:00 am  Time Out: 8:45 am  Total Billable Time: 45 minutes    Precautions: Standard    Subjective     Pt reports: feeling good today  He was not compliant with home exercise program.  Response to previous treatment: increased muscle soreness  Functional change: improved mobility of R shoulder    Pain: 2/10  Location: right shoulder      Objective     Anjel received therapeutic exercises to develop strength, ROM and flexibility for 45 minutes including:    Date  11/04/2020 11/03/2020 10/22/2020 10/19/2020 10/15/2020 10/12/2020 10/8/2020 10/06/2020 10/01/2020 09/29/2020 09/24/2020 09/22/2020 09/17/2020 09/15/2020 09/08/2020 9/03/2020 9/01/2020 8/20/2020   VISIT 17/60 16/60 15/60 14/60 13/60 12/60 11/60 10/60 9/60 8/60 7/60 6/60 5/60 4/60 3/60 2/60 1/60 1/1   DOS 8/05/2020                     FOTO   --  -- -- -- --  -- -- -- 6/5 5/5 4/5 3/5 2/5 1/5   POC EXP. DATE 11/22/2020 11/22/2020 11/22/2020 10/20/2020 10/20/2020 10/20/2020 10/20/2020 10/20/2020 10/20/2020 10/20/2020 10/20/2020 10/20/2020 10/20/2020 10/20/2020 10/20/2020 10/20/2020 10/20/2020 10/20/2020   VISIT AMOUNT  MC total 90.96  1295.64 90.96  1204.68 60.64  1113.72 90.96  1053.08 60.64  962.12 60.64  901.48 90.96  840.84 60.64  749.88 60.64  689.24 90.96  628.60 90.96  537.64 60.64  446.68 60.64  386.04 60.64  325.4 60.64  264.80 60.64  204.16  "60.64  143.52  82.88  82.88   FACE-TO-FACE 11/22/2020 11/22/2020 11/22/2020 10/23/2020 10/23/2020 10/23/2020 10/23/2020 10/23/2020 10/22/2020 10/22/2020 10/22/2020 10/22/2020 09/20/2020 9/20/2020 9/20/2020 9/20/2020 9/20/2020 9/20/2020                        PROM flex, abd, ER 5'  5' Not today 5' Not today Not today 5' Not today NT NT 5' 5' Not today Not today Not today 5' 5' Next   TABLE:                      Supine pec str w/ towel -- -- Not today Not today Not today Not today 1 towel  2' 1 towel  2' 1 towel  2' 1 towel  2' 1 towel  2' 1 towel  2' 1 towel roll  2' 1 towel  2' 1' 30" 1' no towel Next? --   Snow angels -- -- Not today Not today Not today Not today 2 x 10 2 x 10 NT NT 2 x 10 2 x 10  2 x 10 1 x 10 --  -- --   Butterflies  2 x 10 -- -- -- -- -- --  -- --  -- -- -- --  -- --   Wand flexion 2 x 10 1 x 10 1 x 10 1 x 10 1 x 10 Not today 2 x 10 2 x 10 NT 3 x 10 2 x 10 2 x 10 2 x 10 2 x 10 2 x 10 1 x 15 1 x 10 Next?   Wand abduction Standing  2 x 10 Standing  1 x 10 1 x 10 1 x 10 1 x 10 Not today 2 x 10 2 x 10 NT 3 x 10 2 x 10 2 x 10 2 x 10 2 x 10 2 x 10 1 x 15 1 x 10 Next?   Wand ER Against wall standing   2 x 10 Standing  1 x 10 1 x 10 1 x 10 1 x 10 Not today 2 x 10 2 x 10 NT 3 x 10 2 x 10 2 x 10 2 x 10 2 x 10 2 x 10 1 x 15 Next? Next?   SA punches 3 x 10 -- Not today 3 x 10 Not today Not today 3 x 10 Not today NT 2 x 10 x 1# 2 x 10 x 1# 1 x 10 x 1# 2 x 10 2 x 10 2 x 10 1 x 15 1 x 10    Reverse codman's 30 x ea cw/ccw -- Not today 30 x cw/ccw Not today Not today 30 x cw/ccw Not today NT 2 x 10 CW only   2 x 10 ea cw/ccw 2 x 10 cw/ccw 1 x 15 cw/ccw 1 x 10 cw/ccw       Side lying flexion NT 2 x 10 2 x 10 1 x 15 1 x 10 -- -- -- -- -- -- -- -- --- -- -- -- --   Side lying abduction NT 2 x 10 2 x 10 1 x 15 1 x 10 -- -- -- -- -- -- -- -- -- -- -- -- --   Side lying ER NT 2 x 10 2 x 10 1 x 15 1 x 10 -- -- -- -- -- -- -- -- -- -- -- -- --                        PRONE:                      Shoulder flexion -- -- -- " "-- -- -- -- -- -- -- -- -- -- -- -- -- -- --   Shoulder scaption -- -- -- -- -- -- -- -- -- -- -- -- -- -- -- -- -- --   Shoulder abduction -- -- -- -- -- -- -- -- -- -- -- -- -- -- -- -- -- --                        SEATED:                      Scapula squeezes --  --  -- -- -- Not today 20 x 3" NT 20 x 3" 20 x 3" 20 x 3" 20 x 3" 15 x 3" 10 x 3" 10 x 3" Next   Pulley flex, abd 2' ea 2' ea 2' ea 2' 2' ea 2' ea 2' ea 2' ea 2' ea 2' ea 2' ea 2' ea 2' ea 2' ea 2' ea 2' 2'     Active ROM  - Flexion  - Abd Against wall  3 x 10  3 x 10   NT  2 x 10   2 x 10  Not today   2 x 10  2 x 10   2 x 10  2 x 10   1 x 10  1 x 10                                    STANDING:                      Doorway stretch -- -- Not today 3 x 10" Not today 3 x 10" 3 x 10"  -- -- -- -- -- -- --  -- --   Wall walks Flex 2 x 10 Flex 2 x 10 Flex 2 x 10 Flex 2 x 10 Flex 2 x 10 Flex 2 x 10  Not today Flex 2 x 10  Not today Flex 2 x 10  Abd 1 x 15 Flex 1 x 15  ABD 1 x 10 1 x 15 1 x 10 1 x 10 -- -- --  -- --   Theraband  - W's  - T's  - I's    - Rows  - ER  - IR  - Punch out   --  2 x 10 RTB  3 x 10 BTB    3 x 10 BTB  3 x 10 RTB  3 x 10 RTB  3 x 10 RTB   --  --  2 x 10 GTB  1 x 10 BTB  3 x 10 BTB  2 x 10 RTB  2 x 10 RTB  Next    --  --  Not today  Not today  Not today  Not today   --  --  3 x 10 GTB  3 x 10 BTB  2 x 10 YTB  2 x 10 RTB   --  1 x 10 RTB  2 x 10 GTB  3 x 10 GTB  1 x 10 YTB  1 x 10 RTB   --  --  1 x 10 GTB  3 x 10 GTB  --   --  --  --  3 x 10 GTB  --   --  --  --  Not today  --         3 x 10 GTB           3 x 10 RTB NT   --  --  --  --  --   --  --  --  ---  --   --  --  --  --  --   --  ---  --  --  --    --  --  --  --  --    --  --  --  --  -   Isometrics:  - Flexion  - Ext  - ABD  - IR   - ER -- D/C   Not today  Not today  Not today  Not today  Not today   Not today  Not today  Not today  Not today  Not today   Not today  Not today  Not today  Not today  Not today   30 x 3"  30 x 3"  30 x 3"  30 x 3"  30 x 3"   30 x 3"  30 x 3"  30 x " "3"  30 x 3"  30 x 3"   30 x 3"  Not today  Not today  30 x 3"  30 x 3" 30 x 3" ea   30 x 3" ea     20 x 3"  20 x 3"  20 x 3"  20 x 3"  20 x 3"   Not today  Not today  Not today  Not today  Not today   20 x 3"  20 x 3"  20 x 3"  20 x 3"  20 x 3"   20 x 3"  20 x 3"  20 x 3"  20 x 3"  20 x 3"   15 x 3"  15 x 3"  15 x 3"  15 x 3"  15 x 3"   10 x 3"  10 x 3"  10 x 3"  10 x 3" (gentle)  10 x 3"     Wand:  - functional IR (behind back)  - Extension -- D/C   Not today    Not today   2 x 10    2 x 10   2 x 10    2 x 10   2 x 10    2 x 10   2 x 10    2 x 10   2 x 10    2 x 10   2 x 10    2 x 10 Next visit?            Initials SB SB DG MA DG DG MA DG SB SB SB DG DG DG DG SB DG DG       Anjel received the following manual therapy techniques: Soft tissue Mobilization were applied to the: R UE for 0 minutes, including:  - IASTM to bicep and anterior forearm for improved elbow extension    Home Exercises Provided and Patient Education Provided     Education provided:   - again the importance of performing his HEP on a regular basis.   - updated HEP    Written Home Exercises Provided: Patient instructed to cont prior HEP.  Exercises were reviewed and Anjel was able to demonstrate them prior to the end of the session.  nAjel demonstrated fair  understanding of the education provided.     See EMR under Patient Instructions for exercises provided 11/03/2020.    Assessment     Anjel returns to therapy with improved pain levels and slightly improved mobility with exercises noted. Pt was able to complete full routine above with progressions well without increase in symptoms reported. Pt required consistent cueing for compensatory patterns throughout routine and was placed against the wall for some exercises to prevent compensation. Will attempt to address scapular mobility during manual therapy next visit.      Anjel is progressing slowly towards his goals.   Pt prognosis is Good.     Pt will continue to benefit from skilled outpatient " physical therapy to address the deficits listed in the problem list box on initial evaluation, provide pt/family education and to maximize pt's level of independence in the home and community environment.     Pt's spiritual, cultural and educational needs considered and pt agreeable to plan of care and goals.     Anticipated barriers to physical therapy: none    Goals:   Short Term Goals: 4 weeks   1. This patient will be independent with a basic HEP. In progress, not met  2. This patient will have R shoulder active range of motion of 110 degrees flexion in order to bathe with no complaints of pain.In progress, not met  3. This patient will increase R shoulder strength 1 grade in order to be able to drive with no increase in symptoms. In progress, not met  4. This patient will have a pain rating of 4/10 at worst with ADLs. In progress, not met  5. Patient will be able to achieve greater than or equal to 54 on the FOTO Shoulder Survey indicating patient is 46% impaired, limited, or restricted and demonstrating overall improved functional ability with upper extremity. In progress, not met     Long Term Goals: 8 weeks   1. This patient will be independent with an updated HEP. In progress, not met  2. This patient will have R shoulder active range of motion equal to L shoulder active range of motion  in order to dress with no complaints of pain. In progress, not met  3. This patient will have R shoulder strength of 5/5 in order to be able to perform his yard work with no increase in symptoms. In progress, not met  4. This patient will have a pain rating of 1/10 at worst with ADLs. In progress, not met  5. Patient will be able to achieve greater than or equal to 64 on the FOTO Shoulder Survey indicating patient is 34% impaired, limited, or restricted and demonstrating overall improved functional ability with upper extremity. In progress, not met    Plan     Continue with planned PT POC as tolerated. Add scapular  mobilizations to manual therapy routine next visit.     Ernestina Baxter, PTA 1/6

## 2020-11-09 NOTE — PROGRESS NOTES
Physical Therapy Treatment Note     Name: Anjel Sanders  Clinic Number: [id number]    Therapy Diagnosis:   Encounter Diagnoses   Name Primary?    Decreased range of motion of right shoulder Yes    Weakness of right upper extremity     Decreased functional mobility      Physician: Sridevi Vital PA-C    Visit Date: 11/10/2020    Physician Orders: PT Eval and Treat   Medical Diagnosis from Referral: Z98.890 (ICD-10-CM) - S/P rotator cuff repair  Evaluation Date: 8/20/2020  Authorization Period Expiration: 02/27/2021  Plan of Care Expiration: 10/20/2020  Visit # / Visits authorized: 18 / 60    Time In: 8:00 am  Time Out: 8:45 am  Total Billable Time: 45 minutes    Precautions: Standard    Subjective     Pt reports: increased soreness today.   He was not compliant with home exercise program.  Response to previous treatment: increased muscle soreness  Functional change: improved mobility of R shoulder    Pain: 2/10  Location: right shoulder      Objective     Anjel received therapeutic exercises to develop strength, ROM and flexibility for 35 minutes including:    Date  11/10/2020 11/04/2020 11/03/2020 10/22/2020 10/19/2020 10/15/2020 10/12/2020 10/8/2020 10/06/2020 10/01/2020 09/29/2020 09/24/2020 09/22/2020 09/17/2020 09/15/2020 09/08/2020 9/03/2020 9/01/2020 8/20/2020   VISIT 18/60 17/60 16/60 15/60 14/60 13/60 12/60 11/60 10/60 9/60 8/60 7/60 6/60 5/60 4/60 3/60 2/60 1/60 1/1   DOS 8/05/2020                      FOTO --   --  -- -- -- --  -- -- -- 6/5 5/5 4/5 3/5 2/5 1/5   POC EXP. DATE 11/22/2020 11/22/2020 11/22/2020 11/22/2020 10/20/2020 10/20/2020 10/20/2020 10/20/2020 10/20/2020 10/20/2020 10/20/2020 10/20/2020 10/20/2020 10/20/2020 10/20/2020 10/20/2020 10/20/2020 10/20/2020 10/20/2020   VISIT AMOUNT  MC total 90.96  1386.60 90.96  1295.64 90.96  1204.68 60.64  1113.72 90.96  1053.08 60.64  962.12 60.64  901.48 90.96  840.84 60.64  749.88 60.64  689.24 90.96  628.60 90.96  537.64 60.64  446.68  "60.64  386.04 60.64  325.4 60.64  264.80 60.64  204.16 60.64  143.52  82.88  82.88   FACE-TO-FACE 11/22/2020 11/22/2020 11/22/2020 11/22/2020 10/23/2020 10/23/2020 10/23/2020 10/23/2020 10/23/2020 10/22/2020 10/22/2020 10/22/2020 10/22/2020 09/20/2020 9/20/2020 9/20/2020 9/20/2020 9/20/2020 9/20/2020                         PROM flex, abd, ER  5'  5' Not today 5' Not today Not today 5' Not today NT NT 5' 5' Not today Not today Not today 5' 5' Next   TABLE:                       Supine pec str w/ towel  -- -- Not today Not today Not today Not today 1 towel  2' 1 towel  2' 1 towel  2' 1 towel  2' 1 towel  2' 1 towel  2' 1 towel roll  2' 1 towel  2' 1' 30" 1' no towel Next? --   Snow angels  -- -- Not today Not today Not today Not today 2 x 10 2 x 10 NT NT 2 x 10 2 x 10  2 x 10 1 x 10 --  -- --   Butterflies  NT 2 x 10 -- -- -- -- -- --  -- --  -- -- -- --  -- --   Wand flexion NT 2 x 10 1 x 10 1 x 10 1 x 10 1 x 10 Not today 2 x 10 2 x 10 NT 3 x 10 2 x 10 2 x 10 2 x 10 2 x 10 2 x 10 1 x 15 1 x 10 Next?   Wand abduction NT Standing  2 x 10 Standing  1 x 10 1 x 10 1 x 10 1 x 10 Not today 2 x 10 2 x 10 NT 3 x 10 2 x 10 2 x 10 2 x 10 2 x 10 2 x 10 1 x 15 1 x 10 Next?   Wand ER NT Against wall standing   2 x 10 Standing  1 x 10 1 x 10 1 x 10 1 x 10 Not today 2 x 10 2 x 10 NT 3 x 10 2 x 10 2 x 10 2 x 10 2 x 10 2 x 10 1 x 15 Next? Next?   SA punches -- 3 x 10 -- Not today 3 x 10 Not today Not today 3 x 10 Not today NT 2 x 10 x 1# 2 x 10 x 1# 1 x 10 x 1# 2 x 10 2 x 10 2 x 10 1 x 15 1 x 10    Reverse codman's D/C 30 x ea cw/ccw -- Not today 30 x cw/ccw Not today Not today 30 x cw/ccw Not today NT 2 x 10 CW only   2 x 10 ea cw/ccw 2 x 10 cw/ccw 1 x 15 cw/ccw 1 x 10 cw/ccw       Side lying flexion NT NT 2 x 10 2 x 10 1 x 15 1 x 10 -- -- -- -- -- -- -- -- --- -- -- -- --   Side lying abduction NT NT 2 x 10 2 x 10 1 x 15 1 x 10 -- -- -- -- -- -- -- -- -- -- -- -- --   Side lying ER NT NT 2 x 10 2 x 10 1 x 15 1 x 10 -- -- -- -- -- " "-- -- -- -- -- -- -- --                         PRONE:                       Shoulder flexion -- -- -- -- -- -- -- -- -- -- -- -- -- -- -- -- -- -- --   Shoulder scaption -- -- -- -- -- -- -- -- -- -- -- -- -- -- -- -- -- -- --   Shoulder abduction -- -- -- -- -- -- -- -- -- -- -- -- -- -- -- -- -- -- --                         SEATED:                       Scapula squeezes -- --  --  -- -- -- Not today 20 x 3" NT 20 x 3" 20 x 3" 20 x 3" 20 x 3" 15 x 3" 10 x 3" 10 x 3" Next   Pulley flex, abd 2' ea 2' ea 2' ea 2' ea 2' 2' ea 2' ea 2' ea 2' ea 2' ea 2' ea 2' ea 2' ea 2' ea 2' ea 2' ea 2' 2'     Active ROM  - Flexion  - Abd Against wall  3 x 10  3 x 10 Against wall  3 x 10  3 x 10   NT  2 x 10   2 x 10  Not today   2 x 10  2 x 10   2 x 10  2 x 10   1 x 10  1 x 10                                     STANDING:                       Doorway stretch -- -- -- Not today 3 x 10" Not today 3 x 10" 3 x 10"  -- -- -- -- -- -- --  -- --   Bicep stretch in door 3 x 30"                     Wall walks Flex 2 x 10 Flex 2 x 10 Flex 2 x 10 Flex 2 x 10 Flex 2 x 10 Flex 2 x 10 Flex 2 x 10  Not today Flex 2 x 10  Not today Flex 2 x 10  Abd 1 x 15 Flex 1 x 15  ABD 1 x 10 1 x 15 1 x 10 1 x 10 -- -- --  -- --   Theraband  - W's  - T's  - I's    - Rows  - ER  - IR  - Punch out   --  unable  3 x 10 BTB    3 x 10 BTB  3 x 10 RTB  3 x 10 RTB  3 x 10 RTB   --  2 x 10 RTB  3 x 10 BTB    3 x 10 BTB  3 x 10 RTB  3 x 10 RTB  3 x 10 RTB   --  --  2 x 10 GTB  1 x 10 BTB  3 x 10 BTB  2 x 10 RTB  2 x 10 RTB  Next    --  --  Not today  Not today  Not today  Not today   --  --  3 x 10 GTB  3 x 10 BTB  2 x 10 YTB  2 x 10 RTB   --  1 x 10 RTB  2 x 10 GTB  3 x 10 GTB  1 x 10 YTB  1 x 10 RTB   --  --  1 x 10 GTB  3 x 10 GTB  --   --  --  --  3 x 10 GTB  --   --  --  --  Not today  --         3 x 10 GTB           3 x 10 RTB NT   --  --  --  --  --   --  --  --  ---  --   --  --  --  --  --   --  ---  --  --  --    --  --  --  --  --    --  --  --  --  - " "  Isometrics:  - Flexion  - Ext  - ABD  - IR   - ER -- -- D/C   Not today  Not today  Not today  Not today  Not today   Not today  Not today  Not today  Not today  Not today   Not today  Not today  Not today  Not today  Not today   30 x 3"  30 x 3"  30 x 3"  30 x 3"  30 x 3"   30 x 3"  30 x 3"  30 x 3"  30 x 3"  30 x 3"   30 x 3"  Not today  Not today  30 x 3"  30 x 3" 30 x 3" ea   30 x 3" ea     20 x 3"  20 x 3"  20 x 3"  20 x 3"  20 x 3"   Not today  Not today  Not today  Not today  Not today   20 x 3"  20 x 3"  20 x 3"  20 x 3"  20 x 3"   20 x 3"  20 x 3"  20 x 3"  20 x 3"  20 x 3"   15 x 3"  15 x 3"  15 x 3"  15 x 3"  15 x 3"   10 x 3"  10 x 3"  10 x 3"  10 x 3" (gentle)  10 x 3"     Wand:  - functional IR (behind back)  - Extension -- -- D/C   Not today    Not today   2 x 10    2 x 10   2 x 10    2 x 10   2 x 10    2 x 10   2 x 10    2 x 10   2 x 10    2 x 10   2 x 10    2 x 10 Next visit?            Ball on wall No weight  20 x ea cw/ccw                     Ball walks 1 x 10 orange ball                                           Initials SB SB SB DG MA DG DG MA DG SB SB SB DG DG DG DG SB DG DG       Anjel received the following manual therapy techniques: Soft tissue Mobilization were applied to the: R UE for 10 minutes, including:  - IASTM to bicep and anterior forearm for improved elbow extension NOT TODAY  - STM to deltoid complex and Biceps Brachii  - Scapular mobilizations for improved scapulohumeral rhythm    Home Exercises Provided and Patient Education Provided     Education provided:   - again the importance of performing his HEP on a regular basis.   - updated HEP    Written Home Exercises Provided: Patient instructed to cont prior HEP.  Exercises were reviewed and Anjel was able to demonstrate them prior to the end of the session.  Anjel demonstrated fair  understanding of the education provided.     See EMR under Patient Instructions for exercises provided 11/03/2020.    Assessment     Anjel returns " to therapy with improved pain levels and slightly improved mobility with exercises noted. Pt was able to complete full routine above with progressions and new exercises well without increase in symptoms reported. Pt required consistent cueing for compensatory patterns throughout routine, including tactile and manual cueing. Pt tolerated manual therapy well and mobility of the glenohumeral joint continues to improve slowly, although increased muscular tightness noted in deltoid complex, scapular stabilization musculature, and biceps.     Anjel is progressing slowly towards his goals.   Pt prognosis is Good.     Pt will continue to benefit from skilled outpatient physical therapy to address the deficits listed in the problem list box on initial evaluation, provide pt/family education and to maximize pt's level of independence in the home and community environment.     Pt's spiritual, cultural and educational needs considered and pt agreeable to plan of care and goals.     Anticipated barriers to physical therapy: none    Goals:   Short Term Goals: 4 weeks   1. This patient will be independent with a basic HEP. In progress, not met  2. This patient will have R shoulder active range of motion of 110 degrees flexion in order to bathe with no complaints of pain.In progress, not met  3. This patient will increase R shoulder strength 1 grade in order to be able to drive with no increase in symptoms. In progress, not met  4. This patient will have a pain rating of 4/10 at worst with ADLs. In progress, not met  5. Patient will be able to achieve greater than or equal to 54 on the FOTO Shoulder Survey indicating patient is 46% impaired, limited, or restricted and demonstrating overall improved functional ability with upper extremity. In progress, not met     Long Term Goals: 8 weeks   1. This patient will be independent with an updated HEP. In progress, not met  2. This patient will have R shoulder active range of motion equal  to L shoulder active range of motion  in order to dress with no complaints of pain. In progress, not met  3. This patient will have R shoulder strength of 5/5 in order to be able to perform his yard work with no increase in symptoms. In progress, not met  4. This patient will have a pain rating of 1/10 at worst with ADLs. In progress, not met  5. Patient will be able to achieve greater than or equal to 64 on the FOTO Shoulder Survey indicating patient is 34% impaired, limited, or restricted and demonstrating overall improved functional ability with upper extremity. In progress, not met    Plan     Continue with planned PT POC as tolerated. Increase therabnad resistance where appropriate next visit.     Ernestina Baxter, PTA 3/6

## 2020-11-10 ENCOUNTER — CLINICAL SUPPORT (OUTPATIENT)
Dept: REHABILITATION | Facility: HOSPITAL | Age: 65
End: 2020-11-10
Payer: MEDICARE

## 2020-11-10 DIAGNOSIS — M25.611 DECREASED RANGE OF MOTION OF RIGHT SHOULDER: Primary | ICD-10-CM

## 2020-11-10 DIAGNOSIS — R29.898 WEAKNESS OF RIGHT UPPER EXTREMITY: ICD-10-CM

## 2020-11-10 DIAGNOSIS — R26.89 DECREASED FUNCTIONAL MOBILITY: ICD-10-CM

## 2020-11-10 PROCEDURE — 97110 THERAPEUTIC EXERCISES: CPT | Mod: HCNC,PO,CQ

## 2020-11-10 PROCEDURE — 97140 MANUAL THERAPY 1/> REGIONS: CPT | Mod: HCNC,PO,CQ

## 2020-11-11 ENCOUNTER — OFFICE VISIT (OUTPATIENT)
Dept: DERMATOLOGY | Facility: CLINIC | Age: 65
End: 2020-11-11
Payer: MEDICARE

## 2020-11-11 DIAGNOSIS — L23.7 ALLERGIC CONTACT DERMATITIS DUE TO PLANT: Primary | ICD-10-CM

## 2020-11-11 PROCEDURE — 1101F PT FALLS ASSESS-DOCD LE1/YR: CPT | Mod: HCNC,CPTII,S$GLB, | Performed by: DERMATOLOGY

## 2020-11-11 PROCEDURE — 99999 PR PBB SHADOW E&M-EST. PATIENT-LVL II: CPT | Mod: PBBFAC,HCNC,,

## 2020-11-11 PROCEDURE — 1159F MED LIST DOCD IN RCRD: CPT | Mod: HCNC,S$GLB,, | Performed by: DERMATOLOGY

## 2020-11-11 PROCEDURE — 99999 PR PBB SHADOW E&M-EST. PATIENT-LVL II: ICD-10-PCS | Mod: PBBFAC,HCNC,,

## 2020-11-11 PROCEDURE — 1159F PR MEDICATION LIST DOCUMENTED IN MEDICAL RECORD: ICD-10-PCS | Mod: HCNC,S$GLB,, | Performed by: DERMATOLOGY

## 2020-11-11 PROCEDURE — 99202 PR OFFICE/OUTPT VISIT, NEW, LEVL II, 15-29 MIN: ICD-10-PCS | Mod: HCNC,S$GLB,, | Performed by: DERMATOLOGY

## 2020-11-11 PROCEDURE — 99202 OFFICE O/P NEW SF 15 MIN: CPT | Mod: HCNC,S$GLB,, | Performed by: DERMATOLOGY

## 2020-11-11 PROCEDURE — 1101F PR PT FALLS ASSESS DOC 0-1 FALLS W/OUT INJ PAST YR: ICD-10-PCS | Mod: HCNC,CPTII,S$GLB, | Performed by: DERMATOLOGY

## 2020-11-11 RX ORDER — METHYLPREDNISOLONE 4 MG/1
TABLET ORAL
Qty: 1 PACKAGE | Refills: 0 | Status: SHIPPED | OUTPATIENT
Start: 2020-11-11 | End: 2020-12-02

## 2020-11-11 RX ORDER — TRIAMCINOLONE ACETONIDE 1 MG/G
OINTMENT TOPICAL 2 TIMES DAILY
Qty: 453 G | Refills: 0 | Status: SHIPPED | OUTPATIENT
Start: 2020-11-11

## 2020-11-11 NOTE — PATIENT INSTRUCTIONS
"    Contact Dermatitis  Contact dermatitis is a skin rash caused by something that touches the skin and makes it irritated and inflamed. Your skin may be red, swollen, dry, and may be cracked. Blisters may form and ooze. The rash will itch.  Contact dermatitis can form on the face and neck, backs of hands, forearms, genitals, and lower legs.  People can get contact dermatitis from lots of sources. These include:  · Plants such as poison ivy, oak, or sumac  · Chemicals in hair dyes and rinses, soaps, solvents, waxes, fingernail polish, and deodorants   · Jewelry or watchbands made of nickel  Contact dermatitis is not passed from person to person.  Talk with your healthcare provider about what may have caused the rash. A type of allergy testing called "patch testing" may be used to discover what you are allergic to. You will need to avoid the source of your rash in the future to prevent it from coming back.  Treatment is done to relieve itching and prevent the rash from coming back. The rash should go away in a few days to a few weeks.  Home care  Your healthcare provider may prescribe medicine to relieve swelling and itching. Follow all instructions when using these medicines.  General care:  · Avoid anything that heats up your skin, such as hot showers or baths, or direct sunlight. This can make itching worse.  · Apply cold compresses to soothe your sores to help relieve your symptoms. Do this for 30 minutes 3 to 4 times a day. You can make a cold compress by soaking a cloth in cold water. Squeeze out excess water. You can add colloidal oatmeal to the water to help reduce itching. For severe itching in a small area, apply an ice pack wrapped in a thin towel. Do this for 20 minutes 3 to 4 times a day.  · You can also try wet dressings. One way to do this is to wear a wet piece of clothing under a dry one. Wear a damp shirt under a dry shirt if your upper body is affected. This can relieve itching and prevent you from " scratching the affected area.  · You can also help relieve large areas of itching by taking a lukewarm bath with colloidal oatmeal added to the water.  · Use hydrocortisone cream for redness and irritation, unless another medicine was prescribed. You can also use benzocaine anesthetic cream or spray. Calamine lotion can also relieve mild symptoms.  · Use oral diphenhydramine to help reduce itching. You can buy this antihistamine at drug and grocery stores. It can make you sleepy, so use lower doses during the daytime. Or you can use loratadine. This is an antihistamine that will not make you sleepy. Do not use diphenhydramine if you have glaucoma or have trouble urinating due to an enlarged prostate.  · If a plant causes your rash, make sure to wash your skin and the clothes you were wearing when you came into contact with the plant. This is to wash away the plant oils that gave you the rash and prevent more or worse symptoms.  · Stay away from the substance or object that causes your symptoms. If you cant avoid it, wear gloves or some other type of protection.  Follow-up care  Follow up with your healthcare provider, or as advised.  When to seek medical advice  Call your healthcare provider right away if any of these occur:  · Spreading of the rash to other parts of your body  · Severe swelling of your face, eyelids, mouth, throat or tongue  · Trouble urinating due to swelling in the genital area  · Fever of 100.4°F (38°C) or higher  · Redness or swelling that gets worse  · Pain that gets worse  · Foul-smelling fluid leaking from the skin  · Yellow-brown crusts on the open blisters  Date Last Reviewed: 9/1/2016  © 3690-2624 [employer]. [address] [place] [address], [place] [address]. All rights reserved. This information is not intended as a substitute for professional medical care. Always follow your healthcare professional's instructions.

## 2020-11-11 NOTE — PROGRESS NOTES
Subjective:       Patient ID:  Anjel Sanders is a 65 y.o. male who presents for   Chief Complaint   Patient presents with    Rash     HPI  64yo man presents for itchy, red, flaky rash to head/neck that began after cutting grass in garden approx 2 weeks ago. He has a known allergy to poison ivy/oak with itchy, red skin reactions in past but has never had a reaction this bad. Thinks it is because he was using a weed-whacker and inadvertently aerosolized the plant into his eyes and face. Went to ED 10/24 for this most recent reaction where he received a steroid shot, 40mg pred x 5 days, and benadryl for itching. Says this initially helped, but since finishing the medication approx 1.5 weeks ago it has again worsened. Yesterday he collected fire wood and notes acute worsening of rash since last night. Applying daniela's vapor rub to skin which he says helps with itching. Has a history of DM2; he is not sure if his sugars were worsened on the prednisone - does not keep track of them at home.      Review of Systems   Constitutional: Negative for fever and chills.   Eyes: Positive for itching, eye watering, eye irritation and eyelid inflammation. Negative for visual change.   Skin: Positive for itching, rash, dry skin and recent sunburn. Negative for activity-related sunscreen use.        Objective:    Physical Exam   Constitutional: He appears well-developed and well-nourished.   Neurological: He is alert and oriented to person, place, and time.   Skin:   Areas Examined (abnormalities noted in diagram):   Scalp / Hair Palpated and Inspected  Head / Face Inspection Performed  Neck Inspection Performed  Chest / Axilla Inspection Performed  Abdomen Inspection Performed  Back Inspection Performed              Diagram Legend     Erythematous scaling macule/papule c/w actinic keratosis       Vascular papule c/w angioma      Pigmented verrucoid papule/plaque c/w seborrheic keratosis      Yellow umbilicated papule c/w  sebaceous hyperplasia      Irregularly shaped tan macule c/w lentigo     1-2 mm smooth white papules consistent with Milia      Movable subcutaneous cyst with punctum c/w epidermal inclusion cyst      Subcutaneous movable cyst c/w pilar cyst      Firm pink to brown papule c/w dermatofibroma      Pedunculated fleshy papule(s) c/w skin tag(s)      Evenly pigmented macule c/w junctional nevus     Mildly variegated pigmented, slightly irregular-bordered macule c/w mildly atypical nevus      Flesh colored to evenly pigmented papule c/w intradermal nevus       Pink pearly papule/plaque c/w basal cell carcinoma      Erythematous hyperkeratotic cursted plaque c/w SCC      Surgical scar with no sign of skin cancer recurrence      Open and closed comedones      Inflammatory papules and pustules      Verrucoid papule consistent consistent with wart     Erythematous eczematous patches and plaques     Dystrophic onycholytic nail with subungual debris c/w onychomycosis     Umbilicated papule    Erythematous-base heme-crusted tan verrucoid plaque consistent with inflamed seborrheic keratosis     Erythematous Silvery Scaling Plaque c/w Psoriasis     See annotation      Assessment / Plan:        Allergic contact dermatitis due to plant  -  Discussed etiology and avoidance of triggers such as cutting grass or collecting fire wood.  -  If he needs to do these tasks, recommended wearing bandana and eye goggles for protection.   -  Discussed potential for this reaction to worsen with each subsequent exposure.   -  Discussed going to ED for any respiratory distress.  -  Discussed that steroids can exacerbate underlying hyperglycemia and to keep track of his sugars throughout the day if possible.    Other orders  -     triamcinolone acetonide 0.1% (KENALOG) 0.1 % ointment; Apply topically 2 (two) times daily.  Dispense: 453 g; Refill: 0  -     methylPREDNISolone (MEDROL DOSEPACK) 4 mg tablet; Use as directed on package.  Dispense: 1  Package; Refill: 0      RTC PRN

## 2020-11-17 ENCOUNTER — CLINICAL SUPPORT (OUTPATIENT)
Dept: REHABILITATION | Facility: HOSPITAL | Age: 65
End: 2020-11-17
Payer: MEDICARE

## 2020-11-17 DIAGNOSIS — R29.898 WEAKNESS OF RIGHT UPPER EXTREMITY: ICD-10-CM

## 2020-11-17 DIAGNOSIS — M25.611 DECREASED RANGE OF MOTION OF RIGHT SHOULDER: Primary | ICD-10-CM

## 2020-11-17 DIAGNOSIS — R26.89 DECREASED FUNCTIONAL MOBILITY: ICD-10-CM

## 2020-11-17 PROCEDURE — 97110 THERAPEUTIC EXERCISES: CPT | Mod: HCNC,PO,CQ

## 2020-11-17 NOTE — PROGRESS NOTES
Physical Therapy Treatment Note     Name: Anjel Sanders  Clinic Number: [id number]    Therapy Diagnosis:   Encounter Diagnoses   Name Primary?    Decreased range of motion of right shoulder Yes    Weakness of right upper extremity     Decreased functional mobility      Physician: Sridevi Vital PA-C    Visit Date: 11/17/2020    Physician Orders: PT Eval and Treat   Medical Diagnosis from Referral: Z98.890 (ICD-10-CM) - S/P rotator cuff repair  Evaluation Date: 8/20/2020  Authorization Period Expiration: 02/27/2021  Plan of Care Expiration: 11/22/2020  Visit # / Visits authorized: 19 / 60    Time In: 8:45 am  Time Out: 9:30 am  Total Billable Time: 45 minutes    Precautions: Standard    Subjective     Pt reports: feeling good today.   He was not compliant with home exercise program.  Response to previous treatment: increased muscle soreness  Functional change: improved mobility of R shoulder    Pain: 1/10  Location: right shoulder      Objective     Anjel received therapeutic exercises to develop strength, ROM and flexibility for 45 minutes including:    Date  11/17/2020 11/10/2020 11/04/2020 11/03/2020 10/22/2020 10/19/2020 10/15/2020 10/12/2020 10/8/2020 10/06/2020 10/01/2020 09/29/2020 09/24/2020 09/22/2020 09/17/2020 09/15/2020 09/08/2020 9/03/2020 9/01/2020 8/20/2020   VISIT 19/60 18/60 17/60 16/60 15/60 14/60 13/60 12/60 11/60 10/60 9/60 8/60 7/60 6/60 5/60 4/60 3/60 2/60 1/60 1/1   DOS 8/05/2020                       FOTO -- --   --  -- -- -- --  -- -- -- 6/5 5/5 4/5 3/5 2/5 1/5   POC EXP. DATE 11/22/2020 11/22/2020 11/22/2020 11/22/2020 11/22/2020 10/20/2020 10/20/2020 10/20/2020 10/20/2020 10/20/2020 10/20/2020 10/20/2020 10/20/2020 10/20/2020 10/20/2020 10/20/2020 10/20/2020 10/20/2020 10/20/2020 10/20/2020   VISIT AMOUNT  MC total 90.96  1477.56 90.96  1386.60 90.96  1295.64 90.96  1204.68 60.64  1113.72 90.96  1053.08 60.64  962.12 60.64  901.48 90.96  840.84 60.64  749.88 60.64  689.24  "90.96  628.60 90.96  537.64 60.64  446.68 60.64  386.04 60.64  325.4 60.64  264.80 60.64  204.16 60.64  143.52  82.88  82.88   FACE-TO-FACE 11/22/2020 11/22/2020 11/22/2020 11/22/2020 11/22/2020 10/23/2020 10/23/2020 10/23/2020 10/23/2020 10/23/2020 10/22/2020 10/22/2020 10/22/2020 10/22/2020 09/20/2020 9/20/2020 9/20/2020 9/20/2020 9/20/2020 9/20/2020                          PROM flex, abd, ER NT  5'  5' Not today 5' Not today Not today 5' Not today NT NT 5' 5' Not today Not today Not today 5' 5' Next   UBE L3 2' fwd/back                      TABLE:                        Supine pec str w/ towel NT  -- -- Not today Not today Not today Not today 1 towel  2' 1 towel  2' 1 towel  2' 1 towel  2' 1 towel  2' 1 towel  2' 1 towel roll  2' 1 towel  2' 1' 30" 1' no towel Next? --   Snow angels --  -- -- Not today Not today Not today Not today 2 x 10 2 x 10 NT NT 2 x 10 2 x 10  2 x 10 1 x 10 --  -- --   Butterflies  -- NT 2 x 10 -- -- -- -- -- --  -- --  -- -- -- --  -- --   Wand flexion Against wall standing   2 x 10 NT 2 x 10 1 x 10 1 x 10 1 x 10 1 x 10 Not today 2 x 10 2 x 10 NT 3 x 10 2 x 10 2 x 10 2 x 10 2 x 10 2 x 10 1 x 15 1 x 10 Next?   Wand abduction Against wall standing   2 x 10 NT Standing  2 x 10 Standing  1 x 10 1 x 10 1 x 10 1 x 10 Not today 2 x 10 2 x 10 NT 3 x 10 2 x 10 2 x 10 2 x 10 2 x 10 2 x 10 1 x 15 1 x 10 Next?   Wand ER Against wall standing   2 x 10 NT Against wall standing   2 x 10 Standing  1 x 10 1 x 10 1 x 10 1 x 10 Not today 2 x 10 2 x 10 NT 3 x 10 2 x 10 2 x 10 2 x 10 2 x 10 2 x 10 1 x 15 Next? Next?   SA punches  -- 3 x 10 -- Not today 3 x 10 Not today Not today 3 x 10 Not today NT 2 x 10 x 1# 2 x 10 x 1# 1 x 10 x 1# 2 x 10 2 x 10 2 x 10 1 x 15 1 x 10    Reverse codman's -- D/C 30 x ea cw/ccw -- Not today 30 x cw/ccw Not today Not today 30 x cw/ccw Not today NT 2 x 10 CW only   2 x 10 ea cw/ccw 2 x 10 cw/ccw 1 x 15 cw/ccw 1 x 10 cw/ccw       Side lying flexion -- NT NT 2 x 10 2 x 10 1 x 15 1 " "x 10 -- -- -- -- -- -- -- -- --- -- -- -- --   Side lying abduction -- NT NT 2 x 10 2 x 10 1 x 15 1 x 10 -- -- -- -- -- -- -- -- -- -- -- -- --   Side lying ER -- NT NT 2 x 10 2 x 10 1 x 15 1 x 10 -- -- -- -- -- -- -- -- -- -- -- -- --                          PRONE:                        Shoulder flexion  -- -- -- -- -- -- -- -- -- -- -- -- -- -- -- -- -- -- --   Shoulder scaption  -- -- -- -- -- -- -- -- -- -- -- -- -- -- -- -- -- -- --   Shoulder abduction  -- -- -- -- -- -- -- -- -- -- -- -- -- -- -- -- -- -- --                          SEATED:                        Scapula squeezes  -- --  --  -- -- -- Not today 20 x 3" NT 20 x 3" 20 x 3" 20 x 3" 20 x 3" 15 x 3" 10 x 3" 10 x 3" Jayro Alejandraey flex, abd NT 2' ea 2' ea 2' ea 2' ea 2' 2' ea 2' ea 2' ea 2' ea 2' ea 2' ea 2' ea 2' ea 2' ea 2' ea 2' ea 2' 2'     Active ROM  - Flexion  - ABD  - Scaption Against wall  3 x 10  3 x 10  2 x 10 Against wall  3 x 10  3 x 10 Against wall  3 x 10  3 x 10   NT  2 x 10   2 x 10  Not today   2 x 10  2 x 10   2 x 10  2 x 10   1 x 10  1 x 10                                      STANDING:                        Doorway stretch  -- -- -- Not today 3 x 10" Not today 3 x 10" 3 x 10"  -- -- -- -- -- -- --  -- --   Bicep stretch in door 3 x 30" 3 x 30"                     Wall walks Flex 1 x 10 Flex 2 x 10 Flex 2 x 10 Flex 2 x 10 Flex 2 x 10 Flex 2 x 10 Flex 2 x 10 Flex 2 x 10  Not today Flex 2 x 10  Not today Flex 2 x 10  Abd 1 x 15 Flex 1 x 15  ABD 1 x 10 1 x 15 1 x 10 1 x 10 -- -- --  -- --   Theraband  - W's  - T's  - I's    - Rows  - ER  - IR  - Punch out   Supine   2 x 10 RTB  3 x 10 BTB    3 x 10 BTB  2 x 10 GTB  2 x 10 GTB  2 x 10 GTB    --  unable  3 x 10 BTB    3 x 10 BTB  3 x 10 RTB  3 x 10 RTB  3 x 10 RTB   --  2 x 10 RTB  3 x 10 BTB    3 x 10 BTB  3 x 10 RTB  3 x 10 RTB  3 x 10 RTB   --  --  2 x 10 GTB  1 x 10 BTB  3 x 10 BTB  2 x 10 RTB  2 x 10 RTB  Next    --  --  Not today  Not today  Not today  Not today   --  --  3 x " "10 GTB  3 x 10 BTB  2 x 10 YTB  2 x 10 RTB   --  1 x 10 RTB  2 x 10 GTB  3 x 10 GTB  1 x 10 YTB  1 x 10 RTB   --  --  1 x 10 GTB  3 x 10 GTB  --   --  --  --  3 x 10 GTB  --   --  --  --  Not today  --         3 x 10 GTB           3 x 10 RTB NT   --  --  --  --  --   --  --  --  ---  --   --  --  --  --  --   --  ---  --  --  --    --  --  --  --  --    --  --  --  --  -   Isometrics:  - Flexion  - Ext  - ABD  - IR   - ER -- -- -- D/C   Not today  Not today  Not today  Not today  Not today   Not today  Not today  Not today  Not today  Not today   Not today  Not today  Not today  Not today  Not today   30 x 3"  30 x 3"  30 x 3"  30 x 3"  30 x 3"   30 x 3"  30 x 3"  30 x 3"  30 x 3"  30 x 3"   30 x 3"  Not today  Not today  30 x 3"  30 x 3" 30 x 3" ea   30 x 3" ea     20 x 3"  20 x 3"  20 x 3"  20 x 3"  20 x 3"   Not today  Not today  Not today  Not today  Not today   20 x 3"  20 x 3"  20 x 3"  20 x 3"  20 x 3"   20 x 3"  20 x 3"  20 x 3"  20 x 3"  20 x 3"   15 x 3"  15 x 3"  15 x 3"  15 x 3"  15 x 3"   10 x 3"  10 x 3"  10 x 3"  10 x 3" (gentle)  10 x 3"     Wand:  - functional IR (behind back)  - Extension -- -- -- D/C   Not today    Not today   2 x 10    2 x 10   2 x 10    2 x 10   2 x 10    2 x 10   2 x 10    2 x 10   2 x 10    2 x 10   2 x 10    2 x 10 Next visit?            Ball on wall No weight  30 x ea cw/ccw No weight  20 x ea cw/ccw                     Ball walks 2 x 10 orange ball 1 x 10 orange ball                                            Initials SB 4/6 SB SB SB DG MA DG DG MA DG SB SB SB DG DG DG DG SB DG DG       Anjel received the following manual therapy techniques: Soft tissue Mobilization were applied to the: R UE for 0 minutes, including:  - IASTM to bicep and anterior forearm for improved elbow extension NOT TODAY  - STM to deltoid complex and Biceps Brachii  - Scapular mobilizations for improved scapulohumeral rhythm    Home Exercises Provided and Patient Education Provided     Education " provided:   - again the importance of performing his HEP on a regular basis.   - updated HEP    Written Home Exercises Provided: Patient instructed to cont prior HEP.  Exercises were reviewed and Anjel was able to demonstrate them prior to the end of the session.  Anjel demonstrated fair  understanding of the education provided.     See EMR under Patient Instructions for exercises provided 11/03/2020.    Assessment     Anjel returns to therapy with no new complaints at this time, although has been moving and using his arm more lately. Pt was able to progress therex routine without increase in symptoms reported. Pt continues to require manual cueing to prevent compensatory patterns and tactile feedback of the wall to prevent torso movements, although does improve after a few cues. Improvement in elbow extension was noted during shoulder AROM today.     Anjel is progressing slowly towards his goals.   Pt prognosis is Good.     Pt will continue to benefit from skilled outpatient physical therapy to address the deficits listed in the problem list box on initial evaluation, provide pt/family education and to maximize pt's level of independence in the home and community environment.     Pt's spiritual, cultural and educational needs considered and pt agreeable to plan of care and goals.     Anticipated barriers to physical therapy: none    Goals:   Short Term Goals: 4 weeks   1. This patient will be independent with a basic HEP. In progress, not met  2. This patient will have R shoulder active range of motion of 110 degrees flexion in order to bathe with no complaints of pain.In progress, not met  3. This patient will increase R shoulder strength 1 grade in order to be able to drive with no increase in symptoms. In progress, not met  4. This patient will have a pain rating of 4/10 at worst with ADLs. In progress, not met  5. Patient will be able to achieve greater than or equal to 54 on the FOTO Shoulder Survey indicating  patient is 46% impaired, limited, or restricted and demonstrating overall improved functional ability with upper extremity. In progress, not met     Long Term Goals: 8 weeks   1. This patient will be independent with an updated HEP. In progress, not met  2. This patient will have R shoulder active range of motion equal to L shoulder active range of motion  in order to dress with no complaints of pain. In progress, not met  3. This patient will have R shoulder strength of 5/5 in order to be able to perform his yard work with no increase in symptoms. In progress, not met  4. This patient will have a pain rating of 1/10 at worst with ADLs. In progress, not met  5. Patient will be able to achieve greater than or equal to 64 on the FOTO Shoulder Survey indicating patient is 34% impaired, limited, or restricted and demonstrating overall improved functional ability with upper extremity. In progress, not met    Plan     Continue with planned PT POC as tolerated.     Ernestina Baxter, PTA

## 2020-11-19 ENCOUNTER — CLINICAL SUPPORT (OUTPATIENT)
Dept: REHABILITATION | Facility: HOSPITAL | Age: 65
End: 2020-11-19
Payer: MEDICARE

## 2020-11-19 DIAGNOSIS — M25.611 DECREASED RANGE OF MOTION OF RIGHT SHOULDER: ICD-10-CM

## 2020-11-19 DIAGNOSIS — R29.898 WEAKNESS OF RIGHT UPPER EXTREMITY: ICD-10-CM

## 2020-11-19 DIAGNOSIS — R26.89 DECREASED FUNCTIONAL MOBILITY: ICD-10-CM

## 2020-11-19 PROCEDURE — 97164 PT RE-EVAL EST PLAN CARE: CPT | Mod: HCNC,PO

## 2020-11-19 NOTE — PLAN OF CARE
Outpatient Therapy Discharge Summary     Name: Anjel Sanders  Clinic Number: [id number]    Therapy Diagnosis:   Encounter Diagnoses   Name Primary?    Decreased range of motion of right shoulder     Weakness of right upper extremity     Decreased functional mobility      Physician: Sridevi Vital PA-C    Physician Orders: PT Eval and Treat   Medical Diagnosis from Referral: Z98.890 (ICD-10-CM) - S/P rotator cuff repair  Evaluation Date: 8/20/2020    Date of Last visit: 11/19/2020  Total Visits Received: 21  Cancelled Visits: 6  No Show Visits: 0    Assessment    Goals:   Short Term Goals: 4 weeks   1. This patient will be independent with a basic HEP. Partially met  2. This patient will have R shoulder active range of motion of 110 degrees flexion in order to bathe with no complaints of pain. Met  3. This patient will increase R shoulder strength 1 grade in order to be able to drive with no increase in symptoms. Met  4. This patient will have a pain rating of 4/10 at worst with ADLs. Partially met  5. Patient will be able to achieve greater than or equal to 54 on the FOTO Shoulder Survey indicating patient is 46% impaired, limited, or restricted and demonstrating overall improved functional ability with upper extremity. Met     Long Term Goals: 8 weeks   1. This patient will be independent with an updated HEP. Partially met  2. This patient will have R shoulder active range of motion equal to L shoulder active range of motion  in order to dress with no complaints of pain. Partially met  3. This patient will have R shoulder strength of 5/5 in order to be able to perform his yard work with no increase in symptoms. Partially met  4. This patient will have a pain rating of 1/10 at worst with ADLs. Partially met  5. Patient will be able to achieve greater than or equal to 64 on the FOTO Shoulder Survey indicating patient is 34% impaired, limited, or restricted and demonstrating overall improved functional  ability with upper extremity. Not met    Discharge reason: Patient has reached the maximum rehab potential for the present time and Patient requested discharge    Plan   This patient is discharged from Physical Therapy

## 2020-11-19 NOTE — PROGRESS NOTES
Physical Therapy Treatment Note     Name: Anjel Sanders  Clinic Number: [id number]    Therapy Diagnosis:   Encounter Diagnoses   Name Primary?    Decreased range of motion of right shoulder     Weakness of right upper extremity     Decreased functional mobility      Physician: Sridevi Vital PA-C    Visit Date: 11/19/2020    Physician Orders: PT Eval and Treat   Medical Diagnosis from Referral: Z98.890 (ICD-10-CM) - S/P rotator cuff repair  Evaluation Date: 8/20/2020  Authorization Period Expiration: 02/27/2021  Plan of Care Expiration: 11/22/2020  Visit # / Visits authorized: 20 / 60    Time In: 8:45 am  Time Out: 9:05 am  Total Billable Time: 20 minutes    Precautions: Standard    Subjective     Pt reports: doing better, he is able to use his arm more.   He was not compliant with home exercise program.  Response to previous treatment: increased muscle soreness  Functional change: improved mobility of R shoulder    Pain: 1/10  Location: right shoulder      Objective     Anjel received therapeutic exercises to develop strength, ROM and flexibility, along with reassessment for 20 minutes including:    Date  11/19/2020 11/17/2020 11/10/2020 11/04/2020 11/03/2020 10/22/2020 10/19/2020 10/15/2020 10/12/2020 10/8/2020 10/06/2020 10/01/2020 09/29/2020 09/24/2020 09/22/2020 09/17/2020 09/15/2020 09/08/2020 9/03/2020 9/01/2020 8/20/2020   VISIT 20/60 19/60 18/60 17/60 16/60 15/60 14/60 13/60 12/60 11/60 10/60 9/60 8/60 7/60 6/60 5/60 4/60 3/60 2/60 1/60 1/1   DOS 8/05/2020                        FOTO D/C -- --   --  -- -- -- --  -- -- -- 6/5 5/5 4/5 3/5 2/5 1/5   POC EXP. DATE 11/22/2020 11/22/2020 11/22/2020 11/22/2020 11/22/2020 11/22/2020 10/20/2020 10/20/2020 10/20/2020 10/20/2020 10/20/2020 10/20/2020 10/20/2020 10/20/2020 10/20/2020 10/20/2020 10/20/2020 10/20/2020 10/20/2020 10/20/2020 10/20/2020   VISIT AMOUNT  MC total  90.96  1477.56 90.96  1386.60 90.96  1295.64 90.96  1204.68 60.64  1113.72  "90.96  1053.08 60.64  962.12 60.64  901.48 90.96  840.84 60.64  749.88 60.64  689.24 90.96  628.60 90.96  537.64 60.64  446.68 60.64  386.04 60.64  325.4 60.64  264.80 60.64  204.16 60.64  143.52  82.88  82.88   FACE-TO-FACE N/A 11/22/2020 11/22/2020 11/22/2020 11/22/2020 11/22/2020 10/23/2020 10/23/2020 10/23/2020 10/23/2020 10/23/2020 10/22/2020 10/22/2020 10/22/2020 10/22/2020 09/20/2020 9/20/2020 9/20/2020 9/20/2020 9/20/2020 9/20/2020                           PROM flex, abd, ER NT NT  5'  5' Not today 5' Not today Not today 5' Not today NT NT 5' 5' Not today Not today Not today 5' 5' Next   UBE Not today L3 2' fwd/back                      TABLE:                         Supine pec str w/ towel Not today NT  -- -- Not today Not today Not today Not today 1 towel  2' 1 towel  2' 1 towel  2' 1 towel  2' 1 towel  2' 1 towel  2' 1 towel roll  2' 1 towel  2' 1' 30" 1' no towel Next? --   Snow angels -- --  -- -- Not today Not today Not today Not today 2 x 10 2 x 10 NT NT 2 x 10 2 x 10  2 x 10 1 x 10 --  -- --   Butterflies  -- -- NT 2 x 10 -- -- -- -- -- --  -- --  -- -- -- --  -- --   Wand flexion Not today Against wall standing   2 x 10 NT 2 x 10 1 x 10 1 x 10 1 x 10 1 x 10 Not today 2 x 10 2 x 10 NT 3 x 10 2 x 10 2 x 10 2 x 10 2 x 10 2 x 10 1 x 15 1 x 10 Next?   Wand abduction Not today Against wall standing   2 x 10 NT Standing  2 x 10 Standing  1 x 10 1 x 10 1 x 10 1 x 10 Not today 2 x 10 2 x 10 NT 3 x 10 2 x 10 2 x 10 2 x 10 2 x 10 2 x 10 1 x 15 1 x 10 Next?   Wand ER Not today Against wall standing   2 x 10 NT Against wall standing   2 x 10 Standing  1 x 10 1 x 10 1 x 10 1 x 10 Not today 2 x 10 2 x 10 NT 3 x 10 2 x 10 2 x 10 2 x 10 2 x 10 2 x 10 1 x 15 Next? Next?   SA punches --  -- 3 x 10 -- Not today 3 x 10 Not today Not today 3 x 10 Not today NT 2 x 10 x 1# 2 x 10 x 1# 1 x 10 x 1# 2 x 10 2 x 10 2 x 10 1 x 15 1 x 10    Reverse codman's -- -- D/C 30 x ea cw/ccw -- Not today 30 x cw/ccw Not today Not today 30 " "x cw/ccw Not today NT 2 x 10 CW only   2 x 10 ea cw/ccw 2 x 10 cw/ccw 1 x 15 cw/ccw 1 x 10 cw/ccw       Side lying flexion -- -- NT NT 2 x 10 2 x 10 1 x 15 1 x 10 -- -- -- -- -- -- -- -- --- -- -- -- --   Side lying abduction -- -- NT NT 2 x 10 2 x 10 1 x 15 1 x 10 -- -- -- -- -- -- -- -- -- -- -- -- --   Side lying ER -- -- NT NT 2 x 10 2 x 10 1 x 15 1 x 10 -- -- -- -- -- -- -- -- -- -- -- -- --                           PRONE:                         Shoulder flexion --  -- -- -- -- -- -- -- -- -- -- -- -- -- -- -- -- -- -- --   Shoulder scaption --  -- -- -- -- -- -- -- -- -- -- -- -- -- -- -- -- -- -- --   Shoulder abduction --  -- -- -- -- -- -- -- -- -- -- -- -- -- -- -- -- -- -- --                           SEATED:                         Scapula squeezes --  -- --  --  -- -- -- Not today 20 x 3" NT 20 x 3" 20 x 3" 20 x 3" 20 x 3" 15 x 3" 10 x 3" 10 x 3" Next   Pulley flex, abd 2' ea NT 2' ea 2' ea 2' ea 2' ea 2' 2' ea 2' ea 2' ea 2' ea 2' ea 2' ea 2' ea 2' ea 2' ea 2' ea 2' ea 2' 2'     Active ROM  - Flexion  - ABD  - Scaption Not today Against wall  3 x 10  3 x 10  2 x 10 Against wall  3 x 10  3 x 10 Against wall  3 x 10  3 x 10   NT  2 x 10   2 x 10  Not today   2 x 10  2 x 10   2 x 10  2 x 10   1 x 10  1 x 10                                       STANDING:                         Doorway stretch --  -- -- -- Not today 3 x 10" Not today 3 x 10" 3 x 10"  -- -- -- -- -- -- --  -- --   Bicep stretch in door Not today 3 x 30" 3 x 30"                     Wall walks Flex 1 x 10 Flex 1 x 10 Flex 2 x 10 Flex 2 x 10 Flex 2 x 10 Flex 2 x 10 Flex 2 x 10 Flex 2 x 10 Flex 2 x 10  Not today Flex 2 x 10  Not today Flex 2 x 10  Abd 1 x 15 Flex 1 x 15  ABD 1 x 10 1 x 15 1 x 10 1 x 10 -- -- --  -- --   Theraband  - W's  - T's  - I's    - Rows  - ER  - IR  - Punch out   Not today   Supine   2 x 10 RTB  3 x 10 BTB    3 x 10 BTB  2 x 10 GTB  2 x 10 GTB  2 x 10 GTB    --  unable  3 x 10 BTB    3 x 10 BTB  3 x 10 RTB  3 x 10 " "RTB  3 x 10 RTB   --  2 x 10 RTB  3 x 10 BTB    3 x 10 BTB  3 x 10 RTB  3 x 10 RTB  3 x 10 RTB   --  --  2 x 10 GTB  1 x 10 BTB  3 x 10 BTB  2 x 10 RTB  2 x 10 RTB  Next    --  --  Not today  Not today  Not today  Not today   --  --  3 x 10 GTB  3 x 10 BTB  2 x 10 YTB  2 x 10 RTB   --  1 x 10 RTB  2 x 10 GTB  3 x 10 GTB  1 x 10 YTB  1 x 10 RTB   --  --  1 x 10 GTB  3 x 10 GTB  --   --  --  --  3 x 10 GTB  --   --  --  --  Not today  --         3 x 10 GTB           3 x 10 RTB NT   --  --  --  --  --   --  --  --  ---  --   --  --  --  --  --   --  ---  --  --  --    --  --  --  --  --    --  --  --  --  -   Isometrics:  - Flexion  - Ext  - ABD  - IR   - ER -- -- -- -- D/C   Not today  Not today  Not today  Not today  Not today   Not today  Not today  Not today  Not today  Not today   Not today  Not today  Not today  Not today  Not today   30 x 3"  30 x 3"  30 x 3"  30 x 3"  30 x 3"   30 x 3"  30 x 3"  30 x 3"  30 x 3"  30 x 3"   30 x 3"  Not today  Not today  30 x 3"  30 x 3" 30 x 3" ea   30 x 3" ea     20 x 3"  20 x 3"  20 x 3"  20 x 3"  20 x 3"   Not today  Not today  Not today  Not today  Not today   20 x 3"  20 x 3"  20 x 3"  20 x 3"  20 x 3"   20 x 3"  20 x 3"  20 x 3"  20 x 3"  20 x 3"   15 x 3"  15 x 3"  15 x 3"  15 x 3"  15 x 3"   10 x 3"  10 x 3"  10 x 3"  10 x 3" (gentle)  10 x 3"     Mackenzie:  - functional IR (behind back)  - Extension -- -- -- -- D/C   Not today    Not today   2 x 10    2 x 10   2 x 10    2 x 10   2 x 10    2 x 10   2 x 10    2 x 10   2 x 10    2 x 10   2 x 10    2 x 10 Next visit?            Ball on wall Not today No weight  30 x ea cw/ccw No weight  20 x ea cw/ccw                     Ball walks Not today 2 x 10 orange ball 1 x 10 orange ball                                             Initials DG SB 4/6 SB SB SB DG MA DG DG MA DG SB SB SB DG DG DG DG SB DG DG       Active Range of Motion:   Shoulder Left Right   Flexion 135 125   Abduction 155 60   ER at 0 50 30   ER at 90 NT NT   IR 70 70 "      Upper Extremity Strength    (L) UE (R) UE   Shoulder flexion: 3+/5 3-/5   Shoulder Abduction: 3+/5 3-/5   Shoulder ER 4-/5 3+/5   Shoulder IR 5/5 4-/5     FOTO Shoulder Survey 40%    Home Exercises Provided and Patient Education Provided     Education provided:   - again the importance of performing his HEP on a regular basis.   - updated HEP    Written Home Exercises Provided: Patient instructed to cont prior HEP.  Exercises were reviewed and Anjel was able to demonstrate them prior to the end of the session.  Anjel demonstrated fair  understanding of the education provided.     See EMR under Patient Instructions for exercises provided 11/03/2020.    Assessment     Anjel continues to demonstrate decreased AROM of his R shoulder and weakness of his R UE, but he is requesting discharge to his [facility] at this time. He continues to not perform his HEP on a regular basis, which has limited his progress in therapy. His current score on FOTO Shoulder Survey indicates he is 40% impaired, limited, or restricted. He has reached his max rehab potential at this time and is ready for discharge to his [facility] per his request.     Anjel is progressing slowly towards his goals.   Pt prognosis is Good.     Pt will continue to benefit from skilled outpatient physical therapy to address the deficits listed in the problem list box on initial evaluation, provide pt/family education and to maximize pt's level of independence in the home and community environment.     Pt's spiritual, cultural and educational needs considered and pt agreeable to plan of care and goals.     Anticipated barriers to physical therapy: none    Goals:   Short Term Goals: 4 weeks   1. This patient will be independent with a basic HEP. Partially met  2. This patient will have R shoulder active range of motion of 110 degrees flexion in order to bathe with no complaints of pain. Met  3. This patient will increase R shoulder strength 1 grade in order to be able to  drive with no increase in symptoms. Met  4. This patient will have a pain rating of 4/10 at worst with ADLs. Partially met  5. Patient will be able to achieve greater than or equal to 54 on the FOTO Shoulder Survey indicating patient is 46% impaired, limited, or restricted and demonstrating overall improved functional ability with upper extremity. Met     Long Term Goals: 8 weeks   1. This patient will be independent with an updated HEP. Partially met  2. This patient will have R shoulder active range of motion equal to L shoulder active range of motion  in order to dress with no complaints of pain. Partially met  3. This patient will have R shoulder strength of 5/5 in order to be able to perform his yard work with no increase in symptoms. Partially met  4. This patient will have a pain rating of 1/10 at worst with ADLs. Partially met  5. Patient will be able to achieve greater than or equal to 64 on the FOTO Shoulder Survey indicating patient is 34% impaired, limited, or restricted and demonstrating overall improved functional ability with upper extremity. Not met    Plan     Discharge to [facility] per patient request.    Katharina Perez, PT

## 2020-11-29 ENCOUNTER — PATIENT OUTREACH (OUTPATIENT)
Dept: ADMINISTRATIVE | Facility: OTHER | Age: 65
End: 2020-11-29

## 2020-11-29 DIAGNOSIS — E11.9 CONTROLLED TYPE 2 DIABETES MELLITUS WITHOUT COMPLICATION, WITHOUT LONG-TERM CURRENT USE OF INSULIN: Primary | ICD-10-CM

## 2020-11-30 ENCOUNTER — OFFICE VISIT (OUTPATIENT)
Dept: DERMATOLOGY | Facility: CLINIC | Age: 65
End: 2020-11-30
Payer: MEDICARE

## 2020-11-30 DIAGNOSIS — L02.93 CARBUNCLE: Primary | ICD-10-CM

## 2020-11-30 PROCEDURE — 99999 PR PBB SHADOW E&M-EST. PATIENT-LVL III: CPT | Mod: PBBFAC,HCNC,, | Performed by: DERMATOLOGY

## 2020-11-30 PROCEDURE — 1126F AMNT PAIN NOTED NONE PRSNT: CPT | Mod: HCNC,S$GLB,, | Performed by: DERMATOLOGY

## 2020-11-30 PROCEDURE — 87186 SC STD MICRODIL/AGAR DIL: CPT | Mod: HCNC

## 2020-11-30 PROCEDURE — 1159F PR MEDICATION LIST DOCUMENTED IN MEDICAL RECORD: ICD-10-PCS | Mod: HCNC,S$GLB,, | Performed by: DERMATOLOGY

## 2020-11-30 PROCEDURE — 1126F PR PAIN SEVERITY QUANTIFIED, NO PAIN PRESENT: ICD-10-PCS | Mod: HCNC,S$GLB,, | Performed by: DERMATOLOGY

## 2020-11-30 PROCEDURE — 87070 CULTURE OTHR SPECIMN AEROBIC: CPT | Mod: HCNC

## 2020-11-30 PROCEDURE — 1101F PT FALLS ASSESS-DOCD LE1/YR: CPT | Mod: HCNC,CPTII,S$GLB, | Performed by: DERMATOLOGY

## 2020-11-30 PROCEDURE — 1159F MED LIST DOCD IN RCRD: CPT | Mod: HCNC,S$GLB,, | Performed by: DERMATOLOGY

## 2020-11-30 PROCEDURE — 3072F LOW RISK FOR RETINOPATHY: CPT | Mod: HCNC,S$GLB,, | Performed by: DERMATOLOGY

## 2020-11-30 PROCEDURE — 99999 PR PBB SHADOW E&M-EST. PATIENT-LVL III: ICD-10-PCS | Mod: PBBFAC,HCNC,, | Performed by: DERMATOLOGY

## 2020-11-30 PROCEDURE — 87077 CULTURE AEROBIC IDENTIFY: CPT | Mod: HCNC

## 2020-11-30 PROCEDURE — 3288F PR FALLS RISK ASSESSMENT DOCUMENTED: ICD-10-PCS | Mod: HCNC,CPTII,S$GLB, | Performed by: DERMATOLOGY

## 2020-11-30 PROCEDURE — 3072F PR LOW RISK FOR RETINOPATHY: ICD-10-PCS | Mod: HCNC,S$GLB,, | Performed by: DERMATOLOGY

## 2020-11-30 PROCEDURE — 3288F FALL RISK ASSESSMENT DOCD: CPT | Mod: HCNC,CPTII,S$GLB, | Performed by: DERMATOLOGY

## 2020-11-30 PROCEDURE — 99214 OFFICE O/P EST MOD 30 MIN: CPT | Mod: HCNC,S$GLB,, | Performed by: DERMATOLOGY

## 2020-11-30 PROCEDURE — 99214 PR OFFICE/OUTPT VISIT, EST, LEVL IV, 30-39 MIN: ICD-10-PCS | Mod: HCNC,S$GLB,, | Performed by: DERMATOLOGY

## 2020-11-30 PROCEDURE — 1101F PR PT FALLS ASSESS DOC 0-1 FALLS W/OUT INJ PAST YR: ICD-10-PCS | Mod: HCNC,CPTII,S$GLB, | Performed by: DERMATOLOGY

## 2020-11-30 RX ORDER — DOXYCYCLINE 100 MG/1
CAPSULE ORAL
Qty: 20 CAPSULE | Refills: 0 | Status: SHIPPED | OUTPATIENT
Start: 2020-11-30 | End: 2021-12-16

## 2020-11-30 NOTE — PROGRESS NOTES
Subjective:       Patient ID:  Anjel Sanders is a 65 y.o. male who presents for   Chief Complaint   Patient presents with    Lesion     right shoulder, back of scalp      Patient was last seen 11/11/2020 for rash by derm resident treated with TAC ointment bid. Patient stated the the rash went away, a week later started to get lesions that he is c/o today.    Lesion - Initial  Affected locations: right shoulder and scalp  Duration: 3 weeks  Signs / symptoms: pain and tender  Timing: intermittent  Aggravated by: nothing  Relieving factors/Treatments tried: Rx topical steroids (Kenalog 0.1%)  Improvement on treatment: mild        Review of Systems   Constitutional: Negative for fever and chills.   Skin: Negative for itching and rash.   Hematologic/Lymphatic: Does not bruise/bleed easily.        Objective:    Physical Exam   Constitutional: He appears well-developed and well-nourished. No distress.   Neurological: He is alert and oriented to person, place, and time. He is not disoriented.   Psychiatric: He has a normal mood and affect.   Skin:   Areas Examined (abnormalities noted in diagram):   Scalp / Hair Palpated and Inspected  Head / Face Inspection Performed  Neck Inspection Performed  Chest / Axilla Inspection Performed              Diagram Legend     Erythematous scaling macule/papule c/w actinic keratosis       Vascular papule c/w angioma      Pigmented verrucoid papule/plaque c/w seborrheic keratosis      Yellow umbilicated papule c/w sebaceous hyperplasia      Irregularly shaped tan macule c/w lentigo     1-2 mm smooth white papules consistent with Milia      Movable subcutaneous cyst with punctum c/w epidermal inclusion cyst      Subcutaneous movable cyst c/w pilar cyst      Firm pink to brown papule c/w dermatofibroma      Pedunculated fleshy papule(s) c/w skin tag(s)      Evenly pigmented macule c/w junctional nevus     Mildly variegated pigmented, slightly irregular-bordered macule c/w mildly  atypical nevus      Flesh colored to evenly pigmented papule c/w intradermal nevus       Pink pearly papule/plaque c/w basal cell carcinoma      Erythematous hyperkeratotic cursted plaque c/w SCC      Surgical scar with no sign of skin cancer recurrence      Open and closed comedones      Inflammatory papules and pustules      Verrucoid papule consistent consistent with wart     Erythematous eczematous patches and plaques     Dystrophic onycholytic nail with subungual debris c/w onychomycosis     Umbilicated papule    Erythematous-base heme-crusted tan verrucoid plaque consistent with inflamed seborrheic keratosis     Erythematous Silvery Scaling Plaque c/w Psoriasis     See annotation      Assessment / Plan:        Carbuncles  Lesion incised with #11 blade and drained on today's date.  Defect packed with iodoform gauze. Instructions for removal provided to patient.   Patient instructed to perform warm compresses 2 - 3 times/daily.    Patient instructed to wash with Hibiclens daily to affected areas    -     doxycycline (VIBRAMYCIN) 100 MG Cap; Take 1 po bid with food and not within 1 hour prior to lying down  Dispense: 20 capsule; Refill: 0  -     Aerobic culture             Follow up if symptoms worsen or fail to improve.

## 2020-11-30 NOTE — PATIENT INSTRUCTIONS
Lesion incised with #11 blade and drained on today's date.  Defect packed with iodoform gauze. Instructions for removal provided to patient.   Patient instructed to perform warm compresses 2 - 3 times/daily.    Patient instructed to wash with Hibiclens daily to affected areas

## 2020-12-03 LAB — BACTERIA SPEC AEROBE CULT: ABNORMAL

## 2020-12-17 ENCOUNTER — LAB VISIT (OUTPATIENT)
Dept: LAB | Facility: HOSPITAL | Age: 65
End: 2020-12-17
Attending: FAMILY MEDICINE
Payer: MEDICARE

## 2020-12-17 ENCOUNTER — OFFICE VISIT (OUTPATIENT)
Dept: FAMILY MEDICINE | Facility: CLINIC | Age: 65
End: 2020-12-17
Attending: FAMILY MEDICINE
Payer: MEDICARE

## 2020-12-17 VITALS
HEART RATE: 67 BPM | OXYGEN SATURATION: 99 % | SYSTOLIC BLOOD PRESSURE: 130 MMHG | BODY MASS INDEX: 32.45 KG/M2 | HEIGHT: 70 IN | DIASTOLIC BLOOD PRESSURE: 80 MMHG | TEMPERATURE: 98 F | WEIGHT: 226.63 LBS

## 2020-12-17 DIAGNOSIS — Z23 IMMUNIZATION DUE: ICD-10-CM

## 2020-12-17 DIAGNOSIS — E11.59 HYPERTENSION COMPLICATING DIABETES: ICD-10-CM

## 2020-12-17 DIAGNOSIS — E11.9 CONTROLLED TYPE 2 DIABETES MELLITUS WITHOUT COMPLICATION, WITHOUT LONG-TERM CURRENT USE OF INSULIN: ICD-10-CM

## 2020-12-17 DIAGNOSIS — E11.69 DYSLIPIDEMIA ASSOCIATED WITH TYPE 2 DIABETES MELLITUS: ICD-10-CM

## 2020-12-17 DIAGNOSIS — I15.2 HYPERTENSION COMPLICATING DIABETES: ICD-10-CM

## 2020-12-17 DIAGNOSIS — I69.351 HEMIPARESIS AFFECTING RIGHT SIDE AS LATE EFFECT OF CEREBROVASCULAR ACCIDENT: ICD-10-CM

## 2020-12-17 DIAGNOSIS — E78.5 DYSLIPIDEMIA ASSOCIATED WITH TYPE 2 DIABETES MELLITUS: ICD-10-CM

## 2020-12-17 DIAGNOSIS — E11.9 CONTROLLED TYPE 2 DIABETES MELLITUS WITHOUT COMPLICATION, WITHOUT LONG-TERM CURRENT USE OF INSULIN: Primary | ICD-10-CM

## 2020-12-17 LAB
ALBUMIN/CREAT UR: 3.8 UG/MG (ref 0–30)
BILIRUB UR QL STRIP: NEGATIVE
CLARITY UR REFRACT.AUTO: CLEAR
COLOR UR AUTO: YELLOW
CREAT UR-MCNC: 157 MG/DL (ref 23–375)
GLUCOSE UR QL STRIP: NEGATIVE
HGB UR QL STRIP: NEGATIVE
KETONES UR QL STRIP: NEGATIVE
LEUKOCYTE ESTERASE UR QL STRIP: NEGATIVE
MICROALBUMIN UR DL<=1MG/L-MCNC: 6 UG/ML
NITRITE UR QL STRIP: NEGATIVE
PH UR STRIP: 6 [PH] (ref 5–8)
PROT UR QL STRIP: NEGATIVE
SP GR UR STRIP: 1.01 (ref 1–1.03)
URN SPEC COLLECT METH UR: NORMAL
UROBILINOGEN UR STRIP-ACNC: NEGATIVE EU/DL

## 2020-12-17 PROCEDURE — 3044F PR MOST RECENT HEMOGLOBIN A1C LEVEL <7.0%: ICD-10-PCS | Mod: HCNC,CPTII,S$GLB, | Performed by: FAMILY MEDICINE

## 2020-12-17 PROCEDURE — 3079F DIAST BP 80-89 MM HG: CPT | Mod: HCNC,CPTII,S$GLB, | Performed by: FAMILY MEDICINE

## 2020-12-17 PROCEDURE — 3072F PR LOW RISK FOR RETINOPATHY: ICD-10-PCS | Mod: HCNC,S$GLB,, | Performed by: FAMILY MEDICINE

## 2020-12-17 PROCEDURE — G0009 ADMIN PNEUMOCOCCAL VACCINE: HCPCS | Mod: HCNC,S$GLB,, | Performed by: FAMILY MEDICINE

## 2020-12-17 PROCEDURE — 1101F PR PT FALLS ASSESS DOC 0-1 FALLS W/OUT INJ PAST YR: ICD-10-PCS | Mod: HCNC,CPTII,S$GLB, | Performed by: FAMILY MEDICINE

## 2020-12-17 PROCEDURE — 82043 UR ALBUMIN QUANTITATIVE: CPT | Mod: HCNC,PO

## 2020-12-17 PROCEDURE — 90732 PPSV23 VACC 2 YRS+ SUBQ/IM: CPT | Mod: HCNC,S$GLB,, | Performed by: FAMILY MEDICINE

## 2020-12-17 PROCEDURE — 1159F MED LIST DOCD IN RCRD: CPT | Mod: HCNC,S$GLB,, | Performed by: FAMILY MEDICINE

## 2020-12-17 PROCEDURE — 3079F PR MOST RECENT DIASTOLIC BLOOD PRESSURE 80-89 MM HG: ICD-10-PCS | Mod: HCNC,CPTII,S$GLB, | Performed by: FAMILY MEDICINE

## 2020-12-17 PROCEDURE — 1101F PT FALLS ASSESS-DOCD LE1/YR: CPT | Mod: HCNC,CPTII,S$GLB, | Performed by: FAMILY MEDICINE

## 2020-12-17 PROCEDURE — 3288F PR FALLS RISK ASSESSMENT DOCUMENTED: ICD-10-PCS | Mod: HCNC,CPTII,S$GLB, | Performed by: FAMILY MEDICINE

## 2020-12-17 PROCEDURE — 3044F HG A1C LEVEL LT 7.0%: CPT | Mod: HCNC,CPTII,S$GLB, | Performed by: FAMILY MEDICINE

## 2020-12-17 PROCEDURE — 1159F PR MEDICATION LIST DOCUMENTED IN MEDICAL RECORD: ICD-10-PCS | Mod: HCNC,S$GLB,, | Performed by: FAMILY MEDICINE

## 2020-12-17 PROCEDURE — 3008F PR BODY MASS INDEX (BMI) DOCUMENTED: ICD-10-PCS | Mod: HCNC,CPTII,S$GLB, | Performed by: FAMILY MEDICINE

## 2020-12-17 PROCEDURE — 3008F BODY MASS INDEX DOCD: CPT | Mod: HCNC,CPTII,S$GLB, | Performed by: FAMILY MEDICINE

## 2020-12-17 PROCEDURE — G0009 PNEUMOCOCCAL POLYSACCHARIDE VACCINE 23-VALENT =>2YO SQ IM: ICD-10-PCS | Mod: HCNC,S$GLB,, | Performed by: FAMILY MEDICINE

## 2020-12-17 PROCEDURE — 99214 PR OFFICE/OUTPT VISIT, EST, LEVL IV, 30-39 MIN: ICD-10-PCS | Mod: 25,HCNC,S$GLB, | Performed by: FAMILY MEDICINE

## 2020-12-17 PROCEDURE — 3075F SYST BP GE 130 - 139MM HG: CPT | Mod: HCNC,CPTII,S$GLB, | Performed by: FAMILY MEDICINE

## 2020-12-17 PROCEDURE — 81003 URINALYSIS AUTO W/O SCOPE: CPT | Mod: HCNC,PO

## 2020-12-17 PROCEDURE — 99214 OFFICE O/P EST MOD 30 MIN: CPT | Mod: 25,HCNC,S$GLB, | Performed by: FAMILY MEDICINE

## 2020-12-17 PROCEDURE — 3072F LOW RISK FOR RETINOPATHY: CPT | Mod: HCNC,S$GLB,, | Performed by: FAMILY MEDICINE

## 2020-12-17 PROCEDURE — 99999 PR PBB SHADOW E&M-EST. PATIENT-LVL IV: ICD-10-PCS | Mod: PBBFAC,HCNC,, | Performed by: FAMILY MEDICINE

## 2020-12-17 PROCEDURE — 90732 PNEUMOCOCCAL POLYSACCHARIDE VACCINE 23-VALENT =>2YO SQ IM: ICD-10-PCS | Mod: HCNC,S$GLB,, | Performed by: FAMILY MEDICINE

## 2020-12-17 PROCEDURE — 99999 PR PBB SHADOW E&M-EST. PATIENT-LVL IV: CPT | Mod: PBBFAC,HCNC,, | Performed by: FAMILY MEDICINE

## 2020-12-17 PROCEDURE — 3075F PR MOST RECENT SYSTOLIC BLOOD PRESS GE 130-139MM HG: ICD-10-PCS | Mod: HCNC,CPTII,S$GLB, | Performed by: FAMILY MEDICINE

## 2020-12-17 PROCEDURE — 3288F FALL RISK ASSESSMENT DOCD: CPT | Mod: HCNC,CPTII,S$GLB, | Performed by: FAMILY MEDICINE

## 2020-12-17 NOTE — PROGRESS NOTES
Subjective:       Patient ID: Anjel Sanders is a 65 y.o. male.    Chief Complaint: Follow-up    65 yr old black male with Hypertension, DM II, Hyperlipidemia, gout, ED, MURTAZA, presents today for 6 month follow up. No new complaints today.    DM II - diet controlled  -\A1C                   6.4 (H)             06/18/2020                 - no s/s of DM - foot exam UTD - eye exam due - on ASA and ACE    HTN - controlled - on prinzide - no side effects - does not need refills    HLD - uncontrolled - on statin -  LDLCALC                  83.8                06/18/2020                                   - on meds - need labs    ED - controlled - need cialis -     Gout - controlled    -  on allopurinol    Health maintenance  -UTD except labs    Follow-up  Associated symptoms include myalgias. Pertinent negatives include no chest pain, congestion, coughing, diaphoresis, headaches, neck pain, rash, vomiting or weakness.   Hypertension  This is a chronic problem. The current episode started more than 1 year ago. The problem has been gradually improving since onset. The problem is controlled. Pertinent negatives include no anxiety, blurred vision, chest pain, headaches, malaise/fatigue, neck pain, orthopnea, palpitations, peripheral edema, PND or sweats. There are no associated agents to hypertension. Risk factors for coronary artery disease include dyslipidemia, obesity and male gender. Past treatments include ACE inhibitors and diuretics. The current treatment provides moderate improvement. There are no compliance problems.  There is no history of angina, CAD/MI, CVA, heart failure, left ventricular hypertrophy or retinopathy. There is no history of chronic renal disease, coarctation of the aorta, hypercortisolism, pheochromocytoma, renovascular disease or a thyroid problem.   Hyperlipidemia  This is a chronic problem. The current episode started more than 1 year ago. The problem is controlled. Recent lipid tests were  reviewed and are normal. Exacerbating diseases include obesity. He has no history of chronic renal disease, diabetes, hypothyroidism or liver disease. There are no known factors aggravating his hyperlipidemia. Associated symptoms include myalgias. Pertinent negatives include no chest pain, focal sensory loss, focal weakness or leg pain. He is currently on no antihyperlipidemic treatment. The current treatment provides moderate improvement of lipids. There are no compliance problems.  Risk factors for coronary artery disease include dyslipidemia, hypertension, obesity and male sex.   Shoulder Injury   The left shoulder is affected. The incident occurred more than 1 week ago. The injury mechanism was a fall. The quality of the pain is described as cramping and shooting. The pain does not radiate. The pain is at a severity of 6/10. The pain is moderate. Pertinent negatives include no chest pain. The symptoms are aggravated by movement, overhead lifting and palpation. He has tried NSAIDs for the symptoms. The treatment provided moderate relief.     Review of Systems   Constitutional: Negative.  Negative for activity change, diaphoresis, malaise/fatigue and unexpected weight change.   HENT: Negative.  Negative for nasal congestion, ear pain, mouth sores, rhinorrhea and voice change.    Eyes: Negative.  Negative for blurred vision, pain, discharge and visual disturbance.   Respiratory: Negative.  Negative for apnea, cough and wheezing.    Cardiovascular: Negative.  Negative for chest pain, palpitations, orthopnea and PND.   Gastrointestinal: Negative.  Negative for abdominal distention, anal bleeding, diarrhea and vomiting.   Endocrine: Negative.  Negative for cold intolerance and polyuria.   Genitourinary: Negative.  Negative for decreased urine volume, difficulty urinating, discharge, frequency and scrotal swelling.   Musculoskeletal: Positive for myalgias. Negative for back pain, leg pain, neck pain and neck stiffness.    Integumentary:  Negative for color change and rash. Negative.   Allergic/Immunologic: Negative.  Negative for environmental allergies.   Neurological: Negative.  Negative for dizziness, focal weakness, speech difficulty, weakness, light-headedness and headaches.   Hematological: Negative.    Psychiatric/Behavioral: Negative.  Negative for agitation, dysphoric mood and suicidal ideas. The patient is not nervous/anxious.      PMH/PSH/FH/SH/MED/ALLERGY reviewed        Objective:       Vitals:    12/17/20 0857   BP: 130/80   Pulse: 67   Temp: 98.3 °F (36.8 °C)       Physical Exam  Constitutional:       General: He is not in acute distress.     Appearance: He is well-developed. He is not diaphoretic.   HENT:      Head: Normocephalic and atraumatic.      Right Ear: External ear normal.      Left Ear: External ear normal.      Nose: Nose normal.      Mouth/Throat:      Pharynx: No oropharyngeal exudate.   Eyes:      General: No scleral icterus.        Right eye: No discharge.         Left eye: No discharge.      Conjunctiva/sclera: Conjunctivae normal.      Pupils: Pupils are equal, round, and reactive to light.   Neck:      Musculoskeletal: Normal range of motion and neck supple.      Thyroid: No thyromegaly.      Vascular: No JVD.      Trachea: No tracheal deviation.   Cardiovascular:      Rate and Rhythm: Normal rate and regular rhythm.      Heart sounds: Murmur (3/6 ESM AORTIC AREA) present. No friction rub. No gallop.    Pulmonary:      Effort: Pulmonary effort is normal. No respiratory distress.      Breath sounds: Normal breath sounds. No stridor. No wheezing or rales.   Chest:      Chest wall: No tenderness.   Abdominal:      General: Bowel sounds are normal. There is no distension.      Palpations: Abdomen is soft. There is no mass.      Tenderness: There is no abdominal tenderness. There is no guarding or rebound.      Hernia: No hernia is present.   Musculoskeletal: Normal range of motion.         General:  Tenderness (TTP LEFT SHOULDER WITH ROTATOR CUFF IMPINGEMENT) present.   Lymphadenopathy:      Cervical: No cervical adenopathy.   Skin:     General: Skin is warm and dry.      Coloration: Skin is not pale.      Findings: No erythema or rash.   Neurological:      Mental Status: He is alert and oriented to person, place, and time.      Cranial Nerves: No cranial nerve deficit.      Motor: No abnormal muscle tone.      Coordination: Coordination normal.      Deep Tendon Reflexes: Reflexes are normal and symmetric. Reflexes normal.      Comments: No deficits   Psychiatric:         Behavior: Behavior normal.         Thought Content: Thought content normal.         Judgment: Judgment normal.         Assessment:       1. Controlled type 2 diabetes mellitus without complication, without long-term current use of insulin    2. Immunization due    3. Hemiparesis affecting right side as late effect of cerebrovascular accident    4. Hypertension complicating diabetes    5. Dyslipidemia associated with type 2 diabetes mellitus        Plan:           Anjel was seen today for follow-up.    Diagnoses and all orders for this visit:    Controlled type 2 diabetes mellitus without complication, without long-term current use of insulin  -     Comprehensive Metabolic Panel; Future  -     Hemoglobin A1C; Future  -     Microalbumin/Creatinine Ratio, Urine; Future  -     Urinalysis; Future    Immunization due  -     (In Office Administered) Pneumococcal Polysaccharide Vaccine (23 Valent) (SQ/IM)    Hemiparesis affecting right side as late effect of cerebrovascular accident  -     Comprehensive Metabolic Panel; Future    Hypertension complicating diabetes  -     Comprehensive Metabolic Panel; Future    Dyslipidemia associated with type 2 diabetes mellitus  -     Comprehensive Metabolic Panel; Future      HTN/HLD   -controlled - stable - on meds       HEART MURMUR  -ECHO reassurance      ED and Gout   -controlled     MURTAZA  -stable    CVA  -on  ASA and plavix      DM II  -controlled  -diet and exercise only    Obesity  -diet and exercise  -weight loss      25 minutes spent during this visit of which greater than 50% devoted to face-face counseling and coordination of care regarding diagnosis and management plan     Spent adequate time in obtaining history and explaining differentials       Follow up in about 6 months (around 6/17/2021), or if symptoms worsen or fail to improve.

## 2021-01-05 ENCOUNTER — PATIENT OUTREACH (OUTPATIENT)
Dept: ADMINISTRATIVE | Facility: OTHER | Age: 66
End: 2021-01-05

## 2021-01-07 ENCOUNTER — OFFICE VISIT (OUTPATIENT)
Dept: ORTHOPEDICS | Facility: CLINIC | Age: 66
End: 2021-01-07
Payer: MEDICARE

## 2021-01-07 VITALS — HEIGHT: 70 IN | BODY MASS INDEX: 32.35 KG/M2 | WEIGHT: 226 LBS

## 2021-01-07 DIAGNOSIS — M75.121 COMPLETE TEAR OF RIGHT ROTATOR CUFF, UNSPECIFIED WHETHER TRAUMATIC: Primary | ICD-10-CM

## 2021-01-07 PROCEDURE — 1159F MED LIST DOCD IN RCRD: CPT | Mod: HCNC,S$GLB,, | Performed by: ORTHOPAEDIC SURGERY

## 2021-01-07 PROCEDURE — 99999 PR PBB SHADOW E&M-EST. PATIENT-LVL III: CPT | Mod: PBBFAC,HCNC,, | Performed by: ORTHOPAEDIC SURGERY

## 2021-01-07 PROCEDURE — 1101F PR PT FALLS ASSESS DOC 0-1 FALLS W/OUT INJ PAST YR: ICD-10-PCS | Mod: HCNC,CPTII,S$GLB, | Performed by: ORTHOPAEDIC SURGERY

## 2021-01-07 PROCEDURE — 1125F AMNT PAIN NOTED PAIN PRSNT: CPT | Mod: HCNC,S$GLB,, | Performed by: ORTHOPAEDIC SURGERY

## 2021-01-07 PROCEDURE — 99213 PR OFFICE/OUTPT VISIT, EST, LEVL III, 20-29 MIN: ICD-10-PCS | Mod: HCNC,S$GLB,, | Performed by: ORTHOPAEDIC SURGERY

## 2021-01-07 PROCEDURE — 3008F PR BODY MASS INDEX (BMI) DOCUMENTED: ICD-10-PCS | Mod: HCNC,CPTII,S$GLB, | Performed by: ORTHOPAEDIC SURGERY

## 2021-01-07 PROCEDURE — 99999 PR PBB SHADOW E&M-EST. PATIENT-LVL III: ICD-10-PCS | Mod: PBBFAC,HCNC,, | Performed by: ORTHOPAEDIC SURGERY

## 2021-01-07 PROCEDURE — 99213 OFFICE O/P EST LOW 20 MIN: CPT | Mod: HCNC,S$GLB,, | Performed by: ORTHOPAEDIC SURGERY

## 2021-01-07 PROCEDURE — 3288F FALL RISK ASSESSMENT DOCD: CPT | Mod: HCNC,CPTII,S$GLB, | Performed by: ORTHOPAEDIC SURGERY

## 2021-01-07 PROCEDURE — 1159F PR MEDICATION LIST DOCUMENTED IN MEDICAL RECORD: ICD-10-PCS | Mod: HCNC,S$GLB,, | Performed by: ORTHOPAEDIC SURGERY

## 2021-01-07 PROCEDURE — 1125F PR PAIN SEVERITY QUANTIFIED, PAIN PRESENT: ICD-10-PCS | Mod: HCNC,S$GLB,, | Performed by: ORTHOPAEDIC SURGERY

## 2021-01-07 PROCEDURE — 3008F BODY MASS INDEX DOCD: CPT | Mod: HCNC,CPTII,S$GLB, | Performed by: ORTHOPAEDIC SURGERY

## 2021-01-07 PROCEDURE — 3288F PR FALLS RISK ASSESSMENT DOCUMENTED: ICD-10-PCS | Mod: HCNC,CPTII,S$GLB, | Performed by: ORTHOPAEDIC SURGERY

## 2021-01-07 PROCEDURE — 1101F PT FALLS ASSESS-DOCD LE1/YR: CPT | Mod: HCNC,CPTII,S$GLB, | Performed by: ORTHOPAEDIC SURGERY

## 2021-02-15 ENCOUNTER — PES CALL (OUTPATIENT)
Dept: ADMINISTRATIVE | Facility: CLINIC | Age: 66
End: 2021-02-15

## 2021-02-27 ENCOUNTER — IMMUNIZATION (OUTPATIENT)
Dept: INTERNAL MEDICINE | Facility: CLINIC | Age: 66
End: 2021-02-27
Payer: MEDICARE

## 2021-02-27 DIAGNOSIS — Z23 NEED FOR VACCINATION: Primary | ICD-10-CM

## 2021-02-27 PROCEDURE — 91300 COVID-19, MRNA, LNP-S, PF, 30 MCG/0.3 ML DOSE VACCINE: CPT | Mod: PBBFAC | Performed by: FAMILY MEDICINE

## 2021-03-02 ENCOUNTER — PATIENT OUTREACH (OUTPATIENT)
Dept: ADMINISTRATIVE | Facility: OTHER | Age: 66
End: 2021-03-02

## 2021-03-08 ENCOUNTER — TELEPHONE (OUTPATIENT)
Dept: ORTHOPEDICS | Facility: CLINIC | Age: 66
End: 2021-03-08

## 2021-03-08 DIAGNOSIS — M25.569 KNEE PAIN, UNSPECIFIED CHRONICITY, UNSPECIFIED LATERALITY: Primary | ICD-10-CM

## 2021-03-20 ENCOUNTER — IMMUNIZATION (OUTPATIENT)
Dept: INTERNAL MEDICINE | Facility: CLINIC | Age: 66
End: 2021-03-20
Payer: MEDICARE

## 2021-03-20 DIAGNOSIS — Z23 NEED FOR VACCINATION: Primary | ICD-10-CM

## 2021-03-20 PROCEDURE — 0002A COVID-19, MRNA, LNP-S, PF, 30 MCG/0.3 ML DOSE VACCINE: CPT | Mod: HCNC,PBBFAC | Performed by: NURSE ANESTHETIST, CERTIFIED REGISTERED

## 2021-03-20 PROCEDURE — 91300 COVID-19, MRNA, LNP-S, PF, 30 MCG/0.3 ML DOSE VACCINE: CPT | Mod: HCNC,PBBFAC | Performed by: NURSE ANESTHETIST, CERTIFIED REGISTERED

## 2021-06-02 ENCOUNTER — PATIENT OUTREACH (OUTPATIENT)
Dept: ADMINISTRATIVE | Facility: OTHER | Age: 66
End: 2021-06-02

## 2021-06-04 ENCOUNTER — HOSPITAL ENCOUNTER (OUTPATIENT)
Dept: RADIOLOGY | Facility: HOSPITAL | Age: 66
Discharge: HOME | End: 2021-06-04
Attending: ORTHOPAEDIC SURGERY
Payer: MEDICARE

## 2021-06-04 ENCOUNTER — OFFICE VISIT (OUTPATIENT)
Dept: ORTHOPEDICS | Facility: CLINIC | Age: 66
End: 2021-06-04
Payer: MEDICARE

## 2021-06-04 VITALS
BODY MASS INDEX: 32.35 KG/M2 | WEIGHT: 226 LBS | HEART RATE: 59 BPM | SYSTOLIC BLOOD PRESSURE: 155 MMHG | HEIGHT: 70 IN | DIASTOLIC BLOOD PRESSURE: 80 MMHG

## 2021-06-04 DIAGNOSIS — G89.29 CHRONIC PAIN OF RIGHT KNEE: Primary | ICD-10-CM

## 2021-06-04 DIAGNOSIS — M25.561 CHRONIC PAIN OF RIGHT KNEE: Primary | ICD-10-CM

## 2021-06-04 DIAGNOSIS — M25.569 KNEE PAIN, UNSPECIFIED CHRONICITY, UNSPECIFIED LATERALITY: ICD-10-CM

## 2021-06-04 PROCEDURE — 99999 PR PBB SHADOW E&M-EST. PATIENT-LVL IV: CPT | Mod: PBBFAC,,, | Performed by: PHYSICIAN ASSISTANT

## 2021-06-04 PROCEDURE — 1125F PR PAIN SEVERITY QUANTIFIED, PAIN PRESENT: ICD-10-PCS | Mod: S$GLB,,, | Performed by: PHYSICIAN ASSISTANT

## 2021-06-04 PROCEDURE — 73564 X-RAY EXAM KNEE 4 OR MORE: CPT | Mod: TC,50,PN

## 2021-06-04 PROCEDURE — 73564 X-RAY EXAM KNEE 4 OR MORE: CPT | Mod: 26,RT,, | Performed by: RADIOLOGY

## 2021-06-04 PROCEDURE — 99213 OFFICE O/P EST LOW 20 MIN: CPT | Mod: S$GLB,,, | Performed by: PHYSICIAN ASSISTANT

## 2021-06-04 PROCEDURE — 3008F PR BODY MASS INDEX (BMI) DOCUMENTED: ICD-10-PCS | Mod: CPTII,S$GLB,, | Performed by: PHYSICIAN ASSISTANT

## 2021-06-04 PROCEDURE — 1101F PT FALLS ASSESS-DOCD LE1/YR: CPT | Mod: CPTII,S$GLB,, | Performed by: PHYSICIAN ASSISTANT

## 2021-06-04 PROCEDURE — 1125F AMNT PAIN NOTED PAIN PRSNT: CPT | Mod: S$GLB,,, | Performed by: PHYSICIAN ASSISTANT

## 2021-06-04 PROCEDURE — 1159F PR MEDICATION LIST DOCUMENTED IN MEDICAL RECORD: ICD-10-PCS | Mod: S$GLB,,, | Performed by: PHYSICIAN ASSISTANT

## 2021-06-04 PROCEDURE — 99213 PR OFFICE/OUTPT VISIT, EST, LEVL III, 20-29 MIN: ICD-10-PCS | Mod: S$GLB,,, | Performed by: PHYSICIAN ASSISTANT

## 2021-06-04 PROCEDURE — 3288F FALL RISK ASSESSMENT DOCD: CPT | Mod: CPTII,S$GLB,, | Performed by: PHYSICIAN ASSISTANT

## 2021-06-04 PROCEDURE — 3288F PR FALLS RISK ASSESSMENT DOCUMENTED: ICD-10-PCS | Mod: CPTII,S$GLB,, | Performed by: PHYSICIAN ASSISTANT

## 2021-06-04 PROCEDURE — 99999 PR PBB SHADOW E&M-EST. PATIENT-LVL IV: ICD-10-PCS | Mod: PBBFAC,,, | Performed by: PHYSICIAN ASSISTANT

## 2021-06-04 PROCEDURE — 73564 PR  X-RAY KNEE 4+ VIEW: ICD-10-PCS | Mod: 26,LT,, | Performed by: RADIOLOGY

## 2021-06-04 PROCEDURE — 1101F PR PT FALLS ASSESS DOC 0-1 FALLS W/OUT INJ PAST YR: ICD-10-PCS | Mod: CPTII,S$GLB,, | Performed by: PHYSICIAN ASSISTANT

## 2021-06-04 PROCEDURE — 1159F MED LIST DOCD IN RCRD: CPT | Mod: S$GLB,,, | Performed by: PHYSICIAN ASSISTANT

## 2021-06-04 PROCEDURE — 73564 X-RAY EXAM KNEE 4 OR MORE: CPT | Mod: 26,LT,, | Performed by: RADIOLOGY

## 2021-06-04 PROCEDURE — 3008F BODY MASS INDEX DOCD: CPT | Mod: CPTII,S$GLB,, | Performed by: PHYSICIAN ASSISTANT

## 2021-06-04 RX ORDER — MELOXICAM 7.5 MG/1
7.5 TABLET ORAL DAILY
Qty: 30 TABLET | Refills: 2 | Status: SHIPPED | OUTPATIENT
Start: 2021-06-04 | End: 2021-10-12

## 2021-06-07 DIAGNOSIS — Z12.11 SPECIAL SCREENING FOR MALIGNANT NEOPLASM OF COLON: Primary | ICD-10-CM

## 2021-06-17 ENCOUNTER — OFFICE VISIT (OUTPATIENT)
Dept: FAMILY MEDICINE | Facility: CLINIC | Age: 66
End: 2021-06-17
Attending: FAMILY MEDICINE
Payer: MEDICARE

## 2021-06-17 VITALS
HEIGHT: 70 IN | HEART RATE: 64 BPM | SYSTOLIC BLOOD PRESSURE: 130 MMHG | OXYGEN SATURATION: 98 % | BODY MASS INDEX: 31.5 KG/M2 | WEIGHT: 220 LBS | DIASTOLIC BLOOD PRESSURE: 70 MMHG

## 2021-06-17 DIAGNOSIS — E78.5 DYSLIPIDEMIA ASSOCIATED WITH TYPE 2 DIABETES MELLITUS: ICD-10-CM

## 2021-06-17 DIAGNOSIS — E66.9 OBESITY (BMI 30-39.9): ICD-10-CM

## 2021-06-17 DIAGNOSIS — E11.69 DYSLIPIDEMIA ASSOCIATED WITH TYPE 2 DIABETES MELLITUS: ICD-10-CM

## 2021-06-17 DIAGNOSIS — E11.59 HYPERTENSION COMPLICATING DIABETES: ICD-10-CM

## 2021-06-17 DIAGNOSIS — I69.351 HEMIPARESIS AFFECTING RIGHT SIDE AS LATE EFFECT OF CEREBROVASCULAR ACCIDENT: ICD-10-CM

## 2021-06-17 DIAGNOSIS — E11.9 CONTROLLED TYPE 2 DIABETES MELLITUS WITHOUT COMPLICATION, WITHOUT LONG-TERM CURRENT USE OF INSULIN: Primary | ICD-10-CM

## 2021-06-17 DIAGNOSIS — M54.41 CHRONIC BILATERAL LOW BACK PAIN WITH BILATERAL SCIATICA: ICD-10-CM

## 2021-06-17 DIAGNOSIS — M10.9 GOUTY ARTHRITIS: ICD-10-CM

## 2021-06-17 DIAGNOSIS — I15.2 HYPERTENSION COMPLICATING DIABETES: ICD-10-CM

## 2021-06-17 DIAGNOSIS — M54.42 CHRONIC BILATERAL LOW BACK PAIN WITH BILATERAL SCIATICA: ICD-10-CM

## 2021-06-17 DIAGNOSIS — G89.29 CHRONIC BILATERAL LOW BACK PAIN WITH BILATERAL SCIATICA: ICD-10-CM

## 2021-06-17 DIAGNOSIS — M54.31 BILATERAL SCIATICA: ICD-10-CM

## 2021-06-17 DIAGNOSIS — M54.32 BILATERAL SCIATICA: ICD-10-CM

## 2021-06-17 DIAGNOSIS — Z12.5 ENCOUNTER FOR SCREENING FOR MALIGNANT NEOPLASM OF PROSTATE: ICD-10-CM

## 2021-06-17 PROCEDURE — 3078F DIAST BP <80 MM HG: CPT | Mod: CPTII,S$GLB,, | Performed by: FAMILY MEDICINE

## 2021-06-17 PROCEDURE — 1159F PR MEDICATION LIST DOCUMENTED IN MEDICAL RECORD: ICD-10-PCS | Mod: S$GLB,,, | Performed by: FAMILY MEDICINE

## 2021-06-17 PROCEDURE — 1126F AMNT PAIN NOTED NONE PRSNT: CPT | Mod: S$GLB,,, | Performed by: FAMILY MEDICINE

## 2021-06-17 PROCEDURE — 99499 RISK ADDL DX/OHS AUDIT: ICD-10-PCS | Mod: S$GLB,,, | Performed by: FAMILY MEDICINE

## 2021-06-17 PROCEDURE — 1126F PR PAIN SEVERITY QUANTIFIED, NO PAIN PRESENT: ICD-10-PCS | Mod: S$GLB,,, | Performed by: FAMILY MEDICINE

## 2021-06-17 PROCEDURE — 3075F PR MOST RECENT SYSTOLIC BLOOD PRESS GE 130-139MM HG: ICD-10-PCS | Mod: CPTII,S$GLB,, | Performed by: FAMILY MEDICINE

## 2021-06-17 PROCEDURE — 1101F PT FALLS ASSESS-DOCD LE1/YR: CPT | Mod: CPTII,S$GLB,, | Performed by: FAMILY MEDICINE

## 2021-06-17 PROCEDURE — 1101F PR PT FALLS ASSESS DOC 0-1 FALLS W/OUT INJ PAST YR: ICD-10-PCS | Mod: CPTII,S$GLB,, | Performed by: FAMILY MEDICINE

## 2021-06-17 PROCEDURE — 3008F BODY MASS INDEX DOCD: CPT | Mod: CPTII,S$GLB,, | Performed by: FAMILY MEDICINE

## 2021-06-17 PROCEDURE — 3288F PR FALLS RISK ASSESSMENT DOCUMENTED: ICD-10-PCS | Mod: CPTII,S$GLB,, | Performed by: FAMILY MEDICINE

## 2021-06-17 PROCEDURE — 1159F MED LIST DOCD IN RCRD: CPT | Mod: S$GLB,,, | Performed by: FAMILY MEDICINE

## 2021-06-17 PROCEDURE — 3008F PR BODY MASS INDEX (BMI) DOCUMENTED: ICD-10-PCS | Mod: CPTII,S$GLB,, | Performed by: FAMILY MEDICINE

## 2021-06-17 PROCEDURE — 99214 PR OFFICE/OUTPT VISIT, EST, LEVL IV, 30-39 MIN: ICD-10-PCS | Mod: S$GLB,,, | Performed by: FAMILY MEDICINE

## 2021-06-17 PROCEDURE — 99999 PR PBB SHADOW E&M-EST. PATIENT-LVL IV: ICD-10-PCS | Mod: PBBFAC,,, | Performed by: FAMILY MEDICINE

## 2021-06-17 PROCEDURE — 99999 PR PBB SHADOW E&M-EST. PATIENT-LVL IV: CPT | Mod: PBBFAC,,, | Performed by: FAMILY MEDICINE

## 2021-06-17 PROCEDURE — 99214 OFFICE O/P EST MOD 30 MIN: CPT | Mod: S$GLB,,, | Performed by: FAMILY MEDICINE

## 2021-06-17 PROCEDURE — 3288F FALL RISK ASSESSMENT DOCD: CPT | Mod: CPTII,S$GLB,, | Performed by: FAMILY MEDICINE

## 2021-06-17 PROCEDURE — 3075F SYST BP GE 130 - 139MM HG: CPT | Mod: CPTII,S$GLB,, | Performed by: FAMILY MEDICINE

## 2021-06-17 PROCEDURE — 99499 UNLISTED E&M SERVICE: CPT | Mod: S$GLB,,, | Performed by: FAMILY MEDICINE

## 2021-06-17 PROCEDURE — 3078F PR MOST RECENT DIASTOLIC BLOOD PRESSURE < 80 MM HG: ICD-10-PCS | Mod: CPTII,S$GLB,, | Performed by: FAMILY MEDICINE

## 2021-06-17 RX ORDER — AMLODIPINE AND BENAZEPRIL HYDROCHLORIDE 10; 40 MG/1; MG/1
1 CAPSULE ORAL DAILY
Qty: 90 CAPSULE | Refills: 3 | Status: SHIPPED | OUTPATIENT
Start: 2021-06-17 | End: 2022-06-16 | Stop reason: SDUPTHER

## 2021-06-17 RX ORDER — ATORVASTATIN CALCIUM 80 MG/1
80 TABLET, FILM COATED ORAL DAILY
Qty: 90 TABLET | Refills: 4 | Status: SHIPPED | OUTPATIENT
Start: 2021-06-17 | End: 2022-06-16 | Stop reason: SDUPTHER

## 2021-06-17 RX ORDER — ALLOPURINOL 300 MG/1
300 TABLET ORAL DAILY
Qty: 90 TABLET | Refills: 3 | Status: SHIPPED | OUTPATIENT
Start: 2021-06-17 | End: 2022-06-16 | Stop reason: SDUPTHER

## 2021-06-17 RX ORDER — GABAPENTIN 300 MG/1
300 CAPSULE ORAL NIGHTLY
Qty: 90 CAPSULE | Refills: 3 | Status: SHIPPED | OUTPATIENT
Start: 2021-06-17 | End: 2022-06-16 | Stop reason: SDUPTHER

## 2021-06-20 NOTE — PROGRESS NOTES
Patient room 272, Mr. Anf- Troponin more elevated at 5.5 now, was 2.3 previously, no c/o cp, on heparin gtt. FYI only.     Thank you *[phone]       Requested updates within Care Everywhere.  Patient's chart was reviewed for overdue ALEX topics.  Immunizations reconciled.    Orders placed:Diabetic eye cam

## 2021-06-21 ENCOUNTER — LAB VISIT (OUTPATIENT)
Dept: LAB | Facility: HOSPITAL | Age: 66
End: 2021-06-21
Attending: FAMILY MEDICINE
Payer: MEDICARE

## 2021-06-21 DIAGNOSIS — E11.69 DYSLIPIDEMIA ASSOCIATED WITH TYPE 2 DIABETES MELLITUS: ICD-10-CM

## 2021-06-21 DIAGNOSIS — E11.9 CONTROLLED TYPE 2 DIABETES MELLITUS WITHOUT COMPLICATION, WITHOUT LONG-TERM CURRENT USE OF INSULIN: ICD-10-CM

## 2021-06-21 DIAGNOSIS — I69.351 HEMIPARESIS AFFECTING RIGHT SIDE AS LATE EFFECT OF CEREBROVASCULAR ACCIDENT: ICD-10-CM

## 2021-06-21 DIAGNOSIS — E78.5 DYSLIPIDEMIA ASSOCIATED WITH TYPE 2 DIABETES MELLITUS: ICD-10-CM

## 2021-06-21 DIAGNOSIS — Z12.5 ENCOUNTER FOR SCREENING FOR MALIGNANT NEOPLASM OF PROSTATE: ICD-10-CM

## 2021-06-21 DIAGNOSIS — E11.59 HYPERTENSION COMPLICATING DIABETES: ICD-10-CM

## 2021-06-21 DIAGNOSIS — I15.2 HYPERTENSION COMPLICATING DIABETES: ICD-10-CM

## 2021-06-21 DIAGNOSIS — M10.9 GOUTY ARTHRITIS: ICD-10-CM

## 2021-06-21 LAB
ALBUMIN SERPL BCP-MCNC: 4.2 G/DL (ref 3.5–5.2)
ALP SERPL-CCNC: 83 U/L (ref 55–135)
ALT SERPL W/O P-5'-P-CCNC: 26 U/L (ref 10–44)
ANION GAP SERPL CALC-SCNC: 12 MMOL/L (ref 8–16)
AST SERPL-CCNC: 22 U/L (ref 10–40)
BASOPHILS # BLD AUTO: 0.03 K/UL (ref 0–0.2)
BASOPHILS NFR BLD: 0.7 % (ref 0–1.9)
BILIRUB SERPL-MCNC: 1 MG/DL (ref 0.1–1)
BUN SERPL-MCNC: 12 MG/DL (ref 8–23)
CALCIUM SERPL-MCNC: 9.2 MG/DL (ref 8.7–10.5)
CHLORIDE SERPL-SCNC: 104 MMOL/L (ref 95–110)
CHOLEST SERPL-MCNC: 140 MG/DL (ref 120–199)
CHOLEST/HDLC SERPL: 3.6 {RATIO} (ref 2–5)
CO2 SERPL-SCNC: 26 MMOL/L (ref 23–29)
COMPLEXED PSA SERPL-MCNC: 2.1 NG/ML (ref 0–4)
CREAT SERPL-MCNC: 1.4 MG/DL (ref 0.5–1.4)
DIFFERENTIAL METHOD: ABNORMAL
EOSINOPHIL # BLD AUTO: 0.1 K/UL (ref 0–0.5)
EOSINOPHIL NFR BLD: 2.7 % (ref 0–8)
ERYTHROCYTE [DISTWIDTH] IN BLOOD BY AUTOMATED COUNT: 13.2 % (ref 11.5–14.5)
EST. GFR  (AFRICAN AMERICAN): >60 ML/MIN/1.73 M^2
EST. GFR  (NON AFRICAN AMERICAN): 52 ML/MIN/1.73 M^2
ESTIMATED AVG GLUCOSE: 134 MG/DL (ref 68–131)
GLUCOSE SERPL-MCNC: 109 MG/DL (ref 70–110)
HBA1C MFR BLD: 6.3 % (ref 4–5.6)
HCT VFR BLD AUTO: 44.6 % (ref 40–54)
HDLC SERPL-MCNC: 39 MG/DL (ref 40–75)
HDLC SERPL: 27.9 % (ref 20–50)
HGB BLD-MCNC: 14.2 G/DL (ref 14–18)
IMM GRANULOCYTES # BLD AUTO: 0.01 K/UL (ref 0–0.04)
IMM GRANULOCYTES NFR BLD AUTO: 0.2 % (ref 0–0.5)
LDLC SERPL CALC-MCNC: 89.4 MG/DL (ref 63–159)
LYMPHOCYTES # BLD AUTO: 1.6 K/UL (ref 1–4.8)
LYMPHOCYTES NFR BLD: 38.3 % (ref 18–48)
MCH RBC QN AUTO: 27.5 PG (ref 27–31)
MCHC RBC AUTO-ENTMCNC: 31.8 G/DL (ref 32–36)
MCV RBC AUTO: 86 FL (ref 82–98)
MONOCYTES # BLD AUTO: 0.4 K/UL (ref 0.3–1)
MONOCYTES NFR BLD: 9.6 % (ref 4–15)
NEUTROPHILS # BLD AUTO: 2 K/UL (ref 1.8–7.7)
NEUTROPHILS NFR BLD: 48.5 % (ref 38–73)
NONHDLC SERPL-MCNC: 101 MG/DL
NRBC BLD-RTO: 0 /100 WBC
PLATELET # BLD AUTO: 266 K/UL (ref 150–450)
PMV BLD AUTO: 10.4 FL (ref 9.2–12.9)
POTASSIUM SERPL-SCNC: 4.1 MMOL/L (ref 3.5–5.1)
PROT SERPL-MCNC: 8.1 G/DL (ref 6–8.4)
RBC # BLD AUTO: 5.16 M/UL (ref 4.6–6.2)
SODIUM SERPL-SCNC: 142 MMOL/L (ref 136–145)
TRIGL SERPL-MCNC: 58 MG/DL (ref 30–150)
URATE SERPL-MCNC: 7.4 MG/DL (ref 3.4–7)
WBC # BLD AUTO: 4.15 K/UL (ref 3.9–12.7)

## 2021-06-21 PROCEDURE — 36415 COLL VENOUS BLD VENIPUNCTURE: CPT | Performed by: FAMILY MEDICINE

## 2021-06-21 PROCEDURE — 80053 COMPREHEN METABOLIC PANEL: CPT | Performed by: FAMILY MEDICINE

## 2021-06-21 PROCEDURE — 83036 HEMOGLOBIN GLYCOSYLATED A1C: CPT | Performed by: FAMILY MEDICINE

## 2021-06-21 PROCEDURE — 80061 LIPID PANEL: CPT | Performed by: FAMILY MEDICINE

## 2021-06-21 PROCEDURE — 84550 ASSAY OF BLOOD/URIC ACID: CPT | Performed by: FAMILY MEDICINE

## 2021-06-21 PROCEDURE — 84153 ASSAY OF PSA TOTAL: CPT | Performed by: FAMILY MEDICINE

## 2021-06-21 PROCEDURE — 85025 COMPLETE CBC W/AUTO DIFF WBC: CPT | Performed by: FAMILY MEDICINE

## 2021-07-12 ENCOUNTER — PES CALL (OUTPATIENT)
Dept: ADMINISTRATIVE | Facility: CLINIC | Age: 66
End: 2021-07-12

## 2021-07-14 ENCOUNTER — PATIENT OUTREACH (OUTPATIENT)
Dept: ADMINISTRATIVE | Facility: OTHER | Age: 66
End: 2021-07-14

## 2021-07-15 ENCOUNTER — TELEPHONE (OUTPATIENT)
Dept: FAMILY MEDICINE | Facility: CLINIC | Age: 66
End: 2021-07-15

## 2021-07-15 ENCOUNTER — OFFICE VISIT (OUTPATIENT)
Dept: ORTHOPEDICS | Facility: CLINIC | Age: 66
End: 2021-07-15
Payer: MEDICARE

## 2021-07-15 VITALS
SYSTOLIC BLOOD PRESSURE: 158 MMHG | HEIGHT: 70 IN | DIASTOLIC BLOOD PRESSURE: 90 MMHG | WEIGHT: 220 LBS | HEART RATE: 62 BPM | BODY MASS INDEX: 31.5 KG/M2

## 2021-07-15 DIAGNOSIS — G89.29 CHRONIC PAIN OF RIGHT KNEE: Primary | ICD-10-CM

## 2021-07-15 DIAGNOSIS — M25.561 CHRONIC PAIN OF RIGHT KNEE: Primary | ICD-10-CM

## 2021-07-15 PROCEDURE — 99213 PR OFFICE/OUTPT VISIT, EST, LEVL III, 20-29 MIN: ICD-10-PCS | Mod: S$GLB,,, | Performed by: PHYSICIAN ASSISTANT

## 2021-07-15 PROCEDURE — 3008F BODY MASS INDEX DOCD: CPT | Mod: CPTII,S$GLB,, | Performed by: PHYSICIAN ASSISTANT

## 2021-07-15 PROCEDURE — 1126F AMNT PAIN NOTED NONE PRSNT: CPT | Mod: S$GLB,,, | Performed by: PHYSICIAN ASSISTANT

## 2021-07-15 PROCEDURE — 1101F PT FALLS ASSESS-DOCD LE1/YR: CPT | Mod: CPTII,S$GLB,, | Performed by: PHYSICIAN ASSISTANT

## 2021-07-15 PROCEDURE — 1126F PR PAIN SEVERITY QUANTIFIED, NO PAIN PRESENT: ICD-10-PCS | Mod: S$GLB,,, | Performed by: PHYSICIAN ASSISTANT

## 2021-07-15 PROCEDURE — 1159F PR MEDICATION LIST DOCUMENTED IN MEDICAL RECORD: ICD-10-PCS | Mod: S$GLB,,, | Performed by: PHYSICIAN ASSISTANT

## 2021-07-15 PROCEDURE — 1159F MED LIST DOCD IN RCRD: CPT | Mod: S$GLB,,, | Performed by: PHYSICIAN ASSISTANT

## 2021-07-15 PROCEDURE — 99999 PR PBB SHADOW E&M-EST. PATIENT-LVL III: ICD-10-PCS | Mod: PBBFAC,,, | Performed by: PHYSICIAN ASSISTANT

## 2021-07-15 PROCEDURE — 99213 OFFICE O/P EST LOW 20 MIN: CPT | Mod: S$GLB,,, | Performed by: PHYSICIAN ASSISTANT

## 2021-07-15 PROCEDURE — 3288F PR FALLS RISK ASSESSMENT DOCUMENTED: ICD-10-PCS | Mod: CPTII,S$GLB,, | Performed by: PHYSICIAN ASSISTANT

## 2021-07-15 PROCEDURE — 1101F PR PT FALLS ASSESS DOC 0-1 FALLS W/OUT INJ PAST YR: ICD-10-PCS | Mod: CPTII,S$GLB,, | Performed by: PHYSICIAN ASSISTANT

## 2021-07-15 PROCEDURE — 99999 PR PBB SHADOW E&M-EST. PATIENT-LVL III: CPT | Mod: PBBFAC,,, | Performed by: PHYSICIAN ASSISTANT

## 2021-07-15 PROCEDURE — 3288F FALL RISK ASSESSMENT DOCD: CPT | Mod: CPTII,S$GLB,, | Performed by: PHYSICIAN ASSISTANT

## 2021-07-15 PROCEDURE — 3008F PR BODY MASS INDEX (BMI) DOCUMENTED: ICD-10-PCS | Mod: CPTII,S$GLB,, | Performed by: PHYSICIAN ASSISTANT

## 2021-07-22 ENCOUNTER — OFFICE VISIT (OUTPATIENT)
Dept: OPTOMETRY | Facility: CLINIC | Age: 66
End: 2021-07-22
Payer: COMMERCIAL

## 2021-07-22 DIAGNOSIS — H52.4 HYPEROPIA WITH PRESBYOPIA OF BOTH EYES: ICD-10-CM

## 2021-07-22 DIAGNOSIS — H52.03 HYPEROPIA WITH PRESBYOPIA OF BOTH EYES: ICD-10-CM

## 2021-07-22 DIAGNOSIS — Z01.00 EYE EXAM, ROUTINE: Primary | ICD-10-CM

## 2021-07-22 PROCEDURE — 2023F DILAT RTA XM W/O RTNOPTHY: CPT | Mod: CPTII,S$GLB,, | Performed by: OPTOMETRIST

## 2021-07-22 PROCEDURE — 99999 PR PBB SHADOW E&M-EST. PATIENT-LVL III: ICD-10-PCS | Mod: PBBFAC,,, | Performed by: OPTOMETRIST

## 2021-07-22 PROCEDURE — 92014 PR EYE EXAM, EST PATIENT,COMPREHESV: ICD-10-PCS | Mod: S$GLB,,, | Performed by: OPTOMETRIST

## 2021-07-22 PROCEDURE — 92014 COMPRE OPH EXAM EST PT 1/>: CPT | Mod: S$GLB,,, | Performed by: OPTOMETRIST

## 2021-07-22 PROCEDURE — 2023F PR DILATED RETINAL EXAM W/O EVID OF RETINOPATHY: ICD-10-PCS | Mod: CPTII,S$GLB,, | Performed by: OPTOMETRIST

## 2021-07-22 PROCEDURE — 99999 PR PBB SHADOW E&M-EST. PATIENT-LVL III: CPT | Mod: PBBFAC,,, | Performed by: OPTOMETRIST

## 2021-07-22 PROCEDURE — 92015 PR REFRACTION: ICD-10-PCS | Mod: S$GLB,,, | Performed by: OPTOMETRIST

## 2021-07-22 PROCEDURE — 92015 DETERMINE REFRACTIVE STATE: CPT | Mod: S$GLB,,, | Performed by: OPTOMETRIST

## 2021-08-17 ENCOUNTER — TELEPHONE (OUTPATIENT)
Dept: ENDOSCOPY | Facility: HOSPITAL | Age: 66
End: 2021-08-17

## 2021-08-17 ENCOUNTER — OFFICE VISIT (OUTPATIENT)
Dept: FAMILY MEDICINE | Facility: CLINIC | Age: 66
End: 2021-08-17
Attending: FAMILY MEDICINE
Payer: MEDICARE

## 2021-08-17 VITALS
OXYGEN SATURATION: 98 % | HEART RATE: 72 BPM | SYSTOLIC BLOOD PRESSURE: 120 MMHG | HEIGHT: 70 IN | BODY MASS INDEX: 31.18 KG/M2 | DIASTOLIC BLOOD PRESSURE: 70 MMHG | WEIGHT: 217.81 LBS

## 2021-08-17 DIAGNOSIS — I15.2 HYPERTENSION COMPLICATING DIABETES: ICD-10-CM

## 2021-08-17 DIAGNOSIS — Z12.11 SCREEN FOR COLON CANCER: ICD-10-CM

## 2021-08-17 DIAGNOSIS — E11.69 DYSLIPIDEMIA ASSOCIATED WITH TYPE 2 DIABETES MELLITUS: ICD-10-CM

## 2021-08-17 DIAGNOSIS — E11.59 HYPERTENSION COMPLICATING DIABETES: ICD-10-CM

## 2021-08-17 DIAGNOSIS — E11.9 CONTROLLED TYPE 2 DIABETES MELLITUS WITHOUT COMPLICATION, WITHOUT LONG-TERM CURRENT USE OF INSULIN: ICD-10-CM

## 2021-08-17 DIAGNOSIS — N40.0 BENIGN PROSTATIC HYPERPLASIA, UNSPECIFIED WHETHER LOWER URINARY TRACT SYMPTOMS PRESENT: ICD-10-CM

## 2021-08-17 DIAGNOSIS — E78.5 DYSLIPIDEMIA ASSOCIATED WITH TYPE 2 DIABETES MELLITUS: ICD-10-CM

## 2021-08-17 DIAGNOSIS — I69.351 HEMIPARESIS AFFECTING RIGHT SIDE AS LATE EFFECT OF CEREBROVASCULAR ACCIDENT: ICD-10-CM

## 2021-08-17 DIAGNOSIS — Z00.00 ROUTINE GENERAL MEDICAL EXAMINATION AT HEALTH CARE FACILITY: Primary | ICD-10-CM

## 2021-08-17 PROCEDURE — 99397 PER PM REEVAL EST PAT 65+ YR: CPT | Mod: S$GLB,,, | Performed by: FAMILY MEDICINE

## 2021-08-17 PROCEDURE — 1159F PR MEDICATION LIST DOCUMENTED IN MEDICAL RECORD: ICD-10-PCS | Mod: CPTII,S$GLB,, | Performed by: FAMILY MEDICINE

## 2021-08-17 PROCEDURE — 1160F PR REVIEW ALL MEDS BY PRESCRIBER/CLIN PHARMACIST DOCUMENTED: ICD-10-PCS | Mod: CPTII,S$GLB,, | Performed by: FAMILY MEDICINE

## 2021-08-17 PROCEDURE — 3078F PR MOST RECENT DIASTOLIC BLOOD PRESSURE < 80 MM HG: ICD-10-PCS | Mod: CPTII,S$GLB,, | Performed by: FAMILY MEDICINE

## 2021-08-17 PROCEDURE — 1159F MED LIST DOCD IN RCRD: CPT | Mod: CPTII,S$GLB,, | Performed by: FAMILY MEDICINE

## 2021-08-17 PROCEDURE — 3074F SYST BP LT 130 MM HG: CPT | Mod: CPTII,S$GLB,, | Performed by: FAMILY MEDICINE

## 2021-08-17 PROCEDURE — 1160F RVW MEDS BY RX/DR IN RCRD: CPT | Mod: CPTII,S$GLB,, | Performed by: FAMILY MEDICINE

## 2021-08-17 PROCEDURE — 3008F PR BODY MASS INDEX (BMI) DOCUMENTED: ICD-10-PCS | Mod: CPTII,S$GLB,, | Performed by: FAMILY MEDICINE

## 2021-08-17 PROCEDURE — 3044F PR MOST RECENT HEMOGLOBIN A1C LEVEL <7.0%: ICD-10-PCS | Mod: CPTII,S$GLB,, | Performed by: FAMILY MEDICINE

## 2021-08-17 PROCEDURE — 3074F PR MOST RECENT SYSTOLIC BLOOD PRESSURE < 130 MM HG: ICD-10-PCS | Mod: CPTII,S$GLB,, | Performed by: FAMILY MEDICINE

## 2021-08-17 PROCEDURE — 99999 PR PBB SHADOW E&M-EST. PATIENT-LVL III: ICD-10-PCS | Mod: PBBFAC,,, | Performed by: FAMILY MEDICINE

## 2021-08-17 PROCEDURE — 99999 PR PBB SHADOW E&M-EST. PATIENT-LVL III: CPT | Mod: PBBFAC,,, | Performed by: FAMILY MEDICINE

## 2021-08-17 PROCEDURE — 3044F HG A1C LEVEL LT 7.0%: CPT | Mod: CPTII,S$GLB,, | Performed by: FAMILY MEDICINE

## 2021-08-17 PROCEDURE — 3078F DIAST BP <80 MM HG: CPT | Mod: CPTII,S$GLB,, | Performed by: FAMILY MEDICINE

## 2021-08-17 PROCEDURE — 3008F BODY MASS INDEX DOCD: CPT | Mod: CPTII,S$GLB,, | Performed by: FAMILY MEDICINE

## 2021-08-17 PROCEDURE — 99397 PR PREVENTIVE VISIT,EST,65 & OVER: ICD-10-PCS | Mod: S$GLB,,, | Performed by: FAMILY MEDICINE

## 2021-08-17 RX ORDER — TAMSULOSIN HYDROCHLORIDE 0.4 MG/1
0.4 CAPSULE ORAL DAILY
Qty: 30 CAPSULE | Refills: 2 | Status: SHIPPED | OUTPATIENT
Start: 2021-08-17 | End: 2021-10-12

## 2021-08-19 ENCOUNTER — IMMUNIZATION (OUTPATIENT)
Dept: INTERNAL MEDICINE | Facility: CLINIC | Age: 66
End: 2021-08-19
Payer: MEDICARE

## 2021-08-19 DIAGNOSIS — Z23 NEED FOR VACCINATION: Primary | ICD-10-CM

## 2021-08-19 PROCEDURE — 91300 COVID-19, MRNA, LNP-S, PF, 30 MCG/0.3 ML DOSE VACCINE: ICD-10-PCS | Mod: ,,, | Performed by: INTERNAL MEDICINE

## 2021-08-19 PROCEDURE — 0003A COVID-19, MRNA, LNP-S, PF, 30 MCG/0.3 ML DOSE VACCINE: CPT | Mod: CV19,,, | Performed by: INTERNAL MEDICINE

## 2021-08-19 PROCEDURE — 0003A COVID-19, MRNA, LNP-S, PF, 30 MCG/0.3 ML DOSE VACCINE: ICD-10-PCS | Mod: CV19,,, | Performed by: INTERNAL MEDICINE

## 2021-08-19 PROCEDURE — 91300 COVID-19, MRNA, LNP-S, PF, 30 MCG/0.3 ML DOSE VACCINE: CPT | Mod: ,,, | Performed by: INTERNAL MEDICINE

## 2021-08-23 ENCOUNTER — TELEPHONE (OUTPATIENT)
Dept: ENDOSCOPY | Facility: HOSPITAL | Age: 66
End: 2021-08-23

## 2021-08-23 DIAGNOSIS — Z01.818 PRE-OP TESTING: ICD-10-CM

## 2021-08-23 RX ORDER — SODIUM, POTASSIUM,MAG SULFATES 17.5-3.13G
1 SOLUTION, RECONSTITUTED, ORAL ORAL DAILY
Qty: 1 KIT | Refills: 0 | Status: SHIPPED | OUTPATIENT
Start: 2021-08-23 | End: 2021-08-25

## 2021-09-17 ENCOUNTER — TELEPHONE (OUTPATIENT)
Dept: ENDOSCOPY | Facility: HOSPITAL | Age: 66
End: 2021-09-17

## 2021-09-19 ENCOUNTER — CLINICAL SUPPORT (OUTPATIENT)
Dept: URGENT CARE | Facility: CLINIC | Age: 66
End: 2021-09-19
Payer: MEDICARE

## 2021-09-19 DIAGNOSIS — Z01.818 PRE-OP TESTING: ICD-10-CM

## 2021-09-19 PROCEDURE — U0005 INFEC AGEN DETEC AMPLI PROBE: HCPCS | Performed by: INTERNAL MEDICINE

## 2021-09-19 PROCEDURE — U0003 INFECTIOUS AGENT DETECTION BY NUCLEIC ACID (DNA OR RNA); SEVERE ACUTE RESPIRATORY SYNDROME CORONAVIRUS 2 (SARS-COV-2) (CORONAVIRUS DISEASE [COVID-19]), AMPLIFIED PROBE TECHNIQUE, MAKING USE OF HIGH THROUGHPUT TECHNOLOGIES AS DESCRIBED BY CMS-2020-01-R: HCPCS | Mod: HCNC | Performed by: INTERNAL MEDICINE

## 2021-09-20 ENCOUNTER — TELEPHONE (OUTPATIENT)
Dept: GASTROENTEROLOGY | Facility: CLINIC | Age: 66
End: 2021-09-20

## 2021-09-20 LAB
SARS-COV-2 RNA RESP QL NAA+PROBE: NOT DETECTED
SARS-COV-2- CYCLE NUMBER: NORMAL

## 2021-09-21 ENCOUNTER — ANESTHESIA EVENT (OUTPATIENT)
Dept: ENDOSCOPY | Facility: HOSPITAL | Age: 66
End: 2021-09-21
Payer: MEDICARE

## 2021-09-21 ENCOUNTER — ANESTHESIA (OUTPATIENT)
Dept: ENDOSCOPY | Facility: HOSPITAL | Age: 66
End: 2021-09-21
Payer: MEDICARE

## 2021-09-21 ENCOUNTER — HOSPITAL ENCOUNTER (OUTPATIENT)
Facility: HOSPITAL | Age: 66
Discharge: HOME | End: 2021-09-21
Attending: INTERNAL MEDICINE | Admitting: INTERNAL MEDICINE
Payer: MEDICARE

## 2021-09-21 VITALS
HEART RATE: 52 BPM | OXYGEN SATURATION: 100 % | WEIGHT: 221 LBS | SYSTOLIC BLOOD PRESSURE: 157 MMHG | TEMPERATURE: 98 F | RESPIRATION RATE: 17 BRPM | BODY MASS INDEX: 30.94 KG/M2 | DIASTOLIC BLOOD PRESSURE: 65 MMHG | HEIGHT: 71 IN

## 2021-09-21 DIAGNOSIS — Z12.11 SCREENING FOR MALIGNANT NEOPLASM OF COLON: ICD-10-CM

## 2021-09-21 LAB
GLUCOSE SERPL-MCNC: 122 MG/DL (ref 70–110)
POCT GLUCOSE: 122 MG/DL (ref 70–110)

## 2021-09-21 PROCEDURE — 27201012 HC FORCEPS, HOT/COLD, DISP: Mod: HCNC | Performed by: INTERNAL MEDICINE

## 2021-09-21 PROCEDURE — 88305 TISSUE EXAM BY PATHOLOGIST: CPT | Mod: HCNC | Performed by: PATHOLOGY

## 2021-09-21 PROCEDURE — 37000009 HC ANESTHESIA EA ADD 15 MINS: Mod: HCNC | Performed by: INTERNAL MEDICINE

## 2021-09-21 PROCEDURE — 37000008 HC ANESTHESIA 1ST 15 MINUTES: Mod: HCNC | Performed by: INTERNAL MEDICINE

## 2021-09-21 PROCEDURE — 45385 COLONOSCOPY W/LESION REMOVAL: CPT | Mod: PT,HCNC,, | Performed by: INTERNAL MEDICINE

## 2021-09-21 PROCEDURE — 82962 GLUCOSE BLOOD TEST: CPT | Mod: HCNC | Performed by: INTERNAL MEDICINE

## 2021-09-21 PROCEDURE — 25000003 PHARM REV CODE 250: Mod: HCNC | Performed by: NURSE ANESTHETIST, CERTIFIED REGISTERED

## 2021-09-21 PROCEDURE — 27201089 HC SNARE, DISP (ANY): Mod: HCNC | Performed by: INTERNAL MEDICINE

## 2021-09-21 PROCEDURE — 63600175 PHARM REV CODE 636 W HCPCS: Mod: HCNC | Performed by: NURSE ANESTHETIST, CERTIFIED REGISTERED

## 2021-09-21 PROCEDURE — 45385 COLONOSCOPY W/LESION REMOVAL: CPT | Mod: HCNC | Performed by: INTERNAL MEDICINE

## 2021-09-21 PROCEDURE — 45385 PR COLONOSCOPY,REMV LESN,SNARE: ICD-10-PCS | Mod: PT,HCNC,, | Performed by: INTERNAL MEDICINE

## 2021-09-21 PROCEDURE — 88305 TISSUE EXAM BY PATHOLOGIST: CPT | Mod: 26,HCNC,, | Performed by: PATHOLOGY

## 2021-09-21 PROCEDURE — 45380 COLONOSCOPY AND BIOPSY: CPT | Mod: 59,HCNC,, | Performed by: INTERNAL MEDICINE

## 2021-09-21 PROCEDURE — 88305 TISSUE EXAM BY PATHOLOGIST: ICD-10-PCS | Mod: 26,HCNC,, | Performed by: PATHOLOGY

## 2021-09-21 PROCEDURE — 45380 PR COLONOSCOPY,BIOPSY: ICD-10-PCS | Mod: 59,HCNC,, | Performed by: INTERNAL MEDICINE

## 2021-09-21 PROCEDURE — 45380 COLONOSCOPY AND BIOPSY: CPT | Mod: HCNC | Performed by: INTERNAL MEDICINE

## 2021-09-21 RX ORDER — SODIUM CHLORIDE 9 MG/ML
INJECTION, SOLUTION INTRAVENOUS CONTINUOUS
Status: DISCONTINUED | OUTPATIENT
Start: 2021-09-21 | End: 2021-09-21 | Stop reason: HOSPADM

## 2021-09-21 RX ORDER — PROPOFOL 10 MG/ML
VIAL (ML) INTRAVENOUS CONTINUOUS PRN
Status: DISCONTINUED | OUTPATIENT
Start: 2021-09-21 | End: 2021-09-21

## 2021-09-21 RX ORDER — LIDOCAINE HCL/PF 100 MG/5ML
SYRINGE (ML) INTRAVENOUS
Status: DISCONTINUED | OUTPATIENT
Start: 2021-09-21 | End: 2021-09-21

## 2021-09-21 RX ORDER — PROPOFOL 10 MG/ML
VIAL (ML) INTRAVENOUS
Status: DISCONTINUED | OUTPATIENT
Start: 2021-09-21 | End: 2021-09-21

## 2021-09-21 RX ORDER — SODIUM CHLORIDE 0.9 % (FLUSH) 0.9 %
10 SYRINGE (ML) INJECTION
Status: DISCONTINUED | OUTPATIENT
Start: 2021-09-21 | End: 2021-09-21 | Stop reason: HOSPADM

## 2021-09-21 RX ORDER — SODIUM CHLORIDE 9 MG/ML
INJECTION, SOLUTION INTRAVENOUS CONTINUOUS PRN
Status: DISCONTINUED | OUTPATIENT
Start: 2021-09-21 | End: 2021-09-21

## 2021-09-21 RX ADMIN — LIDOCAINE HYDROCHLORIDE 50 MG: 20 INJECTION, SOLUTION INTRAVENOUS at 12:09

## 2021-09-21 RX ADMIN — PROPOFOL 150 MCG/KG/MIN: 10 INJECTION, EMULSION INTRAVENOUS at 12:09

## 2021-09-21 RX ADMIN — SODIUM CHLORIDE: 9 INJECTION, SOLUTION INTRAVENOUS at 11:09

## 2021-09-21 RX ADMIN — PROPOFOL 50 MG: 10 INJECTION, EMULSION INTRAVENOUS at 12:09

## 2021-09-22 ENCOUNTER — TELEPHONE (OUTPATIENT)
Dept: ENDOSCOPY | Facility: HOSPITAL | Age: 66
End: 2021-09-22

## 2021-09-24 LAB
FINAL PATHOLOGIC DIAGNOSIS: NORMAL
GROSS: NORMAL
Lab: NORMAL

## 2021-12-13 ENCOUNTER — PES CALL (OUTPATIENT)
Dept: ADMINISTRATIVE | Facility: CLINIC | Age: 66
End: 2021-12-13
Payer: MEDICARE

## 2021-12-16 ENCOUNTER — OFFICE VISIT (OUTPATIENT)
Dept: FAMILY MEDICINE | Facility: CLINIC | Age: 66
End: 2021-12-16
Attending: FAMILY MEDICINE
Payer: MEDICARE

## 2021-12-16 ENCOUNTER — LAB VISIT (OUTPATIENT)
Dept: LAB | Facility: HOSPITAL | Age: 66
End: 2021-12-16
Attending: FAMILY MEDICINE
Payer: MEDICARE

## 2021-12-16 VITALS
SYSTOLIC BLOOD PRESSURE: 130 MMHG | DIASTOLIC BLOOD PRESSURE: 80 MMHG | HEART RATE: 66 BPM | HEIGHT: 71 IN | BODY MASS INDEX: 30.74 KG/M2 | WEIGHT: 219.56 LBS | OXYGEN SATURATION: 98 %

## 2021-12-16 DIAGNOSIS — Z00.00 ROUTINE GENERAL MEDICAL EXAMINATION AT HEALTH CARE FACILITY: Primary | ICD-10-CM

## 2021-12-16 DIAGNOSIS — Z00.00 ROUTINE GENERAL MEDICAL EXAMINATION AT HEALTH CARE FACILITY: ICD-10-CM

## 2021-12-16 DIAGNOSIS — I15.2 HYPERTENSION COMPLICATING DIABETES: ICD-10-CM

## 2021-12-16 DIAGNOSIS — E78.5 DYSLIPIDEMIA ASSOCIATED WITH TYPE 2 DIABETES MELLITUS: ICD-10-CM

## 2021-12-16 DIAGNOSIS — N40.0 BENIGN PROSTATIC HYPERPLASIA, UNSPECIFIED WHETHER LOWER URINARY TRACT SYMPTOMS PRESENT: ICD-10-CM

## 2021-12-16 DIAGNOSIS — E11.9 CONTROLLED TYPE 2 DIABETES MELLITUS WITHOUT COMPLICATION, WITHOUT LONG-TERM CURRENT USE OF INSULIN: ICD-10-CM

## 2021-12-16 DIAGNOSIS — I69.351 HEMIPARESIS AFFECTING RIGHT SIDE AS LATE EFFECT OF CEREBROVASCULAR ACCIDENT: ICD-10-CM

## 2021-12-16 DIAGNOSIS — E11.69 DYSLIPIDEMIA ASSOCIATED WITH TYPE 2 DIABETES MELLITUS: ICD-10-CM

## 2021-12-16 DIAGNOSIS — M54.50 CHRONIC BILATERAL LOW BACK PAIN WITHOUT SCIATICA: ICD-10-CM

## 2021-12-16 DIAGNOSIS — Z23 IMMUNIZATION DUE: ICD-10-CM

## 2021-12-16 DIAGNOSIS — G89.29 CHRONIC BILATERAL LOW BACK PAIN WITHOUT SCIATICA: ICD-10-CM

## 2021-12-16 DIAGNOSIS — E66.9 OBESITY (BMI 30-39.9): ICD-10-CM

## 2021-12-16 DIAGNOSIS — E11.59 HYPERTENSION COMPLICATING DIABETES: ICD-10-CM

## 2021-12-16 LAB
ALBUMIN/CREAT UR: 2 UG/MG (ref 0–30)
BILIRUB UR QL STRIP: NEGATIVE
CLARITY UR: CLEAR
COLOR UR: YELLOW
CREAT UR-MCNC: 251 MG/DL (ref 23–375)
GLUCOSE UR QL STRIP: NEGATIVE
HGB UR QL STRIP: NEGATIVE
KETONES UR QL STRIP: NEGATIVE
LEUKOCYTE ESTERASE UR QL STRIP: NEGATIVE
MICROALBUMIN UR DL<=1MG/L-MCNC: 5 UG/ML
NITRITE UR QL STRIP: NEGATIVE
PH UR STRIP: 5 [PH] (ref 5–8)
PROT UR QL STRIP: NEGATIVE
SP GR UR STRIP: 1.02 (ref 1–1.03)
URN SPEC COLLECT METH UR: NORMAL
UROBILINOGEN UR STRIP-ACNC: NEGATIVE EU/DL

## 2021-12-16 PROCEDURE — G0008 FLU VACCINE - QUADRIVALENT - HIGH DOSE (65+) PRESERVATIVE FREE IM: ICD-10-PCS | Mod: HCNC,S$GLB,, | Performed by: FAMILY MEDICINE

## 2021-12-16 PROCEDURE — 99214 PR OFFICE/OUTPT VISIT, EST, LEVL IV, 30-39 MIN: ICD-10-PCS | Mod: HCNC,25,S$GLB, | Performed by: FAMILY MEDICINE

## 2021-12-16 PROCEDURE — 99999 PR PBB SHADOW E&M-EST. PATIENT-LVL III: CPT | Mod: PBBFAC,HCNC,, | Performed by: FAMILY MEDICINE

## 2021-12-16 PROCEDURE — 99499 RISK ADDL DX/OHS AUDIT: ICD-10-PCS | Mod: HCNC,S$GLB,, | Performed by: FAMILY MEDICINE

## 2021-12-16 PROCEDURE — 99499 UNLISTED E&M SERVICE: CPT | Mod: HCNC,S$GLB,, | Performed by: FAMILY MEDICINE

## 2021-12-16 PROCEDURE — 4010F ACE/ARB THERAPY RXD/TAKEN: CPT | Mod: HCNC,CPTII,S$GLB, | Performed by: FAMILY MEDICINE

## 2021-12-16 PROCEDURE — 4010F PR ACE/ARB THEARPY RXD/TAKEN: ICD-10-PCS | Mod: HCNC,CPTII,S$GLB, | Performed by: FAMILY MEDICINE

## 2021-12-16 PROCEDURE — 90662 FLU VACCINE - QUADRIVALENT - HIGH DOSE (65+) PRESERVATIVE FREE IM: ICD-10-PCS | Mod: HCNC,S$GLB,, | Performed by: FAMILY MEDICINE

## 2021-12-16 PROCEDURE — 99214 OFFICE O/P EST MOD 30 MIN: CPT | Mod: HCNC,25,S$GLB, | Performed by: FAMILY MEDICINE

## 2021-12-16 PROCEDURE — 99999 PR PBB SHADOW E&M-EST. PATIENT-LVL III: ICD-10-PCS | Mod: PBBFAC,HCNC,, | Performed by: FAMILY MEDICINE

## 2021-12-16 PROCEDURE — 82570 ASSAY OF URINE CREATININE: CPT | Mod: HCNC | Performed by: FAMILY MEDICINE

## 2021-12-16 PROCEDURE — 81003 URINALYSIS AUTO W/O SCOPE: CPT | Mod: HCNC | Performed by: FAMILY MEDICINE

## 2021-12-16 PROCEDURE — 90662 IIV NO PRSV INCREASED AG IM: CPT | Mod: HCNC,S$GLB,, | Performed by: FAMILY MEDICINE

## 2021-12-16 PROCEDURE — G0008 ADMIN INFLUENZA VIRUS VAC: HCPCS | Mod: HCNC,S$GLB,, | Performed by: FAMILY MEDICINE

## 2021-12-16 RX ORDER — MELOXICAM 7.5 MG/1
7.5 TABLET ORAL DAILY
Qty: 90 TABLET | Refills: 0 | Status: SHIPPED | OUTPATIENT
Start: 2021-12-16 | End: 2022-03-29 | Stop reason: SDUPTHER

## 2021-12-16 RX ORDER — TAMSULOSIN HYDROCHLORIDE 0.4 MG/1
1 CAPSULE ORAL DAILY
Qty: 90 CAPSULE | Refills: 3 | Status: SHIPPED | OUTPATIENT
Start: 2021-12-16 | End: 2022-01-05

## 2022-01-05 DIAGNOSIS — N40.0 BENIGN PROSTATIC HYPERPLASIA, UNSPECIFIED WHETHER LOWER URINARY TRACT SYMPTOMS PRESENT: ICD-10-CM

## 2022-01-05 RX ORDER — TAMSULOSIN HYDROCHLORIDE 0.4 MG/1
CAPSULE ORAL
Qty: 90 CAPSULE | Refills: 3 | Status: SHIPPED | OUTPATIENT
Start: 2022-01-05 | End: 2022-10-26

## 2022-01-05 NOTE — TELEPHONE ENCOUNTER
No new care gaps identified.  Powered by [employer]. Reference number: [id number].   1/05/2022 6:25:54 AM CST

## 2022-01-05 NOTE — TELEPHONE ENCOUNTER
Refill Authorization Note   Anjel Sanders  is requesting a refill authorization.  Brief Assessment and Rationale for Refill:  Approve     Medication Therapy Plan:       Medication Reconciliation Completed: No   Comments:   --->Care Gap information included below if applicable.   Orders Placed This Encounter    tamsulosin (FLOMAX) 0.4 mg Cap      Requested Prescriptions   Signed Prescriptions Disp Refills    tamsulosin (FLOMAX) 0.4 mg Cap 90 capsule 3     Sig: TAKE 1 CAPSULE EVERY DAY       Urology: Alpha-Adrenergic Blocker Passed - 1/5/2022  6:25 AM        Passed - Patient is at least 18 years old        Passed - Prostate Cancer is not on problem list        Passed - BP > 90/50 mmH     BP Readings from Last 1 Encounters:   12/16/21 130/80               Passed - Valid encounter within last 15 months     Recent Visits  Date Type Provider Dept   12/16/21 Office Visit Lars Payan MD [facility]   08/17/21 Office Visit Lars Payan MD [facility]   06/17/21 Office Visit Lars Payan MD [facility]   12/17/20 Office Visit Lars Payan MD [facility]   06/19/20 Office Visit Lars Payan MD [facility]   05/20/20 Office Visit Lars Payan MD [facility]   Showing recent visits within past 720 days and meeting all other requirements  Future Appointments  No visits were found meeting these conditions.  Showing future appointments within next 150 days and meeting all other requirements      Future Appointments              Tomorrow [facility] Chen - [facility]    In 5 months MD Alondra Garcia - Family [facility]ond[facility]                    Appointments  past 12m or future 3m with PCP    Date Provider   Last Visit   12/16/2021 Lars Payan MD   Next Visit   6/16/2022 Lars Payan MD   ED visits in past 90 days: 0     Note composed:4:21 PM 01/05/2022

## 2022-01-07 ENCOUNTER — TELEPHONE (OUTPATIENT)
Dept: FAMILY MEDICINE | Facility: CLINIC | Age: 67
End: 2022-01-07
Payer: MEDICARE

## 2022-01-07 NOTE — TELEPHONE ENCOUNTER
Called Patient back to reschedule Medicare Wellness Visit offered by [facility]. Patient was sleep, Patient's Wife stated that he will give a callback when he wakes up.

## 2022-01-14 ENCOUNTER — TELEPHONE (OUTPATIENT)
Dept: FAMILY MEDICINE | Facility: CLINIC | Age: 67
End: 2022-01-14
Payer: MEDICARE

## 2022-01-14 NOTE — TELEPHONE ENCOUNTER
----- Message from Radha Mijares sent at 1/14/2022  1:18 PM CST -----  Contact: [phone]  Type:  Needs Medical Advice    Who Called: pt called  Symptoms (please be specific): cough, chest congestion  How long has patient had these symptoms:  few days  Pharmacy name and phone #:  [id number] - [place], [address] AT [facility]  Would the patient rather a call back or a response via [employer]? Call back  Best Call Back Number: [phone]  Additional Information:

## 2022-01-18 ENCOUNTER — TELEPHONE (OUTPATIENT)
Dept: FAMILY MEDICINE | Facility: CLINIC | Age: 67
End: 2022-01-18
Payer: MEDICARE

## 2022-01-18 RX ORDER — PROMETHAZINE HYDROCHLORIDE AND DEXTROMETHORPHAN HYDROBROMIDE 6.25; 15 MG/5ML; MG/5ML
5 SYRUP ORAL 2 TIMES DAILY PRN
Qty: 180 ML | Refills: 1 | Status: SHIPPED | OUTPATIENT
Start: 2022-01-18 | End: 2022-01-28

## 2022-01-18 NOTE — TELEPHONE ENCOUNTER
Spoke to pt's wife, Kalie and stated that pt has a cough with phlegm and requesting cough medication. Pls advise.

## 2022-01-18 NOTE — TELEPHONE ENCOUNTER
----- Message from Shannan Guerra sent at 1/18/2022  1:38 PM CST -----  Contact: Kalie (wife) [phone]  Type:  Needs Medical Advice    Who Called:  Kalie   Symptoms (please be specific):  wet cough    How long has patient had these symptoms:   3-4 days   Pharmacy name and phone #:   [id number] - [address] AT [facility]   Phone:  [phone]  Fax:  [phone]  Would the patient rather a call back or a response via [facility]?  Call back   Best Call Back Number:  [phone]  Additional Information:  none

## 2022-01-26 ENCOUNTER — TELEPHONE (OUTPATIENT)
Dept: FAMILY MEDICINE | Facility: CLINIC | Age: 67
End: 2022-01-26
Payer: MEDICARE

## 2022-01-26 NOTE — TELEPHONE ENCOUNTER
----- Message from Isabel Zavaleta sent at 1/26/2022  9:22 AM CST -----  Contact: [phone]/ self  Type:  Patient Returning Call    Who Called: pt   Who Left Message for Patient: the nurse   Does the patient know what this is regarding?: no   Would the patient rather a call back or a response via [employer]?  Call back   Best Call Back Number: [phone]  Additional Information:

## 2022-02-01 ENCOUNTER — HOSPITAL ENCOUNTER (OUTPATIENT)
Dept: RADIOLOGY | Facility: HOSPITAL | Age: 67
Discharge: HOME | End: 2022-02-01
Attending: FAMILY MEDICINE
Payer: MEDICARE

## 2022-02-01 ENCOUNTER — OFFICE VISIT (OUTPATIENT)
Dept: FAMILY MEDICINE | Facility: CLINIC | Age: 67
End: 2022-02-01
Attending: FAMILY MEDICINE
Payer: MEDICARE

## 2022-02-01 VITALS
OXYGEN SATURATION: 99 % | BODY MASS INDEX: 31.11 KG/M2 | WEIGHT: 222.25 LBS | HEART RATE: 76 BPM | DIASTOLIC BLOOD PRESSURE: 80 MMHG | SYSTOLIC BLOOD PRESSURE: 130 MMHG | HEIGHT: 71 IN

## 2022-02-01 DIAGNOSIS — I69.351 HEMIPARESIS AFFECTING RIGHT SIDE AS LATE EFFECT OF CEREBROVASCULAR ACCIDENT: ICD-10-CM

## 2022-02-01 DIAGNOSIS — E11.69 DYSLIPIDEMIA ASSOCIATED WITH TYPE 2 DIABETES MELLITUS: ICD-10-CM

## 2022-02-01 DIAGNOSIS — N18.31 CHRONIC KIDNEY DISEASE, STAGE 3A: ICD-10-CM

## 2022-02-01 DIAGNOSIS — R05.9 COUGH: ICD-10-CM

## 2022-02-01 DIAGNOSIS — R05.9 COUGH: Primary | ICD-10-CM

## 2022-02-01 DIAGNOSIS — E78.5 DYSLIPIDEMIA ASSOCIATED WITH TYPE 2 DIABETES MELLITUS: ICD-10-CM

## 2022-02-01 LAB
CTP QC/QA: YES
CTP QC/QA: YES
FLUAV AG NPH QL: NEGATIVE
FLUBV AG NPH QL: NEGATIVE
SARS-COV-2 AG RESP QL IA.RAPID: NEGATIVE

## 2022-02-01 PROCEDURE — 3079F DIAST BP 80-89 MM HG: CPT | Mod: HCNC,CPTII,S$GLB, | Performed by: FAMILY MEDICINE

## 2022-02-01 PROCEDURE — 3288F PR FALLS RISK ASSESSMENT DOCUMENTED: ICD-10-PCS | Mod: HCNC,CPTII,S$GLB, | Performed by: FAMILY MEDICINE

## 2022-02-01 PROCEDURE — 1159F MED LIST DOCD IN RCRD: CPT | Mod: HCNC,CPTII,S$GLB, | Performed by: FAMILY MEDICINE

## 2022-02-01 PROCEDURE — 1101F PT FALLS ASSESS-DOCD LE1/YR: CPT | Mod: HCNC,CPTII,S$GLB, | Performed by: FAMILY MEDICINE

## 2022-02-01 PROCEDURE — 1101F PR PT FALLS ASSESS DOC 0-1 FALLS W/OUT INJ PAST YR: ICD-10-PCS | Mod: HCNC,CPTII,S$GLB, | Performed by: FAMILY MEDICINE

## 2022-02-01 PROCEDURE — 3008F PR BODY MASS INDEX (BMI) DOCUMENTED: ICD-10-PCS | Mod: HCNC,CPTII,S$GLB, | Performed by: FAMILY MEDICINE

## 2022-02-01 PROCEDURE — 3288F FALL RISK ASSESSMENT DOCD: CPT | Mod: HCNC,CPTII,S$GLB, | Performed by: FAMILY MEDICINE

## 2022-02-01 PROCEDURE — 99214 OFFICE O/P EST MOD 30 MIN: CPT | Mod: HCNC,S$GLB,, | Performed by: FAMILY MEDICINE

## 2022-02-01 PROCEDURE — 99499 RISK ADDL DX/OHS AUDIT: ICD-10-PCS | Mod: HCNC,S$GLB,, | Performed by: FAMILY MEDICINE

## 2022-02-01 PROCEDURE — 71046 X-RAY EXAM CHEST 2 VIEWS: CPT | Mod: TC,HCNC,FY

## 2022-02-01 PROCEDURE — 87804 POCT INFLUENZA A/B: ICD-10-PCS | Mod: 59,QW,HCNC,S$GLB | Performed by: FAMILY MEDICINE

## 2022-02-01 PROCEDURE — 99214 PR OFFICE/OUTPT VISIT, EST, LEVL IV, 30-39 MIN: ICD-10-PCS | Mod: HCNC,S$GLB,, | Performed by: FAMILY MEDICINE

## 2022-02-01 PROCEDURE — 99999 PR PBB SHADOW E&M-EST. PATIENT-LVL IV: ICD-10-PCS | Mod: PBBFAC,HCNC,, | Performed by: FAMILY MEDICINE

## 2022-02-01 PROCEDURE — 3075F PR MOST RECENT SYSTOLIC BLOOD PRESS GE 130-139MM HG: ICD-10-PCS | Mod: HCNC,CPTII,S$GLB, | Performed by: FAMILY MEDICINE

## 2022-02-01 PROCEDURE — 3072F LOW RISK FOR RETINOPATHY: CPT | Mod: HCNC,CPTII,S$GLB, | Performed by: FAMILY MEDICINE

## 2022-02-01 PROCEDURE — 1160F RVW MEDS BY RX/DR IN RCRD: CPT | Mod: HCNC,CPTII,S$GLB, | Performed by: FAMILY MEDICINE

## 2022-02-01 PROCEDURE — 3072F PR LOW RISK FOR RETINOPATHY: ICD-10-PCS | Mod: HCNC,CPTII,S$GLB, | Performed by: FAMILY MEDICINE

## 2022-02-01 PROCEDURE — 71046 XR CHEST PA AND LATERAL: ICD-10-PCS | Mod: 26,HCNC,, | Performed by: RADIOLOGY

## 2022-02-01 PROCEDURE — 87426 SARSCOV CORONAVIRUS AG IA: CPT | Mod: QW,HCNC,S$GLB, | Performed by: FAMILY MEDICINE

## 2022-02-01 PROCEDURE — 1126F PR PAIN SEVERITY QUANTIFIED, NO PAIN PRESENT: ICD-10-PCS | Mod: HCNC,CPTII,S$GLB, | Performed by: FAMILY MEDICINE

## 2022-02-01 PROCEDURE — 1126F AMNT PAIN NOTED NONE PRSNT: CPT | Mod: HCNC,CPTII,S$GLB, | Performed by: FAMILY MEDICINE

## 2022-02-01 PROCEDURE — 1159F PR MEDICATION LIST DOCUMENTED IN MEDICAL RECORD: ICD-10-PCS | Mod: HCNC,CPTII,S$GLB, | Performed by: FAMILY MEDICINE

## 2022-02-01 PROCEDURE — 99999 PR PBB SHADOW E&M-EST. PATIENT-LVL IV: CPT | Mod: PBBFAC,HCNC,, | Performed by: FAMILY MEDICINE

## 2022-02-01 PROCEDURE — 87804 INFLUENZA ASSAY W/OPTIC: CPT | Mod: QW,HCNC,S$GLB, | Performed by: FAMILY MEDICINE

## 2022-02-01 PROCEDURE — 1160F PR REVIEW ALL MEDS BY PRESCRIBER/CLIN PHARMACIST DOCUMENTED: ICD-10-PCS | Mod: HCNC,CPTII,S$GLB, | Performed by: FAMILY MEDICINE

## 2022-02-01 PROCEDURE — 3008F BODY MASS INDEX DOCD: CPT | Mod: HCNC,CPTII,S$GLB, | Performed by: FAMILY MEDICINE

## 2022-02-01 PROCEDURE — 3079F PR MOST RECENT DIASTOLIC BLOOD PRESSURE 80-89 MM HG: ICD-10-PCS | Mod: HCNC,CPTII,S$GLB, | Performed by: FAMILY MEDICINE

## 2022-02-01 PROCEDURE — 99499 UNLISTED E&M SERVICE: CPT | Mod: HCNC,S$GLB,, | Performed by: FAMILY MEDICINE

## 2022-02-01 PROCEDURE — 87426 SARS CORONAVIRUS 2 ANTIGEN POCT: ICD-10-PCS | Mod: QW,HCNC,S$GLB, | Performed by: FAMILY MEDICINE

## 2022-02-01 PROCEDURE — 3075F SYST BP GE 130 - 139MM HG: CPT | Mod: HCNC,CPTII,S$GLB, | Performed by: FAMILY MEDICINE

## 2022-02-01 PROCEDURE — 71046 X-RAY EXAM CHEST 2 VIEWS: CPT | Mod: 26,HCNC,, | Performed by: RADIOLOGY

## 2022-02-01 RX ORDER — BENZONATATE 100 MG/1
100 CAPSULE ORAL 3 TIMES DAILY PRN
Qty: 30 CAPSULE | Refills: 0 | Status: SHIPPED | OUTPATIENT
Start: 2022-02-01 | End: 2023-04-10

## 2022-02-01 NOTE — PROGRESS NOTES
Subjective:       Patient ID: Anjel Sanders is a 66 y.o. male.    Chief Complaint: Cough and Chest Congestion    66 yr old black male with Hypertension, DM II, Hyperlipidemia, gout, ED, MURTAZA, presents today for his follow up and c/o cough x 2-3 weeks. It is dry and no phlegm. No SOB r fever. COVID negative.    DM II - diet controlled  -      HGBA1C                   6.1 (H)             12/16/2021                                  - no s/s of DM - foot exam UTD - eye exam due - on ASA and ACE    HTN - controlled - on prinzide - no side effects - does not need refills    HLD -controlled - on statin -    LDLCALC                  89.4                06/21/2021                                               - on meds - need labs    ED - controlled - need cialis -     Gout - controlled    -  on allopurinol    Health maintenance  -UTD except labs    Cough  Associated symptoms include myalgias. Pertinent negatives include no chest pain, ear pain, headaches, rash, rhinorrhea, sweats or wheezing. There is no history of environmental allergies.   Follow-up  Associated symptoms include coughing and myalgias. Pertinent negatives include no chest pain, congestion, diaphoresis, headaches, neck pain, rash, vomiting or weakness.   Hypertension  This is a chronic problem. The current episode started more than 1 year ago. The problem has been gradually improving since onset. The problem is controlled. Pertinent negatives include no anxiety, blurred vision, chest pain, headaches, malaise/fatigue, neck pain, orthopnea, palpitations, peripheral edema, PND or sweats. There are no associated agents to hypertension. Risk factors for coronary artery disease include dyslipidemia, obesity and male gender. Past treatments include ACE inhibitors and diuretics. The current treatment provides moderate improvement. There are no compliance problems.  There is no history of angina, CAD/MI, CVA, heart failure, left ventricular hypertrophy or  retinopathy. There is no history of chronic renal disease, coarctation of the aorta, hypercortisolism, pheochromocytoma, renovascular disease or a thyroid problem.   Hyperlipidemia  This is a chronic problem. The current episode started more than 1 year ago. The problem is controlled. Recent lipid tests were reviewed and are normal. Exacerbating diseases include obesity. He has no history of chronic renal disease, diabetes, hypothyroidism or liver disease. There are no known factors aggravating his hyperlipidemia. Associated symptoms include myalgias. Pertinent negatives include no chest pain, focal sensory loss, focal weakness or leg pain. He is currently on no antihyperlipidemic treatment. The current treatment provides moderate improvement of lipids. There are no compliance problems.  Risk factors for coronary artery disease include dyslipidemia, hypertension, obesity and male sex.   Shoulder Injury   The left shoulder is affected. The incident occurred more than 1 week ago. The injury mechanism was a fall. The quality of the pain is described as cramping and shooting. The pain does not radiate. The pain is at a severity of 6/10. The pain is moderate. Pertinent negatives include no chest pain. The symptoms are aggravated by movement, overhead lifting and palpation. He has tried NSAIDs for the symptoms. The treatment provided moderate relief.     Review of Systems   Constitutional: Negative.  Negative for activity change, diaphoresis, malaise/fatigue and unexpected weight change.   HENT: Negative.  Negative for nasal congestion, ear pain, mouth sores, rhinorrhea and voice change.    Eyes: Negative.  Negative for blurred vision, pain, discharge and visual disturbance.   Respiratory: Positive for cough. Negative for apnea and wheezing.    Cardiovascular: Negative.  Negative for chest pain, palpitations, orthopnea and PND.   Gastrointestinal: Negative.  Negative for abdominal distention, anal bleeding, diarrhea and  vomiting.   Endocrine: Negative.  Negative for cold intolerance and polyuria.   Genitourinary: Negative.  Negative for decreased urine volume, difficulty urinating, discharge, frequency and scrotal swelling.   Musculoskeletal: Positive for myalgias. Negative for back pain, leg pain, neck pain and neck stiffness.   Integumentary:  Negative for color change and rash. Negative.   Allergic/Immunologic: Negative.  Negative for environmental allergies.   Neurological: Negative.  Negative for dizziness, focal weakness, speech difficulty, weakness, light-headedness and headaches.   Hematological: Negative.    Psychiatric/Behavioral: Negative.  Negative for agitation, dysphoric mood and suicidal ideas. The patient is not nervous/anxious.          PMH/PSH/FH/SH/MED/ALLERGY reviewed    Objective:       Vitals:    02/01/22 1026   BP: 130/80   Pulse: 76       Physical Exam  Constitutional:       General: He is not in acute distress.     Appearance: He is well-developed. He is not diaphoretic.   HENT:      Head: Normocephalic and atraumatic.      Right Ear: External ear normal.      Left Ear: External ear normal.      Nose: Nose normal.      Mouth/Throat:      Pharynx: No oropharyngeal exudate.   Eyes:      General: No scleral icterus.        Right eye: No discharge.         Left eye: No discharge.      Conjunctiva/sclera: Conjunctivae normal.      Pupils: Pupils are equal, round, and reactive to light.   Neck:      Thyroid: No thyromegaly.      Vascular: No JVD.      Trachea: No tracheal deviation.   Cardiovascular:      Rate and Rhythm: Normal rate and regular rhythm.      Heart sounds: Murmur (3/6 ESM AORTIC AREA) heard.   No friction rub. No gallop.    Pulmonary:      Effort: Pulmonary effort is normal. No respiratory distress.      Breath sounds: Normal breath sounds. No stridor. No wheezing or rales.   Chest:      Chest wall: No tenderness.   Abdominal:      General: Bowel sounds are normal. There is no distension.       Palpations: Abdomen is soft. There is no mass.      Tenderness: There is no abdominal tenderness. There is no guarding or rebound.      Hernia: No hernia is present.   Musculoskeletal:         General: Tenderness (TTP LEFT SHOULDER WITH ROTATOR CUFF IMPINGEMENT) present. Normal range of motion.      Cervical back: Normal range of motion and neck supple.   Lymphadenopathy:      Cervical: No cervical adenopathy.   Skin:     General: Skin is warm and dry.      Coloration: Skin is not pale.      Findings: No erythema or rash.   Neurological:      Mental Status: He is alert and oriented to person, place, and time.      Cranial Nerves: No cranial nerve deficit.      Motor: No abnormal muscle tone.      Coordination: Coordination normal.      Deep Tendon Reflexes: Reflexes are normal and symmetric. Reflexes normal.      Comments: No deficits   Psychiatric:         Behavior: Behavior normal.         Thought Content: Thought content normal.         Judgment: Judgment normal.         Assessment:       Problem List Items Addressed This Visit     Hemiparesis affecting right side as late effect of cerebrovascular accident    Dyslipidemia associated with type 2 diabetes mellitus    Chronic kidney disease, stage 3a      Other Visit Diagnoses     Cough    -  Primary    Relevant Medications    benzonatate (TESSALON) 100 MG capsule    Other Relevant Orders    X-Ray Chest PA And Lateral    SARS Coronavirus 2 Antigen, POCT (Completed)    POCT Influenza A/B (Completed)          Plan:           Anjel was seen today for cough and chest congestion.    Diagnoses and all orders for this visit:    Cough  -     X-Ray Chest PA And Lateral; Future  -     SARS Coronavirus 2 Antigen, POCT  -     POCT Influenza A/B  -     benzonatate (TESSALON) 100 MG capsule; Take 1 capsule (100 mg total) by mouth 3 (three) times daily as needed for Cough.    Chronic kidney disease, stage 3a    Dyslipidemia associated with type 2 diabetes mellitus    Hemiparesis  affecting right side as late effect of cerebrovascular accident      Cough  -likely allergic  -covid and flu negative  CXR normal  -tessalon perles prn cough    BPH  -continue flomax daily. Side effects of medications have been discussed and patient agreed to proceed with treatment and understands the risks and benefits.    HTN/HLD   -controlled - stable - on meds       HEART MURMUR  -ECHO reassurance      ED and Gout   -controlled     MURTAZA  -stable    CVA  -on ASA and plavix      DM II  -controlled  -diet and exercise only    Obesity  -diet and exercise  -weight loss          Spent adequate time in obtaining history and explaining differentials     25 minutes spent during this visit of which greater than 50% devoted to face-face counseling and coordination of care regarding diagnosis and management plan    Follow up in about 4 months (around 6/16/2022), or if symptoms worsen or fail to improve.

## 2022-02-24 ENCOUNTER — IMMUNIZATION (OUTPATIENT)
Dept: INTERNAL MEDICINE | Facility: CLINIC | Age: 67
End: 2022-02-24
Payer: MEDICARE

## 2022-02-24 DIAGNOSIS — Z23 NEED FOR VACCINATION: Primary | ICD-10-CM

## 2022-02-24 PROCEDURE — 91305 COVID-19, MRNA, LNP-S, PF, 30 MCG/0.3 ML DOSE VACCINE (PFIZER): CPT | Mod: HCNC,S$GLB,, | Performed by: FAMILY MEDICINE

## 2022-02-24 PROCEDURE — 91305 COVID-19, MRNA, LNP-S, PF, 30 MCG/0.3 ML DOSE VACCINE (PFIZER): ICD-10-PCS | Mod: HCNC,S$GLB,, | Performed by: FAMILY MEDICINE

## 2022-04-27 PROBLEM — Z12.11 SCREENING FOR MALIGNANT NEOPLASM OF COLON: Status: RESOLVED | Noted: 2021-09-21 | Resolved: 2022-04-27

## 2022-04-27 PROBLEM — L23.7 ALLERGIC CONTACT DERMATITIS DUE TO PLANT: Status: RESOLVED | Noted: 2020-11-11 | Resolved: 2022-04-27

## 2022-04-27 PROBLEM — N18.2 CKD (CHRONIC KIDNEY DISEASE) STAGE 2, GFR 60-89 ML/MIN: Status: ACTIVE | Noted: 2022-02-01

## 2022-04-27 NOTE — PROGRESS NOTES
"  Anjel Sanders presented for a  Medicare AWV and comprehensive Health Risk Assessment today. The following components were reviewed and updated:    · Medical history  · Family History  · Social history  · Allergies and Current Medications  · Health Risk Assessment  · Health Maintenance  · Care Team         ** See Completed Assessments for Annual Wellness Visit within the encounter summary.**         The following assessments were completed:  · Living Situation  · CAGE  · Depression Screening  · Timed Get Up and Go  · Whisper Test  · Cognitive Function Screening  · Nutrition Screening  · ADL Screening  · PAQ Screening        Vitals:    05/03/22 0815 05/03/22 0843   BP: (!) 150/79 (!) 144/74   BP Location: Right arm Right arm   Patient Position: Sitting Sitting   BP Method:  Large (Manual)   Pulse: 61    Temp: 98.6 °F (37 °C)    SpO2: 98%    Weight: 101.3 kg (223 lb 5.2 oz)    Height: 5' 11" (1.803 m)      Body mass index is 31.15 kg/m².  Physical Exam  Vitals and nursing note reviewed.   Constitutional:       General: He is not in acute distress.     Appearance: Normal appearance. He is not ill-appearing.   HENT:      Head: Normocephalic and atraumatic.   Eyes:      General: No scleral icterus.        Right eye: No discharge.         Left eye: No discharge.      Extraocular Movements: Extraocular movements intact.      Conjunctiva/sclera: Conjunctivae normal.   Cardiovascular:      Rate and Rhythm: Normal rate and regular rhythm.      Heart sounds: Normal heart sounds. No murmur heard.  Pulmonary:      Effort: Pulmonary effort is normal. No respiratory distress.      Breath sounds: Normal breath sounds. No wheezing, rhonchi or rales.   Musculoskeletal:      Cervical back: Normal range of motion.      Right lower leg: No edema.      Left lower leg: No edema.   Skin:     General: Skin is warm and dry.      Findings: No rash.   Neurological:      Mental Status: He is alert and oriented to person, place, and time.     "  Comments: Hemiparesis affecting right side    Psychiatric:         Mood and Affect: Mood normal.         Behavior: Behavior normal. Behavior is cooperative.         Cognition and Memory: Cognition and memory normal.               Diagnoses and health risks identified today and associated recommendations/orders:    1. Encounter for preventive health examination  - Chart reviewed. Problem list updated. Discussed current medical diagnosis, current medications, medical/surgical/family/social history; updated provider list; documented vital signs; identified any cognitive impairment; and updated risk factor list. Addressed any outstanding health maintenance. Provided patient with personalized health advice. Continue to follow up with PCP and any specialists.     2. Hemiparesis affecting right side as late effect of cerebrovascular accident  Chronic; stable on current treatment plan; follow up with PCP    3. Dyslipidemia associated with type 2 diabetes mellitus  Chronic; stable on current treatment plan; follow up with PCP  - recommend low chol/low fat diet   - taking statin     4. Obesity, diabetes, and hypertension syndrome  - Recommendation for healthy diet and increasing exercise as tolerated with goal of 150min/week . Recommend weight loss    5. Hypertension complicating diabetes  Chronic; stable on current treatment plan; follow up with PCP  - recommend low sodium diabetic diet   - BP elevated today ; patient did not take AM blood pressure medications  - decrease caffeine use, takes meds as directed  - repeat BP check in 3 weeks with PCP     6. CKD (chronic kidney disease) stage 2, GFR 60-89 ml/min  Chronic; stable on current treatment plan; follow up with PCP  - recommend avoiding NSAIDS     7. Erectile dysfunction due to arterial insufficiency  Chronic; stable on current treatment plan; follow up with PCP    8. MURTAZA (obstructive sleep apnea)  Chronic; stable on current treatment plan; follow up with PCP    9. BMI  31.0-31.9,adult  - Recommendation for healthy diet and increasing exercise as tolerated with goal of 150min/week . Recommend weight loss        Provided Anjel with a 5-10 year written screening schedule and personal prevention plan. Recommendations were developed using the USPSTF age appropriate recommendations. Education, counseling, and referrals were provided as needed. After Visit Summary printed and given to patient which includes a list of additional screenings\tests needed.    Follow up in about 1 year (around 5/3/2023) for your next annual wellness visit.    Josefa Kramer, NALINIP-C   Advance Care Planning         I offered to discuss advanced care planning, including how to pick a person who would make decisions for you if you were unable to make them for yourself, called a health care power of , and what kind of decisions you might make such as use of life sustaining treatments such as ventilators and tube feeding when faced with a life limiting illness recorded on a living will that they will need to know. (How you want to be cared for as you near the end of your natural life)     X  Patient is unwilling to engage in a discussion regarding advance directives at this time.

## 2022-05-02 ENCOUNTER — TELEPHONE (OUTPATIENT)
Dept: ADMINISTRATIVE | Facility: CLINIC | Age: 67
End: 2022-05-02
Payer: MEDICARE

## 2022-05-03 ENCOUNTER — TELEPHONE (OUTPATIENT)
Dept: FAMILY MEDICINE | Facility: CLINIC | Age: 67
End: 2022-05-03

## 2022-05-03 ENCOUNTER — OFFICE VISIT (OUTPATIENT)
Dept: FAMILY MEDICINE | Facility: CLINIC | Age: 67
End: 2022-05-03
Payer: MEDICARE

## 2022-05-03 VITALS
DIASTOLIC BLOOD PRESSURE: 74 MMHG | OXYGEN SATURATION: 98 % | BODY MASS INDEX: 31.26 KG/M2 | TEMPERATURE: 99 F | HEART RATE: 61 BPM | SYSTOLIC BLOOD PRESSURE: 144 MMHG | WEIGHT: 223.31 LBS | HEIGHT: 71 IN

## 2022-05-03 DIAGNOSIS — E11.59 OBESITY, DIABETES, AND HYPERTENSION SYNDROME: ICD-10-CM

## 2022-05-03 DIAGNOSIS — E11.69 OBESITY, DIABETES, AND HYPERTENSION SYNDROME: ICD-10-CM

## 2022-05-03 DIAGNOSIS — E11.69 DYSLIPIDEMIA ASSOCIATED WITH TYPE 2 DIABETES MELLITUS: ICD-10-CM

## 2022-05-03 DIAGNOSIS — E78.5 DYSLIPIDEMIA ASSOCIATED WITH TYPE 2 DIABETES MELLITUS: ICD-10-CM

## 2022-05-03 DIAGNOSIS — I15.2 OBESITY, DIABETES, AND HYPERTENSION SYNDROME: ICD-10-CM

## 2022-05-03 DIAGNOSIS — E66.9 OBESITY, DIABETES, AND HYPERTENSION SYNDROME: ICD-10-CM

## 2022-05-03 DIAGNOSIS — Z00.00 ENCOUNTER FOR PREVENTIVE HEALTH EXAMINATION: Primary | ICD-10-CM

## 2022-05-03 DIAGNOSIS — I15.2 HYPERTENSION COMPLICATING DIABETES: ICD-10-CM

## 2022-05-03 DIAGNOSIS — E78.2 MIXED HYPERLIPIDEMIA: ICD-10-CM

## 2022-05-03 DIAGNOSIS — Z12.5 ENCOUNTER FOR SCREENING FOR MALIGNANT NEOPLASM OF PROSTATE: ICD-10-CM

## 2022-05-03 DIAGNOSIS — E11.59 HYPERTENSION COMPLICATING DIABETES: ICD-10-CM

## 2022-05-03 DIAGNOSIS — N52.01 ERECTILE DYSFUNCTION DUE TO ARTERIAL INSUFFICIENCY: ICD-10-CM

## 2022-05-03 DIAGNOSIS — N18.2 CKD (CHRONIC KIDNEY DISEASE) STAGE 2, GFR 60-89 ML/MIN: ICD-10-CM

## 2022-05-03 DIAGNOSIS — G47.33 OSA (OBSTRUCTIVE SLEEP APNEA): ICD-10-CM

## 2022-05-03 DIAGNOSIS — I69.351 HEMIPARESIS AFFECTING RIGHT SIDE AS LATE EFFECT OF CEREBROVASCULAR ACCIDENT: ICD-10-CM

## 2022-05-03 DIAGNOSIS — Z00.00 ROUTINE GENERAL MEDICAL EXAMINATION AT HEALTH CARE FACILITY: Primary | ICD-10-CM

## 2022-05-03 PROCEDURE — 1101F PT FALLS ASSESS-DOCD LE1/YR: CPT | Mod: CPTII,S$GLB,, | Performed by: NURSE PRACTITIONER

## 2022-05-03 PROCEDURE — 3008F PR BODY MASS INDEX (BMI) DOCUMENTED: ICD-10-PCS | Mod: CPTII,S$GLB,, | Performed by: NURSE PRACTITIONER

## 2022-05-03 PROCEDURE — 3072F PR LOW RISK FOR RETINOPATHY: ICD-10-PCS | Mod: CPTII,S$GLB,, | Performed by: NURSE PRACTITIONER

## 2022-05-03 PROCEDURE — 99999 PR PBB SHADOW E&M-EST. PATIENT-LVL V: CPT | Mod: PBBFAC,,, | Performed by: NURSE PRACTITIONER

## 2022-05-03 PROCEDURE — 1126F AMNT PAIN NOTED NONE PRSNT: CPT | Mod: CPTII,S$GLB,, | Performed by: NURSE PRACTITIONER

## 2022-05-03 PROCEDURE — 1160F PR REVIEW ALL MEDS BY PRESCRIBER/CLIN PHARMACIST DOCUMENTED: ICD-10-PCS | Mod: CPTII,S$GLB,, | Performed by: NURSE PRACTITIONER

## 2022-05-03 PROCEDURE — 3078F PR MOST RECENT DIASTOLIC BLOOD PRESSURE < 80 MM HG: ICD-10-PCS | Mod: CPTII,S$GLB,, | Performed by: NURSE PRACTITIONER

## 2022-05-03 PROCEDURE — 99499 UNLISTED E&M SERVICE: CPT | Mod: HCNC,,, | Performed by: NURSE PRACTITIONER

## 2022-05-03 PROCEDURE — G0439 PPPS, SUBSEQ VISIT: HCPCS | Mod: S$GLB,,, | Performed by: NURSE PRACTITIONER

## 2022-05-03 PROCEDURE — 3072F LOW RISK FOR RETINOPATHY: CPT | Mod: CPTII,S$GLB,, | Performed by: NURSE PRACTITIONER

## 2022-05-03 PROCEDURE — 99499 RISK ADDL DX/OHS AUDIT: ICD-10-PCS | Mod: HCNC,,, | Performed by: NURSE PRACTITIONER

## 2022-05-03 PROCEDURE — G0439 PR MEDICARE ANNUAL WELLNESS SUBSEQUENT VISIT: ICD-10-PCS | Mod: S$GLB,,, | Performed by: NURSE PRACTITIONER

## 2022-05-03 PROCEDURE — 3008F BODY MASS INDEX DOCD: CPT | Mod: CPTII,S$GLB,, | Performed by: NURSE PRACTITIONER

## 2022-05-03 PROCEDURE — 1126F PR PAIN SEVERITY QUANTIFIED, NO PAIN PRESENT: ICD-10-PCS | Mod: CPTII,S$GLB,, | Performed by: NURSE PRACTITIONER

## 2022-05-03 PROCEDURE — 1159F MED LIST DOCD IN RCRD: CPT | Mod: CPTII,S$GLB,, | Performed by: NURSE PRACTITIONER

## 2022-05-03 PROCEDURE — 99999 PR PBB SHADOW E&M-EST. PATIENT-LVL V: ICD-10-PCS | Mod: PBBFAC,,, | Performed by: NURSE PRACTITIONER

## 2022-05-03 PROCEDURE — 1170F FXNL STATUS ASSESSED: CPT | Mod: CPTII,S$GLB,, | Performed by: NURSE PRACTITIONER

## 2022-05-03 PROCEDURE — 1170F PR FUNCTIONAL STATUS ASSESSED: ICD-10-PCS | Mod: CPTII,S$GLB,, | Performed by: NURSE PRACTITIONER

## 2022-05-03 PROCEDURE — 3077F SYST BP >= 140 MM HG: CPT | Mod: CPTII,S$GLB,, | Performed by: NURSE PRACTITIONER

## 2022-05-03 PROCEDURE — 3077F PR MOST RECENT SYSTOLIC BLOOD PRESSURE >= 140 MM HG: ICD-10-PCS | Mod: CPTII,S$GLB,, | Performed by: NURSE PRACTITIONER

## 2022-05-03 PROCEDURE — 3078F DIAST BP <80 MM HG: CPT | Mod: CPTII,S$GLB,, | Performed by: NURSE PRACTITIONER

## 2022-05-03 PROCEDURE — 3288F PR FALLS RISK ASSESSMENT DOCUMENTED: ICD-10-PCS | Mod: CPTII,S$GLB,, | Performed by: NURSE PRACTITIONER

## 2022-05-03 PROCEDURE — 1101F PR PT FALLS ASSESS DOC 0-1 FALLS W/OUT INJ PAST YR: ICD-10-PCS | Mod: CPTII,S$GLB,, | Performed by: NURSE PRACTITIONER

## 2022-05-03 PROCEDURE — 1159F PR MEDICATION LIST DOCUMENTED IN MEDICAL RECORD: ICD-10-PCS | Mod: CPTII,S$GLB,, | Performed by: NURSE PRACTITIONER

## 2022-05-03 PROCEDURE — 1160F RVW MEDS BY RX/DR IN RCRD: CPT | Mod: CPTII,S$GLB,, | Performed by: NURSE PRACTITIONER

## 2022-05-03 PROCEDURE — 3288F FALL RISK ASSESSMENT DOCD: CPT | Mod: CPTII,S$GLB,, | Performed by: NURSE PRACTITIONER

## 2022-05-03 NOTE — PATIENT INSTRUCTIONS
Counseling and Referral of Other Preventative  (Italic type indicates deductible and co-insurance are waived)    Patient Name: Anjel Sanders  Today's Date: 5/3/2022    Health Maintenance       Date Due Completion Date    Foot Exam 06/17/2022 6/17/2021 (Done)    Override on 6/17/2021: Done    Override on 6/19/2020: Done    Override on 7/12/2019: Done    Override on 10/25/2017: Done    Override on 10/31/2016: Done    Shingles Vaccine (2 of 2) 08/02/2022 (Originally 10/14/2020) 8/19/2020    Hemoglobin A1c 06/16/2022 12/16/2021    Override on 8/22/2018: Done    PROSTATE-SPECIFIC ANTIGEN 06/21/2022 6/21/2021    Lipid Panel 06/21/2022 6/21/2021    COVID-19 Vaccine (5 - Booster for Pfizer series) 06/24/2022 2/24/2022    Eye Exam 07/22/2022 7/22/2021    Override on 11/21/2018: Done    Override on 11/3/2017: Done    Diabetes Urine Screening 12/16/2022 12/16/2021    High Dose Statin 02/01/2023 2/1/2022    Colorectal Cancer Screening 09/21/2024 9/21/2021    TETANUS VACCINE 08/11/2025 8/11/2015        No orders of the defined types were placed in this encounter.    The following information is provided to all patients.  This information is to help you find resources for any of the problems found today that may be affecting your health:                Living healthy guide: www.[facility].[place].gov      Understanding Diabetes: www.diabetes.org      Eating healthy: www.cdc.gov/healthyweight      CDC home safety checklist: www.cdc.gov/steadi/patient.html      Agency on Aging: www.goea.[place].gov      Alcoholics anonymous (AA): www.aa.org      Physical Activity: www.omar.nih.gov/hr3pguh      Tobacco use: www.quitwithusla.org

## 2022-05-03 NOTE — Clinical Note
This patient has an appointment in June and needs LABS prior to appointment if yall want labs. Due for HgA1c in June

## 2022-06-16 ENCOUNTER — LAB VISIT (OUTPATIENT)
Dept: LAB | Facility: HOSPITAL | Age: 67
End: 2022-06-16
Attending: FAMILY MEDICINE
Payer: MEDICARE

## 2022-06-16 ENCOUNTER — OFFICE VISIT (OUTPATIENT)
Dept: FAMILY MEDICINE | Facility: CLINIC | Age: 67
End: 2022-06-16
Attending: FAMILY MEDICINE
Payer: MEDICARE

## 2022-06-16 VITALS
BODY MASS INDEX: 31.08 KG/M2 | WEIGHT: 222 LBS | HEART RATE: 61 BPM | DIASTOLIC BLOOD PRESSURE: 80 MMHG | OXYGEN SATURATION: 99 % | HEIGHT: 71 IN | SYSTOLIC BLOOD PRESSURE: 130 MMHG

## 2022-06-16 DIAGNOSIS — G89.29 CHRONIC BILATERAL LOW BACK PAIN WITH BILATERAL SCIATICA: ICD-10-CM

## 2022-06-16 DIAGNOSIS — Z00.00 ROUTINE GENERAL MEDICAL EXAMINATION AT HEALTH CARE FACILITY: ICD-10-CM

## 2022-06-16 DIAGNOSIS — M54.31 BILATERAL SCIATICA: ICD-10-CM

## 2022-06-16 DIAGNOSIS — E11.9 CONTROLLED TYPE 2 DIABETES MELLITUS WITHOUT COMPLICATION, WITHOUT LONG-TERM CURRENT USE OF INSULIN: ICD-10-CM

## 2022-06-16 DIAGNOSIS — E11.69 DYSLIPIDEMIA ASSOCIATED WITH TYPE 2 DIABETES MELLITUS: ICD-10-CM

## 2022-06-16 DIAGNOSIS — G47.33 OSA (OBSTRUCTIVE SLEEP APNEA): ICD-10-CM

## 2022-06-16 DIAGNOSIS — E11.59 HYPERTENSION COMPLICATING DIABETES: Primary | ICD-10-CM

## 2022-06-16 DIAGNOSIS — E78.5 DYSLIPIDEMIA ASSOCIATED WITH TYPE 2 DIABETES MELLITUS: ICD-10-CM

## 2022-06-16 DIAGNOSIS — M54.41 CHRONIC BILATERAL LOW BACK PAIN WITH BILATERAL SCIATICA: ICD-10-CM

## 2022-06-16 DIAGNOSIS — I15.2 HYPERTENSION COMPLICATING DIABETES: ICD-10-CM

## 2022-06-16 DIAGNOSIS — N18.2 CKD (CHRONIC KIDNEY DISEASE) STAGE 2, GFR 60-89 ML/MIN: ICD-10-CM

## 2022-06-16 DIAGNOSIS — Z20.822 EXPOSURE TO CONFIRMED CASE OF COVID-19: ICD-10-CM

## 2022-06-16 DIAGNOSIS — M54.42 CHRONIC BILATERAL LOW BACK PAIN WITH BILATERAL SCIATICA: ICD-10-CM

## 2022-06-16 DIAGNOSIS — E11.59 HYPERTENSION COMPLICATING DIABETES: ICD-10-CM

## 2022-06-16 DIAGNOSIS — Z12.5 ENCOUNTER FOR SCREENING FOR MALIGNANT NEOPLASM OF PROSTATE: ICD-10-CM

## 2022-06-16 DIAGNOSIS — I15.2 HYPERTENSION COMPLICATING DIABETES: Primary | ICD-10-CM

## 2022-06-16 DIAGNOSIS — E78.2 MIXED HYPERLIPIDEMIA: ICD-10-CM

## 2022-06-16 DIAGNOSIS — M54.32 BILATERAL SCIATICA: ICD-10-CM

## 2022-06-16 DIAGNOSIS — M10.9 GOUTY ARTHRITIS: ICD-10-CM

## 2022-06-16 LAB
ALBUMIN SERPL BCP-MCNC: 4.1 G/DL (ref 3.5–5.2)
ALP SERPL-CCNC: 84 U/L (ref 55–135)
ALT SERPL W/O P-5'-P-CCNC: 35 U/L (ref 10–44)
ANION GAP SERPL CALC-SCNC: 9 MMOL/L (ref 8–16)
AST SERPL-CCNC: 28 U/L (ref 10–40)
BASOPHILS # BLD AUTO: 0.04 K/UL (ref 0–0.2)
BASOPHILS NFR BLD: 0.8 % (ref 0–1.9)
BILIRUB SERPL-MCNC: 0.5 MG/DL (ref 0.1–1)
BUN SERPL-MCNC: 11 MG/DL (ref 8–23)
CALCIUM SERPL-MCNC: 9.7 MG/DL (ref 8.7–10.5)
CHLORIDE SERPL-SCNC: 105 MMOL/L (ref 95–110)
CHOLEST SERPL-MCNC: 148 MG/DL (ref 120–199)
CHOLEST/HDLC SERPL: 3.5 {RATIO} (ref 2–5)
CO2 SERPL-SCNC: 30 MMOL/L (ref 23–29)
COMPLEXED PSA SERPL-MCNC: 3 NG/ML (ref 0–4)
CREAT SERPL-MCNC: 1.5 MG/DL (ref 0.5–1.4)
DIFFERENTIAL METHOD: ABNORMAL
EOSINOPHIL # BLD AUTO: 0.1 K/UL (ref 0–0.5)
EOSINOPHIL NFR BLD: 2.9 % (ref 0–8)
ERYTHROCYTE [DISTWIDTH] IN BLOOD BY AUTOMATED COUNT: 13.4 % (ref 11.5–14.5)
EST. GFR  (AFRICAN AMERICAN): 55 ML/MIN/1.73 M^2
EST. GFR  (NON AFRICAN AMERICAN): 47 ML/MIN/1.73 M^2
ESTIMATED AVG GLUCOSE: 154 MG/DL (ref 68–131)
GLUCOSE SERPL-MCNC: 109 MG/DL (ref 70–110)
HBA1C MFR BLD: 7 % (ref 4–5.6)
HCT VFR BLD AUTO: 40.3 % (ref 40–54)
HDLC SERPL-MCNC: 42 MG/DL (ref 40–75)
HDLC SERPL: 28.4 % (ref 20–50)
HGB BLD-MCNC: 13 G/DL (ref 14–18)
IMM GRANULOCYTES # BLD AUTO: 0.01 K/UL (ref 0–0.04)
IMM GRANULOCYTES NFR BLD AUTO: 0.2 % (ref 0–0.5)
LDLC SERPL CALC-MCNC: 93.2 MG/DL (ref 63–159)
LYMPHOCYTES # BLD AUTO: 1.8 K/UL (ref 1–4.8)
LYMPHOCYTES NFR BLD: 36 % (ref 18–48)
MCH RBC QN AUTO: 28.3 PG (ref 27–31)
MCHC RBC AUTO-ENTMCNC: 32.3 G/DL (ref 32–36)
MCV RBC AUTO: 88 FL (ref 82–98)
MONOCYTES # BLD AUTO: 0.5 K/UL (ref 0.3–1)
MONOCYTES NFR BLD: 10.7 % (ref 4–15)
NEUTROPHILS # BLD AUTO: 2.4 K/UL (ref 1.8–7.7)
NEUTROPHILS NFR BLD: 49.4 % (ref 38–73)
NONHDLC SERPL-MCNC: 106 MG/DL
NRBC BLD-RTO: 0 /100 WBC
PLATELET # BLD AUTO: 256 K/UL (ref 150–450)
PMV BLD AUTO: 10.4 FL (ref 9.2–12.9)
POTASSIUM SERPL-SCNC: 4 MMOL/L (ref 3.5–5.1)
PROT SERPL-MCNC: 7.9 G/DL (ref 6–8.4)
RBC # BLD AUTO: 4.59 M/UL (ref 4.6–6.2)
SODIUM SERPL-SCNC: 144 MMOL/L (ref 136–145)
TRIGL SERPL-MCNC: 64 MG/DL (ref 30–150)
WBC # BLD AUTO: 4.86 K/UL (ref 3.9–12.7)

## 2022-06-16 PROCEDURE — 3008F PR BODY MASS INDEX (BMI) DOCUMENTED: ICD-10-PCS | Mod: CPTII,S$GLB,, | Performed by: FAMILY MEDICINE

## 2022-06-16 PROCEDURE — 83036 HEMOGLOBIN GLYCOSYLATED A1C: CPT | Performed by: FAMILY MEDICINE

## 2022-06-16 PROCEDURE — 4010F PR ACE/ARB THEARPY RXD/TAKEN: ICD-10-PCS | Mod: CPTII,S$GLB,, | Performed by: FAMILY MEDICINE

## 2022-06-16 PROCEDURE — 3072F LOW RISK FOR RETINOPATHY: CPT | Mod: CPTII,S$GLB,, | Performed by: FAMILY MEDICINE

## 2022-06-16 PROCEDURE — 80061 LIPID PANEL: CPT | Performed by: FAMILY MEDICINE

## 2022-06-16 PROCEDURE — 1160F RVW MEDS BY RX/DR IN RCRD: CPT | Mod: CPTII,S$GLB,, | Performed by: FAMILY MEDICINE

## 2022-06-16 PROCEDURE — 4010F ACE/ARB THERAPY RXD/TAKEN: CPT | Mod: CPTII,S$GLB,, | Performed by: FAMILY MEDICINE

## 2022-06-16 PROCEDURE — 3008F BODY MASS INDEX DOCD: CPT | Mod: CPTII,S$GLB,, | Performed by: FAMILY MEDICINE

## 2022-06-16 PROCEDURE — 3072F PR LOW RISK FOR RETINOPATHY: ICD-10-PCS | Mod: CPTII,S$GLB,, | Performed by: FAMILY MEDICINE

## 2022-06-16 PROCEDURE — 3288F PR FALLS RISK ASSESSMENT DOCUMENTED: ICD-10-PCS | Mod: CPTII,S$GLB,, | Performed by: FAMILY MEDICINE

## 2022-06-16 PROCEDURE — 1159F PR MEDICATION LIST DOCUMENTED IN MEDICAL RECORD: ICD-10-PCS | Mod: CPTII,S$GLB,, | Performed by: FAMILY MEDICINE

## 2022-06-16 PROCEDURE — 3079F DIAST BP 80-89 MM HG: CPT | Mod: CPTII,S$GLB,, | Performed by: FAMILY MEDICINE

## 2022-06-16 PROCEDURE — 99214 PR OFFICE/OUTPT VISIT, EST, LEVL IV, 30-39 MIN: ICD-10-PCS | Mod: 25,S$GLB,, | Performed by: FAMILY MEDICINE

## 2022-06-16 PROCEDURE — 99214 OFFICE O/P EST MOD 30 MIN: CPT | Mod: 25,S$GLB,, | Performed by: FAMILY MEDICINE

## 2022-06-16 PROCEDURE — 99999 PR PBB SHADOW E&M-EST. PATIENT-LVL III: ICD-10-PCS | Mod: PBBFAC,,, | Performed by: FAMILY MEDICINE

## 2022-06-16 PROCEDURE — 3075F SYST BP GE 130 - 139MM HG: CPT | Mod: CPTII,S$GLB,, | Performed by: FAMILY MEDICINE

## 2022-06-16 PROCEDURE — 80053 COMPREHEN METABOLIC PANEL: CPT | Performed by: FAMILY MEDICINE

## 2022-06-16 PROCEDURE — 36415 COLL VENOUS BLD VENIPUNCTURE: CPT | Performed by: FAMILY MEDICINE

## 2022-06-16 PROCEDURE — 3075F PR MOST RECENT SYSTOLIC BLOOD PRESS GE 130-139MM HG: ICD-10-PCS | Mod: CPTII,S$GLB,, | Performed by: FAMILY MEDICINE

## 2022-06-16 PROCEDURE — 84153 ASSAY OF PSA TOTAL: CPT | Performed by: FAMILY MEDICINE

## 2022-06-16 PROCEDURE — 1101F PR PT FALLS ASSESS DOC 0-1 FALLS W/OUT INJ PAST YR: ICD-10-PCS | Mod: CPTII,S$GLB,, | Performed by: FAMILY MEDICINE

## 2022-06-16 PROCEDURE — 1126F PR PAIN SEVERITY QUANTIFIED, NO PAIN PRESENT: ICD-10-PCS | Mod: CPTII,S$GLB,, | Performed by: FAMILY MEDICINE

## 2022-06-16 PROCEDURE — 1126F AMNT PAIN NOTED NONE PRSNT: CPT | Mod: CPTII,S$GLB,, | Performed by: FAMILY MEDICINE

## 2022-06-16 PROCEDURE — 99999 PR PBB SHADOW E&M-EST. PATIENT-LVL III: CPT | Mod: PBBFAC,,, | Performed by: FAMILY MEDICINE

## 2022-06-16 PROCEDURE — 1160F PR REVIEW ALL MEDS BY PRESCRIBER/CLIN PHARMACIST DOCUMENTED: ICD-10-PCS | Mod: CPTII,S$GLB,, | Performed by: FAMILY MEDICINE

## 2022-06-16 PROCEDURE — 3079F PR MOST RECENT DIASTOLIC BLOOD PRESSURE 80-89 MM HG: ICD-10-PCS | Mod: CPTII,S$GLB,, | Performed by: FAMILY MEDICINE

## 2022-06-16 PROCEDURE — 1159F MED LIST DOCD IN RCRD: CPT | Mod: CPTII,S$GLB,, | Performed by: FAMILY MEDICINE

## 2022-06-16 PROCEDURE — 1101F PT FALLS ASSESS-DOCD LE1/YR: CPT | Mod: CPTII,S$GLB,, | Performed by: FAMILY MEDICINE

## 2022-06-16 PROCEDURE — 3288F FALL RISK ASSESSMENT DOCD: CPT | Mod: CPTII,S$GLB,, | Performed by: FAMILY MEDICINE

## 2022-06-16 PROCEDURE — 85025 COMPLETE CBC W/AUTO DIFF WBC: CPT | Performed by: FAMILY MEDICINE

## 2022-06-16 RX ORDER — ALLOPURINOL 300 MG/1
300 TABLET ORAL DAILY
Qty: 90 TABLET | Refills: 3 | Status: SHIPPED | OUTPATIENT
Start: 2022-06-16

## 2022-06-16 RX ORDER — ATORVASTATIN CALCIUM 80 MG/1
80 TABLET, FILM COATED ORAL DAILY
Qty: 90 TABLET | Refills: 4 | Status: SHIPPED | OUTPATIENT
Start: 2022-06-16 | End: 2023-08-25

## 2022-06-16 RX ORDER — GABAPENTIN 300 MG/1
300 CAPSULE ORAL NIGHTLY
Qty: 90 CAPSULE | Refills: 3 | Status: SHIPPED | OUTPATIENT
Start: 2022-06-16 | End: 2022-09-29

## 2022-06-16 RX ORDER — AMLODIPINE AND BENAZEPRIL HYDROCHLORIDE 10; 40 MG/1; MG/1
1 CAPSULE ORAL DAILY
Qty: 90 CAPSULE | Refills: 3 | Status: SHIPPED | OUTPATIENT
Start: 2022-06-16 | End: 2023-08-25

## 2022-06-16 RX ORDER — HYDROCHLOROTHIAZIDE 12.5 MG/1
12.5 TABLET ORAL DAILY
Qty: 90 TABLET | Refills: 3 | Status: SHIPPED | OUTPATIENT
Start: 2022-06-16 | End: 2023-06-16

## 2022-06-16 NOTE — PROGRESS NOTES
Subjective:       Patient ID: Anjel Sanders is a 66 y.o. male.    Chief Complaint: Follow-up and Covid Exposure    66 yr old black male with Hypertension, DM II, Hyperlipidemia, gout, ED, MURTAZA, presents today for 6 month follow up. C/o back pain.    DM II - diet controlled  -      HGBA1C                   6.1 (H)             12/16/2021                                   - no s/s of DM - foot exam UTD - eye exam due - on ASA and ACE    HTN - uncontrolled initially - on lotrel - no side effects - fluctuating at home and elevated with systolic 150 - 160. Initial /88 - after resting 15-20 min - taking medicine - it came down to 130/ 80    HLD -controlled - on statin -       LDLCALC                  89.4                06/21/2021                                                      - on meds - need labs    ED - controlled - need cialis -     Gout - controlled    -  on allopurinol    Health maintenance  -UTD except labs    Follow-up  Associated symptoms include myalgias. Pertinent negatives include no chest pain, congestion, coughing, diaphoresis, headaches, neck pain, rash, vomiting or weakness.   Hypertension  This is a chronic problem. The current episode started more than 1 year ago. The problem has been gradually improving since onset. The problem is controlled. Pertinent negatives include no anxiety, blurred vision, chest pain, headaches, malaise/fatigue, neck pain, orthopnea, palpitations, peripheral edema, PND or sweats. There are no associated agents to hypertension. Risk factors for coronary artery disease include dyslipidemia, obesity and male gender. Past treatments include ACE inhibitors and diuretics. The current treatment provides moderate improvement. There are no compliance problems.  There is no history of angina, CAD/MI, CVA, heart failure, left ventricular hypertrophy or retinopathy. There is no history of chronic renal disease, coarctation of the aorta, hypercortisolism, pheochromocytoma,  renovascular disease or a thyroid problem.   Hyperlipidemia  This is a chronic problem. The current episode started more than 1 year ago. The problem is controlled. Recent lipid tests were reviewed and are normal. Exacerbating diseases include obesity. He has no history of chronic renal disease, diabetes, hypothyroidism or liver disease. There are no known factors aggravating his hyperlipidemia. Associated symptoms include myalgias. Pertinent negatives include no chest pain, focal sensory loss, focal weakness or leg pain. He is currently on no antihyperlipidemic treatment. The current treatment provides moderate improvement of lipids. There are no compliance problems.  Risk factors for coronary artery disease include dyslipidemia, hypertension, obesity and male sex.   Shoulder Injury   The left shoulder is affected. The incident occurred more than 1 week ago. The injury mechanism was a fall. The quality of the pain is described as cramping and shooting. The pain does not radiate. The pain is at a severity of 6/10. The pain is moderate. Pertinent negatives include no chest pain. The symptoms are aggravated by movement, overhead lifting and palpation. He has tried NSAIDs for the symptoms. The treatment provided moderate relief.     Review of Systems   Constitutional: Negative.  Negative for activity change, diaphoresis, malaise/fatigue and unexpected weight change.   HENT: Negative.  Negative for nasal congestion, ear pain, mouth sores, rhinorrhea and voice change.    Eyes: Negative.  Negative for blurred vision, pain, discharge and visual disturbance.   Respiratory: Negative.  Negative for apnea, cough and wheezing.    Cardiovascular: Negative.  Negative for chest pain, palpitations, orthopnea and PND.   Gastrointestinal: Negative.  Negative for abdominal distention, anal bleeding, diarrhea and vomiting.   Endocrine: Negative.  Negative for cold intolerance and polyuria.   Genitourinary: Negative.  Negative for  decreased urine volume, difficulty urinating, discharge, frequency and scrotal swelling.   Musculoskeletal: Positive for myalgias. Negative for back pain, leg pain, neck pain and neck stiffness.   Integumentary:  Negative for color change and rash. Negative.   Allergic/Immunologic: Negative.  Negative for environmental allergies.   Neurological: Negative.  Negative for dizziness, focal weakness, speech difficulty, weakness, light-headedness and headaches.   Hematological: Negative.    Psychiatric/Behavioral: Negative.  Negative for agitation, dysphoric mood and suicidal ideas. The patient is not nervous/anxious.          Active Ambulatory Problems     Diagnosis Date Noted    AR (allergic rhinitis)     Gouty arthritis     MURTAZA (obstructive sleep apnea)     Hx of colonic polyps     Ocular hypertension - Both Eyes 11/28/2012    HLD (hyperlipidemia) 08/27/2013    Hypertension 08/27/2013    ED (erectile dysfunction) 08/27/2013    Hemiparesis affecting right side as late effect of cerebrovascular accident 05/01/2015    Muscle weakness of right upper extremity 05/06/2015    Stiffness of right shoulder joint 05/06/2015    Hypertension complicating diabetes 04/21/2017    Obesity, diabetes, and hypertension syndrome 04/21/2017    Chronic midline low back pain without sciatica 07/31/2017    Left sided lacunar stroke 10/05/2018    Controlled type 2 diabetes mellitus without complication, without long-term current use of insulin 02/26/2019    Complete tear of left rotator cuff 03/28/2019    Acute pain of right shoulder 12/05/2019    Traumatic complete tear of right rotator cuff 06/16/2020    Right shoulder pain 08/04/2020    CKD (chronic kidney disease) stage 2, GFR 60-89 ml/min 02/01/2022     Resolved Ambulatory Problems     Diagnosis Date Noted    Chest pain, atypical 08/27/2013    Abnormal EKG 09/06/2013    Acute low back pain 12/16/2013    BMI 30.0-30.9,adult 08/12/2014    Acute ischemic stroke  03/20/2015    Diabetes type 2, controlled 03/25/2015    BMI 31.0-31.9,adult 03/25/2015    Obesity (BMI 30-39.9) 05/11/2015    Allergic rhinitis due to pollen 05/11/2015    Decreased strength 10/09/2018    Decreased ROM of lumbar spine 10/09/2018    Pain of left upper extremity 12/19/2018    Weakness 12/19/2018    Stiffness in joint 12/19/2018    Morbid obesity 02/26/2019    Severe obesity (BMI 35.0-39.9) with comorbidity 02/26/2019    Decreased range of motion of right shoulder 08/20/2020    Weakness of right upper extremity 08/20/2020    Decreased functional mobility 08/20/2020    Allergic contact dermatitis due to plant 11/11/2020    Screening for malignant neoplasm of colon 09/21/2021     Past Medical History:   Diagnosis Date    Other and unspecified hyperlipidemia     Stroke     Unspecified essential hypertension      Past Surgical History:   Procedure Laterality Date    ARTHROSCOPIC REPAIR OF ROTATOR CUFF OF SHOULDER Left 4/30/2019    Procedure: REPAIR, ROTATOR CUFF, ARTHROSCOPIC;  Surgeon: Bob Esparza Jr., MD;  Location: [facility] OR;  Service: Orthopedics;  Laterality: Left;  need opus sytem (Jluis notified)  video    ARTHROSCOPY OF SHOULDER WITH DECOMPRESSION OF SUBACROMIAL SPACE  4/30/2019    Procedure: ARTHROSCOPY, SHOULDER, WITH SUBACROMIAL SPACE DECOMPRESSION;  Surgeon: Bob Esparza Jr., MD;  Location: [facility] OR;  Service: Orthopedics;;    COLONOSCOPY N/A 9/21/2021    Procedure: COLONOSCOPY;  Surgeon: Asif Lópze MD;  Location: [facility] ENDO;  Service: Endoscopy;  Laterality: N/A;    FOOT SURGERY      ROTATOR CUFF REPAIR Right 8/4/2020    Procedure: REPAIR, ROTATOR CUFF;  Surgeon: Bob Esparza Jr., MD;  Location: [facility] OR;  Service: Orthopedics;  Laterality: Right;  open no scope  need arthrex anchors (Marylin notified per [place] 7/21, EF) Confirmed with Harry 1000 8/3/2020 KB    surgery on jaw       Family History   Problem Relation Age of Onset    Hyperlipidemia Mother      Heart disease Mother     Diabetes Mother     Hypertension Mother     Glaucoma Mother     No Known Problems Father     Stomach cancer Sister     Stroke Sister     Cancer Sister         stomach cancer    Heart disease Sister     Diabetes Sister     Hypertension Sister     Diabetes Sister     Alcohol abuse Brother         in remission    Cancer Brother         sinus cancer    Cancer Son         stomach cancer    Amblyopia Neg Hx     Blindness Neg Hx     Cataracts Neg Hx     Macular degeneration Neg Hx     Retinal detachment Neg Hx     Strabismus Neg Hx     Thyroid disease Neg Hx      Social History     Socioeconomic History    Marital status:    Tobacco Use    Smoking status: Never Smoker    Smokeless tobacco: Never Used   Substance and Sexual Activity    Alcohol use: Not Currently     Alcohol/week: 9.3 standard drinks     Types: 6 Cans of beer, 4 Standard drinks or equivalent per week    Drug use: Yes     Types: Marijuana    Sexual activity: Yes     Partners: Female   Social History Narrative     with [employer]     Social Determinants of Health     Financial Resource Strain: Medium Risk    Difficulty of Paying Living Expenses: Somewhat hard   Food Insecurity: No Food Insecurity    Worried About Running Out of Food in the Last Year: Never true    Ran Out of Food in the Last Year: Never true   Transportation Needs: No Transportation Needs    Lack of Transportation (Medical): No    Lack of Transportation (Non-Medical): No   Physical Activity: Sufficiently Active    Days of Exercise per Week: 4 days    Minutes of Exercise per Session: 120 min   Stress: Stress Concern Present    Feeling of Stress : To some extent   Social Connections: Moderately Integrated    Frequency of Communication with Friends and Family: More than three times a week    Frequency of Social Gatherings with Friends and Family: More than three times a week    Attends [employer]  Services: 1 to 4 times per year    Active Member of Clubs or Organizations: No    Attends Club or Organization Meetings: Never    Marital Status:    Housing Stability: Low Risk     Unable to Pay for Housing in the Last Year: No    Number of Places Lived in the Last Year: 1    Unstable Housing in the Last Year: No     Review of patient's allergies indicates:  No Known Allergies  Current Outpatient Medications on File Prior to Visit   Medication Sig Dispense Refill    aspirin (ECOTRIN) 81 MG EC tablet Take 81 mg by mouth once daily.      meloxicam (MOBIC) 7.5 MG tablet TAKE 1 TABLET BY MOUTH EVERY DAY 90 tablet 0    tamsulosin (FLOMAX) 0.4 mg Cap TAKE 1 CAPSULE EVERY DAY 90 capsule 3    triamcinolone acetonide 0.1% (KENALOG) 0.1 % ointment Apply topically 2 (two) times daily. 453 g 0    [DISCONTINUED] allopurinoL (ZYLOPRIM) 300 MG tablet Take 1 tablet (300 mg total) by mouth once daily. 90 tablet 3    [DISCONTINUED] amLODIPine-benazepriL (LOTREL) 10-40 mg per capsule Take 1 capsule by mouth once daily. 90 capsule 3    [DISCONTINUED] atorvastatin (LIPITOR) 80 MG tablet Take 1 tablet (80 mg total) by mouth once daily. 90 tablet 4    [DISCONTINUED] gabapentin (NEURONTIN) 300 MG capsule Take 1 capsule (300 mg total) by mouth every evening. 90 capsule 3    [DISCONTINUED] triamcinolone acetonide 0.1% (KENALOG) 0.1 % ointment Apply topically 2 (two) times daily.       No current facility-administered medications on file prior to visit.       Objective:       Vitals:    06/16/22 0929   BP: 130/80   Pulse: 61       Physical Exam  Constitutional:       General: He is not in acute distress.     Appearance: He is well-developed. He is not diaphoretic.   HENT:      Head: Normocephalic and atraumatic.      Right Ear: External ear normal.      Left Ear: External ear normal.      Nose: Nose normal.      Mouth/Throat:      Pharynx: No oropharyngeal exudate.   Eyes:      General: No scleral icterus.        Right  eye: No discharge.         Left eye: No discharge.      Conjunctiva/sclera: Conjunctivae normal.      Pupils: Pupils are equal, round, and reactive to light.   Neck:      Thyroid: No thyromegaly.      Vascular: No JVD.      Trachea: No tracheal deviation.   Cardiovascular:      Rate and Rhythm: Normal rate and regular rhythm.      Heart sounds: Murmur (3/6 ESM AORTIC AREA) heard.     No friction rub. No gallop.   Pulmonary:      Effort: Pulmonary effort is normal. No respiratory distress.      Breath sounds: Normal breath sounds. No stridor. No wheezing or rales.   Chest:      Chest wall: No tenderness.   Abdominal:      General: Bowel sounds are normal. There is no distension.      Palpations: Abdomen is soft. There is no mass.      Tenderness: There is no abdominal tenderness. There is no guarding or rebound.      Hernia: No hernia is present.   Musculoskeletal:         General: Tenderness (TTP LEFT SHOULDER WITH ROTATOR CUFF IMPINGEMENT) present. Normal range of motion.      Cervical back: Normal range of motion and neck supple.   Lymphadenopathy:      Cervical: No cervical adenopathy.   Skin:     General: Skin is warm and dry.      Coloration: Skin is not pale.      Findings: No erythema or rash.   Neurological:      Mental Status: He is alert and oriented to person, place, and time.      Cranial Nerves: No cranial nerve deficit.      Motor: No abnormal muscle tone.      Coordination: Coordination normal.      Deep Tendon Reflexes: Reflexes are normal and symmetric. Reflexes normal.      Comments: No deficits   Psychiatric:         Behavior: Behavior normal.         Thought Content: Thought content normal.         Judgment: Judgment normal.         Assessment:       Problem List Items Addressed This Visit     Gouty arthritis    Relevant Medications    allopurinoL (ZYLOPRIM) 300 MG tablet    Hypertension complicating diabetes - Primary    Relevant Medications    amLODIPine-benazepriL (LOTREL) 10-40 mg per capsule     hydroCHLOROthiazide (HYDRODIURIL) 12.5 MG Tab    Dyslipidemia associated with type 2 diabetes mellitus    Relevant Medications    atorvastatin (LIPITOR) 80 MG tablet      Other Visit Diagnoses     Chronic bilateral low back pain with bilateral sciatica        Relevant Medications    gabapentin (NEURONTIN) 300 MG capsule    Bilateral sciatica        Relevant Medications    gabapentin (NEURONTIN) 300 MG capsule    Exposure to confirmed case of COVID-19        Relevant Orders    SARS Coronavirus 2 Antigen, POCT          Plan:           Anjel was seen today for follow-up and covid exposure.    Diagnoses and all orders for this visit:    Hypertension complicating diabetes  -     amLODIPine-benazepriL (LOTREL) 10-40 mg per capsule; Take 1 capsule by mouth once daily.  -     hydroCHLOROthiazide (HYDRODIURIL) 12.5 MG Tab; Take 1 tablet (12.5 mg total) by mouth once daily.    Chronic bilateral low back pain with bilateral sciatica  -     gabapentin (NEURONTIN) 300 MG capsule; Take 1 capsule (300 mg total) by mouth every evening.    Bilateral sciatica  -     gabapentin (NEURONTIN) 300 MG capsule; Take 1 capsule (300 mg total) by mouth every evening.    Dyslipidemia associated with type 2 diabetes mellitus  -     atorvastatin (LIPITOR) 80 MG tablet; Take 1 tablet (80 mg total) by mouth once daily.    Gouty arthritis  -     allopurinoL (ZYLOPRIM) 300 MG tablet; Take 1 tablet (300 mg total) by mouth once daily.    Exposure to confirmed case of COVID-19  -     SARS Coronavirus 2 Antigen, POCT    MURTAZA (obstructive sleep apnea)    CKD (chronic kidney disease) stage 2, GFR 60-89 ml/min    Controlled type 2 diabetes mellitus without complication, without long-term current use of insulin  -     Hemoglobin A1C; Future      BPH  -continue flomax daily. Side effects of medications have been discussed and patient agreed to proceed with treatment and understands the risks and benefits.    HTN  -controlled   -add HCTZ with lotrel. Side  effects of medications have been discussed and patient agreed to proceed with treatment and understands the risks and benefits.  -precautions for hypotension given    HLD  -controlled      HEART MURMUR  -ECHO reassurance      ED and Gout   -controlled     MURTAZA  -stable    CVA  -on ASA and plavix      DM II  -controlled  -diet and exercise only    Obesity  -diet and exercise  -weight loss          Spent adequate time in obtaining history and explaining differentials     25 minutes spent during this visit of which greater than 50% devoted to face-face counseling and coordination of care regarding diagnosis and management plan    Follow up in about 6 months (around 12/16/2022), or if symptoms worsen or fail to improve.

## 2022-07-13 ENCOUNTER — TELEPHONE (OUTPATIENT)
Dept: FAMILY MEDICINE | Facility: CLINIC | Age: 67
End: 2022-07-13
Payer: MEDICARE

## 2022-07-13 NOTE — TELEPHONE ENCOUNTER
----- Message from Radha Mijares sent at 7/12/2022 10:47 AM CDT -----  Contact: [phone]  Type:  Needs Medical Advice    Who Called: pt called   Would the patient rather a call back or a response via [facility]? Call back   Best Call Back Number: [phone]  Additional Information: pt would like to set up a A1C check. Pt says it's done in office. Pt has labs to be done for A1C but insurance will not pay for it because of the CPT code. Please call pt to advice

## 2022-08-08 DIAGNOSIS — R05.9 COUGH: Primary | ICD-10-CM

## 2022-08-08 RX ORDER — BENZONATATE 200 MG/1
200 CAPSULE ORAL 3 TIMES DAILY PRN
Qty: 30 CAPSULE | Refills: 2 | Status: SHIPPED | OUTPATIENT
Start: 2022-08-08 | End: 2022-08-18

## 2022-09-12 ENCOUNTER — HOSPITAL ENCOUNTER (OUTPATIENT)
Dept: RADIOLOGY | Facility: HOSPITAL | Age: 67
Discharge: HOME | End: 2022-09-12
Attending: FAMILY MEDICINE
Payer: MEDICARE

## 2022-09-12 ENCOUNTER — OFFICE VISIT (OUTPATIENT)
Dept: FAMILY MEDICINE | Facility: CLINIC | Age: 67
End: 2022-09-12
Attending: FAMILY MEDICINE
Payer: MEDICARE

## 2022-09-12 VITALS
HEART RATE: 61 BPM | SYSTOLIC BLOOD PRESSURE: 128 MMHG | WEIGHT: 218.94 LBS | HEIGHT: 71 IN | DIASTOLIC BLOOD PRESSURE: 76 MMHG | BODY MASS INDEX: 30.65 KG/M2 | OXYGEN SATURATION: 98 %

## 2022-09-12 DIAGNOSIS — R05.9 COUGH: ICD-10-CM

## 2022-09-12 DIAGNOSIS — R05.9 COUGH: Primary | ICD-10-CM

## 2022-09-12 LAB
CTP QC/QA: YES
CTP QC/QA: YES
POC MOLECULAR INFLUENZA A AGN: NEGATIVE
POC MOLECULAR INFLUENZA B AGN: NEGATIVE
SARS-COV-2 AG RESP QL IA.RAPID: NEGATIVE

## 2022-09-12 PROCEDURE — 99214 PR OFFICE/OUTPT VISIT, EST, LEVL IV, 30-39 MIN: ICD-10-PCS | Mod: S$GLB,,, | Performed by: FAMILY MEDICINE

## 2022-09-12 PROCEDURE — 3288F FALL RISK ASSESSMENT DOCD: CPT | Mod: CPTII,S$GLB,, | Performed by: FAMILY MEDICINE

## 2022-09-12 PROCEDURE — 1160F PR REVIEW ALL MEDS BY PRESCRIBER/CLIN PHARMACIST DOCUMENTED: ICD-10-PCS | Mod: CPTII,S$GLB,, | Performed by: FAMILY MEDICINE

## 2022-09-12 PROCEDURE — 4010F PR ACE/ARB THEARPY RXD/TAKEN: ICD-10-PCS | Mod: CPTII,S$GLB,, | Performed by: FAMILY MEDICINE

## 2022-09-12 PROCEDURE — 3008F BODY MASS INDEX DOCD: CPT | Mod: CPTII,S$GLB,, | Performed by: FAMILY MEDICINE

## 2022-09-12 PROCEDURE — 3008F PR BODY MASS INDEX (BMI) DOCUMENTED: ICD-10-PCS | Mod: CPTII,S$GLB,, | Performed by: FAMILY MEDICINE

## 2022-09-12 PROCEDURE — U0002 SARS CORONAVIRUS 2 ANTIGEN POCT: ICD-10-PCS | Mod: QW,S$GLB,, | Performed by: FAMILY MEDICINE

## 2022-09-12 PROCEDURE — 1159F PR MEDICATION LIST DOCUMENTED IN MEDICAL RECORD: ICD-10-PCS | Mod: CPTII,S$GLB,, | Performed by: FAMILY MEDICINE

## 2022-09-12 PROCEDURE — 3072F LOW RISK FOR RETINOPATHY: CPT | Mod: CPTII,S$GLB,, | Performed by: FAMILY MEDICINE

## 2022-09-12 PROCEDURE — 1160F RVW MEDS BY RX/DR IN RCRD: CPT | Mod: CPTII,S$GLB,, | Performed by: FAMILY MEDICINE

## 2022-09-12 PROCEDURE — 1101F PT FALLS ASSESS-DOCD LE1/YR: CPT | Mod: CPTII,S$GLB,, | Performed by: FAMILY MEDICINE

## 2022-09-12 PROCEDURE — 99499 UNLISTED E&M SERVICE: CPT | Mod: HCNC,S$GLB,, | Performed by: FAMILY MEDICINE

## 2022-09-12 PROCEDURE — 3288F PR FALLS RISK ASSESSMENT DOCUMENTED: ICD-10-PCS | Mod: CPTII,S$GLB,, | Performed by: FAMILY MEDICINE

## 2022-09-12 PROCEDURE — 99999 PR PBB SHADOW E&M-EST. PATIENT-LVL III: CPT | Mod: PBBFAC,,, | Performed by: FAMILY MEDICINE

## 2022-09-12 PROCEDURE — 1159F MED LIST DOCD IN RCRD: CPT | Mod: CPTII,S$GLB,, | Performed by: FAMILY MEDICINE

## 2022-09-12 PROCEDURE — 4010F ACE/ARB THERAPY RXD/TAKEN: CPT | Mod: CPTII,S$GLB,, | Performed by: FAMILY MEDICINE

## 2022-09-12 PROCEDURE — 99214 OFFICE O/P EST MOD 30 MIN: CPT | Mod: S$GLB,,, | Performed by: FAMILY MEDICINE

## 2022-09-12 PROCEDURE — 87502 POCT INFLUENZA A/B MOLECULAR: ICD-10-PCS | Mod: QW,S$GLB,, | Performed by: FAMILY MEDICINE

## 2022-09-12 PROCEDURE — 3072F PR LOW RISK FOR RETINOPATHY: ICD-10-PCS | Mod: CPTII,S$GLB,, | Performed by: FAMILY MEDICINE

## 2022-09-12 PROCEDURE — 3078F DIAST BP <80 MM HG: CPT | Mod: CPTII,S$GLB,, | Performed by: FAMILY MEDICINE

## 2022-09-12 PROCEDURE — 3051F PR MOST RECENT HEMOGLOBIN A1C LEVEL 7.0 - < 8.0%: ICD-10-PCS | Mod: CPTII,S$GLB,, | Performed by: FAMILY MEDICINE

## 2022-09-12 PROCEDURE — 3074F PR MOST RECENT SYSTOLIC BLOOD PRESSURE < 130 MM HG: ICD-10-PCS | Mod: CPTII,S$GLB,, | Performed by: FAMILY MEDICINE

## 2022-09-12 PROCEDURE — 3051F HG A1C>EQUAL 7.0%<8.0%: CPT | Mod: CPTII,S$GLB,, | Performed by: FAMILY MEDICINE

## 2022-09-12 PROCEDURE — 3078F PR MOST RECENT DIASTOLIC BLOOD PRESSURE < 80 MM HG: ICD-10-PCS | Mod: CPTII,S$GLB,, | Performed by: FAMILY MEDICINE

## 2022-09-12 PROCEDURE — 87502 INFLUENZA DNA AMP PROBE: CPT | Mod: QW,S$GLB,, | Performed by: FAMILY MEDICINE

## 2022-09-12 PROCEDURE — 99999 PR PBB SHADOW E&M-EST. PATIENT-LVL III: ICD-10-PCS | Mod: PBBFAC,,, | Performed by: FAMILY MEDICINE

## 2022-09-12 PROCEDURE — 1101F PR PT FALLS ASSESS DOC 0-1 FALLS W/OUT INJ PAST YR: ICD-10-PCS | Mod: CPTII,S$GLB,, | Performed by: FAMILY MEDICINE

## 2022-09-12 PROCEDURE — 3074F SYST BP LT 130 MM HG: CPT | Mod: CPTII,S$GLB,, | Performed by: FAMILY MEDICINE

## 2022-09-12 PROCEDURE — 71046 X-RAY EXAM CHEST 2 VIEWS: CPT | Mod: TC,FY,PO

## 2022-09-12 PROCEDURE — U0002 COVID-19 LAB TEST NON-CDC: HCPCS | Mod: QW,S$GLB,, | Performed by: FAMILY MEDICINE

## 2022-09-12 RX ORDER — PROMETHAZINE HYDROCHLORIDE AND DEXTROMETHORPHAN HYDROBROMIDE 6.25; 15 MG/5ML; MG/5ML
5 SYRUP ORAL 2 TIMES DAILY PRN
Qty: 180 ML | Refills: 1 | Status: SHIPPED | OUTPATIENT
Start: 2022-09-12 | End: 2022-09-22

## 2022-09-12 NOTE — PROGRESS NOTES
Subjective:       Patient ID: Anjel Sanders is a 67 y.o. male.    Chief Complaint: Cough and Chest Congestion    66 yr old black male with Hypertension, DM II, Hyperlipidemia, gout, ED, MURTAZA, presents today for his follow up and c/o cough x 2-3 weeks. It is dry and no phlegm. No SOB r fever. COVID negative.    DM II - diet controlled  -      HGBA1C                   7.0 (H)             06/16/2022                                       - no s/s of DM - foot exam UTD - eye exam due - on ASA and ACE    HTN - controlled - on prinzide - no side effects - does not need refills    HLD -controlled - on statin -      LDLCALC                  93.2                06/16/2022                                                    - on meds - need labs    ED - controlled - need cialis -     Gout - controlled    -  on allopurinol    Health maintenance  -UTD except labs    Cough  Associated symptoms include myalgias. Pertinent negatives include no chest pain, ear pain, headaches, rash, rhinorrhea, sweats or wheezing. There is no history of environmental allergies.   Follow-up  Associated symptoms include coughing and myalgias. Pertinent negatives include no chest pain, congestion, diaphoresis, headaches, neck pain, rash, vomiting or weakness.   Hypertension  This is a chronic problem. The current episode started more than 1 year ago. The problem has been gradually improving since onset. The problem is controlled. Pertinent negatives include no anxiety, blurred vision, chest pain, headaches, malaise/fatigue, neck pain, orthopnea, palpitations, peripheral edema, PND or sweats. There are no associated agents to hypertension. Risk factors for coronary artery disease include dyslipidemia, obesity and male gender. Past treatments include ACE inhibitors and diuretics. The current treatment provides moderate improvement. There are no compliance problems.  There is no history of angina, CAD/MI, CVA, heart failure, left ventricular hypertrophy  or retinopathy. There is no history of chronic renal disease, coarctation of the aorta, hypercortisolism, pheochromocytoma, renovascular disease or a thyroid problem.   Hyperlipidemia  This is a chronic problem. The current episode started more than 1 year ago. The problem is controlled. Recent lipid tests were reviewed and are normal. Exacerbating diseases include obesity. He has no history of chronic renal disease, diabetes, hypothyroidism or liver disease. There are no known factors aggravating his hyperlipidemia. Associated symptoms include myalgias. Pertinent negatives include no chest pain, focal sensory loss, focal weakness or leg pain. He is currently on no antihyperlipidemic treatment. The current treatment provides moderate improvement of lipids. There are no compliance problems.  Risk factors for coronary artery disease include dyslipidemia, hypertension, obesity and male sex.   Shoulder Injury   The left shoulder is affected. The incident occurred more than 1 week ago. The injury mechanism was a fall. The quality of the pain is described as cramping and shooting. The pain does not radiate. The pain is at a severity of 6/10. The pain is moderate. Pertinent negatives include no chest pain. The symptoms are aggravated by movement, overhead lifting and palpation. He has tried NSAIDs for the symptoms. The treatment provided moderate relief.   Review of Systems   Constitutional: Negative.  Negative for activity change, diaphoresis, malaise/fatigue and unexpected weight change.   HENT: Negative.  Negative for nasal congestion, ear pain, mouth sores, rhinorrhea and voice change.    Eyes: Negative.  Negative for blurred vision, pain, discharge and visual disturbance.   Respiratory:  Positive for cough. Negative for apnea and wheezing.    Cardiovascular: Negative.  Negative for chest pain, palpitations, orthopnea and PND.   Gastrointestinal: Negative.  Negative for abdominal distention, anal bleeding, diarrhea and  vomiting.   Endocrine: Negative.  Negative for cold intolerance and polyuria.   Genitourinary: Negative.  Negative for decreased urine volume, difficulty urinating, discharge, frequency and scrotal swelling.   Musculoskeletal:  Positive for myalgias. Negative for back pain, leg pain, neck pain and neck stiffness.   Integumentary:  Negative for color change and rash. Negative.   Allergic/Immunologic: Negative.  Negative for environmental allergies.   Neurological: Negative.  Negative for dizziness, focal weakness, speech difficulty, weakness, light-headedness and headaches.   Hematological: Negative.    Psychiatric/Behavioral: Negative.  Negative for agitation, dysphoric mood and suicidal ideas. The patient is not nervous/anxious.        PMH/PSH/FH/SH/MED/ALLERGY reviewed    Objective:       Vitals:    09/12/22 1509   BP: 128/76   Pulse: 61       Physical Exam  Constitutional:       General: He is not in acute distress.     Appearance: He is well-developed. He is not diaphoretic.   HENT:      Head: Normocephalic and atraumatic.      Right Ear: External ear normal.      Left Ear: External ear normal.      Nose: Nose normal.      Mouth/Throat:      Pharynx: No oropharyngeal exudate.   Eyes:      General: No scleral icterus.        Right eye: No discharge.         Left eye: No discharge.      Conjunctiva/sclera: Conjunctivae normal.      Pupils: Pupils are equal, round, and reactive to light.   Neck:      Thyroid: No thyromegaly.      Vascular: No JVD.      Trachea: No tracheal deviation.   Cardiovascular:      Rate and Rhythm: Normal rate and regular rhythm.      Heart sounds: Murmur (3/6 ESM AORTIC AREA) heard.     No friction rub. No gallop.   Pulmonary:      Effort: Pulmonary effort is normal. No respiratory distress.      Breath sounds: Normal breath sounds. No stridor. No wheezing or rales.   Chest:      Chest wall: No tenderness.   Abdominal:      General: Bowel sounds are normal. There is no distension.       Palpations: Abdomen is soft. There is no mass.      Tenderness: There is no abdominal tenderness. There is no guarding or rebound.      Hernia: No hernia is present.   Musculoskeletal:         General: Tenderness (TTP LEFT SHOULDER WITH ROTATOR CUFF IMPINGEMENT) present. Normal range of motion.      Cervical back: Normal range of motion and neck supple.   Lymphadenopathy:      Cervical: No cervical adenopathy.   Skin:     General: Skin is warm and dry.      Coloration: Skin is not pale.      Findings: No erythema or rash.   Neurological:      Mental Status: He is alert and oriented to person, place, and time.      Cranial Nerves: No cranial nerve deficit.      Motor: No abnormal muscle tone.      Coordination: Coordination normal.      Deep Tendon Reflexes: Reflexes are normal and symmetric. Reflexes normal.      Comments: No deficits   Psychiatric:         Behavior: Behavior normal.         Thought Content: Thought content normal.         Judgment: Judgment normal.       Assessment:       Problem List Items Addressed This Visit    None  Visit Diagnoses       Cough    -  Primary    Relevant Medications    promethazine-dextromethorphan (PROMETHAZINE-DM) 6.25-15 mg/5 mL Syrp    Other Relevant Orders    SARS Coronavirus 2 Antigen, POCT    POCT Influenza A/B Molecular    X-Ray Chest PA And Lateral            Plan:           Anjel was seen today for cough and chest congestion.    Diagnoses and all orders for this visit:    Cough  -     promethazine-dextromethorphan (PROMETHAZINE-DM) 6.25-15 mg/5 mL Syrp; Take 5 mLs by mouth 2 (two) times daily as needed (cough).  -     SARS Coronavirus 2 Antigen, POCT  -     POCT Influenza A/B Molecular  -     X-Ray Chest PA And Lateral; Future    Cough  -likely allergic  -covid and flu negative  CXR  -phenergan DM prn cough    BPH  -continue flomax daily. Side effects of medications have been discussed and patient agreed to proceed with treatment and understands the risks and  benefits.    HTN/HLD   -controlled - stable - on meds       HEART MURMUR  -ECHO reassurance      ED and Gout   -controlled     MURTAZA  -stable    CVA  -on ASA and plavix      DM II  -controlled  -diet and exercise only    Obesity  -diet and exercise  -weight loss          Spent adequate time in obtaining history and explaining differentials     25 minutes spent during this visit of which greater than 50% devoted to face-face counseling and coordination of care regarding diagnosis and management plan    Follow up in about 3 months (around 12/16/2022), or if symptoms worsen or fail to improve.

## 2022-09-22 ENCOUNTER — IMMUNIZATION (OUTPATIENT)
Dept: INTERNAL MEDICINE | Facility: CLINIC | Age: 67
End: 2022-09-22
Payer: MEDICARE

## 2022-09-22 DIAGNOSIS — Z23 NEED FOR VACCINATION: Primary | ICD-10-CM

## 2022-09-22 PROCEDURE — 91312 COVID-19, MRNA, LNP-S, BIVALENT BOOSTER, PF, 30 MCG/0.3 ML DOSE: CPT | Mod: S$GLB,,, | Performed by: FAMILY MEDICINE

## 2022-09-22 PROCEDURE — 0124A COVID-19, MRNA, LNP-S, BIVALENT BOOSTER, PF, 30 MCG/0.3 ML DOSE: CPT | Mod: PBBFAC | Performed by: FAMILY MEDICINE

## 2022-09-22 PROCEDURE — 91312 COVID-19, MRNA, LNP-S, BIVALENT BOOSTER, PF, 30 MCG/0.3 ML DOSE: ICD-10-PCS | Mod: S$GLB,,, | Performed by: FAMILY MEDICINE

## 2022-09-29 ENCOUNTER — OFFICE VISIT (OUTPATIENT)
Dept: FAMILY MEDICINE | Facility: CLINIC | Age: 67
End: 2022-09-29
Attending: FAMILY MEDICINE
Payer: MEDICARE

## 2022-09-29 VITALS
BODY MASS INDEX: 30.8 KG/M2 | SYSTOLIC BLOOD PRESSURE: 129 MMHG | OXYGEN SATURATION: 99 % | HEART RATE: 63 BPM | DIASTOLIC BLOOD PRESSURE: 78 MMHG | TEMPERATURE: 98 F | WEIGHT: 220 LBS | HEIGHT: 71 IN

## 2022-09-29 DIAGNOSIS — M54.50 CHRONIC BILATERAL LOW BACK PAIN WITHOUT SCIATICA: ICD-10-CM

## 2022-09-29 DIAGNOSIS — M54.31 BILATERAL SCIATICA: ICD-10-CM

## 2022-09-29 DIAGNOSIS — E78.5 DYSLIPIDEMIA ASSOCIATED WITH TYPE 2 DIABETES MELLITUS: ICD-10-CM

## 2022-09-29 DIAGNOSIS — E11.59 HYPERTENSION COMPLICATING DIABETES: ICD-10-CM

## 2022-09-29 DIAGNOSIS — Z23 IMMUNIZATION DUE: ICD-10-CM

## 2022-09-29 DIAGNOSIS — E11.9 CONTROLLED TYPE 2 DIABETES MELLITUS WITHOUT COMPLICATION, WITHOUT LONG-TERM CURRENT USE OF INSULIN: Primary | ICD-10-CM

## 2022-09-29 DIAGNOSIS — E11.69 DYSLIPIDEMIA ASSOCIATED WITH TYPE 2 DIABETES MELLITUS: ICD-10-CM

## 2022-09-29 DIAGNOSIS — G89.29 CHRONIC BILATERAL LOW BACK PAIN WITHOUT SCIATICA: ICD-10-CM

## 2022-09-29 DIAGNOSIS — G89.29 CHRONIC BILATERAL LOW BACK PAIN WITH BILATERAL SCIATICA: ICD-10-CM

## 2022-09-29 DIAGNOSIS — M54.32 BILATERAL SCIATICA: ICD-10-CM

## 2022-09-29 DIAGNOSIS — M54.41 CHRONIC BILATERAL LOW BACK PAIN WITH BILATERAL SCIATICA: ICD-10-CM

## 2022-09-29 DIAGNOSIS — I15.2 HYPERTENSION COMPLICATING DIABETES: ICD-10-CM

## 2022-09-29 DIAGNOSIS — M54.42 CHRONIC BILATERAL LOW BACK PAIN WITH BILATERAL SCIATICA: ICD-10-CM

## 2022-09-29 PROCEDURE — 3008F BODY MASS INDEX DOCD: CPT | Mod: CPTII,S$GLB,ICN, | Performed by: FAMILY MEDICINE

## 2022-09-29 PROCEDURE — 90471 ZOSTER RECOMBINANT VACCINE: ICD-10-PCS | Mod: S$GLB,ICN,, | Performed by: FAMILY MEDICINE

## 2022-09-29 PROCEDURE — 3072F PR LOW RISK FOR RETINOPATHY: ICD-10-PCS | Mod: CPTII,S$GLB,ICN, | Performed by: FAMILY MEDICINE

## 2022-09-29 PROCEDURE — 90471 IMMUNIZATION ADMIN: CPT | Mod: S$GLB,ICN,, | Performed by: FAMILY MEDICINE

## 2022-09-29 PROCEDURE — G0008 ADMIN INFLUENZA VIRUS VAC: HCPCS | Mod: 59,S$GLB,ICN, | Performed by: FAMILY MEDICINE

## 2022-09-29 PROCEDURE — 99214 PR OFFICE/OUTPT VISIT, EST, LEVL IV, 30-39 MIN: ICD-10-PCS | Mod: 25,S$GLB,ICN, | Performed by: FAMILY MEDICINE

## 2022-09-29 PROCEDURE — 3008F PR BODY MASS INDEX (BMI) DOCUMENTED: ICD-10-PCS | Mod: CPTII,S$GLB,ICN, | Performed by: FAMILY MEDICINE

## 2022-09-29 PROCEDURE — 1160F RVW MEDS BY RX/DR IN RCRD: CPT | Mod: CPTII,S$GLB,ICN, | Performed by: FAMILY MEDICINE

## 2022-09-29 PROCEDURE — 3072F LOW RISK FOR RETINOPATHY: CPT | Mod: CPTII,S$GLB,ICN, | Performed by: FAMILY MEDICINE

## 2022-09-29 PROCEDURE — 4010F ACE/ARB THERAPY RXD/TAKEN: CPT | Mod: CPTII,S$GLB,ICN, | Performed by: FAMILY MEDICINE

## 2022-09-29 PROCEDURE — 3078F PR MOST RECENT DIASTOLIC BLOOD PRESSURE < 80 MM HG: ICD-10-PCS | Mod: CPTII,S$GLB,ICN, | Performed by: FAMILY MEDICINE

## 2022-09-29 PROCEDURE — 1160F PR REVIEW ALL MEDS BY PRESCRIBER/CLIN PHARMACIST DOCUMENTED: ICD-10-PCS | Mod: CPTII,S$GLB,ICN, | Performed by: FAMILY MEDICINE

## 2022-09-29 PROCEDURE — 99999 PR PBB SHADOW E&M-EST. PATIENT-LVL III: ICD-10-PCS | Mod: PBBFAC,,, | Performed by: FAMILY MEDICINE

## 2022-09-29 PROCEDURE — 90750 ZOSTER RECOMBINANT VACCINE: ICD-10-PCS | Mod: S$GLB,ICN,, | Performed by: FAMILY MEDICINE

## 2022-09-29 PROCEDURE — 3051F PR MOST RECENT HEMOGLOBIN A1C LEVEL 7.0 - < 8.0%: ICD-10-PCS | Mod: CPTII,S$GLB,ICN, | Performed by: FAMILY MEDICINE

## 2022-09-29 PROCEDURE — 4010F PR ACE/ARB THEARPY RXD/TAKEN: ICD-10-PCS | Mod: CPTII,S$GLB,ICN, | Performed by: FAMILY MEDICINE

## 2022-09-29 PROCEDURE — 3074F PR MOST RECENT SYSTOLIC BLOOD PRESSURE < 130 MM HG: ICD-10-PCS | Mod: CPTII,S$GLB,ICN, | Performed by: FAMILY MEDICINE

## 2022-09-29 PROCEDURE — 90694 VACC AIIV4 NO PRSRV 0.5ML IM: CPT | Mod: S$GLB,ICN,, | Performed by: FAMILY MEDICINE

## 2022-09-29 PROCEDURE — 3051F HG A1C>EQUAL 7.0%<8.0%: CPT | Mod: CPTII,S$GLB,ICN, | Performed by: FAMILY MEDICINE

## 2022-09-29 PROCEDURE — G0008 FLU VACCINE - QUADRIVALENT - ADJUVANTED: ICD-10-PCS | Mod: 59,S$GLB,ICN, | Performed by: FAMILY MEDICINE

## 2022-09-29 PROCEDURE — 3074F SYST BP LT 130 MM HG: CPT | Mod: CPTII,S$GLB,ICN, | Performed by: FAMILY MEDICINE

## 2022-09-29 PROCEDURE — 90750 HZV VACC RECOMBINANT IM: CPT | Mod: S$GLB,ICN,, | Performed by: FAMILY MEDICINE

## 2022-09-29 PROCEDURE — 3078F DIAST BP <80 MM HG: CPT | Mod: CPTII,S$GLB,ICN, | Performed by: FAMILY MEDICINE

## 2022-09-29 PROCEDURE — 1159F PR MEDICATION LIST DOCUMENTED IN MEDICAL RECORD: ICD-10-PCS | Mod: CPTII,S$GLB,ICN, | Performed by: FAMILY MEDICINE

## 2022-09-29 PROCEDURE — 90694 FLU VACCINE - QUADRIVALENT - ADJUVANTED: ICD-10-PCS | Mod: S$GLB,ICN,, | Performed by: FAMILY MEDICINE

## 2022-09-29 PROCEDURE — 99999 PR PBB SHADOW E&M-EST. PATIENT-LVL III: CPT | Mod: PBBFAC,,, | Performed by: FAMILY MEDICINE

## 2022-09-29 PROCEDURE — 1159F MED LIST DOCD IN RCRD: CPT | Mod: CPTII,S$GLB,ICN, | Performed by: FAMILY MEDICINE

## 2022-09-29 PROCEDURE — 99214 OFFICE O/P EST MOD 30 MIN: CPT | Mod: 25,S$GLB,ICN, | Performed by: FAMILY MEDICINE

## 2022-09-29 RX ORDER — GABAPENTIN 300 MG/1
600 CAPSULE ORAL NIGHTLY
Qty: 180 CAPSULE | Refills: 3 | Status: SHIPPED | OUTPATIENT
Start: 2022-09-29 | End: 2023-05-03

## 2022-09-29 RX ORDER — MELOXICAM 15 MG/1
15 TABLET ORAL DAILY
Qty: 90 TABLET | Refills: 0 | Status: SHIPPED | OUTPATIENT
Start: 2022-09-29 | End: 2023-02-28 | Stop reason: SDUPTHER

## 2022-09-30 NOTE — PROGRESS NOTES
Subjective:       Patient ID: Anjel Sanders is a 67 y.o. male.    Chief Complaint: No chief complaint on file.    66 yr old black male with Hypertension, DM II, Hyperlipidemia, gout, ED, MURTAZA, presents today for 6 month follow up. C/o back pain.    DM II - diet controlled  -   HGBA1C                   7.0 (H)             06/16/2022                                      - no s/s of DM - foot exam UTD - eye exam due - on ASA and ACE    HTN - controlled initially - on lotrel - no side effects - fluctuating at home and elevated with systolic 150 - 160. Initial /88 - after resting 15-20 min - taking medicine - it came down to 130/ 80    HLD -controlled - on statin -       LDLCALC                  89.4                06/21/2021                                                      - on meds - need labs    ED - controlled - need cialis -     Gout - controlled    -  on allopurinol    Health maintenance  -UTD except labs    Follow-up  Associated symptoms include myalgias. Pertinent negatives include no chest pain, congestion, coughing, diaphoresis, headaches, neck pain, rash, vomiting or weakness.   Hypertension  This is a chronic problem. The current episode started more than 1 year ago. The problem has been gradually improving since onset. The problem is controlled. Pertinent negatives include no anxiety, blurred vision, chest pain, headaches, malaise/fatigue, neck pain, orthopnea, palpitations, peripheral edema, PND or sweats. There are no associated agents to hypertension. Risk factors for coronary artery disease include dyslipidemia, obesity and male gender. Past treatments include ACE inhibitors and diuretics. The current treatment provides moderate improvement. There are no compliance problems.  There is no history of angina, CAD/MI, CVA, heart failure, left ventricular hypertrophy or retinopathy. There is no history of chronic renal disease, coarctation of the aorta, hypercortisolism, pheochromocytoma,  renovascular disease or a thyroid problem.   Hyperlipidemia  This is a chronic problem. The current episode started more than 1 year ago. The problem is controlled. Recent lipid tests were reviewed and are normal. Exacerbating diseases include obesity. He has no history of chronic renal disease, diabetes, hypothyroidism or liver disease. There are no known factors aggravating his hyperlipidemia. Associated symptoms include myalgias. Pertinent negatives include no chest pain, focal sensory loss, focal weakness or leg pain. He is currently on no antihyperlipidemic treatment. The current treatment provides moderate improvement of lipids. There are no compliance problems.  Risk factors for coronary artery disease include dyslipidemia, hypertension, obesity and male sex.   Shoulder Injury   The left shoulder is affected. The incident occurred more than 1 week ago. The injury mechanism was a fall. The quality of the pain is described as cramping and shooting. The pain does not radiate. The pain is at a severity of 6/10. The pain is moderate. Pertinent negatives include no chest pain. The symptoms are aggravated by movement, overhead lifting and palpation. He has tried NSAIDs for the symptoms. The treatment provided moderate relief.   Review of Systems   Constitutional: Negative.  Negative for activity change, diaphoresis, malaise/fatigue and unexpected weight change.   HENT: Negative.  Negative for nasal congestion, ear pain, mouth sores, rhinorrhea and voice change.    Eyes: Negative.  Negative for blurred vision, pain, discharge and visual disturbance.   Respiratory: Negative.  Negative for apnea, cough and wheezing.    Cardiovascular: Negative.  Negative for chest pain, palpitations, orthopnea and PND.   Gastrointestinal: Negative.  Negative for abdominal distention, anal bleeding, diarrhea and vomiting.   Endocrine: Negative.  Negative for cold intolerance and polyuria.   Genitourinary: Negative.  Negative for  decreased urine volume, difficulty urinating, discharge, frequency and scrotal swelling.   Musculoskeletal:  Positive for myalgias. Negative for back pain, leg pain, neck pain and neck stiffness.   Integumentary:  Negative for color change and rash. Negative.   Allergic/Immunologic: Negative.  Negative for environmental allergies.   Neurological: Negative.  Negative for dizziness, focal weakness, speech difficulty, weakness, light-headedness and headaches.   Hematological: Negative.    Psychiatric/Behavioral: Negative.  Negative for agitation, dysphoric mood and suicidal ideas. The patient is not nervous/anxious.        Active Ambulatory Problems     Diagnosis Date Noted    AR (allergic rhinitis)     Gouty arthritis     MURTAZA (obstructive sleep apnea)     Hx of colonic polyps     Ocular hypertension - Both Eyes 11/28/2012    HLD (hyperlipidemia) 08/27/2013    Hypertension 08/27/2013    ED (erectile dysfunction) 08/27/2013    Hemiparesis affecting right side as late effect of cerebrovascular accident 05/01/2015    Muscle weakness of right upper extremity 05/06/2015    Stiffness of right shoulder joint 05/06/2015    Hypertension complicating diabetes 04/21/2017    Obesity, diabetes, and hypertension syndrome 04/21/2017    Chronic midline low back pain without sciatica 07/31/2017    Left sided lacunar stroke 10/05/2018    Controlled type 2 diabetes mellitus without complication, without long-term current use of insulin 02/26/2019    Complete tear of left rotator cuff 03/28/2019    Acute pain of right shoulder 12/05/2019    Traumatic complete tear of right rotator cuff 06/16/2020    Right shoulder pain 08/04/2020    CKD (chronic kidney disease) stage 2, GFR 60-89 ml/min 02/01/2022     Resolved Ambulatory Problems     Diagnosis Date Noted    Chest pain, atypical 08/27/2013    Abnormal EKG 09/06/2013    Acute low back pain 12/16/2013    BMI 30.0-30.9,adult 08/12/2014    Acute ischemic stroke 03/20/2015    Diabetes type 2,  controlled 03/25/2015    BMI 31.0-31.9,adult 03/25/2015    Obesity (BMI 30-39.9) 05/11/2015    Allergic rhinitis due to pollen 05/11/2015    Decreased strength 10/09/2018    Decreased ROM of lumbar spine 10/09/2018    Pain of left upper extremity 12/19/2018    Weakness 12/19/2018    Stiffness in joint 12/19/2018    Morbid obesity 02/26/2019    Severe obesity (BMI 35.0-39.9) with comorbidity 02/26/2019    Decreased range of motion of right shoulder 08/20/2020    Weakness of right upper extremity 08/20/2020    Decreased functional mobility 08/20/2020    Allergic contact dermatitis due to plant 11/11/2020    Screening for malignant neoplasm of colon 09/21/2021     Past Medical History:   Diagnosis Date    Other and unspecified hyperlipidemia     Stroke     Unspecified essential hypertension      Past Surgical History:   Procedure Laterality Date    ARTHROSCOPIC REPAIR OF ROTATOR CUFF OF SHOULDER Left 4/30/2019    Procedure: REPAIR, ROTATOR CUFF, ARTHROSCOPIC;  Surgeon: Bob Esparza Jr., MD;  Location: [facility] OR;  Service: Orthopedics;  Laterality: Left;  need opus sytem (Jluis notified)  video    ARTHROSCOPY OF SHOULDER WITH DECOMPRESSION OF SUBACROMIAL SPACE  4/30/2019    Procedure: ARTHROSCOPY, SHOULDER, WITH SUBACROMIAL SPACE DECOMPRESSION;  Surgeon: Bob Esparza Jr., MD;  Location: [facility] OR;  Service: Orthopedics;;    COLONOSCOPY N/A 9/21/2021    Procedure: COLONOSCOPY;  Surgeon: Asif López MD;  Location: [facility] ENDO;  Service: Endoscopy;  Laterality: N/A;    FOOT SURGERY      ROTATOR CUFF REPAIR Right 8/4/2020    Procedure: REPAIR, ROTATOR CUFF;  Surgeon: Bob Esparza Jr., MD;  Location: [facility] OR;  Service: Orthopedics;  Laterality: Right;  open no scope  need arthrex anchors (Marylin notified per [place] 7/21, EF) Confirmed with Harry 1000 8/3/2020 KB    surgery on jaw       Family History   Problem Relation Age of Onset    Hyperlipidemia Mother     Heart disease Mother     Diabetes Mother     Hypertension  Mother     Glaucoma Mother     No Known Problems Father     Stomach cancer Sister     Stroke Sister     Cancer Sister         stomach cancer    Heart disease Sister     Diabetes Sister     Hypertension Sister     Diabetes Sister     Alcohol abuse Brother         in remission    Cancer Brother         sinus cancer    Cancer Son         stomach cancer    Amblyopia Neg Hx     Blindness Neg Hx     Cataracts Neg Hx     Macular degeneration Neg Hx     Retinal detachment Neg Hx     Strabismus Neg Hx     Thyroid disease Neg Hx      Social History     Socioeconomic History    Marital status:    Tobacco Use    Smoking status: Never    Smokeless tobacco: Never   Substance and Sexual Activity    Alcohol use: Not Currently     Alcohol/week: 9.3 standard drinks     Types: 6 Cans of beer, 4 Standard drinks or equivalent per week    Drug use: Yes     Types: Marijuana    Sexual activity: Yes     Partners: Female   Social History Narrative     with [employer]     Social Determinants of Health     Financial Resource Strain: Medium Risk    Difficulty of Paying Living Expenses: Somewhat hard   Food Insecurity: No Food Insecurity    Worried About Running Out of Food in the Last Year: Never true    Ran Out of Food in the Last Year: Never true   Transportation Needs: No Transportation Needs    Lack of Transportation (Medical): No    Lack of Transportation (Non-Medical): No   Physical Activity: Sufficiently Active    Days of Exercise per Week: 4 days    Minutes of Exercise per Session: 120 min   Stress: Stress Concern Present    Feeling of Stress : To some extent   Social Connections: Moderately Integrated    Frequency of Communication with Friends and Family: More than three times a week    Frequency of Social Gatherings with Friends and Family: More than three times a week    Attends [employer] Services: 1 to 4 times per year    Active Member of Clubs or Organizations: No    Attends Club or Organization  Meetings: Never    Marital Status:    Housing Stability: Low Risk     Unable to Pay for Housing in the Last Year: No    Number of Places Lived in the Last Year: 1    Unstable Housing in the Last Year: No     Review of patient's allergies indicates:  No Known Allergies  Current Outpatient Medications on File Prior to Visit   Medication Sig Dispense Refill    allopurinoL (ZYLOPRIM) 300 MG tablet Take 1 tablet (300 mg total) by mouth once daily. 90 tablet 3    amLODIPine-benazepriL (LOTREL) 10-40 mg per capsule Take 1 capsule by mouth once daily. 90 capsule 3    aspirin (ECOTRIN) 81 MG EC tablet Take 81 mg by mouth once daily.      atorvastatin (LIPITOR) 80 MG tablet Take 1 tablet (80 mg total) by mouth once daily. 90 tablet 4    hydroCHLOROthiazide (HYDRODIURIL) 12.5 MG Tab Take 1 tablet (12.5 mg total) by mouth once daily. 90 tablet 3    tamsulosin (FLOMAX) 0.4 mg Cap TAKE 1 CAPSULE EVERY DAY 90 capsule 3    triamcinolone acetonide 0.1% (KENALOG) 0.1 % ointment Apply topically 2 (two) times daily. 453 g 0     No current facility-administered medications on file prior to visit.       Objective:       Vitals:    09/29/22 0922   BP: 129/78   Pulse: 63   Temp: 98 °F (36.7 °C)       Physical Exam  Constitutional:       General: He is not in acute distress.     Appearance: He is well-developed. He is not diaphoretic.   HENT:      Head: Normocephalic and atraumatic.      Right Ear: External ear normal.      Left Ear: External ear normal.      Nose: Nose normal.      Mouth/Throat:      Pharynx: No oropharyngeal exudate.   Eyes:      General: No scleral icterus.        Right eye: No discharge.         Left eye: No discharge.      Conjunctiva/sclera: Conjunctivae normal.      Pupils: Pupils are equal, round, and reactive to light.   Neck:      Thyroid: No thyromegaly.      Vascular: No JVD.      Trachea: No tracheal deviation.   Cardiovascular:      Rate and Rhythm: Normal rate and regular rhythm.      Heart sounds:  Murmur (3/6 ESM AORTIC AREA) heard.     No friction rub. No gallop.   Pulmonary:      Effort: Pulmonary effort is normal. No respiratory distress.      Breath sounds: Normal breath sounds. No stridor. No wheezing or rales.   Chest:      Chest wall: No tenderness.   Abdominal:      General: Bowel sounds are normal. There is no distension.      Palpations: Abdomen is soft. There is no mass.      Tenderness: There is no abdominal tenderness. There is no guarding or rebound.      Hernia: No hernia is present.   Musculoskeletal:         General: Tenderness (TTP LEFT SHOULDER WITH ROTATOR CUFF IMPINGEMENT) present. Normal range of motion.      Cervical back: Normal range of motion and neck supple.      Comments: Ttp L2-l4 and paralumbar right area   Lymphadenopathy:      Cervical: No cervical adenopathy.   Skin:     General: Skin is warm and dry.      Coloration: Skin is not pale.      Findings: No erythema or rash.   Neurological:      Mental Status: He is alert and oriented to person, place, and time.      Cranial Nerves: No cranial nerve deficit.      Motor: No abnormal muscle tone.      Coordination: Coordination normal.      Deep Tendon Reflexes: Reflexes are normal and symmetric. Reflexes normal.      Comments: No deficits   Psychiatric:         Behavior: Behavior normal.         Thought Content: Thought content normal.         Judgment: Judgment normal.       Assessment:       Problem List Items Addressed This Visit       Hypertension complicating diabetes    Controlled type 2 diabetes mellitus without complication, without long-term current use of insulin - Primary    Relevant Orders    Comprehensive Metabolic Panel    Hemoglobin A1C    Urinalysis    Microalbumin/Creatinine Ratio, Urine    Ambulatory referral/consult to Optometry     Other Visit Diagnoses       Chronic bilateral low back pain with bilateral sciatica        Relevant Medications    gabapentin (NEURONTIN) 300 MG capsule    Bilateral sciatica         Relevant Medications    gabapentin (NEURONTIN) 300 MG capsule    Chronic bilateral low back pain without sciatica        Relevant Medications    meloxicam (MOBIC) 15 MG tablet    Dyslipidemia associated with type 2 diabetes mellitus        Immunization due        Relevant Orders    Influenza (FLUAD) - Quadrivalent (Adjuvanted) *Preferred* (65+) (PF) (Completed)    (In Office Administered) Zoster Recombinant Vaccine (Completed)            Plan:           Diagnoses and all orders for this visit:    Controlled type 2 diabetes mellitus without complication, without long-term current use of insulin  -     Comprehensive Metabolic Panel; Future  -     Hemoglobin A1C; Future  -     Urinalysis; Future  -     Microalbumin/Creatinine Ratio, Urine; Future  -     Ambulatory referral/consult to Optometry; Future    Chronic bilateral low back pain with bilateral sciatica  -     gabapentin (NEURONTIN) 300 MG capsule; Take 2 capsules (600 mg total) by mouth every evening.    Bilateral sciatica  -     gabapentin (NEURONTIN) 300 MG capsule; Take 2 capsules (600 mg total) by mouth every evening.    Chronic bilateral low back pain without sciatica  -     meloxicam (MOBIC) 15 MG tablet; Take 1 tablet (15 mg total) by mouth once daily.    Hypertension complicating diabetes    Dyslipidemia associated with type 2 diabetes mellitus    Immunization due  -     Influenza (FLUAD) - Quadrivalent (Adjuvanted) *Preferred* (65+) (PF)  -     (In Office Administered) Zoster Recombinant Vaccine    BPH  -continue flomax daily. Side effects of medications have been discussed and patient agreed to proceed with treatment and understands the risks and benefits.    HTN  -controlled   -add HCTZ with lotrel. Side effects of medications have been discussed and patient agreed to proceed with treatment and understands the risks and benefits.  -precautions for hypotension given    HLD  -controlled    Back pain chronic  -increase gabapentin to 600 and mobic 15  daily      HEART MURMUR  -ECHO reassurance      ED and Gout   -controlled     MURTAZA  -stable    CVA  -on ASA and plavix      DM II  -controlled  -diet and exercise only    Obesity  -diet and exercise  -weight loss          Spent adequate time in obtaining history and explaining differentials     25 minutes spent during this visit of which greater than 50% devoted to face-face counseling and coordination of care regarding diagnosis and management plan    Follow up in about 3 months (around 12/29/2022), or if symptoms worsen or fail to improve.

## 2022-10-26 DIAGNOSIS — N40.0 BENIGN PROSTATIC HYPERPLASIA, UNSPECIFIED WHETHER LOWER URINARY TRACT SYMPTOMS PRESENT: ICD-10-CM

## 2022-10-26 RX ORDER — TAMSULOSIN HYDROCHLORIDE 0.4 MG/1
1 CAPSULE ORAL DAILY
Qty: 90 CAPSULE | Refills: 3 | Status: SHIPPED | OUTPATIENT
Start: 2022-10-26 | End: 2023-09-22 | Stop reason: SDUPTHER

## 2022-10-26 NOTE — TELEPHONE ENCOUNTER
Refill Decision Note   Anjel Sanders  is requesting a refill authorization.  Brief Assessment and Rationale for Refill:  Approve    -Medication-Related Problems Identified: Requires labs  Medication Therapy Plan:  Labs (uric acid) outdated    Medication Reconciliation Completed: No   Comments:     Provider Staff:     Action is required for this patient.   Please see care gap opportunities below in Care Due Message.     Thanks!  [facility]     Appointments      Date Provider   Last Visit   9/29/2022 Lars Payan MD   Next Visit   12/16/2022 Lars Payan MD     Note composed:10:12 AM 10/26/2022           Note composed:10:12 AM 10/26/2022

## 2022-10-26 NOTE — TELEPHONE ENCOUNTER
Care Due:                  Date            Visit Type   Department     Provider  --------------------------------------------------------------------------------                                EP [facility]    Lars Ash  Last Visit: 09-      CARE ([facility])   MEDICINE       Ora                              [facility]  Next Visit: 12-      CARE ([facility])   MEDICINE       Ora                                                            Last  Test          Frequency    Reason                     Performed    Due Date  --------------------------------------------------------------------------------    Uric Acid...  12 months..  allopurinoL..............  06- 06-    [facility] Embedded Care Gaps. Reference number: [id number]. 10/26/2022   6:40:47 AM CDT

## 2022-12-13 ENCOUNTER — LAB VISIT (OUTPATIENT)
Dept: LAB | Facility: HOSPITAL | Age: 67
End: 2022-12-13
Attending: FAMILY MEDICINE
Payer: MEDICARE

## 2022-12-13 DIAGNOSIS — E11.9 CONTROLLED TYPE 2 DIABETES MELLITUS WITHOUT COMPLICATION, WITHOUT LONG-TERM CURRENT USE OF INSULIN: ICD-10-CM

## 2022-12-13 LAB
ALBUMIN/CREAT UR: 4.6 UG/MG (ref 0–30)
BILIRUB UR QL STRIP: NEGATIVE
CLARITY UR REFRACT.AUTO: CLEAR
COLOR UR AUTO: YELLOW
CREAT UR-MCNC: 238 MG/DL (ref 23–375)
GLUCOSE UR QL STRIP: NEGATIVE
HGB UR QL STRIP: NEGATIVE
KETONES UR QL STRIP: NEGATIVE
LEUKOCYTE ESTERASE UR QL STRIP: NEGATIVE
MICROALBUMIN UR DL<=1MG/L-MCNC: 11 UG/ML
NITRITE UR QL STRIP: NEGATIVE
PH UR STRIP: 6 [PH] (ref 5–8)
PROT UR QL STRIP: NEGATIVE
SP GR UR STRIP: 1.02 (ref 1–1.03)
URN SPEC COLLECT METH UR: NORMAL
UROBILINOGEN UR STRIP-ACNC: NEGATIVE EU/DL

## 2022-12-13 PROCEDURE — 82570 ASSAY OF URINE CREATININE: CPT | Mod: PO | Performed by: FAMILY MEDICINE

## 2022-12-13 PROCEDURE — 82043 UR ALBUMIN QUANTITATIVE: CPT | Mod: PO | Performed by: FAMILY MEDICINE

## 2022-12-13 PROCEDURE — 81003 URINALYSIS AUTO W/O SCOPE: CPT | Mod: PO | Performed by: FAMILY MEDICINE

## 2022-12-16 ENCOUNTER — OFFICE VISIT (OUTPATIENT)
Dept: FAMILY MEDICINE | Facility: CLINIC | Age: 67
End: 2022-12-16
Attending: FAMILY MEDICINE
Payer: MEDICARE

## 2022-12-16 VITALS
DIASTOLIC BLOOD PRESSURE: 78 MMHG | WEIGHT: 219.81 LBS | BODY MASS INDEX: 30.77 KG/M2 | HEIGHT: 71 IN | HEART RATE: 62 BPM | SYSTOLIC BLOOD PRESSURE: 124 MMHG | OXYGEN SATURATION: 99 %

## 2022-12-16 DIAGNOSIS — Z00.00 ROUTINE GENERAL MEDICAL EXAMINATION AT HEALTH CARE FACILITY: Primary | ICD-10-CM

## 2022-12-16 DIAGNOSIS — E78.5 DYSLIPIDEMIA ASSOCIATED WITH TYPE 2 DIABETES MELLITUS: ICD-10-CM

## 2022-12-16 DIAGNOSIS — E11.9 CONTROLLED TYPE 2 DIABETES MELLITUS WITHOUT COMPLICATION, WITHOUT LONG-TERM CURRENT USE OF INSULIN: ICD-10-CM

## 2022-12-16 DIAGNOSIS — E11.59 HYPERTENSION COMPLICATING DIABETES: ICD-10-CM

## 2022-12-16 DIAGNOSIS — E11.69 DYSLIPIDEMIA ASSOCIATED WITH TYPE 2 DIABETES MELLITUS: ICD-10-CM

## 2022-12-16 DIAGNOSIS — N18.2 CKD (CHRONIC KIDNEY DISEASE) STAGE 2, GFR 60-89 ML/MIN: ICD-10-CM

## 2022-12-16 DIAGNOSIS — I15.2 HYPERTENSION COMPLICATING DIABETES: ICD-10-CM

## 2022-12-16 DIAGNOSIS — I69.351 HEMIPARESIS AFFECTING RIGHT SIDE AS LATE EFFECT OF CEREBROVASCULAR ACCIDENT: ICD-10-CM

## 2022-12-16 PROCEDURE — 4010F PR ACE/ARB THEARPY RXD/TAKEN: ICD-10-PCS | Mod: CPTII,S$GLB,, | Performed by: FAMILY MEDICINE

## 2022-12-16 PROCEDURE — 1159F PR MEDICATION LIST DOCUMENTED IN MEDICAL RECORD: ICD-10-PCS | Mod: CPTII,S$GLB,, | Performed by: FAMILY MEDICINE

## 2022-12-16 PROCEDURE — 3044F PR MOST RECENT HEMOGLOBIN A1C LEVEL <7.0%: ICD-10-PCS | Mod: CPTII,S$GLB,, | Performed by: FAMILY MEDICINE

## 2022-12-16 PROCEDURE — 1126F AMNT PAIN NOTED NONE PRSNT: CPT | Mod: CPTII,S$GLB,, | Performed by: FAMILY MEDICINE

## 2022-12-16 PROCEDURE — 3288F FALL RISK ASSESSMENT DOCD: CPT | Mod: CPTII,S$GLB,, | Performed by: FAMILY MEDICINE

## 2022-12-16 PROCEDURE — 3288F PR FALLS RISK ASSESSMENT DOCUMENTED: ICD-10-PCS | Mod: CPTII,S$GLB,, | Performed by: FAMILY MEDICINE

## 2022-12-16 PROCEDURE — 99999 PR PBB SHADOW E&M-EST. PATIENT-LVL III: CPT | Mod: PBBFAC,,, | Performed by: FAMILY MEDICINE

## 2022-12-16 PROCEDURE — 3008F PR BODY MASS INDEX (BMI) DOCUMENTED: ICD-10-PCS | Mod: CPTII,S$GLB,, | Performed by: FAMILY MEDICINE

## 2022-12-16 PROCEDURE — 99397 PR PREVENTIVE VISIT,EST,65 & OVER: ICD-10-PCS | Mod: S$GLB,,, | Performed by: FAMILY MEDICINE

## 2022-12-16 PROCEDURE — 3078F DIAST BP <80 MM HG: CPT | Mod: CPTII,S$GLB,, | Performed by: FAMILY MEDICINE

## 2022-12-16 PROCEDURE — 3061F NEG MICROALBUMINURIA REV: CPT | Mod: CPTII,S$GLB,, | Performed by: FAMILY MEDICINE

## 2022-12-16 PROCEDURE — 1159F MED LIST DOCD IN RCRD: CPT | Mod: CPTII,S$GLB,, | Performed by: FAMILY MEDICINE

## 2022-12-16 PROCEDURE — 1160F RVW MEDS BY RX/DR IN RCRD: CPT | Mod: CPTII,S$GLB,, | Performed by: FAMILY MEDICINE

## 2022-12-16 PROCEDURE — 1101F PR PT FALLS ASSESS DOC 0-1 FALLS W/OUT INJ PAST YR: ICD-10-PCS | Mod: CPTII,S$GLB,, | Performed by: FAMILY MEDICINE

## 2022-12-16 PROCEDURE — 3074F SYST BP LT 130 MM HG: CPT | Mod: CPTII,S$GLB,, | Performed by: FAMILY MEDICINE

## 2022-12-16 PROCEDURE — 3074F PR MOST RECENT SYSTOLIC BLOOD PRESSURE < 130 MM HG: ICD-10-PCS | Mod: CPTII,S$GLB,, | Performed by: FAMILY MEDICINE

## 2022-12-16 PROCEDURE — 3061F PR NEG MICROALBUMINURIA RESULT DOCUMENTED/REVIEW: ICD-10-PCS | Mod: CPTII,S$GLB,, | Performed by: FAMILY MEDICINE

## 2022-12-16 PROCEDURE — 3078F PR MOST RECENT DIASTOLIC BLOOD PRESSURE < 80 MM HG: ICD-10-PCS | Mod: CPTII,S$GLB,, | Performed by: FAMILY MEDICINE

## 2022-12-16 PROCEDURE — 1101F PT FALLS ASSESS-DOCD LE1/YR: CPT | Mod: CPTII,S$GLB,, | Performed by: FAMILY MEDICINE

## 2022-12-16 PROCEDURE — 3072F LOW RISK FOR RETINOPATHY: CPT | Mod: CPTII,S$GLB,, | Performed by: FAMILY MEDICINE

## 2022-12-16 PROCEDURE — 1160F PR REVIEW ALL MEDS BY PRESCRIBER/CLIN PHARMACIST DOCUMENTED: ICD-10-PCS | Mod: CPTII,S$GLB,, | Performed by: FAMILY MEDICINE

## 2022-12-16 PROCEDURE — 4010F ACE/ARB THERAPY RXD/TAKEN: CPT | Mod: CPTII,S$GLB,, | Performed by: FAMILY MEDICINE

## 2022-12-16 PROCEDURE — 1126F PR PAIN SEVERITY QUANTIFIED, NO PAIN PRESENT: ICD-10-PCS | Mod: CPTII,S$GLB,, | Performed by: FAMILY MEDICINE

## 2022-12-16 PROCEDURE — 3066F NEPHROPATHY DOC TX: CPT | Mod: CPTII,S$GLB,, | Performed by: FAMILY MEDICINE

## 2022-12-16 PROCEDURE — 3008F BODY MASS INDEX DOCD: CPT | Mod: CPTII,S$GLB,, | Performed by: FAMILY MEDICINE

## 2022-12-16 PROCEDURE — 3044F HG A1C LEVEL LT 7.0%: CPT | Mod: CPTII,S$GLB,, | Performed by: FAMILY MEDICINE

## 2022-12-16 PROCEDURE — 99999 PR PBB SHADOW E&M-EST. PATIENT-LVL III: ICD-10-PCS | Mod: PBBFAC,,, | Performed by: FAMILY MEDICINE

## 2022-12-16 PROCEDURE — 99397 PER PM REEVAL EST PAT 65+ YR: CPT | Mod: S$GLB,,, | Performed by: FAMILY MEDICINE

## 2022-12-16 PROCEDURE — 3066F PR DOCUMENTATION OF TREATMENT FOR NEPHROPATHY: ICD-10-PCS | Mod: CPTII,S$GLB,, | Performed by: FAMILY MEDICINE

## 2022-12-16 PROCEDURE — 3072F PR LOW RISK FOR RETINOPATHY: ICD-10-PCS | Mod: CPTII,S$GLB,, | Performed by: FAMILY MEDICINE

## 2022-12-16 NOTE — PROGRESS NOTES
Subjective:       Patient ID: Anjel Sanders is a 67 y.o. male.    Chief Complaint: Annual Exam    66 yr old black male with Hypertension, DM II, Hyperlipidemia, gout, ED, MURTAZA, presents today for his annual exam and  6 month follow up. C/o back pain.    DM II - diet controlled  -    HGBA1C                   6.2 (H)             12/13/2022                                              - no s/s of DM - foot exam UTD - eye exam due - on ASA and ACE    HTN - controlled initially - on lotrel - no side effects - fluctuating at home and elevated with systolic 150 - 160. Initial /88 - after resting 15-20 min - taking medicine - it came down to 130/ 80    HLD -controlled - on statin -          LDLCALC                  93.2                06/16/2022                                                          - on meds - need labs    ED - controlled - need cialis -     Gout - controlled    -  on allopurinol    Health maintenance  -UTD except labs    Follow-up  Associated symptoms include myalgias. Pertinent negatives include no chest pain, congestion, coughing, diaphoresis, headaches, neck pain, rash, vomiting or weakness.   Hypertension  This is a chronic problem. The current episode started more than 1 year ago. The problem has been gradually improving since onset. The problem is controlled. Pertinent negatives include no anxiety, blurred vision, chest pain, headaches, malaise/fatigue, neck pain, orthopnea, palpitations, peripheral edema, PND or sweats. There are no associated agents to hypertension. Risk factors for coronary artery disease include dyslipidemia, obesity and male gender. Past treatments include ACE inhibitors and diuretics. The current treatment provides moderate improvement. There are no compliance problems.  There is no history of angina, CAD/MI, CVA, heart failure, left ventricular hypertrophy or retinopathy. There is no history of chronic renal disease, coarctation of the aorta, hypercortisolism,  pheochromocytoma, renovascular disease or a thyroid problem.   Hyperlipidemia  This is a chronic problem. The current episode started more than 1 year ago. The problem is controlled. Recent lipid tests were reviewed and are normal. Exacerbating diseases include obesity. He has no history of chronic renal disease, diabetes, hypothyroidism or liver disease. There are no known factors aggravating his hyperlipidemia. Associated symptoms include myalgias. Pertinent negatives include no chest pain, focal sensory loss, focal weakness or leg pain. He is currently on no antihyperlipidemic treatment. The current treatment provides moderate improvement of lipids. There are no compliance problems.  Risk factors for coronary artery disease include dyslipidemia, hypertension, obesity and male sex.   Shoulder Injury   The left shoulder is affected. The incident occurred more than 1 week ago. The injury mechanism was a fall. The quality of the pain is described as cramping and shooting. The pain does not radiate. The pain is at a severity of 6/10. The pain is moderate. Pertinent negatives include no chest pain. The symptoms are aggravated by movement, overhead lifting and palpation. He has tried NSAIDs for the symptoms. The treatment provided moderate relief.   Review of Systems   Constitutional: Negative.  Negative for activity change, diaphoresis, malaise/fatigue and unexpected weight change.   HENT: Negative.  Negative for nasal congestion, ear pain, mouth sores, rhinorrhea and voice change.    Eyes: Negative.  Negative for blurred vision, pain, discharge and visual disturbance.   Respiratory: Negative.  Negative for apnea, cough and wheezing.    Cardiovascular: Negative.  Negative for chest pain, palpitations, orthopnea and PND.   Gastrointestinal: Negative.  Negative for abdominal distention, anal bleeding, diarrhea and vomiting.   Endocrine: Negative.  Negative for cold intolerance and polyuria.   Genitourinary: Negative.   Negative for decreased urine volume, difficulty urinating, discharge, frequency and scrotal swelling.   Musculoskeletal:  Positive for myalgias. Negative for back pain, leg pain, neck pain and neck stiffness.   Integumentary:  Negative for color change and rash. Negative.   Allergic/Immunologic: Negative.  Negative for environmental allergies.   Neurological: Negative.  Negative for dizziness, focal weakness, speech difficulty, weakness, light-headedness and headaches.   Hematological: Negative.    Psychiatric/Behavioral: Negative.  Negative for agitation, dysphoric mood and suicidal ideas. The patient is not nervous/anxious.        Active Ambulatory Problems     Diagnosis Date Noted    AR (allergic rhinitis)     Gouty arthritis     MURTAZA (obstructive sleep apnea)     Hx of colonic polyps     Ocular hypertension - Both Eyes 11/28/2012    HLD (hyperlipidemia) 08/27/2013    Hypertension 08/27/2013    ED (erectile dysfunction) 08/27/2013    Hemiparesis affecting right side as late effect of cerebrovascular accident 05/01/2015    Muscle weakness of right upper extremity 05/06/2015    Stiffness of right shoulder joint 05/06/2015    Hypertension complicating diabetes 04/21/2017    Obesity, diabetes, and hypertension syndrome 04/21/2017    Chronic midline low back pain without sciatica 07/31/2017    Left sided lacunar stroke 10/05/2018    Controlled type 2 diabetes mellitus without complication, without long-term current use of insulin 02/26/2019    Complete tear of left rotator cuff 03/28/2019    Acute pain of right shoulder 12/05/2019    Traumatic complete tear of right rotator cuff 06/16/2020    Right shoulder pain 08/04/2020    CKD (chronic kidney disease) stage 2, GFR 60-89 ml/min 02/01/2022     Resolved Ambulatory Problems     Diagnosis Date Noted    Chest pain, atypical 08/27/2013    Abnormal EKG 09/06/2013    Acute low back pain 12/16/2013    BMI 30.0-30.9,adult 08/12/2014    Acute ischemic stroke 03/20/2015     Diabetes type 2, controlled 03/25/2015    BMI 31.0-31.9,adult 03/25/2015    Obesity (BMI 30-39.9) 05/11/2015    Allergic rhinitis due to pollen 05/11/2015    Decreased strength 10/09/2018    Decreased ROM of lumbar spine 10/09/2018    Pain of left upper extremity 12/19/2018    Weakness 12/19/2018    Stiffness in joint 12/19/2018    Morbid obesity 02/26/2019    Severe obesity (BMI 35.0-39.9) with comorbidity 02/26/2019    Decreased range of motion of right shoulder 08/20/2020    Weakness of right upper extremity 08/20/2020    Decreased functional mobility 08/20/2020    Allergic contact dermatitis due to plant 11/11/2020    Screening for malignant neoplasm of colon 09/21/2021     Past Medical History:   Diagnosis Date    Other and unspecified hyperlipidemia     Stroke     Unspecified essential hypertension      Past Surgical History:   Procedure Laterality Date    ARTHROSCOPIC REPAIR OF ROTATOR CUFF OF SHOULDER Left 4/30/2019    Procedure: REPAIR, ROTATOR CUFF, ARTHROSCOPIC;  Surgeon: Bob Esparza Jr., MD;  Location: [facility] OR;  Service: Orthopedics;  Laterality: Left;  need opus sytem (Jluis notified)  video    ARTHROSCOPY OF SHOULDER WITH DECOMPRESSION OF SUBACROMIAL SPACE  4/30/2019    Procedure: ARTHROSCOPY, SHOULDER, WITH SUBACROMIAL SPACE DECOMPRESSION;  Surgeon: Bob Esparza Jr., MD;  Location: [facility] OR;  Service: Orthopedics;;    COLONOSCOPY N/A 9/21/2021    Procedure: COLONOSCOPY;  Surgeon: Asif López MD;  Location: [facility] ENDO;  Service: Endoscopy;  Laterality: N/A;    FOOT SURGERY      ROTATOR CUFF REPAIR Right 8/4/2020    Procedure: REPAIR, ROTATOR CUFF;  Surgeon: Bob Esparza Jr., MD;  Location: [facility] OR;  Service: Orthopedics;  Laterality: Right;  open no scope  need arthrex anchors (Marylin notified per [place] 7/21, EF) Confirmed with Harry 1000 8/3/2020 KB    surgery on jaw       Family History   Problem Relation Age of Onset    Hyperlipidemia Mother     Heart disease Mother     Diabetes Mother      Hypertension Mother     Glaucoma Mother     No Known Problems Father     Stomach cancer Sister     Stroke Sister     Cancer Sister         stomach cancer    Heart disease Sister     Diabetes Sister     Hypertension Sister     Diabetes Sister     Alcohol abuse Brother         in remission    Cancer Brother         sinus cancer    Cancer Son         stomach cancer    Amblyopia Neg Hx     Blindness Neg Hx     Cataracts Neg Hx     Macular degeneration Neg Hx     Retinal detachment Neg Hx     Strabismus Neg Hx     Thyroid disease Neg Hx      Social History     Socioeconomic History    Marital status:    Tobacco Use    Smoking status: Never    Smokeless tobacco: Never   Substance and Sexual Activity    Alcohol use: Not Currently     Alcohol/week: 9.3 standard drinks     Types: 6 Cans of beer, 4 Standard drinks or equivalent per week    Drug use: Yes     Types: Marijuana    Sexual activity: Yes     Partners: Female   Social History Narrative     with [employer]     Social Determinants of Health     Financial Resource Strain: Medium Risk    Difficulty of Paying Living Expenses: Somewhat hard   Food Insecurity: No Food Insecurity    Worried About Running Out of Food in the Last Year: Never true    Ran Out of Food in the Last Year: Never true   Transportation Needs: No Transportation Needs    Lack of Transportation (Medical): No    Lack of Transportation (Non-Medical): No   Physical Activity: Sufficiently Active    Days of Exercise per Week: 4 days    Minutes of Exercise per Session: 120 min   Stress: Stress Concern Present    Feeling of Stress : To some extent   Social Connections: Moderately Integrated    Frequency of Communication with Friends and Family: More than three times a week    Frequency of Social Gatherings with Friends and Family: More than three times a week    Attends Mu-ism Services: 1 to 4 times per year    Active Member of Clubs or Organizations: No    Attends Club or  Organization Meetings: Never    Marital Status:    Housing Stability: Low Risk     Unable to Pay for Housing in the Last Year: No    Number of Places Lived in the Last Year: 1    Unstable Housing in the Last Year: No     Review of patient's allergies indicates:  No Known Allergies  Current Outpatient Medications on File Prior to Visit   Medication Sig Dispense Refill    allopurinoL (ZYLOPRIM) 300 MG tablet Take 1 tablet (300 mg total) by mouth once daily. 90 tablet 3    amLODIPine-benazepriL (LOTREL) 10-40 mg per capsule Take 1 capsule by mouth once daily. 90 capsule 3    aspirin (ECOTRIN) 81 MG EC tablet Take 81 mg by mouth once daily.      atorvastatin (LIPITOR) 80 MG tablet Take 1 tablet (80 mg total) by mouth once daily. 90 tablet 4    gabapentin (NEURONTIN) 300 MG capsule Take 2 capsules (600 mg total) by mouth every evening. 180 capsule 3    hydroCHLOROthiazide (HYDRODIURIL) 12.5 MG Tab Take 1 tablet (12.5 mg total) by mouth once daily. 90 tablet 3    meloxicam (MOBIC) 15 MG tablet Take 1 tablet (15 mg total) by mouth once daily. 90 tablet 0    tamsulosin (FLOMAX) 0.4 mg Cap Take 1 capsule (0.4 mg total) by mouth once daily. 90 capsule 3    triamcinolone acetonide 0.1% (KENALOG) 0.1 % ointment Apply topically 2 (two) times daily. 453 g 0     No current facility-administered medications on file prior to visit.       Objective:       Vitals:    12/16/22 0923   BP: 124/78   Pulse: 62       Physical Exam  Constitutional:       General: He is not in acute distress.     Appearance: He is well-developed. He is not diaphoretic.   HENT:      Head: Normocephalic and atraumatic.      Right Ear: External ear normal.      Left Ear: External ear normal.      Nose: Nose normal.      Mouth/Throat:      Pharynx: No oropharyngeal exudate.   Eyes:      General: No scleral icterus.        Right eye: No discharge.         Left eye: No discharge.      Conjunctiva/sclera: Conjunctivae normal.      Pupils: Pupils are equal,  round, and reactive to light.   Neck:      Thyroid: No thyromegaly.      Vascular: No JVD.      Trachea: No tracheal deviation.   Cardiovascular:      Rate and Rhythm: Normal rate and regular rhythm.      Heart sounds: Murmur (3/6 ESM AORTIC AREA) heard.     No friction rub. No gallop.   Pulmonary:      Effort: Pulmonary effort is normal. No respiratory distress.      Breath sounds: Normal breath sounds. No stridor. No wheezing or rales.   Chest:      Chest wall: No tenderness.   Abdominal:      General: Bowel sounds are normal. There is no distension.      Palpations: Abdomen is soft. There is no mass.      Tenderness: There is no abdominal tenderness. There is no guarding or rebound.      Hernia: No hernia is present.   Musculoskeletal:         General: Tenderness (TTP LEFT SHOULDER WITH ROTATOR CUFF IMPINGEMENT) present. Normal range of motion.      Cervical back: Normal range of motion and neck supple.      Comments: Ttp L2-l4 and paralumbar right area   Lymphadenopathy:      Cervical: No cervical adenopathy.   Skin:     General: Skin is warm and dry.      Coloration: Skin is not pale.      Findings: No erythema or rash.   Neurological:      Mental Status: He is alert and oriented to person, place, and time.      Cranial Nerves: No cranial nerve deficit.      Motor: No abnormal muscle tone.      Coordination: Coordination normal.      Deep Tendon Reflexes: Reflexes are normal and symmetric. Reflexes normal.      Comments: No deficits   Psychiatric:         Behavior: Behavior normal.         Thought Content: Thought content normal.         Judgment: Judgment normal.       Assessment:       Problem List Items Addressed This Visit       Hypertension complicating diabetes    Hemiparesis affecting right side as late effect of cerebrovascular accident    Controlled type 2 diabetes mellitus without complication, without long-term current use of insulin    CKD (chronic kidney disease) stage 2, GFR 60-89 ml/min     Other  Visit Diagnoses       Routine general medical examination at health care facility    -  Primary    Dyslipidemia associated with type 2 diabetes mellitus                Plan:           Anjel was seen today for annual exam.    Diagnoses and all orders for this visit:    Routine general medical examination at health care facility    Hemiparesis affecting right side as late effect of cerebrovascular accident    Hypertension complicating diabetes    CKD (chronic kidney disease) stage 2, GFR 60-89 ml/min    Controlled type 2 diabetes mellitus without complication, without long-term current use of insulin    Dyslipidemia associated with type 2 diabetes mellitus    Wellness check  -normal exam  -labs    BPH  -continue flomax daily. Side effects of medications have been discussed and patient agreed to proceed with treatment and understands the risks and benefits.    HTN  -controlled   -add HCTZ with lotrel. Side effects of medications have been discussed and patient agreed to proceed with treatment and understands the risks and benefits.  -precautions for hypotension given    HLD  -controlled    Back pain chronic  -increase gabapentin to 600 and mobic 15 daily      HEART MURMUR  -ECHO reassurance      ED and Gout   -controlled     MURTAZA  -stable    CVA  -on ASA and plavix      DM II  -controlled  -diet and exercise only    Obesity  -diet and exercise  -weight loss          Spent adequate time in obtaining history and explaining differentials         Follow up in about 6 months (around 6/16/2023), or if symptoms worsen or fail to improve.

## 2023-01-26 ENCOUNTER — IMMUNIZATION (OUTPATIENT)
Dept: INTERNAL MEDICINE | Facility: CLINIC | Age: 68
End: 2023-01-26
Payer: MEDICARE

## 2023-01-26 DIAGNOSIS — Z23 NEED FOR VACCINATION: Primary | ICD-10-CM

## 2023-01-26 PROCEDURE — 0124A COVID-19, MRNA, LNP-S, BIVALENT BOOSTER, PF, 30 MCG/0.3 ML DOSE: CPT | Mod: HCNC,PBBFAC | Performed by: FAMILY MEDICINE

## 2023-01-26 PROCEDURE — 91312 COVID-19, MRNA, LNP-S, BIVALENT BOOSTER, PF, 30 MCG/0.3 ML DOSE: ICD-10-PCS | Mod: HCNC,S$GLB,, | Performed by: FAMILY MEDICINE

## 2023-01-26 PROCEDURE — 91312 COVID-19, MRNA, LNP-S, BIVALENT BOOSTER, PF, 30 MCG/0.3 ML DOSE: CPT | Mod: HCNC,S$GLB,, | Performed by: FAMILY MEDICINE

## 2023-02-28 ENCOUNTER — OFFICE VISIT (OUTPATIENT)
Dept: FAMILY MEDICINE | Facility: CLINIC | Age: 68
End: 2023-02-28
Attending: FAMILY MEDICINE
Payer: MEDICARE

## 2023-02-28 VITALS
SYSTOLIC BLOOD PRESSURE: 128 MMHG | BODY MASS INDEX: 30.56 KG/M2 | DIASTOLIC BLOOD PRESSURE: 78 MMHG | HEIGHT: 71 IN | TEMPERATURE: 98 F | WEIGHT: 218.25 LBS | HEART RATE: 60 BPM | OXYGEN SATURATION: 98 %

## 2023-02-28 DIAGNOSIS — G89.29 CHRONIC BILATERAL LOW BACK PAIN WITHOUT SCIATICA: Primary | ICD-10-CM

## 2023-02-28 DIAGNOSIS — M54.50 CHRONIC BILATERAL LOW BACK PAIN WITHOUT SCIATICA: Primary | ICD-10-CM

## 2023-02-28 PROCEDURE — 99999 PR PBB SHADOW E&M-EST. PATIENT-LVL III: ICD-10-PCS | Mod: PBBFAC,HCNC,, | Performed by: FAMILY MEDICINE

## 2023-02-28 PROCEDURE — 3078F PR MOST RECENT DIASTOLIC BLOOD PRESSURE < 80 MM HG: ICD-10-PCS | Mod: HCNC,CPTII,S$GLB, | Performed by: FAMILY MEDICINE

## 2023-02-28 PROCEDURE — 1159F MED LIST DOCD IN RCRD: CPT | Mod: HCNC,CPTII,S$GLB, | Performed by: FAMILY MEDICINE

## 2023-02-28 PROCEDURE — 3078F DIAST BP <80 MM HG: CPT | Mod: HCNC,CPTII,S$GLB, | Performed by: FAMILY MEDICINE

## 2023-02-28 PROCEDURE — 1160F RVW MEDS BY RX/DR IN RCRD: CPT | Mod: HCNC,CPTII,S$GLB, | Performed by: FAMILY MEDICINE

## 2023-02-28 PROCEDURE — 99214 OFFICE O/P EST MOD 30 MIN: CPT | Mod: HCNC,S$GLB,, | Performed by: FAMILY MEDICINE

## 2023-02-28 PROCEDURE — 3008F BODY MASS INDEX DOCD: CPT | Mod: HCNC,CPTII,S$GLB, | Performed by: FAMILY MEDICINE

## 2023-02-28 PROCEDURE — 3074F PR MOST RECENT SYSTOLIC BLOOD PRESSURE < 130 MM HG: ICD-10-PCS | Mod: HCNC,CPTII,S$GLB, | Performed by: FAMILY MEDICINE

## 2023-02-28 PROCEDURE — 3074F SYST BP LT 130 MM HG: CPT | Mod: HCNC,CPTII,S$GLB, | Performed by: FAMILY MEDICINE

## 2023-02-28 PROCEDURE — 99999 PR PBB SHADOW E&M-EST. PATIENT-LVL III: CPT | Mod: PBBFAC,HCNC,, | Performed by: FAMILY MEDICINE

## 2023-02-28 PROCEDURE — 1160F PR REVIEW ALL MEDS BY PRESCRIBER/CLIN PHARMACIST DOCUMENTED: ICD-10-PCS | Mod: HCNC,CPTII,S$GLB, | Performed by: FAMILY MEDICINE

## 2023-02-28 PROCEDURE — 1159F PR MEDICATION LIST DOCUMENTED IN MEDICAL RECORD: ICD-10-PCS | Mod: HCNC,CPTII,S$GLB, | Performed by: FAMILY MEDICINE

## 2023-02-28 PROCEDURE — 99214 PR OFFICE/OUTPT VISIT, EST, LEVL IV, 30-39 MIN: ICD-10-PCS | Mod: HCNC,S$GLB,, | Performed by: FAMILY MEDICINE

## 2023-02-28 PROCEDURE — 3008F PR BODY MASS INDEX (BMI) DOCUMENTED: ICD-10-PCS | Mod: HCNC,CPTII,S$GLB, | Performed by: FAMILY MEDICINE

## 2023-02-28 RX ORDER — DICLOFENAC SODIUM 10 MG/G
2 GEL TOPICAL DAILY
Qty: 100 G | Refills: 0 | Status: SHIPPED | OUTPATIENT
Start: 2023-02-28 | End: 2023-04-17 | Stop reason: SDUPTHER

## 2023-02-28 RX ORDER — MELOXICAM 15 MG/1
15 TABLET ORAL DAILY
Qty: 90 TABLET | Refills: 0 | Status: SHIPPED | OUTPATIENT
Start: 2023-02-28 | End: 2023-05-29

## 2023-02-28 NOTE — PROGRESS NOTES
Subjective:       Patient ID: Anjel Sanders is a 67 y.o. male.    Chief Complaint: Back Pain    66 yr old black male with Hypertension, DM II, Hyperlipidemia, gout, ED, MURTAZA, presents today for evaluation of back pain. Chronic and recently started worsening. It is lower back and does not radiate to legs. Np saddle anesthesia, bowel or bladder issues. Worse at night and Am and already on gabapentin. Details as follows -    Back Pain  This is a chronic problem. The current episode started more than 1 month ago. The problem occurs intermittently. The problem has been gradually worsening since onset. Pertinent negatives include no chest pain or weakness.   Review of Systems   Constitutional: Negative.  Negative for activity change, diaphoresis and unexpected weight change.   HENT: Negative.  Negative for nasal congestion, ear pain, mouth sores, rhinorrhea and voice change.    Eyes: Negative.  Negative for pain, discharge and visual disturbance.   Respiratory: Negative.  Negative for apnea, cough and wheezing.    Cardiovascular: Negative.  Negative for chest pain and palpitations.   Gastrointestinal: Negative.  Negative for abdominal distention, anal bleeding, diarrhea and vomiting.   Endocrine: Negative.  Negative for cold intolerance and polyuria.   Genitourinary: Negative.  Negative for decreased urine volume, difficulty urinating, discharge, frequency and scrotal swelling.   Musculoskeletal:  Positive for back pain and myalgias. Negative for neck stiffness.   Integumentary:  Negative for color change and rash. Negative.   Allergic/Immunologic: Negative.  Negative for environmental allergies.   Neurological: Negative.  Negative for dizziness, speech difficulty, weakness and light-headedness.   Hematological: Negative.    Psychiatric/Behavioral: Negative.  Negative for agitation, dysphoric mood and suicidal ideas. The patient is not nervous/anxious.        Past Medical History:   Diagnosis Date    AR (allergic  rhinitis)     Gouty arthritis     Hx of colonic polyps     Tubular Adenoma    MURTAZA (obstructive sleep apnea)     Other and unspecified hyperlipidemia     Stroke     Unspecified essential hypertension        Past Surgical History:   Procedure Laterality Date    ARTHROSCOPIC REPAIR OF ROTATOR CUFF OF SHOULDER Left 4/30/2019    Procedure: REPAIR, ROTATOR CUFF, ARTHROSCOPIC;  Surgeon: Bob Esparza Jr., MD;  Location: [facility] OR;  Service: Orthopedics;  Laterality: Left;  need opus sytem (Jluis notified)  video    ARTHROSCOPY OF SHOULDER WITH DECOMPRESSION OF SUBACROMIAL SPACE  4/30/2019    Procedure: ARTHROSCOPY, SHOULDER, WITH SUBACROMIAL SPACE DECOMPRESSION;  Surgeon: Bob Esparza Jr., MD;  Location: [facility] OR;  Service: Orthopedics;;    COLONOSCOPY N/A 9/21/2021    Procedure: COLONOSCOPY;  Surgeon: Asif López MD;  Location: [facility] ENDO;  Service: Endoscopy;  Laterality: N/A;    FOOT SURGERY      ROTATOR CUFF REPAIR Right 8/4/2020    Procedure: REPAIR, ROTATOR CUFF;  Surgeon: Bob Esparza Jr., MD;  Location: [facility] OR;  Service: Orthopedics;  Laterality: Right;  open no scope  need arthrex anchors (Marylin notified per [place] 7/21, EF) Confirmed with Harry 1000 8/3/2020 KB    surgery on jaw         Family History   Problem Relation Age of Onset    Hyperlipidemia Mother     Heart disease Mother     Diabetes Mother     Hypertension Mother     Glaucoma Mother     No Known Problems Father     Stomach cancer Sister     Stroke Sister     Cancer Sister         stomach cancer    Heart disease Sister     Diabetes Sister     Hypertension Sister     Diabetes Sister     Alcohol abuse Brother         in remission    Cancer Brother         sinus cancer    Cancer Son         stomach cancer    Amblyopia Neg Hx     Blindness Neg Hx     Cataracts Neg Hx     Macular degeneration Neg Hx     Retinal detachment Neg Hx     Strabismus Neg Hx     Thyroid disease Neg Hx        Social History     Socioeconomic History    Marital status:     Tobacco Use    Smoking status: Never    Smokeless tobacco: Never   Substance and Sexual Activity    Alcohol use: Not Currently     Alcohol/week: 9.3 standard drinks     Types: 6 Cans of beer, 4 Standard drinks or equivalent per week    Drug use: Yes     Types: Marijuana    Sexual activity: Yes     Partners: Female   Social History Narrative     with [employer]     Social Determinants of Health     Financial Resource Strain: Medium Risk    Difficulty of Paying Living Expenses: Somewhat hard   Food Insecurity: No Food Insecurity    Worried About Running Out of Food in the Last Year: Never true    Ran Out of Food in the Last Year: Never true   Transportation Needs: No Transportation Needs    Lack of Transportation (Medical): No    Lack of Transportation (Non-Medical): No   Physical Activity: Sufficiently Active    Days of Exercise per Week: 4 days    Minutes of Exercise per Session: 120 min   Stress: Stress Concern Present    Feeling of Stress : To some extent   Social Connections: Moderately Integrated    Frequency of Communication with Friends and Family: More than three times a week    Frequency of Social Gatherings with Friends and Family: More than three times a week    Attends Zoroastrian Services: 1 to 4 times per year    Active Member of Clubs or Organizations: No    Attends Club or Organization Meetings: Never    Marital Status:    Housing Stability: Low Risk     Unable to Pay for Housing in the Last Year: No    Number of Places Lived in the Last Year: 1    Unstable Housing in the Last Year: No       Current Outpatient Medications   Medication Sig Dispense Refill    allopurinoL (ZYLOPRIM) 300 MG tablet Take 1 tablet (300 mg total) by mouth once daily. 90 tablet 3    amLODIPine-benazepriL (LOTREL) 10-40 mg per capsule Take 1 capsule by mouth once daily. 90 capsule 3    aspirin (ECOTRIN) 81 MG EC tablet Take 81 mg by mouth once daily.      atorvastatin (LIPITOR) 80 MG  tablet Take 1 tablet (80 mg total) by mouth once daily. 90 tablet 4    gabapentin (NEURONTIN) 300 MG capsule Take 2 capsules (600 mg total) by mouth every evening. 180 capsule 3    hydroCHLOROthiazide (HYDRODIURIL) 12.5 MG Tab Take 1 tablet (12.5 mg total) by mouth once daily. 90 tablet 3    tamsulosin (FLOMAX) 0.4 mg Cap Take 1 capsule (0.4 mg total) by mouth once daily. 90 capsule 3    triamcinolone acetonide 0.1% (KENALOG) 0.1 % ointment Apply topically 2 (two) times daily. 453 g 0    diclofenac sodium (VOLTAREN) 1 % Gel Apply 2 g topically once daily. 100 g 0    meloxicam (MOBIC) 15 MG tablet Take 1 tablet (15 mg total) by mouth once daily. 90 tablet 0     No current facility-administered medications for this visit.       Review of patient's allergies indicates:  No Known Allergies    Objective:      Vitals:    02/28/23 1515   BP: 128/78   Pulse: 60   Temp: 98 °F (36.7 °C)       Physical Exam  Constitutional:       Appearance: He is well-developed.   HENT:      Head: Normocephalic and atraumatic.      Right Ear: External ear normal.      Left Ear: External ear normal.      Nose: Nose normal.      Mouth/Throat:      Pharynx: No oropharyngeal exudate.   Eyes:      General: No scleral icterus.        Right eye: No discharge.         Left eye: No discharge.      Conjunctiva/sclera: Conjunctivae normal.      Pupils: Pupils are equal, round, and reactive to light.   Neck:      Thyroid: No thyromegaly.      Vascular: No JVD.      Trachea: No tracheal deviation.   Cardiovascular:      Rate and Rhythm: Normal rate and regular rhythm.      Heart sounds: Normal heart sounds. No murmur heard.    No friction rub. No gallop.   Pulmonary:      Effort: Pulmonary effort is normal. No respiratory distress.      Breath sounds: Normal breath sounds. No stridor. No wheezing or rales.   Chest:      Chest wall: No tenderness.   Abdominal:      General: Bowel sounds are normal. There is no distension.      Palpations: Abdomen is soft.  There is no mass.      Tenderness: There is no abdominal tenderness. There is no guarding or rebound.      Hernia: No hernia is present.   Musculoskeletal:         General: Tenderness (TTP L2-L4 and paralmbar area. SLRt negative) present. Normal range of motion.      Cervical back: Normal range of motion and neck supple.   Lymphadenopathy:      Cervical: No cervical adenopathy.   Skin:     General: Skin is warm and dry.      Coloration: Skin is not pale.      Findings: No erythema or rash.   Neurological:      Mental Status: He is alert and oriented to person, place, and time.      Cranial Nerves: No cranial nerve deficit.      Motor: No abnormal muscle tone.      Coordination: Coordination normal.      Deep Tendon Reflexes: Reflexes are normal and symmetric. Reflexes normal.   Psychiatric:         Behavior: Behavior normal.         Thought Content: Thought content normal.         Judgment: Judgment normal.       Assessment:       Problem List Items Addressed This Visit    None  Visit Diagnoses       Chronic bilateral low back pain without sciatica    -  Primary    Relevant Medications    meloxicam (MOBIC) 15 MG tablet    diclofenac sodium (VOLTAREN) 1 % Gel    Other Relevant Orders    X-Ray Lumbar Spine 5 View              Plan:       Anjel was seen today for back pain.    Diagnoses and all orders for this visit:    Chronic bilateral low back pain without sciatica  -     meloxicam (MOBIC) 15 MG tablet; Take 1 tablet (15 mg total) by mouth once daily.  -     diclofenac sodium (VOLTAREN) 1 % Gel; Apply 2 g topically once daily.  -     X-Ray Lumbar Spine 5 View; Future      'low back pain  -MSK  -rest, heat pads  -voltaren gel as needed  -mobic as directed  -x ray to r/o other issues    Spent adequate time in obtaining history and explaining differentials    25 minutes spent during this visit of which greater than 50% devoted to face-face counseling and coordination of care regarding diagnosis and management  plan    Follow up in about 4 months (around 6/16/2023), or if symptoms worsen or fail to improve.

## 2023-03-01 ENCOUNTER — HOSPITAL ENCOUNTER (OUTPATIENT)
Dept: RADIOLOGY | Facility: HOSPITAL | Age: 68
Discharge: HOME | End: 2023-03-01
Attending: FAMILY MEDICINE
Payer: MEDICARE

## 2023-03-01 DIAGNOSIS — G89.29 CHRONIC BILATERAL LOW BACK PAIN WITHOUT SCIATICA: ICD-10-CM

## 2023-03-01 DIAGNOSIS — M54.50 CHRONIC BILATERAL LOW BACK PAIN WITHOUT SCIATICA: ICD-10-CM

## 2023-03-01 PROCEDURE — 72110 XR LUMBAR SPINE COMPLETE 5 VIEW: ICD-10-PCS | Mod: 26,HCNC,, | Performed by: RADIOLOGY

## 2023-03-01 PROCEDURE — 72110 X-RAY EXAM L-2 SPINE 4/>VWS: CPT | Mod: 26,HCNC,, | Performed by: RADIOLOGY

## 2023-03-01 PROCEDURE — 72110 X-RAY EXAM L-2 SPINE 4/>VWS: CPT | Mod: TC,HCNC,FY,PO

## 2023-03-24 ENCOUNTER — PES CALL (OUTPATIENT)
Dept: ADMINISTRATIVE | Facility: CLINIC | Age: 68
End: 2023-03-24
Payer: MEDICARE

## 2023-04-04 ENCOUNTER — TELEPHONE (OUTPATIENT)
Dept: OPTOMETRY | Facility: CLINIC | Age: 68
End: 2023-04-04
Payer: MEDICARE

## 2023-04-25 NOTE — PROGRESS NOTES
DATE: 5/3/2023  PATIENT: Anjel Sanders    Supervising Physician: Darrin Boucher M.D.    CHIEF COMPLAINT: low back pain    HISTORY:  Anjel Sanders is a 67 y.o. male here for initial evaluation of low back and bilateral leg pain (Back - 7, Leg - 7).  The pain in the low back is what bothers him most.  The pain has been present for years but has progressively worsened in severity over the past few months. The patient describes the pain as dull.  The pain is worse with rest and improved by movement during the day. There is associated numbness and tingling. There is subjective weakness. Prior treatments have included tylenol and advil, but no DEE or surgery.    The patient denies myelopathic symptoms such as handwriting changes or difficulty with buttons/coins/keys. Denies perineal paresthesias, bowel/bladder dysfunction.    PAST MEDICAL/SURGICAL HISTORY:  Past Medical History:   Diagnosis Date    AR (allergic rhinitis)     Gouty arthritis     Hx of colonic polyps     Tubular Adenoma    MURTAZA (obstructive sleep apnea)     Other and unspecified hyperlipidemia     Stroke     Unspecified essential hypertension      Past Surgical History:   Procedure Laterality Date    ARTHROSCOPIC REPAIR OF ROTATOR CUFF OF SHOULDER Left 4/30/2019    Procedure: REPAIR, ROTATOR CUFF, ARTHROSCOPIC;  Surgeon: Bob Esparza Jr., MD;  Location: [facility] OR;  Service: Orthopedics;  Laterality: Left;  need opus sytem (Jluis notified)  video    ARTHROSCOPY OF SHOULDER WITH DECOMPRESSION OF SUBACROMIAL SPACE  4/30/2019    Procedure: ARTHROSCOPY, SHOULDER, WITH SUBACROMIAL SPACE DECOMPRESSION;  Surgeon: Bob Esparza Jr., MD;  Location: [facility] OR;  Service: Orthopedics;;    COLONOSCOPY N/A 9/21/2021    Procedure: COLONOSCOPY;  Surgeon: Asif López MD;  Location: [facility] ENDO;  Service: Endoscopy;  Laterality: N/A;    FOOT SURGERY      ROTATOR CUFF REPAIR Right 8/4/2020    Procedure: REPAIR, ROTATOR CUFF;  Surgeon: Bob Esparza Jr., MD;   Location: [facility] OR;  Service: Orthopedics;  Laterality: Right;  open no scope  need arthrex anchors (Marylin notified per [place] 7/21, RADHA) Confirmed with Harry Nava 8/3/2020 [facility]    surgery on jaw         Medications:   Current Outpatient Medications on File Prior to Visit   Medication Sig Dispense Refill    allopurinoL (ZYLOPRIM) 300 MG tablet Take 1 tablet (300 mg total) by mouth once daily. 90 tablet 3    amLODIPine-benazepriL (LOTREL) 10-40 mg per capsule Take 1 capsule by mouth once daily. 90 capsule 3    aspirin (ECOTRIN) 81 MG EC tablet Take 81 mg by mouth once daily.      atorvastatin (LIPITOR) 80 MG tablet Take 1 tablet (80 mg total) by mouth once daily. 90 tablet 4    benzonatate (TESSALON) 100 MG capsule TAKE 1 CAPSULE BY MOUTH THREE TIMES A DAY AS NEEDED FOR COUGH 30 capsule 0    diclofenac sodium (VOLTAREN) 1 % Gel APPLY 2 GRAMS TO AFFECTED AREA ONCE DAILY. 100 g 0    hydroCHLOROthiazide (HYDRODIURIL) 12.5 MG Tab Take 1 tablet (12.5 mg total) by mouth once daily. 90 tablet 3    meloxicam (MOBIC) 15 MG tablet Take 1 tablet (15 mg total) by mouth once daily. 90 tablet 0    tamsulosin (FLOMAX) 0.4 mg Cap Take 1 capsule (0.4 mg total) by mouth once daily. 90 capsule 3    triamcinolone acetonide 0.1% (KENALOG) 0.1 % ointment Apply topically 2 (two) times daily. 453 g 0    [DISCONTINUED] gabapentin (NEURONTIN) 300 MG capsule Take 2 capsules (600 mg total) by mouth every evening. 180 capsule 3    benzonatate (TESSALON) 200 MG capsule Take by mouth.      promethazine-dextromethorphan (PROMETHAZINE-DM) 6.25-15 mg/5 mL Syrp Take 5 mLs by mouth 2 (two) times daily as needed.       No current facility-administered medications on file prior to visit.       Social History:   Social History     Socioeconomic History    Marital status:    Tobacco Use    Smoking status: Never    Smokeless tobacco: Never   Substance and Sexual Activity    Alcohol use: Not Currently     Alcohol/week: 9.3 standard drinks     Types: 6 Cans  of beer, 4 Standard drinks or equivalent per week    Drug use: Yes     Types: Marijuana    Sexual activity: Yes     Partners: Female   Social History Narrative     with [employer]     Social Determinants of Health     Financial Resource Strain: Medium Risk    Difficulty of Paying Living Expenses: Somewhat hard   Food Insecurity: No Food Insecurity    Worried About Running Out of Food in the Last Year: Never true    Ran Out of Food in the Last Year: Never true   Transportation Needs: No Transportation Needs    Lack of Transportation (Medical): No    Lack of Transportation (Non-Medical): No   Physical Activity: Sufficiently Active    Days of Exercise per Week: 4 days    Minutes of Exercise per Session: 120 min   Stress: Stress Concern Present    Feeling of Stress : To some extent   Social Connections: Moderately Integrated    Frequency of Communication with Friends and Family: More than three times a week    Frequency of Social Gatherings with Friends and Family: More than three times a week    Attends Evangelical Services: 1 to 4 times per year    Active Member of Clubs or Organizations: No    Attends Club or Organization Meetings: Never    Marital Status:    Housing Stability: Low Risk     Unable to Pay for Housing in the Last Year: No    Number of Places Lived in the Last Year: 1    Unstable Housing in the Last Year: No       REVIEW OF SYSTEMS:  Constitution: Negative. Negative for chills, fever and night sweats.   Cardiovascular: Negative for chest pain and syncope.   Respiratory: Negative for cough and shortness of breath.   Gastrointestinal: See HPI. Negative for nausea/vomiting. Negative for abdominal pain.  Genitourinary: See HPI. Negative for discoloration or dysuria.  Skin: Negative for dry skin, itching and rash.   Hematologic/Lymphatic: Negative for bleeding problem. Does not bruise/bleed easily.   Musculoskeletal: Negative for falls and muscle weakness.   Neurological: See  "HPI. No seizures.   Endocrine: Negative for polydipsia, polyphagia and polyuria.   Allergic/Immunologic: Negative for hives and persistent infections.     EXAM:  Ht 5' 11" (1.803 m)   Wt 102.6 kg (226 lb 3.1 oz)   BMI 31.55 kg/m²     General: The patient is a very pleasant 67 y.o. male in no apparent distress, the patient is oriented to person, place and time.  Psych: Normal mood and affect  HEENT: Vision grossly intact, hearing intact to the spoken word.  Lungs: Respirations unlabored.  Gait: Normal station and gait, no difficulty with toe or heel walk.   Skin: Dorsal lumbar skin negative for rashes, lesions, hairy patches and surgical scars. There is mild lumbar tenderness to palpation.  Range of motion: Lumbar range of motion is acceptable.  Spinal Balance: Global saggital and coronal spinal balance acceptable, not significant for scoliosis and kyphosis.  Musculoskeletal: No pain with the range of motion of the bilateral hips. No trochanteric tenderness to palpation.  Vascular: Bilateral lower extremities warm and well perfused, dorsalis pedis pulses 2+ bilaterally.  Neurological: decreased strength and tone in all major motor groups in the bilateral lower extremities. Normal sensation to light touch in the L2-S1 dermatomes bilaterally.  Deep tendon reflexes symmetric 2+ in the bilateral lower extremities.  Negative Babinski bilaterally. Straight leg raise posiitve bilaterally.    IMAGING:      Today I personally reviewed AP, Lat and Flex/Ex  upright L-spine films that demonstrate grade 1 anterolisthesis of L4 on L5.  Mild DDD throughout.       Body mass index is 31.55 kg/m².    Hemoglobin A1C   Date Value Ref Range Status   12/13/2022 6.2 (H) 4.0 - 5.6 % Final     Comment:     ADA Screening Guidelines:  5.7-6.4%  Consistent with prediabetes  >or=6.5%  Consistent with diabetes    High levels of fetal hemoglobin interfere with the HbA1C  assay. Heterozygous hemoglobin variants (HbS, HgC, etc)do  not " significantly interfere with this assay.   However, presence of multiple variants may affect accuracy.     06/16/2022 7.0 (H) 4.0 - 5.6 % Final     Comment:     ADA Screening Guidelines:  5.7-6.4%  Consistent with prediabetes  >or=6.5%  Consistent with diabetes    High levels of fetal hemoglobin interfere with the HbA1C  assay. Heterozygous hemoglobin variants (HbS, HgC, etc)do  not significantly interfere with this assay.   However, presence of multiple variants may affect accuracy.     12/16/2021 6.1 (H) 4.0 - 5.6 % Final     Comment:     ADA Screening Guidelines:  5.7-6.4%  Consistent with prediabetes  >or=6.5%  Consistent with diabetes    High levels of fetal hemoglobin interfere with the HbA1C  assay. Heterozygous hemoglobin variants (HbS, HgC, etc)do  not significantly interfere with this assay.   However, presence of multiple variants may affect accuracy.             ASSESSMENT/PLAN:    Anjel was seen today for low-back pain and leg pain.    Diagnoses and all orders for this visit:    Dorsalgia, unspecified  -     MRI Lumbar Spine Without Contrast; Future    Spondylolisthesis of lumbar region  -     gabapentin (NEURONTIN) 300 MG capsule; Take 1 capsule (300 mg total) by mouth 2 (two) times daily AND 3 capsules (900 mg total) every evening.  -     tiZANidine (ZANAFLEX) 4 MG tablet; Take 1 tablet (4 mg total) by mouth nightly as needed (muscle spasms).  -     MRI Lumbar Spine Without Contrast; Future    Lumbar radiculopathy  -     gabapentin (NEURONTIN) 300 MG capsule; Take 1 capsule (300 mg total) by mouth 2 (two) times daily AND 3 capsules (900 mg total) every evening.  -     tiZANidine (ZANAFLEX) 4 MG tablet; Take 1 tablet (4 mg total) by mouth nightly as needed (muscle spasms).  -     MRI Lumbar Spine Without Contrast; Future  -     Ambulatory referral/consult to Physical/Occupational Therapy; Future      Today we discussed at length all of the different treatment options including anti-inflammatories,  acetaminophen, rest, ice, heat, physical therapy including strengthening and stretching exercises, home exercises, ROM, aerobic conditioning, aqua therapy, other modalities including ultrasound, massage, and dry needling, epidural steroid injections and finally surgical intervention.  Lumbar MRI ordered, he will follow up in the clinic for results.

## 2023-05-03 ENCOUNTER — OFFICE VISIT (OUTPATIENT)
Dept: ORTHOPEDICS | Facility: CLINIC | Age: 68
End: 2023-05-03
Payer: MEDICARE

## 2023-05-03 VITALS — WEIGHT: 226.19 LBS | HEIGHT: 71 IN | BODY MASS INDEX: 31.67 KG/M2

## 2023-05-03 DIAGNOSIS — M54.9 DORSALGIA, UNSPECIFIED: Primary | ICD-10-CM

## 2023-05-03 DIAGNOSIS — M54.16 LUMBAR RADICULOPATHY: ICD-10-CM

## 2023-05-03 DIAGNOSIS — M43.16 SPONDYLOLISTHESIS OF LUMBAR REGION: ICD-10-CM

## 2023-05-03 PROCEDURE — 4010F PR ACE/ARB THEARPY RXD/TAKEN: ICD-10-PCS | Mod: HCNC,CPTII,S$GLB, | Performed by: REGISTERED NURSE

## 2023-05-03 PROCEDURE — 99214 OFFICE O/P EST MOD 30 MIN: CPT | Mod: HCNC,S$GLB,, | Performed by: REGISTERED NURSE

## 2023-05-03 PROCEDURE — 1101F PR PT FALLS ASSESS DOC 0-1 FALLS W/OUT INJ PAST YR: ICD-10-PCS | Mod: HCNC,CPTII,S$GLB, | Performed by: REGISTERED NURSE

## 2023-05-03 PROCEDURE — 3008F BODY MASS INDEX DOCD: CPT | Mod: HCNC,CPTII,S$GLB, | Performed by: REGISTERED NURSE

## 2023-05-03 PROCEDURE — 1159F MED LIST DOCD IN RCRD: CPT | Mod: HCNC,CPTII,S$GLB, | Performed by: REGISTERED NURSE

## 2023-05-03 PROCEDURE — 99999 PR PBB SHADOW E&M-EST. PATIENT-LVL III: ICD-10-PCS | Mod: PBBFAC,HCNC,, | Performed by: REGISTERED NURSE

## 2023-05-03 PROCEDURE — 3288F PR FALLS RISK ASSESSMENT DOCUMENTED: ICD-10-PCS | Mod: HCNC,CPTII,S$GLB, | Performed by: REGISTERED NURSE

## 2023-05-03 PROCEDURE — 1101F PT FALLS ASSESS-DOCD LE1/YR: CPT | Mod: HCNC,CPTII,S$GLB, | Performed by: REGISTERED NURSE

## 2023-05-03 PROCEDURE — 4010F ACE/ARB THERAPY RXD/TAKEN: CPT | Mod: HCNC,CPTII,S$GLB, | Performed by: REGISTERED NURSE

## 2023-05-03 PROCEDURE — 99214 PR OFFICE/OUTPT VISIT, EST, LEVL IV, 30-39 MIN: ICD-10-PCS | Mod: HCNC,S$GLB,, | Performed by: REGISTERED NURSE

## 2023-05-03 PROCEDURE — 1159F PR MEDICATION LIST DOCUMENTED IN MEDICAL RECORD: ICD-10-PCS | Mod: HCNC,CPTII,S$GLB, | Performed by: REGISTERED NURSE

## 2023-05-03 PROCEDURE — 3288F FALL RISK ASSESSMENT DOCD: CPT | Mod: HCNC,CPTII,S$GLB, | Performed by: REGISTERED NURSE

## 2023-05-03 PROCEDURE — 1125F PR PAIN SEVERITY QUANTIFIED, PAIN PRESENT: ICD-10-PCS | Mod: HCNC,CPTII,S$GLB, | Performed by: REGISTERED NURSE

## 2023-05-03 PROCEDURE — 1125F AMNT PAIN NOTED PAIN PRSNT: CPT | Mod: HCNC,CPTII,S$GLB, | Performed by: REGISTERED NURSE

## 2023-05-03 PROCEDURE — 3008F PR BODY MASS INDEX (BMI) DOCUMENTED: ICD-10-PCS | Mod: HCNC,CPTII,S$GLB, | Performed by: REGISTERED NURSE

## 2023-05-03 PROCEDURE — 99999 PR PBB SHADOW E&M-EST. PATIENT-LVL III: CPT | Mod: PBBFAC,HCNC,, | Performed by: REGISTERED NURSE

## 2023-05-03 RX ORDER — PROMETHAZINE HYDROCHLORIDE AND DEXTROMETHORPHAN HYDROBROMIDE 6.25; 15 MG/5ML; MG/5ML
5 SYRUP ORAL 2 TIMES DAILY PRN
COMMUNITY
Start: 2022-12-21 | End: 2023-06-16 | Stop reason: SDUPTHER

## 2023-05-03 RX ORDER — GABAPENTIN 300 MG/1
CAPSULE ORAL
Qty: 150 CAPSULE | Refills: 11 | Status: SHIPPED | OUTPATIENT
Start: 2023-05-03 | End: 2024-05-02

## 2023-05-03 RX ORDER — TIZANIDINE 4 MG/1
4 TABLET ORAL NIGHTLY PRN
Qty: 60 TABLET | Refills: 2 | Status: SHIPPED | OUTPATIENT
Start: 2023-05-03

## 2023-05-03 RX ORDER — BENZONATATE 200 MG/1
CAPSULE ORAL
COMMUNITY
Start: 2023-04-07

## 2023-05-03 NOTE — PROGRESS NOTES
Established Patient - Audio Only Telehealth Visit     The patient location is: home  The chief complaint leading to consultation is: MRI results  Visit type: Virtual visit with audio only (telephone)  Total time spent with patient: 15min     The reason for the audio only service rather than synchronous audio and video virtual visit was related to technical difficulties or patient preference/necessity.     Each patient to whom I provide medical services by telemedicine is:  (1) informed of the relationship between the physician and patient and the respective role of any other health care provider with respect to management of the patient; and (2) notified that they may decline to receive medical services by telemedicine and may withdraw from such care at any time. Patient verbally consented to receive this service via voice-only telephone call.    DATE: 5/12/2023  PATIENT: Anjel Sanders    Attending Physician: Darrin Boucher M.D.    HISTORY:  Anjel Sanders is a 67 y.o. male who returns to me today for MRI results.  He was last seen by me 5/3/2023.  Today he is doing well but notes continued low back pain.    The patient denies myelopathic symptoms such as handwriting changes or difficulty with buttons/coins/keys. Denies perineal paresthesias, bowel/bladder dysfunction.    PMH/PSH/FamHx/SocHx:  Unchanged from prior visit    ROS:  REVIEW OF SYSTEMS:  Constitution: Negative. Negative for chills, fever and night sweats.   HENT: Negative for congestion and headaches.    Eyes: Negative for blurred vision, left vision loss and right vision loss.   Cardiovascular: Negative for chest pain and syncope.   Respiratory: Negative for cough and shortness of breath.    Endocrine: Negative for polydipsia, polyphagia and polyuria.   Hematologic/Lymphatic: Negative for bleeding problem. Does not bruise/bleed easily.   Skin: Negative for dry skin, itching and rash.   Musculoskeletal: Negative for falls and muscle weakness.    Gastrointestinal: Negative for abdominal pain and bowel incontinence.   Allergic/Immunologic: Negative for hives and persistent infections.  Genitourinary: Negative for urinary retention/incontinence and nocturia.   Neurological: negative for disturbances in coordination, no myelopathic symptoms such as handwriting changes or difficulty with buttons, coins, keys or small objects. No loss of balance and seizures.   Psychiatric/Behavioral: Negative for depression. The patient does not have insomnia.   Denies perineal paresthesias, bowel or bladder incontinence    EXAM:  There were no vitals taken for this visit.  Stable.     IMAGING:    Today I personally re- reviewed AP, Lat and Flex/Ex  upright L-spine that demonstrate grade 1 anterolisthesis of L4 on L5.  Mild DDD throughout.      Lumbar MRI shows severe stenosis at L3/4 with moderate stenosis at L4/5 and foraminal narrowing from L3-S1.     There is no height or weight on file to calculate BMI.    Hemoglobin A1C   Date Value Ref Range Status   12/13/2022 6.2 (H) 4.0 - 5.6 % Final     Comment:     ADA Screening Guidelines:  5.7-6.4%  Consistent with prediabetes  >or=6.5%  Consistent with diabetes    High levels of fetal hemoglobin interfere with the HbA1C  assay. Heterozygous hemoglobin variants (HbS, HgC, etc)do  not significantly interfere with this assay.   However, presence of multiple variants may affect accuracy.     06/16/2022 7.0 (H) 4.0 - 5.6 % Final     Comment:     ADA Screening Guidelines:  5.7-6.4%  Consistent with prediabetes  >or=6.5%  Consistent with diabetes    High levels of fetal hemoglobin interfere with the HbA1C  assay. Heterozygous hemoglobin variants (HbS, HgC, etc)do  not significantly interfere with this assay.   However, presence of multiple variants may affect accuracy.     12/16/2021 6.1 (H) 4.0 - 5.6 % Final     Comment:     ADA Screening Guidelines:  5.7-6.4%  Consistent with prediabetes  >or=6.5%  Consistent with diabetes    High  levels of fetal hemoglobin interfere with the HbA1C  assay. Heterozygous hemoglobin variants (HbS, HgC, etc)do  not significantly interfere with this assay.   However, presence of multiple variants may affect accuracy.           ASSESSMENT/PLAN:    Diagnoses and all orders for this visit:    Lumbar radiculopathy  -     Procedure Order to Pain Management; Future      Today we discussed at length all of the different treatment options including anti-inflammatories, acetaminophen, rest, ice, heat, physical therapy including strengthening and stretching exercises, home exercises, ROM, aerobic conditioning, aqua therapy, other modalities including ultrasound, massage, and dry needling, epidural steroid injections and finally surgical intervention.    L3/4 DEE ordered with pain management. He may follow up 2 weeks after if his pain persists.      This service was not originating from a related E/M service provided within the previous 7 days nor will  to an E/M service or procedure within the next 24 hours or my soonest available appointment.  Prevailing standard of care was able to be met in this audio-only visit.

## 2023-05-04 PROBLEM — Z86.73 HISTORY OF STROKE: Status: ACTIVE | Noted: 2018-10-05

## 2023-05-10 ENCOUNTER — HOSPITAL ENCOUNTER (OUTPATIENT)
Dept: RADIOLOGY | Facility: HOSPITAL | Age: 68
Discharge: HOME | End: 2023-05-10
Attending: REGISTERED NURSE
Payer: MEDICARE

## 2023-05-10 DIAGNOSIS — M54.16 LUMBAR RADICULOPATHY: ICD-10-CM

## 2023-05-10 DIAGNOSIS — M43.16 SPONDYLOLISTHESIS OF LUMBAR REGION: ICD-10-CM

## 2023-05-10 DIAGNOSIS — M54.9 DORSALGIA, UNSPECIFIED: ICD-10-CM

## 2023-05-10 PROCEDURE — 72148 MRI LUMBAR SPINE WITHOUT CONTRAST: ICD-10-PCS | Mod: 26,HCNC,, | Performed by: INTERNAL MEDICINE

## 2023-05-10 PROCEDURE — 72148 MRI LUMBAR SPINE W/O DYE: CPT | Mod: TC,HCNC

## 2023-05-10 PROCEDURE — 72148 MRI LUMBAR SPINE W/O DYE: CPT | Mod: 26,HCNC,, | Performed by: INTERNAL MEDICINE

## 2023-05-12 ENCOUNTER — TELEPHONE (OUTPATIENT)
Dept: ORTHOPEDICS | Facility: CLINIC | Age: 68
End: 2023-05-12
Payer: MEDICARE

## 2023-05-12 ENCOUNTER — OFFICE VISIT (OUTPATIENT)
Dept: ORTHOPEDICS | Facility: CLINIC | Age: 68
End: 2023-05-12
Payer: MEDICARE

## 2023-05-12 DIAGNOSIS — M54.16 LUMBAR RADICULOPATHY: Primary | ICD-10-CM

## 2023-05-12 PROCEDURE — 1160F PR REVIEW ALL MEDS BY PRESCRIBER/CLIN PHARMACIST DOCUMENTED: ICD-10-PCS | Mod: HCNC,CPTII,95, | Performed by: REGISTERED NURSE

## 2023-05-12 PROCEDURE — 4010F PR ACE/ARB THEARPY RXD/TAKEN: ICD-10-PCS | Mod: HCNC,CPTII,95, | Performed by: REGISTERED NURSE

## 2023-05-12 PROCEDURE — 4010F ACE/ARB THERAPY RXD/TAKEN: CPT | Mod: HCNC,CPTII,95, | Performed by: REGISTERED NURSE

## 2023-05-12 PROCEDURE — 99442 PR PHYSICIAN TELEPHONE EVALUATION 11-20 MIN: CPT | Mod: HCNC,95,, | Performed by: REGISTERED NURSE

## 2023-05-12 PROCEDURE — 1159F MED LIST DOCD IN RCRD: CPT | Mod: HCNC,CPTII,95, | Performed by: REGISTERED NURSE

## 2023-05-12 PROCEDURE — 99442 PR PHYSICIAN TELEPHONE EVALUATION 11-20 MIN: ICD-10-PCS | Mod: HCNC,95,, | Performed by: REGISTERED NURSE

## 2023-05-12 PROCEDURE — 1160F RVW MEDS BY RX/DR IN RCRD: CPT | Mod: HCNC,CPTII,95, | Performed by: REGISTERED NURSE

## 2023-05-12 PROCEDURE — 1159F PR MEDICATION LIST DOCUMENTED IN MEDICAL RECORD: ICD-10-PCS | Mod: HCNC,CPTII,95, | Performed by: REGISTERED NURSE

## 2023-05-16 ENCOUNTER — PES CALL (OUTPATIENT)
Dept: ADMINISTRATIVE | Facility: CLINIC | Age: 68
End: 2023-05-16
Payer: MEDICARE

## 2023-05-22 ENCOUNTER — TELEPHONE (OUTPATIENT)
Dept: PAIN MEDICINE | Facility: OTHER | Age: 68
End: 2023-05-22
Payer: MEDICARE

## 2023-05-23 ENCOUNTER — TELEPHONE (OUTPATIENT)
Dept: PAIN MEDICINE | Facility: OTHER | Age: 68
End: 2023-05-23
Payer: MEDICARE

## 2023-05-24 ENCOUNTER — TELEPHONE (OUTPATIENT)
Dept: PAIN MEDICINE | Facility: OTHER | Age: 68
End: 2023-05-24
Payer: MEDICARE

## 2023-05-24 DIAGNOSIS — M54.16 LUMBAR RADICULOPATHY: Primary | ICD-10-CM

## 2023-05-27 DIAGNOSIS — M54.50 CHRONIC BILATERAL LOW BACK PAIN WITHOUT SCIATICA: ICD-10-CM

## 2023-05-27 DIAGNOSIS — G89.29 CHRONIC BILATERAL LOW BACK PAIN WITHOUT SCIATICA: ICD-10-CM

## 2023-05-29 RX ORDER — MELOXICAM 15 MG/1
TABLET ORAL
Qty: 90 TABLET | Refills: 0 | Status: SHIPPED | OUTPATIENT
Start: 2023-05-29 | End: 2023-11-03

## 2023-06-06 ENCOUNTER — TELEPHONE (OUTPATIENT)
Dept: PAIN MEDICINE | Facility: CLINIC | Age: 68
End: 2023-06-06
Payer: MEDICARE

## 2023-06-06 NOTE — TELEPHONE ENCOUNTER
----- Message from Keena Sahu sent at 6/6/2023  9:00 AM CDT -----  Regarding: Pt's Wife Kalie Henderson called to speak to the nurse regarding the pt's 6/28/23 procedure appt and she would like a call back today  Patient Advice:    Pt's Wife Kalie Henderson called to speak to the nurse regarding the pt's 6/28/23 procedure appt and she would like a call back today    Mrs Sanders can be reached at [phone]

## 2023-06-07 ENCOUNTER — TELEPHONE (OUTPATIENT)
Dept: PAIN MEDICINE | Facility: CLINIC | Age: 68
End: 2023-06-07
Payer: MEDICARE

## 2023-06-07 NOTE — TELEPHONE ENCOUNTER
----- Message from Roz Oliver sent at 6/7/2023  9:11 AM CDT -----  Regarding: Procedure Location  Contact: Kalie [phone]  Kalie/ wife is calling stating procedure is at [facility] and that is too far and want appt scheduled closer to them please call

## 2023-06-14 ENCOUNTER — TELEPHONE (OUTPATIENT)
Dept: PAIN MEDICINE | Facility: CLINIC | Age: 68
End: 2023-06-14
Payer: MEDICARE

## 2023-06-14 DIAGNOSIS — M54.16 LUMBAR RADICULOPATHY: Primary | ICD-10-CM

## 2023-06-14 NOTE — TELEPHONE ENCOUNTER
----- Message from Tash Murphy sent at 6/14/2023 11:58 AM CDT -----  Who Called: Pt    What is the request in detail: Requesting call back to discuss rescheduling upcoming 06/28 to either main [place] or [place] location, pt states this appt is too far away. Please advise    Can the clinic reply by [facility]? No    Best Call Back Number: [phone]      Additional Information:

## 2023-06-16 ENCOUNTER — OFFICE VISIT (OUTPATIENT)
Dept: FAMILY MEDICINE | Facility: CLINIC | Age: 68
End: 2023-06-16
Attending: FAMILY MEDICINE
Payer: MEDICARE

## 2023-06-16 DIAGNOSIS — E11.9 CONTROLLED TYPE 2 DIABETES MELLITUS WITHOUT COMPLICATION, WITHOUT LONG-TERM CURRENT USE OF INSULIN: ICD-10-CM

## 2023-06-16 DIAGNOSIS — N18.31 CHRONIC KIDNEY DISEASE, STAGE 3A: ICD-10-CM

## 2023-06-16 DIAGNOSIS — N52.9 ERECTILE DYSFUNCTION, UNSPECIFIED ERECTILE DYSFUNCTION TYPE: ICD-10-CM

## 2023-06-16 DIAGNOSIS — Z12.5 ENCOUNTER FOR SCREENING FOR MALIGNANT NEOPLASM OF PROSTATE: ICD-10-CM

## 2023-06-16 DIAGNOSIS — R05.8 ALLERGIC COUGH: ICD-10-CM

## 2023-06-16 DIAGNOSIS — E78.5 DYSLIPIDEMIA ASSOCIATED WITH TYPE 2 DIABETES MELLITUS: ICD-10-CM

## 2023-06-16 DIAGNOSIS — N18.2 CKD (CHRONIC KIDNEY DISEASE) STAGE 2, GFR 60-89 ML/MIN: ICD-10-CM

## 2023-06-16 DIAGNOSIS — E11.69 DYSLIPIDEMIA ASSOCIATED WITH TYPE 2 DIABETES MELLITUS: ICD-10-CM

## 2023-06-16 DIAGNOSIS — I69.351 HEMIPARESIS AFFECTING RIGHT SIDE AS LATE EFFECT OF CEREBROVASCULAR ACCIDENT: ICD-10-CM

## 2023-06-16 DIAGNOSIS — I15.2 HYPERTENSION COMPLICATING DIABETES: Primary | ICD-10-CM

## 2023-06-16 DIAGNOSIS — E11.59 HYPERTENSION COMPLICATING DIABETES: Primary | ICD-10-CM

## 2023-06-16 PROCEDURE — 99215 OFFICE O/P EST HI 40 MIN: CPT | Mod: S$GLB,,, | Performed by: FAMILY MEDICINE

## 2023-06-16 PROCEDURE — 1159F PR MEDICATION LIST DOCUMENTED IN MEDICAL RECORD: ICD-10-PCS | Mod: CPTII,S$GLB,, | Performed by: FAMILY MEDICINE

## 2023-06-16 PROCEDURE — 99499 UNLISTED E&M SERVICE: CPT | Mod: HCNC,S$GLB,, | Performed by: FAMILY MEDICINE

## 2023-06-16 PROCEDURE — 99999 PR PBB SHADOW E&M-EST. PATIENT-LVL II: ICD-10-PCS | Mod: PBBFAC,,, | Performed by: FAMILY MEDICINE

## 2023-06-16 PROCEDURE — 99999 PR PBB SHADOW E&M-EST. PATIENT-LVL II: CPT | Mod: PBBFAC,,, | Performed by: FAMILY MEDICINE

## 2023-06-16 PROCEDURE — 1159F MED LIST DOCD IN RCRD: CPT | Mod: CPTII,S$GLB,, | Performed by: FAMILY MEDICINE

## 2023-06-16 PROCEDURE — 4010F PR ACE/ARB THEARPY RXD/TAKEN: ICD-10-PCS | Mod: CPTII,S$GLB,, | Performed by: FAMILY MEDICINE

## 2023-06-16 PROCEDURE — 99499 RISK ADDL DX/OHS AUDIT: ICD-10-PCS | Mod: HCNC,S$GLB,, | Performed by: FAMILY MEDICINE

## 2023-06-16 PROCEDURE — 1160F PR REVIEW ALL MEDS BY PRESCRIBER/CLIN PHARMACIST DOCUMENTED: ICD-10-PCS | Mod: CPTII,S$GLB,, | Performed by: FAMILY MEDICINE

## 2023-06-16 PROCEDURE — 4010F ACE/ARB THERAPY RXD/TAKEN: CPT | Mod: CPTII,S$GLB,, | Performed by: FAMILY MEDICINE

## 2023-06-16 PROCEDURE — 99215 PR OFFICE/OUTPT VISIT, EST, LEVL V, 40-54 MIN: ICD-10-PCS | Mod: S$GLB,,, | Performed by: FAMILY MEDICINE

## 2023-06-16 PROCEDURE — 1160F RVW MEDS BY RX/DR IN RCRD: CPT | Mod: CPTII,S$GLB,, | Performed by: FAMILY MEDICINE

## 2023-06-16 RX ORDER — TADALAFIL 20 MG/1
20 TABLET ORAL DAILY
Qty: 10 TABLET | Refills: 4 | Status: SHIPPED | OUTPATIENT
Start: 2023-06-16 | End: 2023-06-21

## 2023-06-16 RX ORDER — PROMETHAZINE HYDROCHLORIDE AND DEXTROMETHORPHAN HYDROBROMIDE 6.25; 15 MG/5ML; MG/5ML
5 SYRUP ORAL 2 TIMES DAILY PRN
Qty: 180 ML | Refills: 1 | Status: SHIPPED | OUTPATIENT
Start: 2023-06-16 | End: 2024-03-06

## 2023-06-16 NOTE — PROGRESS NOTES
Subjective:       Patient ID: Anjel Sanders is a 67 y.o. male.    Chief Complaint: Follow-up    66 yr old black male with Hypertension, DM II, Hyperlipidemia, gout, ED, MURTAZA, presents today for his annual exam and  6 month follow up. C/o back pain.    DM II - diet controlled  -    HGBA1C                   6.2 (H)             12/13/2022                                              - no s/s of DM - foot exam UTD - eye exam due - on ASA and ACE    HTN - controlled initially - on lotrel - no side effects - fluctuating at home and elevated with systolic 150 - 160. Initial /88 - after resting 15-20 min - taking medicine - it came down to 130/ 80    HLD -controlled - on statin -          LDLCALC                  93.2                06/16/2022                                                          - on meds - need labs    ED - controlled - need cialis -     Gout - controlled    -  on allopurinol    Health maintenance  -UTD except labs    Follow-up  Associated symptoms include myalgias. Pertinent negatives include no chest pain, congestion, coughing, diaphoresis, headaches, neck pain, rash, vomiting or weakness.   Hypertension  This is a chronic problem. The current episode started more than 1 year ago. The problem has been gradually improving since onset. The problem is controlled. Pertinent negatives include no anxiety, blurred vision, chest pain, headaches, malaise/fatigue, neck pain, orthopnea, palpitations, peripheral edema, PND or sweats. There are no associated agents to hypertension. Risk factors for coronary artery disease include dyslipidemia, obesity and male gender. Past treatments include ACE inhibitors and diuretics. The current treatment provides moderate improvement. There are no compliance problems.  There is no history of angina, CAD/MI, CVA, heart failure, left ventricular hypertrophy or retinopathy. There is no history of chronic renal disease, coarctation of the aorta, hypercortisolism,  pheochromocytoma, renovascular disease or a thyroid problem.   Hyperlipidemia  This is a chronic problem. The current episode started more than 1 year ago. The problem is controlled. Recent lipid tests were reviewed and are normal. Exacerbating diseases include obesity. He has no history of chronic renal disease, diabetes, hypothyroidism or liver disease. There are no known factors aggravating his hyperlipidemia. Associated symptoms include myalgias. Pertinent negatives include no chest pain, focal sensory loss, focal weakness or leg pain. He is currently on no antihyperlipidemic treatment. The current treatment provides moderate improvement of lipids. There are no compliance problems.  Risk factors for coronary artery disease include dyslipidemia, hypertension, obesity and male sex.   Shoulder Injury   The left shoulder is affected. The incident occurred more than 1 week ago. The injury mechanism was a fall. The quality of the pain is described as cramping and shooting. The pain does not radiate. The pain is at a severity of 6/10. The pain is moderate. Pertinent negatives include no chest pain. The symptoms are aggravated by movement, overhead lifting and palpation. He has tried NSAIDs for the symptoms. The treatment provided moderate relief.   Review of Systems   Constitutional: Negative.  Negative for activity change, diaphoresis, malaise/fatigue and unexpected weight change.   HENT: Negative.  Negative for nasal congestion, ear pain, mouth sores, rhinorrhea and voice change.    Eyes: Negative.  Negative for blurred vision, pain, discharge and visual disturbance.   Respiratory: Negative.  Negative for apnea, cough and wheezing.    Cardiovascular: Negative.  Negative for chest pain, palpitations, orthopnea and PND.   Gastrointestinal: Negative.  Negative for abdominal distention, anal bleeding, diarrhea and vomiting.   Endocrine: Negative.  Negative for cold intolerance and polyuria.   Genitourinary: Negative.   Negative for decreased urine volume, difficulty urinating, discharge, frequency and scrotal swelling.   Musculoskeletal:  Positive for myalgias. Negative for back pain, leg pain, neck pain and neck stiffness.   Integumentary:  Negative for color change and rash. Negative.   Allergic/Immunologic: Negative.  Negative for environmental allergies.   Neurological: Negative.  Negative for dizziness, focal weakness, speech difficulty, weakness, light-headedness and headaches.   Hematological: Negative.    Psychiatric/Behavioral: Negative.  Negative for agitation, dysphoric mood and suicidal ideas. The patient is not nervous/anxious.        Active Ambulatory Problems     Diagnosis Date Noted    Gouty arthritis     MURTAZA (obstructive sleep apnea)     Hx of colonic polyps     Ocular hypertension - Both Eyes 11/28/2012    HLD (hyperlipidemia) 08/27/2013    Hypertension 08/27/2013    ED (erectile dysfunction) 08/27/2013    Hemiparesis affecting right side as late effect of cerebrovascular accident 05/01/2015    Muscle weakness of right upper extremity 05/06/2015    Stiffness of right shoulder joint 05/06/2015    Hypertension complicating diabetes 04/21/2017    Obesity, diabetes, and hypertension syndrome 04/21/2017    Chronic midline low back pain without sciatica 07/31/2017    History of stroke 10/05/2018    Controlled type 2 diabetes mellitus without complication, without long-term current use of insulin 02/26/2019    Complete tear of left rotator cuff 03/28/2019    Acute pain of right shoulder 12/05/2019    Traumatic complete tear of right rotator cuff 06/16/2020    Right shoulder pain 08/04/2020    CKD (chronic kidney disease) stage 2, GFR 60-89 ml/min 02/01/2022    Chronic kidney disease, stage 3a 06/16/2023     Resolved Ambulatory Problems     Diagnosis Date Noted    AR (allergic rhinitis)     Chest pain, atypical 08/27/2013    Abnormal EKG 09/06/2013    Acute low back pain 12/16/2013    BMI 30.0-30.9,adult 08/12/2014     Acute ischemic stroke 03/20/2015    Diabetes type 2, controlled 03/25/2015    BMI 31.0-31.9,adult 03/25/2015    Obesity (BMI 30-39.9) 05/11/2015    Allergic rhinitis due to pollen 05/11/2015    Decreased strength 10/09/2018    Decreased ROM of lumbar spine 10/09/2018    Pain of left upper extremity 12/19/2018    Weakness 12/19/2018    Stiffness in joint 12/19/2018    Morbid obesity 02/26/2019    Severe obesity (BMI 35.0-39.9) with comorbidity 02/26/2019    Decreased range of motion of right shoulder 08/20/2020    Weakness of right upper extremity 08/20/2020    Decreased functional mobility 08/20/2020    Allergic contact dermatitis due to plant 11/11/2020    Screening for malignant neoplasm of colon 09/21/2021     Past Medical History:   Diagnosis Date    Other and unspecified hyperlipidemia     Stroke     Unspecified essential hypertension      Past Surgical History:   Procedure Laterality Date    ARTHROSCOPIC REPAIR OF ROTATOR CUFF OF SHOULDER Left 4/30/2019    Procedure: REPAIR, ROTATOR CUFF, ARTHROSCOPIC;  Surgeon: Bob Esparza Jr., MD;  Location: [facility] OR;  Service: Orthopedics;  Laterality: Left;  need opus sytem (Jluis notified)  video    ARTHROSCOPY OF SHOULDER WITH DECOMPRESSION OF SUBACROMIAL SPACE  4/30/2019    Procedure: ARTHROSCOPY, SHOULDER, WITH SUBACROMIAL SPACE DECOMPRESSION;  Surgeon: Bob Esparza Jr., MD;  Location: [facility] OR;  Service: Orthopedics;;    COLONOSCOPY N/A 9/21/2021    Procedure: COLONOSCOPY;  Surgeon: Asif López MD;  Location: [facility] ENDO;  Service: Endoscopy;  Laterality: N/A;    FOOT SURGERY      ROTATOR CUFF REPAIR Right 8/4/2020    Procedure: REPAIR, ROTATOR CUFF;  Surgeon: Bob Esparza Jr., MD;  Location: [facility] OR;  Service: Orthopedics;  Laterality: Right;  open no scope  need arthrex anchors (Marylin notified per [place] 7/21, EF) Confirmed with Harry 1000 8/3/2020 KB    surgery on jaw       Family History   Problem Relation Age of Onset    Hyperlipidemia Mother      Heart disease Mother     Diabetes Mother     Hypertension Mother     Glaucoma Mother     No Known Problems Father     Stomach cancer Sister     Stroke Sister     Cancer Sister         stomach cancer    Heart disease Sister     Diabetes Sister     Hypertension Sister     Diabetes Sister     Alcohol abuse Brother         in remission    Cancer Brother         sinus cancer    Cancer Son         stomach cancer    Amblyopia Neg Hx     Blindness Neg Hx     Cataracts Neg Hx     Macular degeneration Neg Hx     Retinal detachment Neg Hx     Strabismus Neg Hx     Thyroid disease Neg Hx      Social History     Socioeconomic History    Marital status:    Tobacco Use    Smoking status: Never    Smokeless tobacco: Never   Substance and Sexual Activity    Alcohol use: Not Currently     Alcohol/week: 9.3 standard drinks     Types: 6 Cans of beer, 4 Standard drinks or equivalent per week    Drug use: Yes     Types: Marijuana    Sexual activity: Yes     Partners: Female   Social History Narrative     with [employer]     Review of patient's allergies indicates:  No Known Allergies  Current Outpatient Medications on File Prior to Visit   Medication Sig Dispense Refill    allopurinoL (ZYLOPRIM) 300 MG tablet Take 1 tablet (300 mg total) by mouth once daily. 90 tablet 3    amLODIPine-benazepriL (LOTREL) 10-40 mg per capsule Take 1 capsule by mouth once daily. 90 capsule 3    aspirin (ECOTRIN) 81 MG EC tablet Take 81 mg by mouth once daily.      atorvastatin (LIPITOR) 80 MG tablet Take 1 tablet (80 mg total) by mouth once daily. 90 tablet 4    benzonatate (TESSALON) 100 MG capsule TAKE 1 CAPSULE BY MOUTH THREE TIMES A DAY AS NEEDED FOR COUGH 30 capsule 0    benzonatate (TESSALON) 200 MG capsule Take by mouth.      diclofenac sodium (VOLTAREN) 1 % Gel APPLY 2 GRAMS TO AFFECTED AREA ONCE DAILY. 100 g 0    gabapentin (NEURONTIN) 300 MG capsule Take 1 capsule (300 mg total) by mouth 2 (two) times daily AND 3  capsules (900 mg total) every evening. 150 capsule 11    hydroCHLOROthiazide (HYDRODIURIL) 12.5 MG Tab Take 1 tablet (12.5 mg total) by mouth once daily. 90 tablet 3    meloxicam (MOBIC) 15 MG tablet TAKE 1 TABLET BY MOUTH EVERY DAY 90 tablet 0    tamsulosin (FLOMAX) 0.4 mg Cap Take 1 capsule (0.4 mg total) by mouth once daily. 90 capsule 3    tiZANidine (ZANAFLEX) 4 MG tablet Take 1 tablet (4 mg total) by mouth nightly as needed (muscle spasms). 60 tablet 2    triamcinolone acetonide 0.1% (KENALOG) 0.1 % ointment Apply topically 2 (two) times daily. 453 g 0    [DISCONTINUED] promethazine-dextromethorphan (PROMETHAZINE-DM) 6.25-15 mg/5 mL Syrp Take 5 mLs by mouth 2 (two) times daily as needed.       No current facility-administered medications on file prior to visit.       Objective:       There were no vitals filed for this visit.      Physical Exam  Constitutional:       General: He is not in acute distress.     Appearance: He is well-developed. He is not diaphoretic.   HENT:      Head: Normocephalic and atraumatic.      Right Ear: External ear normal.      Left Ear: External ear normal.      Nose: Nose normal.      Mouth/Throat:      Pharynx: No oropharyngeal exudate.   Eyes:      General: No scleral icterus.        Right eye: No discharge.         Left eye: No discharge.      Conjunctiva/sclera: Conjunctivae normal.      Pupils: Pupils are equal, round, and reactive to light.   Neck:      Thyroid: No thyromegaly.      Vascular: No JVD.      Trachea: No tracheal deviation.   Cardiovascular:      Rate and Rhythm: Normal rate and regular rhythm.      Heart sounds: Murmur (3/6 ESM AORTIC AREA) heard.     No friction rub. No gallop.   Pulmonary:      Effort: Pulmonary effort is normal. No respiratory distress.      Breath sounds: Normal breath sounds. No stridor. No wheezing or rales.   Chest:      Chest wall: No tenderness.   Abdominal:      General: Bowel sounds are normal. There is no distension.      Palpations:  Abdomen is soft. There is no mass.      Tenderness: There is no abdominal tenderness. There is no guarding or rebound.      Hernia: No hernia is present.   Musculoskeletal:         General: Tenderness (TTP LEFT SHOULDER WITH ROTATOR CUFF IMPINGEMENT) present. Normal range of motion.      Cervical back: Normal range of motion and neck supple.      Comments: Ttp L2-l4 and paralumbar right area   Lymphadenopathy:      Cervical: No cervical adenopathy.   Skin:     General: Skin is warm and dry.      Coloration: Skin is not pale.      Findings: No erythema or rash.   Neurological:      Mental Status: He is alert and oriented to person, place, and time.      Cranial Nerves: No cranial nerve deficit.      Motor: No abnormal muscle tone.      Coordination: Coordination normal.      Deep Tendon Reflexes: Reflexes are normal and symmetric. Reflexes normal.      Comments: No deficits   Psychiatric:         Behavior: Behavior normal.         Thought Content: Thought content normal.         Judgment: Judgment normal.       Assessment:       Problem List Items Addressed This Visit       ED (erectile dysfunction)    Relevant Medications    tadalafiL (CIALIS) 20 MG Tab    Hypertension complicating diabetes - Primary    Relevant Orders    CBC Auto Differential    Comprehensive Metabolic Panel    Lipid Panel    Hemoglobin A1C    Urinalysis    Microalbumin/Creatinine Ratio, Urine    NURSING COMMUNICATION: Create [facility] Account    NURSING COMMUNICATION: Create [facility] Account    Hypertension Digital Medicine (HDMP) Enrollment Order (Completed)    Hypertension Digital Medicine (HDMP): Assign Onboarding Questionnaires (Completed)    Hemiparesis affecting right side as late effect of cerebrovascular accident    Relevant Orders    CBC Auto Differential    Comprehensive Metabolic Panel    Lipid Panel    Hemoglobin A1C    Urinalysis    Microalbumin/Creatinine Ratio, Urine    Controlled type 2 diabetes mellitus without complication,  without long-term current use of insulin    Relevant Orders    CBC Auto Differential    Comprehensive Metabolic Panel    Lipid Panel    Hemoglobin A1C    Urinalysis    Microalbumin/Creatinine Ratio, Urine    Ambulatory referral/consult to Optometry    Diabetes Digital Medicine (DDMP) Enrollment Order (Completed)    Diabetes Digital Medicine (DDMP): Assign Onboarding Questionnaires (Completed)    NURSING COMMUNICATION: Create [facility] Account    NURSING COMMUNICATION: Create [facility] Account    CKD (chronic kidney disease) stage 2, GFR 60-89 ml/min    Relevant Orders    CBC Auto Differential    Comprehensive Metabolic Panel    Lipid Panel    Hemoglobin A1C    Urinalysis    Microalbumin/Creatinine Ratio, Urine    Chronic kidney disease, stage 3a    Relevant Orders    CBC Auto Differential    Comprehensive Metabolic Panel    Lipid Panel    Hemoglobin A1C    Urinalysis    Microalbumin/Creatinine Ratio, Urine     Other Visit Diagnoses       Dyslipidemia associated with type 2 diabetes mellitus        Relevant Orders    CBC Auto Differential    Comprehensive Metabolic Panel    Lipid Panel    Hemoglobin A1C    Urinalysis    Microalbumin/Creatinine Ratio, Urine    NURSING COMMUNICATION: Create [facility] Account    NURSING COMMUNICATION: Create [facility] Account    NURSING COMMUNICATION: Create [facility] Account    Hyperlipidemia Digital Medicine (LDMP) Enrollment Order (Completed)    Hyperlipidemia Digital Medicine (LDMP): Assign Onboarding Questionnaires (Completed)    Encounter for screening for malignant neoplasm of prostate        Relevant Orders    PSA, Screening    Allergic cough        Relevant Medications    promethazine-dextromethorphan (PROMETHAZINE-DM) 6.25-15 mg/5 mL Syrp            Plan:           Anjel was seen today for follow-up.    Diagnoses and all orders for this visit:    Hypertension complicating diabetes  -     CBC Auto Differential; Future  -     Comprehensive Metabolic Panel; Future  -     Lipid  Panel; Future  -     Hemoglobin A1C; Future  -     Urinalysis; Future  -     Microalbumin/Creatinine Ratio, Urine; Future  -     NURSING COMMUNICATION: Create [facility] Account  -     NURSING COMMUNICATION: Create [facility]  -     Hypertension Digital Medicine (HDMP) Enrollment Order  -     Hypertension Digital Medicine (HDMP): Assign Onboarding Questionnaires    Chronic kidney disease, stage 3a  -     CBC Auto Differential; Future  -     Comprehensive Metabolic Panel; Future  -     Lipid Panel; Future  -     Hemoglobin A1C; Future  -     Urinalysis; Future  -     Microalbumin/Creatinine Ratio, Urine; Future    Dyslipidemia associated with type 2 diabetes mellitus  -     CBC Auto Differential; Future  -     Comprehensive Metabolic Panel; Future  -     Lipid Panel; Future  -     Hemoglobin A1C; Future  -     Urinalysis; Future  -     Microalbumin/Creatinine Ratio, Urine; Future  -     NURSING COMMUNICATION: Create [facility] Account  -     NURSING COMMUNICATION: Create [facility] Account  -     NURSING COMMUNICATION: [facility]  -     Hyperlipidemia Digital Medicine (LDMP) Enrollment Order  -     Hyperlipidemia Digital Medicine (LDMP): Assign Onboarding Questionnaires    Hemiparesis affecting right side as late effect of cerebrovascular accident  -     CBC Auto Differential; Future  -     Comprehensive Metabolic Panel; Future  -     Lipid Panel; Future  -     Hemoglobin A1C; Future  -     Urinalysis; Future  -     Microalbumin/Creatinine Ratio, Urine; Future    Controlled type 2 diabetes mellitus without complication, without long-term current use of insulin  -     CBC Auto Differential; Future  -     Comprehensive Metabolic Panel; Future  -     Lipid Panel; Future  -     Hemoglobin A1C; Future  -     Urinalysis; Future  -     Microalbumin/Creatinine Ratio, Urine; Future  -     Ambulatory referral/consult to Optometry; Future  -     Diabetes Digital Medicine (DDMP) Enrollment Order  -     Diabetes  Digital Medicine (DDMP): Assign Onboarding Questionnaires  -     NURSING COMMUNICATION: Create [facility] Account  -     NURSING COMMUNICATION: Create [facility]    CKD (chronic kidney disease) stage 2, GFR 60-89 ml/min  -     CBC Auto Differential; Future  -     Comprehensive Metabolic Panel; Future  -     Lipid Panel; Future  -     Hemoglobin A1C; Future  -     Urinalysis; Future  -     Microalbumin/Creatinine Ratio, Urine; Future    Encounter for screening for malignant neoplasm of prostate  -     PSA, Screening; Future    Allergic cough  -     promethazine-dextromethorphan (PROMETHAZINE-DM) 6.25-15 mg/5 mL Syrp; Take 5 mLs by mouth 2 (two) times daily as needed (cough).    Erectile dysfunction, unspecified erectile dysfunction type  -     tadalafiL (CIALIS) 20 MG Tab; Take 1 tablet (20 mg total) by mouth once daily.      Wellness check  -normal exam  -labs    BPH  -continue flomax daily. Side effects of medications have been discussed and patient agreed to proceed with treatment and understands the risks and benefits.    HTN  -controlled   -add HCTZ with lotrel. Side effects of medications have been discussed and patient agreed to proceed with treatment and understands the risks and benefits.  -precautions for hypotension given    HLD  -controlled    Back pain chronic  -increase gabapentin to 600 and mobic 15 daily      HEART MURMUR  -ECHO reassurance      ED and Gout   -controlled     MURTAZA  -stable    CVA  -on ASA and plavix      DM II  -controlled  -diet and exercise only    Obesity  -diet and exercise  -weight loss          Spent adequate time in obtaining history and explaining differentials     40 minutes spent during this visit of which greater than 50% devoted to face-face counseling and coordination of care regarding diagnosis and management plan    Rtc 6 m r prn

## 2023-06-17 ENCOUNTER — LAB VISIT (OUTPATIENT)
Dept: LAB | Facility: HOSPITAL | Age: 68
End: 2023-06-17
Attending: FAMILY MEDICINE
Payer: MEDICARE

## 2023-06-17 DIAGNOSIS — I69.351 HEMIPARESIS AFFECTING RIGHT SIDE AS LATE EFFECT OF CEREBROVASCULAR ACCIDENT: ICD-10-CM

## 2023-06-17 DIAGNOSIS — N18.2 CKD (CHRONIC KIDNEY DISEASE) STAGE 2, GFR 60-89 ML/MIN: ICD-10-CM

## 2023-06-17 DIAGNOSIS — Z12.5 ENCOUNTER FOR SCREENING FOR MALIGNANT NEOPLASM OF PROSTATE: ICD-10-CM

## 2023-06-17 DIAGNOSIS — E78.5 DYSLIPIDEMIA ASSOCIATED WITH TYPE 2 DIABETES MELLITUS: ICD-10-CM

## 2023-06-17 DIAGNOSIS — E11.9 CONTROLLED TYPE 2 DIABETES MELLITUS WITHOUT COMPLICATION, WITHOUT LONG-TERM CURRENT USE OF INSULIN: ICD-10-CM

## 2023-06-17 DIAGNOSIS — N18.31 CHRONIC KIDNEY DISEASE, STAGE 3A: ICD-10-CM

## 2023-06-17 DIAGNOSIS — E11.59 HYPERTENSION COMPLICATING DIABETES: ICD-10-CM

## 2023-06-17 DIAGNOSIS — I15.2 HYPERTENSION COMPLICATING DIABETES: ICD-10-CM

## 2023-06-17 DIAGNOSIS — E11.69 DYSLIPIDEMIA ASSOCIATED WITH TYPE 2 DIABETES MELLITUS: ICD-10-CM

## 2023-06-17 LAB
ALBUMIN SERPL BCP-MCNC: 4.1 G/DL (ref 3.5–5.2)
ALBUMIN/CREAT UR: 3.4 UG/MG (ref 0–30)
ALP SERPL-CCNC: 82 U/L (ref 55–135)
ALT SERPL W/O P-5'-P-CCNC: 38 U/L (ref 10–44)
ANION GAP SERPL CALC-SCNC: 10 MMOL/L (ref 8–16)
AST SERPL-CCNC: 29 U/L (ref 10–40)
BASOPHILS # BLD AUTO: 0.04 K/UL (ref 0–0.2)
BASOPHILS NFR BLD: 0.9 % (ref 0–1.9)
BILIRUB SERPL-MCNC: 0.7 MG/DL (ref 0.1–1)
BILIRUB UR QL STRIP: NEGATIVE
BUN SERPL-MCNC: 15 MG/DL (ref 8–23)
CALCIUM SERPL-MCNC: 9.4 MG/DL (ref 8.7–10.5)
CHLORIDE SERPL-SCNC: 107 MMOL/L (ref 95–110)
CHOLEST SERPL-MCNC: 132 MG/DL (ref 120–199)
CHOLEST/HDLC SERPL: 3.6 {RATIO} (ref 2–5)
CLARITY UR: CLEAR
CO2 SERPL-SCNC: 27 MMOL/L (ref 23–29)
COLOR UR: YELLOW
COMPLEXED PSA SERPL-MCNC: 2.1 NG/ML (ref 0–4)
CREAT SERPL-MCNC: 1.3 MG/DL (ref 0.5–1.4)
CREAT UR-MCNC: 262 MG/DL (ref 23–375)
DIFFERENTIAL METHOD: NORMAL
EOSINOPHIL # BLD AUTO: 0.2 K/UL (ref 0–0.5)
EOSINOPHIL NFR BLD: 3.2 % (ref 0–8)
ERYTHROCYTE [DISTWIDTH] IN BLOOD BY AUTOMATED COUNT: 13.4 % (ref 11.5–14.5)
EST. GFR  (NO RACE VARIABLE): 60 ML/MIN/1.73 M^2
ESTIMATED AVG GLUCOSE: 131 MG/DL (ref 68–131)
GLUCOSE SERPL-MCNC: 115 MG/DL (ref 70–110)
GLUCOSE UR QL STRIP: NEGATIVE
HBA1C MFR BLD: 6.2 % (ref 4–5.6)
HCT VFR BLD AUTO: 43.4 % (ref 40–54)
HDLC SERPL-MCNC: 37 MG/DL (ref 40–75)
HDLC SERPL: 28 % (ref 20–50)
HGB BLD-MCNC: 14.1 G/DL (ref 14–18)
HGB UR QL STRIP: NEGATIVE
IMM GRANULOCYTES # BLD AUTO: 0.02 K/UL (ref 0–0.04)
IMM GRANULOCYTES NFR BLD AUTO: 0.4 % (ref 0–0.5)
KETONES UR QL STRIP: NEGATIVE
LDLC SERPL CALC-MCNC: 84.2 MG/DL (ref 63–159)
LEUKOCYTE ESTERASE UR QL STRIP: NEGATIVE
LYMPHOCYTES # BLD AUTO: 2.1 K/UL (ref 1–4.8)
LYMPHOCYTES NFR BLD: 44.6 % (ref 18–48)
MCH RBC QN AUTO: 28.4 PG (ref 27–31)
MCHC RBC AUTO-ENTMCNC: 32.5 G/DL (ref 32–36)
MCV RBC AUTO: 88 FL (ref 82–98)
MICROALBUMIN UR DL<=1MG/L-MCNC: 9 UG/ML
MONOCYTES # BLD AUTO: 0.5 K/UL (ref 0.3–1)
MONOCYTES NFR BLD: 11.2 % (ref 4–15)
NEUTROPHILS # BLD AUTO: 1.9 K/UL (ref 1.8–7.7)
NEUTROPHILS NFR BLD: 39.7 % (ref 38–73)
NITRITE UR QL STRIP: NEGATIVE
NONHDLC SERPL-MCNC: 95 MG/DL
NRBC BLD-RTO: 0 /100 WBC
PH UR STRIP: 6 [PH] (ref 5–8)
PLATELET # BLD AUTO: 286 K/UL (ref 150–450)
PMV BLD AUTO: 10.3 FL (ref 9.2–12.9)
POTASSIUM SERPL-SCNC: 4.1 MMOL/L (ref 3.5–5.1)
PROT SERPL-MCNC: 7.7 G/DL (ref 6–8.4)
PROT UR QL STRIP: ABNORMAL
RBC # BLD AUTO: 4.96 M/UL (ref 4.6–6.2)
SODIUM SERPL-SCNC: 144 MMOL/L (ref 136–145)
SP GR UR STRIP: 1.02 (ref 1–1.03)
TRIGL SERPL-MCNC: 54 MG/DL (ref 30–150)
URN SPEC COLLECT METH UR: ABNORMAL
UROBILINOGEN UR STRIP-ACNC: NEGATIVE EU/DL
WBC # BLD AUTO: 4.66 K/UL (ref 3.9–12.7)

## 2023-06-17 PROCEDURE — 85025 COMPLETE CBC W/AUTO DIFF WBC: CPT | Performed by: FAMILY MEDICINE

## 2023-06-17 PROCEDURE — 84153 ASSAY OF PSA TOTAL: CPT | Performed by: FAMILY MEDICINE

## 2023-06-17 PROCEDURE — 36415 COLL VENOUS BLD VENIPUNCTURE: CPT | Performed by: FAMILY MEDICINE

## 2023-06-17 PROCEDURE — 83036 HEMOGLOBIN GLYCOSYLATED A1C: CPT | Performed by: FAMILY MEDICINE

## 2023-06-17 PROCEDURE — 81003 URINALYSIS AUTO W/O SCOPE: CPT | Performed by: FAMILY MEDICINE

## 2023-06-17 PROCEDURE — 82570 ASSAY OF URINE CREATININE: CPT | Performed by: FAMILY MEDICINE

## 2023-06-17 PROCEDURE — 80053 COMPREHEN METABOLIC PANEL: CPT | Performed by: FAMILY MEDICINE

## 2023-06-17 PROCEDURE — 80061 LIPID PANEL: CPT | Performed by: FAMILY MEDICINE

## 2023-06-19 ENCOUNTER — TELEPHONE (OUTPATIENT)
Dept: PAIN MEDICINE | Facility: CLINIC | Age: 68
End: 2023-06-19
Payer: MEDICARE

## 2023-06-19 ENCOUNTER — TELEPHONE (OUTPATIENT)
Dept: ORTHOPEDICS | Facility: CLINIC | Age: 68
End: 2023-06-19
Payer: MEDICARE

## 2023-06-19 NOTE — TELEPHONE ENCOUNTER
Spoke to patient wife Kalie Sanders regarding that he's not booked for actual surgery. Stated to her the number to pain management to get his injection rescheduled. Stated number [phone] to call to get his injection rescheduled. Wife stated thank you. Thanks.

## 2023-06-19 NOTE — TELEPHONE ENCOUNTER
----- Message from Harry Ndiaye sent at 6/19/2023 11:21 AM CDT -----      Name of Who is Calling: OANH KRUEGER [[id number]]      What is the request in detail: Pt wife called to reschedule injection.Please contact to further discuss and advise.          Can the clinic reply by [facility]: N      [facility]: [phone]

## 2023-06-20 ENCOUNTER — OFFICE VISIT (OUTPATIENT)
Dept: PAIN MEDICINE | Facility: CLINIC | Age: 68
End: 2023-06-20
Payer: MEDICARE

## 2023-06-20 ENCOUNTER — TELEPHONE (OUTPATIENT)
Dept: PAIN MEDICINE | Facility: OTHER | Age: 68
End: 2023-06-20
Payer: MEDICARE

## 2023-06-20 VITALS
BODY MASS INDEX: 31.42 KG/M2 | WEIGHT: 225.31 LBS | DIASTOLIC BLOOD PRESSURE: 81 MMHG | SYSTOLIC BLOOD PRESSURE: 156 MMHG | HEART RATE: 75 BPM

## 2023-06-20 DIAGNOSIS — M47.897 OTHER SPONDYLOSIS, LUMBOSACRAL REGION: Primary | ICD-10-CM

## 2023-06-20 DIAGNOSIS — M47.816 OSTEOARTHRITIS OF LUMBAR SPINE WITHOUT MYELOPATHY OR RADICULOPATHY: ICD-10-CM

## 2023-06-20 PROCEDURE — 1125F PR PAIN SEVERITY QUANTIFIED, PAIN PRESENT: ICD-10-PCS | Mod: CPTII,S$GLB,, | Performed by: STUDENT IN AN ORGANIZED HEALTH CARE EDUCATION/TRAINING PROGRAM

## 2023-06-20 PROCEDURE — 3044F HG A1C LEVEL LT 7.0%: CPT | Mod: CPTII,S$GLB,, | Performed by: STUDENT IN AN ORGANIZED HEALTH CARE EDUCATION/TRAINING PROGRAM

## 2023-06-20 PROCEDURE — 1125F AMNT PAIN NOTED PAIN PRSNT: CPT | Mod: CPTII,S$GLB,, | Performed by: STUDENT IN AN ORGANIZED HEALTH CARE EDUCATION/TRAINING PROGRAM

## 2023-06-20 PROCEDURE — 3066F PR DOCUMENTATION OF TREATMENT FOR NEPHROPATHY: ICD-10-PCS | Mod: CPTII,S$GLB,, | Performed by: STUDENT IN AN ORGANIZED HEALTH CARE EDUCATION/TRAINING PROGRAM

## 2023-06-20 PROCEDURE — 4010F ACE/ARB THERAPY RXD/TAKEN: CPT | Mod: CPTII,S$GLB,, | Performed by: STUDENT IN AN ORGANIZED HEALTH CARE EDUCATION/TRAINING PROGRAM

## 2023-06-20 PROCEDURE — 99204 OFFICE O/P NEW MOD 45 MIN: CPT | Mod: S$GLB,,, | Performed by: STUDENT IN AN ORGANIZED HEALTH CARE EDUCATION/TRAINING PROGRAM

## 2023-06-20 PROCEDURE — 1159F PR MEDICATION LIST DOCUMENTED IN MEDICAL RECORD: ICD-10-PCS | Mod: CPTII,S$GLB,, | Performed by: STUDENT IN AN ORGANIZED HEALTH CARE EDUCATION/TRAINING PROGRAM

## 2023-06-20 PROCEDURE — 3061F NEG MICROALBUMINURIA REV: CPT | Mod: CPTII,S$GLB,, | Performed by: STUDENT IN AN ORGANIZED HEALTH CARE EDUCATION/TRAINING PROGRAM

## 2023-06-20 PROCEDURE — 3077F PR MOST RECENT SYSTOLIC BLOOD PRESSURE >= 140 MM HG: ICD-10-PCS | Mod: CPTII,S$GLB,, | Performed by: STUDENT IN AN ORGANIZED HEALTH CARE EDUCATION/TRAINING PROGRAM

## 2023-06-20 PROCEDURE — 1159F MED LIST DOCD IN RCRD: CPT | Mod: CPTII,S$GLB,, | Performed by: STUDENT IN AN ORGANIZED HEALTH CARE EDUCATION/TRAINING PROGRAM

## 2023-06-20 PROCEDURE — 3008F BODY MASS INDEX DOCD: CPT | Mod: CPTII,S$GLB,, | Performed by: STUDENT IN AN ORGANIZED HEALTH CARE EDUCATION/TRAINING PROGRAM

## 2023-06-20 PROCEDURE — 99999 PR PBB SHADOW E&M-EST. PATIENT-LVL III: ICD-10-PCS | Mod: PBBFAC,,, | Performed by: STUDENT IN AN ORGANIZED HEALTH CARE EDUCATION/TRAINING PROGRAM

## 2023-06-20 PROCEDURE — 3061F PR NEG MICROALBUMINURIA RESULT DOCUMENTED/REVIEW: ICD-10-PCS | Mod: CPTII,S$GLB,, | Performed by: STUDENT IN AN ORGANIZED HEALTH CARE EDUCATION/TRAINING PROGRAM

## 2023-06-20 PROCEDURE — 99204 PR OFFICE/OUTPT VISIT, NEW, LEVL IV, 45-59 MIN: ICD-10-PCS | Mod: S$GLB,,, | Performed by: STUDENT IN AN ORGANIZED HEALTH CARE EDUCATION/TRAINING PROGRAM

## 2023-06-20 PROCEDURE — 4010F PR ACE/ARB THEARPY RXD/TAKEN: ICD-10-PCS | Mod: CPTII,S$GLB,, | Performed by: STUDENT IN AN ORGANIZED HEALTH CARE EDUCATION/TRAINING PROGRAM

## 2023-06-20 PROCEDURE — 99999 PR PBB SHADOW E&M-EST. PATIENT-LVL III: CPT | Mod: PBBFAC,,, | Performed by: STUDENT IN AN ORGANIZED HEALTH CARE EDUCATION/TRAINING PROGRAM

## 2023-06-20 PROCEDURE — 3066F NEPHROPATHY DOC TX: CPT | Mod: CPTII,S$GLB,, | Performed by: STUDENT IN AN ORGANIZED HEALTH CARE EDUCATION/TRAINING PROGRAM

## 2023-06-20 PROCEDURE — 3008F PR BODY MASS INDEX (BMI) DOCUMENTED: ICD-10-PCS | Mod: CPTII,S$GLB,, | Performed by: STUDENT IN AN ORGANIZED HEALTH CARE EDUCATION/TRAINING PROGRAM

## 2023-06-20 PROCEDURE — 3079F DIAST BP 80-89 MM HG: CPT | Mod: CPTII,S$GLB,, | Performed by: STUDENT IN AN ORGANIZED HEALTH CARE EDUCATION/TRAINING PROGRAM

## 2023-06-20 PROCEDURE — 3077F SYST BP >= 140 MM HG: CPT | Mod: CPTII,S$GLB,, | Performed by: STUDENT IN AN ORGANIZED HEALTH CARE EDUCATION/TRAINING PROGRAM

## 2023-06-20 PROCEDURE — 3044F PR MOST RECENT HEMOGLOBIN A1C LEVEL <7.0%: ICD-10-PCS | Mod: CPTII,S$GLB,, | Performed by: STUDENT IN AN ORGANIZED HEALTH CARE EDUCATION/TRAINING PROGRAM

## 2023-06-20 PROCEDURE — 3079F PR MOST RECENT DIASTOLIC BLOOD PRESSURE 80-89 MM HG: ICD-10-PCS | Mod: CPTII,S$GLB,, | Performed by: STUDENT IN AN ORGANIZED HEALTH CARE EDUCATION/TRAINING PROGRAM

## 2023-06-20 RX ORDER — MELOXICAM 15 MG/1
15 TABLET ORAL DAILY
Qty: 30 TABLET | Refills: 1 | Status: SHIPPED | OUTPATIENT
Start: 2023-06-20 | End: 2023-08-19

## 2023-06-20 RX ORDER — METHOCARBAMOL 500 MG/1
500 TABLET, FILM COATED ORAL 3 TIMES DAILY PRN
Qty: 90 TABLET | Refills: 0 | Status: SHIPPED | OUTPATIENT
Start: 2023-06-20 | End: 2023-07-20

## 2023-06-20 NOTE — PROGRESS NOTES
[facility] - New Patient Evaluation    Referred by: Aaareferral Self   Reason for referral: Other spondylosis, lumbosacral region     CC: No chief complaint on file.    Last 3 PDI Scores 6/20/2023   Pain Disability Index (PDI) 9       Subjective 06/20/2023:   Anjel Sanders is a 67 y.o. male who presents complaining of low back pain with radiation of pain to the posterior aspect of his thighs.  Pain is worse in the morning, but improves after he is up and moving around.  The pain will then worsen again toward the end of the day.  He is taking gabapentin and he states that this helps a little.    Location: lower back   Onset: 4 months  Current Pain Score: 3/10  Daily Pain of Range: 10-10/10  Quality: Aching, Tingling, and Numb  Radiation: both legs  Worsened by: nothing in particular  Improved by: medications    Patient denies night fever/night sweats, urinary incontinence, bowel incontinence, significant weight loss, significant motor weakness, and loss of sensations.    Previous Interventions:  - N/A    Previous Therapies:  PT/OT: no   Chiropractor:   HEP:   Relevant Surgery: no   Previous Medications:   - NSAIDS: N/A  - Muscle Relaxants: N/A   - TCAs: N/A  - SNRIs: N/A  - Topicals: N/A  - Anticonvulsants: Gabapentin   - Opioids: N/A  - Adjuvants: N/A    Current Pain Medications:  Gabapentin     Review of Systems:  ROS    GENERAL:  No weight loss, malaise or fevers. +DM Type II  HEENT:   No recent changes in vision or hearing  NECK:  No difficulty with swallowing. No stridor.   RESPIRATORY:  Negative for cough, wheezing or shortness of breath, patient denies any recent URI.  CARDIOVASCULAR:  Negative for chest pain, leg swelling or palpitations. +HTN  HLD  GI/Gu:  Negative for abdominal discomfort, blood in stools or black stools or change in bowel habits.  +CKD  MUSCULOSKELETAL:  See HPI.  SKIN:  Negative for lesions, rash, and itching.  PSYCH:  No mood disorder or recent psychosocial  stressors.    HEMATOLOGY/LYMPHOLOGY:  Negative for prolonged bleeding, bruising easily or swollen nodes.  Patient is not currently taking any anti-coagulants  NEURO:   No history of headaches, syncope, paralysis, seizures or tremors.  All other reviewed and negative other than HPI.    History:  Current medications, allergies, medical history, surgical history,   family history, and social history were reviewed in the chart as marked.    Full Medication List:    Current Outpatient Medications:     allopurinoL (ZYLOPRIM) 300 MG tablet, Take 1 tablet (300 mg total) by mouth once daily., Disp: 90 tablet, Rfl: 3    amLODIPine-benazepriL (LOTREL) 10-40 mg per capsule, Take 1 capsule by mouth once daily., Disp: 90 capsule, Rfl: 3    aspirin (ECOTRIN) 81 MG EC tablet, Take 81 mg by mouth once daily., Disp: , Rfl:     atorvastatin (LIPITOR) 80 MG tablet, Take 1 tablet (80 mg total) by mouth once daily., Disp: 90 tablet, Rfl: 4    benzonatate (TESSALON) 100 MG capsule, TAKE 1 CAPSULE BY MOUTH THREE TIMES A DAY AS NEEDED FOR COUGH, Disp: 30 capsule, Rfl: 0    benzonatate (TESSALON) 200 MG capsule, Take by mouth., Disp: , Rfl:     diclofenac sodium (VOLTAREN) 1 % Gel, APPLY 2 GRAMS TO AFFECTED AREA ONCE DAILY., Disp: 100 g, Rfl: 0    gabapentin (NEURONTIN) 300 MG capsule, Take 1 capsule (300 mg total) by mouth 2 (two) times daily AND 3 capsules (900 mg total) every evening., Disp: 150 capsule, Rfl: 11    hydroCHLOROthiazide (HYDRODIURIL) 12.5 MG Tab, Take 1 tablet (12.5 mg total) by mouth once daily., Disp: 90 tablet, Rfl: 3    meloxicam (MOBIC) 15 MG tablet, TAKE 1 TABLET BY MOUTH EVERY DAY, Disp: 90 tablet, Rfl: 0    meloxicam (MOBIC) 15 MG tablet, Take 1 tablet (15 mg total) by mouth once daily., Disp: 30 tablet, Rfl: 1    methocarbamoL (ROBAXIN) 500 MG Tab, Take 1 tablet (500 mg total) by mouth 3 (three) times daily as needed (muscle spasm)., Disp: 90 tablet, Rfl: 0    promethazine-dextromethorphan (PROMETHAZINE-DM) 6.25-15  mg/5 mL Syrp, Take 5 mLs by mouth 2 (two) times daily as needed (cough)., Disp: 180 mL, Rfl: 1    tadalafiL (CIALIS) 20 MG Tab, Take 1 tablet (20 mg total) by mouth once daily., Disp: 10 tablet, Rfl: 4    tamsulosin (FLOMAX) 0.4 mg Cap, Take 1 capsule (0.4 mg total) by mouth once daily., Disp: 90 capsule, Rfl: 3    tiZANidine (ZANAFLEX) 4 MG tablet, Take 1 tablet (4 mg total) by mouth nightly as needed (muscle spasms)., Disp: 60 tablet, Rfl: 2    triamcinolone acetonide 0.1% (KENALOG) 0.1 % ointment, Apply topically 2 (two) times daily., Disp: 453 g, Rfl: 0     Allergies:  Patient has no known allergies.     Medical History:   has a past medical history of AR (allergic rhinitis), Gouty arthritis, colonic polyps, MURTAZA (obstructive sleep apnea), Other and unspecified hyperlipidemia, Stroke, and Unspecified essential hypertension.    Surgical History:   has a past surgical history that includes Foot surgery; surgery on jaw; Arthroscopic repair of rotator cuff of shoulder (Left, 4/30/2019); Arthroscopy of shoulder with decompression of subacromial space (4/30/2019); Rotator cuff repair (Right, 8/4/2020); and Colonoscopy (N/A, 9/21/2021).    Family History:  family history includes Alcohol abuse in his brother; Cancer in his brother, sister, and son; Diabetes in his mother, sister, and sister; Glaucoma in his mother; Heart disease in his mother and sister; Hyperlipidemia in his mother; Hypertension in his mother and sister; No Known Problems in his father; Stomach cancer in his sister; Stroke in his sister.    Social History:   reports that he has never smoked. He has never used smokeless tobacco. He reports that he does not currently use alcohol after a past usage of about 9.3 standard drinks per week. He reports current drug use. Drug: Marijuana.    Physical Exam:  Vitals:    06/20/23 1052   BP: (!) 156/81   Pulse: 75   Weight: 102.2 kg (225 lb 5 oz)   PainSc:   3   PainLoc: Back       GENERAL: Well appearing, in no  acute distress, alert and oriented x3.  PSYCH:  Mood and affect appropriate.  SKIN: Skin color, texture, turgor normal, no rashes or lesions.  HEAD/FACE:  Normocephalic, atraumatic. Cranial nerves grossly intact.  NECK: Not examined  CV: RRR with palpation of the radial artery.  PULM: No evidence of respiratory difficulty, symmetric chest rise.  GI:  Soft and non-distended.  MSK: Straight leg raising is negative to radicular pain. Pain to palpation over the facet joints of the lumbar spine.  Pain with lumbar facet loading. No pain over the SI joints. Normal range of motion of the lumbar spine without pain reproduction.  Peripheral joint ROM is full and pain free without obvious instability or laxity in all four extremities. No obvious deformities, edema, or skin discoloration.  No atrophy or tone abnormalities are noted.   NEURO: Bilateral lower extremity coordination and strength is symmetric.  No loss of sensation is noted.  MENTAL STATUS: A x O x 3, good concentration, speech is fluent and goal directed  MOTOR: 5/5 in all muscle groups  GAIT: Normal. Ambulates unassisted.    Imaging:  EXAMINATION:  MRI LUMBAR SPINE WITHOUT CONTRAST     CLINICAL HISTORY:  Low back pain, symptoms persist with > 6wks conservative treatment; Dorsalgia, unspecified     TECHNIQUE:  Multiplanar, multisequence MR images were acquired from the thoracolumbar junction to the sacrum without the administration of contrast.     COMPARISON:  Radiographs 03/01/2023     FINDINGS:  ALIGNMENT: Grade 1 anterolisthesis at L4-5.     BONE: No compression fractures.  No marrow replacing lesions.     JOINT: Disc desiccation and height loss at L3-4.  Advanced multilevel facet arthropathy.  Significant findings are described in detail below.  Subchondral bone marrow edema at the L4-5 facet joints bilaterally.     SPINAL CANAL: The conus medullaris has a normal appearance and terminates at the L1-2 level.  Cauda equina nerve roots are unremarkable.  No mass  or collection.     PARASPINAL SOFT TISSUES: Unremarkable.     SIGNIFICANT FINDINGS BY LEVEL:     T12-L1: Unremarkable.     L1-2: Mild disc bulge.  No canal stenosis.  Mild bilateral foraminal stenosis.     L2-3: Mild disc bulge.  Bilateral facet arthropathy and ligamentum flavum thickening.  Mild canal stenosis.  Moderate bilateral foraminal stenosis.     L3-4: Disc bulge.  Severe bilateral facet arthropathy and ligamentum flavum thickening.  Severe canal stenosis with complete effacement of the thecal sac.  Moderate bilateral foraminal stenosis.     L4-5: Disc bulge.  Severe bilateral facet arthropathy and ligamentum flavum thickening.  Mild canal stenosis.  Narrowing of both subarticular zones with abutment of the descending L5 nerve roots.  Moderate right and severe left foraminal stenosis.     L5-S1: Disc bulge.  Bilateral facet arthropathy and ligamentum flavum thickening, severe on the right.  Mild canal stenosis.  Moderate bilateral foraminal stenosis, right greater than left.     Impression:     Degenerative changes resulting in severe canal stenosis at L3-4 and high-grade foraminal stenosis at multiple levels as detailed above.     This report was flagged in Epic as abnormal.        Electronically signed by: Ramón Hunter  Date:                                            05/10/2023  Time:                                           09:13    Labs:  BMP  Lab Results   Component Value Date     06/17/2023    K 4.1 06/17/2023     06/17/2023    CO2 27 06/17/2023    BUN 15 06/17/2023    CREATININE 1.3 06/17/2023    CALCIUM 9.4 06/17/2023    ANIONGAP 10 06/17/2023    EGFRNORACEVR 60 06/17/2023     Lab Results   Component Value Date    ALT 38 06/17/2023    AST 29 06/17/2023    ALKPHOS 82 06/17/2023    BILITOT 0.7 06/17/2023     Lab Results   Component Value Date    WBC 4.66 06/17/2023    HGB 14.1 06/17/2023    HCT 43.4 06/17/2023    MCV 88 06/17/2023     06/17/2023             Assessment:  Problem  List Items Addressed This Visit    None  Visit Diagnoses       Other spondylosis, lumbosacral region    -  Primary    Relevant Orders    Ambulatory referral/consult to Physical/Occupational Therapy    Osteoarthritis of lumbar spine without myelopathy or radiculopathy        Relevant Orders    Ambulatory referral/consult to Physical/Occupational Therapy            06/20/2023 - Anjel Sanders is a 67 y.o. male who  has a past medical history of AR (allergic rhinitis), Gouty arthritis, colonic polyps, MURTAZA (obstructive sleep apnea), Other and unspecified hyperlipidemia, Stroke, and Unspecified essential hypertension.  By history and examination this patient has chronic low back pain without radiculopathy.  The underlying cause cause is facet arthritis, degenerative disc disease, and foraminal stenosis.  Pathology is confirmed by imaging.  We discussed the underlying diagnoses and multiple treatment options including non-opioid medications, interventional procedures, physical therapy, home exercise, and core muscle enhancement.  The risks and benefits of each treatment option were discussed and all questions were answered.        Treatment Plan:   Procedures: Consider bilateral lumbar L3-5 MBB with progression to RFA if symptoms persist despite conservative treatment.   PT/OT/HEP: Patient given home exercise program and referral to Physical Therapy placed  Medications:    - Robaxin 500mg TID PRN        - Mobic 15mg qd prn        - Continue Gabapentin   -  Not applicable  Imaging: Reviewed  Follow Up: RTC 6-8 weeks      Miquel Luong D.O. ([facility])    I personally evaluated and examined the patient.  I reviewed the trainee's HPI, physical examination, assessment, and plan. I agree with the findings and treatment plan.    Syeda Revees,    Interventional Pain Management          Disclaimer: This note was partly generated using dictation software which may occasionally result in transcription  errors.

## 2023-06-21 ENCOUNTER — OFFICE VISIT (OUTPATIENT)
Dept: UROLOGY | Facility: CLINIC | Age: 68
End: 2023-06-21
Payer: MEDICARE

## 2023-06-21 ENCOUNTER — OFFICE VISIT (OUTPATIENT)
Dept: HOME HEALTH SERVICES | Facility: CLINIC | Age: 68
End: 2023-06-21
Payer: MEDICARE

## 2023-06-21 VITALS
SYSTOLIC BLOOD PRESSURE: 154 MMHG | BODY MASS INDEX: 31.01 KG/M2 | HEIGHT: 70 IN | HEART RATE: 78 BPM | DIASTOLIC BLOOD PRESSURE: 61 MMHG | WEIGHT: 216.63 LBS

## 2023-06-21 VITALS
SYSTOLIC BLOOD PRESSURE: 164 MMHG | BODY MASS INDEX: 31.38 KG/M2 | DIASTOLIC BLOOD PRESSURE: 90 MMHG | WEIGHT: 225 LBS | HEART RATE: 63 BPM | OXYGEN SATURATION: 99 %

## 2023-06-21 DIAGNOSIS — E11.69 DYSLIPIDEMIA ASSOCIATED WITH TYPE 2 DIABETES MELLITUS: ICD-10-CM

## 2023-06-21 DIAGNOSIS — N18.31 CHRONIC KIDNEY DISEASE, STAGE 3A: ICD-10-CM

## 2023-06-21 DIAGNOSIS — N52.9 ERECTILE DYSFUNCTION, UNSPECIFIED ERECTILE DYSFUNCTION TYPE: ICD-10-CM

## 2023-06-21 DIAGNOSIS — E78.5 DYSLIPIDEMIA ASSOCIATED WITH TYPE 2 DIABETES MELLITUS: ICD-10-CM

## 2023-06-21 DIAGNOSIS — I15.2 HYPERTENSION COMPLICATING DIABETES: ICD-10-CM

## 2023-06-21 DIAGNOSIS — R05.9 COUGH, UNSPECIFIED TYPE: ICD-10-CM

## 2023-06-21 DIAGNOSIS — G89.29 CHRONIC MIDLINE LOW BACK PAIN WITHOUT SCIATICA: ICD-10-CM

## 2023-06-21 DIAGNOSIS — E11.59 HYPERTENSION COMPLICATING DIABETES: ICD-10-CM

## 2023-06-21 DIAGNOSIS — Z00.00 ENCOUNTER FOR PREVENTIVE HEALTH EXAMINATION: Primary | ICD-10-CM

## 2023-06-21 DIAGNOSIS — I69.351 HEMIPARESIS AFFECTING RIGHT SIDE AS LATE EFFECT OF CEREBROVASCULAR ACCIDENT: ICD-10-CM

## 2023-06-21 DIAGNOSIS — N40.0 BENIGN PROSTATIC HYPERPLASIA, UNSPECIFIED WHETHER LOWER URINARY TRACT SYMPTOMS PRESENT: ICD-10-CM

## 2023-06-21 DIAGNOSIS — E11.9 CONTROLLED TYPE 2 DIABETES MELLITUS WITHOUT COMPLICATION, WITHOUT LONG-TERM CURRENT USE OF INSULIN: ICD-10-CM

## 2023-06-21 DIAGNOSIS — M10.9 GOUTY ARTHRITIS: ICD-10-CM

## 2023-06-21 DIAGNOSIS — M54.50 CHRONIC MIDLINE LOW BACK PAIN WITHOUT SCIATICA: ICD-10-CM

## 2023-06-21 PROCEDURE — 1126F AMNT PAIN NOTED NONE PRSNT: CPT | Mod: CPTII,S$GLB,, | Performed by: NURSE PRACTITIONER

## 2023-06-21 PROCEDURE — 3077F SYST BP >= 140 MM HG: CPT | Mod: CPTII,S$GLB,, | Performed by: NURSE PRACTITIONER

## 2023-06-21 PROCEDURE — 1160F RVW MEDS BY RX/DR IN RCRD: CPT | Mod: CPTII,S$GLB,, | Performed by: NURSE PRACTITIONER

## 2023-06-21 PROCEDURE — G0439 PR MEDICARE ANNUAL WELLNESS SUBSEQUENT VISIT: ICD-10-PCS | Mod: S$GLB,,, | Performed by: NURSE PRACTITIONER

## 2023-06-21 PROCEDURE — 3066F NEPHROPATHY DOC TX: CPT | Mod: CPTII,S$GLB,, | Performed by: NURSE PRACTITIONER

## 2023-06-21 PROCEDURE — 99499 RISK ADDL DX/OHS AUDIT: ICD-10-PCS | Mod: S$GLB,,, | Performed by: NURSE PRACTITIONER

## 2023-06-21 PROCEDURE — 3066F PR DOCUMENTATION OF TREATMENT FOR NEPHROPATHY: ICD-10-PCS | Mod: CPTII,S$GLB,, | Performed by: NURSE PRACTITIONER

## 2023-06-21 PROCEDURE — 1159F MED LIST DOCD IN RCRD: CPT | Mod: CPTII,S$GLB,, | Performed by: NURSE PRACTITIONER

## 2023-06-21 PROCEDURE — 3061F PR NEG MICROALBUMINURIA RESULT DOCUMENTED/REVIEW: ICD-10-PCS | Mod: CPTII,S$GLB,, | Performed by: NURSE PRACTITIONER

## 2023-06-21 PROCEDURE — 99499 UNLISTED E&M SERVICE: CPT | Mod: S$GLB,,, | Performed by: NURSE PRACTITIONER

## 2023-06-21 PROCEDURE — 1160F PR REVIEW ALL MEDS BY PRESCRIBER/CLIN PHARMACIST DOCUMENTED: ICD-10-PCS | Mod: CPTII,S$GLB,, | Performed by: NURSE PRACTITIONER

## 2023-06-21 PROCEDURE — 99203 PR OFFICE/OUTPT VISIT, NEW, LEVL III, 30-44 MIN: ICD-10-PCS | Mod: S$GLB,,, | Performed by: NURSE PRACTITIONER

## 2023-06-21 PROCEDURE — 3080F DIAST BP >= 90 MM HG: CPT | Mod: CPTII,S$GLB,, | Performed by: NURSE PRACTITIONER

## 2023-06-21 PROCEDURE — 3078F PR MOST RECENT DIASTOLIC BLOOD PRESSURE < 80 MM HG: ICD-10-PCS | Mod: CPTII,S$GLB,, | Performed by: NURSE PRACTITIONER

## 2023-06-21 PROCEDURE — 3061F NEG MICROALBUMINURIA REV: CPT | Mod: CPTII,S$GLB,, | Performed by: NURSE PRACTITIONER

## 2023-06-21 PROCEDURE — G0439 PPPS, SUBSEQ VISIT: HCPCS | Mod: S$GLB,,, | Performed by: NURSE PRACTITIONER

## 2023-06-21 PROCEDURE — 4010F PR ACE/ARB THEARPY RXD/TAKEN: ICD-10-PCS | Mod: CPTII,S$GLB,, | Performed by: NURSE PRACTITIONER

## 2023-06-21 PROCEDURE — 1125F PR PAIN SEVERITY QUANTIFIED, PAIN PRESENT: ICD-10-PCS | Mod: CPTII,S$GLB,, | Performed by: NURSE PRACTITIONER

## 2023-06-21 PROCEDURE — 3288F PR FALLS RISK ASSESSMENT DOCUMENTED: ICD-10-PCS | Mod: CPTII,S$GLB,, | Performed by: NURSE PRACTITIONER

## 2023-06-21 PROCEDURE — 3044F HG A1C LEVEL LT 7.0%: CPT | Mod: CPTII,S$GLB,, | Performed by: NURSE PRACTITIONER

## 2023-06-21 PROCEDURE — 3044F PR MOST RECENT HEMOGLOBIN A1C LEVEL <7.0%: ICD-10-PCS | Mod: CPTII,S$GLB,, | Performed by: NURSE PRACTITIONER

## 2023-06-21 PROCEDURE — 1170F FXNL STATUS ASSESSED: CPT | Mod: CPTII,S$GLB,, | Performed by: NURSE PRACTITIONER

## 2023-06-21 PROCEDURE — 4010F ACE/ARB THERAPY RXD/TAKEN: CPT | Mod: CPTII,S$GLB,, | Performed by: NURSE PRACTITIONER

## 2023-06-21 PROCEDURE — 3008F BODY MASS INDEX DOCD: CPT | Mod: CPTII,S$GLB,, | Performed by: NURSE PRACTITIONER

## 2023-06-21 PROCEDURE — 99999 PR PBB SHADOW E&M-EST. PATIENT-LVL IV: ICD-10-PCS | Mod: PBBFAC,,, | Performed by: NURSE PRACTITIONER

## 2023-06-21 PROCEDURE — 1101F PT FALLS ASSESS-DOCD LE1/YR: CPT | Mod: CPTII,S$GLB,, | Performed by: NURSE PRACTITIONER

## 2023-06-21 PROCEDURE — 1126F PR PAIN SEVERITY QUANTIFIED, NO PAIN PRESENT: ICD-10-PCS | Mod: CPTII,S$GLB,, | Performed by: NURSE PRACTITIONER

## 2023-06-21 PROCEDURE — 3288F FALL RISK ASSESSMENT DOCD: CPT | Mod: CPTII,S$GLB,, | Performed by: NURSE PRACTITIONER

## 2023-06-21 PROCEDURE — 3077F PR MOST RECENT SYSTOLIC BLOOD PRESSURE >= 140 MM HG: ICD-10-PCS | Mod: CPTII,S$GLB,, | Performed by: NURSE PRACTITIONER

## 2023-06-21 PROCEDURE — 1125F AMNT PAIN NOTED PAIN PRSNT: CPT | Mod: CPTII,S$GLB,, | Performed by: NURSE PRACTITIONER

## 2023-06-21 PROCEDURE — 3080F PR MOST RECENT DIASTOLIC BLOOD PRESSURE >= 90 MM HG: ICD-10-PCS | Mod: CPTII,S$GLB,, | Performed by: NURSE PRACTITIONER

## 2023-06-21 PROCEDURE — 3008F PR BODY MASS INDEX (BMI) DOCUMENTED: ICD-10-PCS | Mod: CPTII,S$GLB,, | Performed by: NURSE PRACTITIONER

## 2023-06-21 PROCEDURE — 1159F PR MEDICATION LIST DOCUMENTED IN MEDICAL RECORD: ICD-10-PCS | Mod: CPTII,S$GLB,, | Performed by: NURSE PRACTITIONER

## 2023-06-21 PROCEDURE — 1101F PR PT FALLS ASSESS DOC 0-1 FALLS W/OUT INJ PAST YR: ICD-10-PCS | Mod: CPTII,S$GLB,, | Performed by: NURSE PRACTITIONER

## 2023-06-21 PROCEDURE — 99999 PR PBB SHADOW E&M-EST. PATIENT-LVL IV: CPT | Mod: PBBFAC,,, | Performed by: NURSE PRACTITIONER

## 2023-06-21 PROCEDURE — 1170F PR FUNCTIONAL STATUS ASSESSED: ICD-10-PCS | Mod: CPTII,S$GLB,, | Performed by: NURSE PRACTITIONER

## 2023-06-21 PROCEDURE — 3078F DIAST BP <80 MM HG: CPT | Mod: CPTII,S$GLB,, | Performed by: NURSE PRACTITIONER

## 2023-06-21 PROCEDURE — 99203 OFFICE O/P NEW LOW 30 MIN: CPT | Mod: S$GLB,,, | Performed by: NURSE PRACTITIONER

## 2023-06-21 RX ORDER — TADALAFIL 20 MG/1
20 TABLET ORAL DAILY
Qty: 20 TABLET | Refills: 11 | Status: SHIPPED | OUTPATIENT
Start: 2023-06-21 | End: 2023-09-22

## 2023-06-21 NOTE — PROGRESS NOTES
Anjel Sanders presented for a  Medicare AWV and comprehensive Health Risk Assessment today. The following components were reviewed and updated:    Medical history  Family History  Social history  Allergies and Current Medications  Health Risk Assessment  Health Maintenance  Care Team         ** See Completed Assessments for Annual Wellness Visit within the encounter summary.**         The following assessments were completed:  Living Situation  CAGE  Depression Screening  Timed Get Up and Go  Whisper Test  Cognitive Function Screening  Nutrition Screening  ADL Screening  PAQ Screening    Review for Opioid Screening: Patient does not have rx for Opioids.  Review for Substance Use Disorders: Patient does not use substance.      Vitals:    06/21/23 0809   BP: (!) 164/90   Pulse: 63   SpO2: 99%   Weight: 102.1 kg (225 lb)     Body mass index is 31.38 kg/m².  Physical Exam  Vitals reviewed.   Constitutional:       Appearance: He is well-developed.   HENT:      Head: Normocephalic.   Eyes:      Pupils: Pupils are equal, round, and reactive to light.   Cardiovascular:      Rate and Rhythm: Normal rate and regular rhythm.      Heart sounds: Normal heart sounds.   Pulmonary:      Effort: Pulmonary effort is normal.      Breath sounds: Normal breath sounds.   Abdominal:      General: Bowel sounds are normal. There is no distension.      Palpations: Abdomen is soft.      Tenderness: There is no abdominal tenderness.   Musculoskeletal:         General: Normal range of motion.      Cervical back: Normal range of motion.      Right lower leg: No edema.      Left lower leg: No edema.   Skin:     General: Skin is warm and dry.   Neurological:      Mental Status: He is alert and oriented to person, place, and time.      Motor: Weakness (right side) present.   Psychiatric:         Behavior: Behavior normal.             Diagnoses and health risks identified today and associated recommendations/orders:    1. Encounter for  preventive health examination  - Above assessments completed. Preventive measures and health maintenance reviewed with patient and wife.    2. Controlled type 2 diabetes mellitus without complication, without long-term current use of insulin  Stable, followed by PCP  -A1C 6.2, diet controlled  -encouraged yearly eye exams    3. Chronic kidney disease, stage 3a  Stable, followed by PCP  -encouraged hydration and avoidance of NSAIDs    4. Hemiparesis affecting right side as late effect of cerebrovascular accident  Stable, followed by PCP  -on asa    5. Hypertension complicating diabetes  Stable, followed by PCP  -on amlodipine-benazepril    6. Dyslipidemia associated with type 2 diabetes mellitus  Stable, followed by PCP  -on statin    7. Chronic midline low back pain without sciatica  Stable, followed by PCP  -on meloxicam, gabapentin, and muscle relaxer    8. Benign prostatic hyperplasia, unspecified whether lower urinary tract symptoms present  Stable, followed by PCP  -on tamsulosin    9. Gouty arthritis  Stable, followed by PCP  -on allopurinol    10. Cough, unspecified type  Stable, followed by PCP  -on benzonatate      Provided Anjel with a 5-10 year written screening schedule and personal prevention plan. Recommendations were developed using the USPSTF age appropriate recommendations. Education, counseling, and referrals were provided as needed. After Visit Summary printed and given to patient which includes a list of additional screenings\tests needed.    Follow up in about 1 year (around 6/21/2024) for your next annual wellness visit.    Krystal Marc NP  I offered to discuss advanced care planning, including how to pick a person who would make decisions for you if you were unable to make them for yourself, called a health care power of , and what kind of decisions you might make such as use of life sustaining treatments such as ventilators and tube feeding when faced with a life limiting illness  recorded on a living will that they will need to know. (How you want to be cared for as you near the end of your natural life)     X Patient is interested in learning more about how to make advanced directives.  I provided them paperwork and offered to discuss this with them.

## 2023-06-21 NOTE — PROGRESS NOTES
CHIEF COMPLAINT:    Mr. Sanders is a 67 y.o. male presenting for   Chief Complaint   Patient presents with    Erectile Dysfunction       PRESENTING ILLNESS:    Anjel Sanders is a 67 y.o. male with a PMH of hx stroke, htn, dyslipidemia, ckd 3, obesity, gricelda who presents for erectile dysfunction    New patient to urology department. Presents today for erectile dysfunction.  Discussed factors that affect erectile dysfunction including low testosterone, obesity, HLD, HTN, DM and smoking.  Discussed ED options including PDE5-i, ICI, JUAN MANUEL and IPP.  Prescribed cialis which he has not tried.  Educated on proper administration and use of cialis.    He reports a good urinary stream and complete bladder emptying.  Nocturia x 1 per night.  Not bothersome to him. Has no urinary complaints today.    No family history of prostate cancer. Last PSA 2.1.    REVIEW OF SYSTEMS:    Review of Systems   Constitutional:  Negative for chills and fever.   Respiratory:  Negative for shortness of breath.    Cardiovascular:  Negative for chest pain.   Gastrointestinal:  Negative for constipation and diarrhea.   Genitourinary:  Negative for dysuria, flank pain, frequency, hematuria and urgency.   Neurological:  Negative for dizziness and weakness.     PATIENT HISTORY:    Past Medical History:   Diagnosis Date    AR (allergic rhinitis)     Gouty arthritis     Hx of colonic polyps     Tubular Adenoma    GRICELDA (obstructive sleep apnea)     Other and unspecified hyperlipidemia     Stroke     Unspecified essential hypertension        Family History   Problem Relation Age of Onset    Hyperlipidemia Mother     Heart disease Mother     Diabetes Mother     Hypertension Mother     Glaucoma Mother     No Known Problems Father     Stomach cancer Sister     Stroke Sister     Cancer Sister         stomach cancer    Heart disease Sister     Diabetes Sister     Hypertension Sister     Diabetes Sister     Alcohol abuse Brother         in remission    Cancer  Brother         sinus cancer    Cancer Son         stomach cancer    Amblyopia Neg Hx     Blindness Neg Hx     Cataracts Neg Hx     Macular degeneration Neg Hx     Retinal detachment Neg Hx     Strabismus Neg Hx     Thyroid disease Neg Hx        Allergies:  Patient has no known allergies.    Medications:    Current Outpatient Medications:     allopurinoL (ZYLOPRIM) 300 MG tablet, Take 1 tablet (300 mg total) by mouth once daily., Disp: 90 tablet, Rfl: 3    amLODIPine-benazepriL (LOTREL) 10-40 mg per capsule, Take 1 capsule by mouth once daily., Disp: 90 capsule, Rfl: 3    aspirin (ECOTRIN) 81 MG EC tablet, Take 81 mg by mouth once daily., Disp: , Rfl:     atorvastatin (LIPITOR) 80 MG tablet, Take 1 tablet (80 mg total) by mouth once daily., Disp: 90 tablet, Rfl: 4    benzonatate (TESSALON) 100 MG capsule, TAKE 1 CAPSULE BY MOUTH THREE TIMES A DAY AS NEEDED FOR COUGH, Disp: 30 capsule, Rfl: 0    benzonatate (TESSALON) 200 MG capsule, Take by mouth., Disp: , Rfl:     diclofenac sodium (VOLTAREN) 1 % Gel, APPLY 2 GRAMS TO AFFECTED AREA ONCE DAILY., Disp: 100 g, Rfl: 0    gabapentin (NEURONTIN) 300 MG capsule, Take 1 capsule (300 mg total) by mouth 2 (two) times daily AND 3 capsules (900 mg total) every evening., Disp: 150 capsule, Rfl: 11    meloxicam (MOBIC) 15 MG tablet, TAKE 1 TABLET BY MOUTH EVERY DAY, Disp: 90 tablet, Rfl: 0    meloxicam (MOBIC) 15 MG tablet, Take 1 tablet (15 mg total) by mouth once daily., Disp: 30 tablet, Rfl: 1    methocarbamoL (ROBAXIN) 500 MG Tab, Take 1 tablet (500 mg total) by mouth 3 (three) times daily as needed (muscle spasm)., Disp: 90 tablet, Rfl: 0    promethazine-dextromethorphan (PROMETHAZINE-DM) 6.25-15 mg/5 mL Syrp, Take 5 mLs by mouth 2 (two) times daily as needed (cough)., Disp: 180 mL, Rfl: 1    tamsulosin (FLOMAX) 0.4 mg Cap, Take 1 capsule (0.4 mg total) by mouth once daily., Disp: 90 capsule, Rfl: 3    tiZANidine (ZANAFLEX) 4 MG tablet, Take 1 tablet (4 mg total) by mouth  nightly as needed (muscle spasms)., Disp: 60 tablet, Rfl: 2    triamcinolone acetonide 0.1% (KENALOG) 0.1 % ointment, Apply topically 2 (two) times daily., Disp: 453 g, Rfl: 0    hydroCHLOROthiazide (HYDRODIURIL) 12.5 MG Tab, Take 1 tablet (12.5 mg total) by mouth once daily., Disp: 90 tablet, Rfl: 3    tadalafiL (CIALIS) 20 MG Tab, Take 1 tablet (20 mg total) by mouth once daily., Disp: 20 tablet, Rfl: 11    PHYSICAL EXAMINATION:    Physical Exam  Vitals and nursing note reviewed.   Constitutional:       Appearance: Normal appearance. He is well-developed.   HENT:      Head: Normocephalic and atraumatic.   Eyes:      Pupils: Pupils are equal, round, and reactive to light.   Pulmonary:      Effort: Pulmonary effort is normal.   Musculoskeletal:         General: Normal range of motion.      Cervical back: Normal range of motion.   Skin:     General: Skin is warm and dry.   Neurological:      Mental Status: He is alert and oriented to person, place, and time.   Psychiatric:         Behavior: Behavior normal.         LABS:  Lab Results   Component Value Date    PSA 2.1 06/17/2023    PSA 3.0 06/16/2022    PSA 2.1 06/21/2021    PSA 1.6 01/04/2020    PSA 1.3 11/27/2018    PSA 1.4 11/03/2017    PSA 0.94 08/13/2014    PSA 1.1 08/30/2013    PSA 1.06 10/20/2012    PSA 3.1 09/24/2011       IMPRESSION:  Encounter Diagnoses   Name Primary?    Erectile dysfunction, unspecified erectile dysfunction type          PLAN:  Problem List Items Addressed This Visit       ED (erectile dysfunction)    Relevant Medications    tadalafiL (CIALIS) 20 MG Tab       1. ED  Trial of cialis.  Discussed side effects and proper administration.  He voiced understanding.     2. Bph with nocturia   - not bothersome to him  3. Rtc in 3 mths       Staci Goldstein NP    I spent 30 minutes with the patient. Over 50% of the visit was spent in counseling.

## 2023-06-21 NOTE — PATIENT INSTRUCTIONS
Counseling and Referral of Other Preventative  (Italic type indicates deductible and co-insurance are waived)    Patient Name: Anjel Sanders  Today's Date: 6/21/2023    Health Maintenance       Date Due Completion Date    Eye Exam 07/22/2022 7/22/2021    Override on 11/21/2018: Done    Override on 11/3/2017: Done    Hemoglobin A1c 12/17/2023 6/17/2023    Override on 8/22/2018: Done    High Dose Statin 05/03/2024 5/3/2023    Foot Exam 06/16/2024 6/16/2023 (Done)    Override on 6/16/2023: Done    Override on 6/16/2022: Done    Override on 6/17/2021: Done    Override on 6/19/2020: Done    Override on 7/12/2019: Done    Override on 10/25/2017: Done    Override on 10/31/2016: Done    PROSTATE-SPECIFIC ANTIGEN 06/17/2024 6/17/2023    Diabetes Urine Screening 06/17/2024 6/17/2023    Lipid Panel 06/17/2024 6/17/2023    Colorectal Cancer Screening 09/21/2024 9/21/2021    TETANUS VACCINE 08/11/2025 8/11/2015        No orders of the defined types were placed in this encounter.      The following information is provided to all patients.  This information is to help you find resources for any of the problems found today that may be affecting your health:                Living healthy guide: www.[facility].[place].gov      Understanding Diabetes: www.diabetes.org      Eating healthy: www.cdc.gov/healthyweight      CDC home safety checklist: www.cdc.gov/steadi/patient.html      Agency on Aging: www.goea.[place].[facility]      Alcoholics anonymous (AA): www.aa.org      Physical Activity: www.omar.nih.gov/rw7nflh      Tobacco use: www.quitwithusla.org

## 2023-07-12 ENCOUNTER — TELEPHONE (OUTPATIENT)
Dept: PAIN MEDICINE | Facility: CLINIC | Age: 68
End: 2023-07-12
Payer: MEDICARE

## 2023-07-12 NOTE — TELEPHONE ENCOUNTER
Spoke with pt in regards to scheduling a sunday L3/4 TFESI with Dr. Reeves. Pt stated his back is better and he will give us a call when he's ready to schedule. Pt voiced understanding.

## 2023-07-21 ENCOUNTER — TELEPHONE (OUTPATIENT)
Dept: UROLOGY | Facility: CLINIC | Age: 68
End: 2023-07-21
Payer: MEDICARE

## 2023-08-11 ENCOUNTER — PATIENT OUTREACH (OUTPATIENT)
Dept: ADMINISTRATIVE | Facility: HOSPITAL | Age: 68
End: 2023-08-11
Payer: MEDICARE

## 2023-08-14 ENCOUNTER — TELEPHONE (OUTPATIENT)
Dept: PAIN MEDICINE | Facility: CLINIC | Age: 68
End: 2023-08-14
Payer: MEDICARE

## 2023-08-14 ENCOUNTER — CLINICAL SUPPORT (OUTPATIENT)
Dept: REHABILITATION | Facility: HOSPITAL | Age: 68
End: 2023-08-14
Attending: STUDENT IN AN ORGANIZED HEALTH CARE EDUCATION/TRAINING PROGRAM
Payer: MEDICARE

## 2023-08-14 DIAGNOSIS — M54.50 LOW BACK PAIN ASSOCIATED WITH A SPINAL DISORDER OTHER THAN RADICULOPATHY OR SPINAL STENOSIS: Primary | ICD-10-CM

## 2023-08-14 PROCEDURE — 97161 PT EVAL LOW COMPLEX 20 MIN: CPT | Mod: HCNC,PO

## 2023-08-14 PROCEDURE — 97530 THERAPEUTIC ACTIVITIES: CPT | Mod: HCNC,PO

## 2023-08-14 NOTE — PLAN OF CARE
[facility] Physical Therapy (PT) Initial Evaluation-   LUMBAR SPINE     Name: Anjel Sanders  Clinic Number: [id number]  YOB: 1955    Therapy Diagnosis:   Encounter Diagnosis   Name Primary?    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 Pt with no specific DALIA. Pt had an injection scheduled 2 months ago but had to reschedule due to insurance issues. Pt reports he was told he needs to do physical therapy first before undergoing to injection. Pt reports he has been pushing the pain off for a long time. Pt reports gabapentin (takes 3/day) does improve pain but the pain returns after a while if he does not take the medication. Pt reports he will only be able to attend physical therapy 1x/week at this time due to copay.   PMHx of stroke around 5 years ago which led to him retiring, some right side weakness.     Past medical history:   Past Medical History:   Diagnosis Date    AR (allergic rhinitis)     Gouty arthritis     Hx of colonic polyps     Tubular Adenoma    MURTAZA (obstructive sleep  "apnea)     Other and unspecified hyperlipidemia     Stroke     Unspecified essential hypertension        Past Surgical history:  has a past surgical history that includes Foot surgery; surgery on jaw; Arthroscopic repair of rotator cuff of shoulder (Left, 4/30/2019); Arthroscopy of shoulder with decompression of subacromial space (4/30/2019); Rotator cuff repair (Right, 8/4/2020); and Colonoscopy (N/A, 9/21/2021).    Medications: Anjel has a current medication list which includes the following prescription(s): allopurinol, amlodipine-benazepril, aspirin, atorvastatin, benzonatate, benzonatate, diclofenac sodium, gabapentin, hydrochlorothiazide, meloxicam, meloxicam, promethazine-dextromethorphan, tadalafil, tamsulosin, tizanidine, and triamcinolone acetonide 0.1%.    Allergies: Review of patient's allergies indicates:  No Known Allergies    Prior Therapy: No  Social: Lives with family   Occupation: Retired    Prior Level of Function: I ADLs  Current Level of Function: "I still do everything with pain and take gabapentin"   Patient's goals: "Decrease overall pain"     Intake Outcome Measure for FOTO Survey    Therapist reviewed FOTO scores for Anjel Sanders on 8/14/2023.   FOTO documents entered into EPIC - see Media section.    Intake Score: 49%       Imaging:  X-ray: 2/2023: FINDINGS: There are 5 lumbar type vertebral bodies.  There is grade 1 anterolisthesis of L4 on L5.  There is no fracture or scoliosis. There is normal lumbar lordosis.  MRI: 5/03/2023: Impression: Degenerative changes resulting in severe canal stenosis at L3-4 and high-grade foraminal stenosis at multiple levels as detailed above    Environmental/Abuse/Neglect/Sensory concerns: None  The patient's spiritual, cultural, social and education needs were considered with no evidence of barriers noted.     Pain: Current 5/10, worst 8/10, best 0/10   Location: bilateral back (L5/S1)   Description: Aching and Dull  Aggravating Factors: Standing, " "Walking, and Morning  Easing Factors: pain medication    Additional Information:  Muscle Spasms? [x] No     [] Yes:   Trigger Points? [x] No     [] Yes:   Paresthesias? [x] No     [] Yes:   Radicular Symptoms? [x] No     [] Yes:   Leg Length Discrepancy? [x] No     [] Yes:          Objective     Palpation: No TTP at this time. Iliac crests appear Level. Poor joint mobility of lumbar spine    Posture: unremarkable     Gait: flexed forward trunk      Sensation: Light touch: Intact           Deep Pressure: Intact    Deep Tendon Reflexes:   LEFT RIGHT   Patellar (L3-4) 2+ 2+   Achilles (S1) 2+ 2+     Range of Motion (ROM):   Flexion (2cm from fingertips to floor) Anterior talar joint    Thoracolumbar flexion (10cm)    Extension (8-10cm) 5 cm (2/10 pain)    Right (R) Rotation (45 °) 25   Left (L) Rotation (45 °) 25     Strength:   LEFT RIGHT   Hip flexion 4/5 4/5   Hip extension 3+/5 3+/5   Hip abduction 3+/5 3+/5   Hip adduction 4-/5 4-/5   Knee flexion 4-/5 4-/5   Knee extension 4/5 4/5   Ankle dorsiflexion (DF) 4/5 4/5   Ankle plantarflexion (PF) 3+/5 3+/5     Core Strength:  Abdominal flexors: 4/5   No struggle 5/5  Arms crossed 4/5  Arms straight 3/5  Shoulders barely clear 3-/5    Leg lowering/Abdominal flexors: 3+/5  15 ° for 5": 5/5  15 ° : 4+/5  30 ° : 4/5  45 ° : 4-/5  60 ° : 3+/5  75 ° : 3/5  90 ° : 3-/5    Muscle Length:   LEFT RIGHT   [place] [] Negative  [x] Positive  [] Negative  [x] Positive   K[place]y [] Negative  [x] Positive [] Negative  [x] Positive   Polo [] Negative  [x] Positive [] Negative  [x] Positive          Movement Analysis:  Bridges: Able to perform with 10 second hold - 4/10 pain focused in L5/S1   Double knee to chest: Non-fluid movement, able to achieve 90 degrees of bilateral hip flexion   Prone press up: 5 inches    Special Tests:   + -  + -  + -   ANNELIESE   [x]  []  Sacral Springing []  [x]  Arlyn Signs []  []    Straight Leg Raise []  [x]  Sacroiliac (SI) Compression []  []  Distraction [] "  []    Slump []  [x]  SI Distraction []  []  Gaenslan's []  []    Prone Instability [] []  Tenderness []  [x]  Other []  []      Treatment   Treatment Time In: 740  Treatment Time Out: 755  Total Treatment time (time-based codes) separate from Evaluation: 15 minutes    Anjel participated in dynamic functional therapeutic activities to improve functional performance for 15  minutes, includin:1 HEP instruction with patient, discussed activity tolerance and pain management with corrective exercise     Home Exercises and Patient Education   Education provided:  Yes; see media section for HEP    Written Home Exercise Program (HEP) Provided: yes.  Exercises were reviewed and Anjel was able to demonstrate them prior to the end of the session.  Anjel demonstrated good  understanding of the education provided.     See Electronic Medical Record under Media for exercises provided 2023.  ______________________________________________________________________    Assessment   Anjel is a 67 y.o. 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 Patient presents to PT with pain, decreased lumbar ROM, decreased bilateral hip/knee/ankle ROM, decreased strength, poor posture, and functional deficits with ADLs and vocational tasks at this time. Patient     Patient prognosis is Fair.   Patient will benefit from skilled outpatient Physical Therapy to address the deficits stated above and in the chart below, provide patient/family education, and to maximize patient's level of independence.     Plan of care discussed with patient: Yes  Patient's spiritual, cultural and educational needs considered and patient is agreeable to the plan of care and goals as stated below:     Anticipated Barriers for  therapy: None    Medical Necessity:  History  Co-morbidities and personal factors that may impact the plan of care [x] LOW: no personal factors / co-morbidities  [] MODERATE: 1-2 personal factors / co-morbidities  [] HIGH: 3+ personal factors / co-morbidities    Moderate / High Support Documentation:   Past Medical History:   Diagnosis Date    AR (allergic rhinitis)     Gouty arthritis     Hx of colonic polyps     Tubular Adenoma    MURTAZA (obstructive sleep apnea)     Other and unspecified hyperlipidemia     Stroke     Unspecified essential hypertension         Examination  Body Structures and Functions, activity limitations and participation restrictions that may impact the plan of care [x] LOW: addressing 1-2 elements  [] MODERATE: 3+ elements  [] HIGH: 4+ elements (please support below)    Moderate / High Support Documentation: None at this time      Clinical Presentation [x] LOW: stable  [] MODERATE: Evolving  [] HIGH: Unstable     Decision Making/ Complexity Score: low       Goals:  Short Term Goals (2 weeks)  1. Patient will report < 0/10 pain at rest and < 3/10 pain with activity.  2. Patient will increase lumbar flexion by 5 centimeters and increase lumbar extension by 5 centimeters.  3. Patient will demonstrate improved tolerance with static positions to be able to stand/sit 2 hours.     Long Term Goals (4 weeks)  1. Patient will be independent and compliant with HEP and its progression for future symptom management.  2. Patient will demonstrate proper lifting technique 3/3 trials for decreased risk of injury when performing daily tasks.  3. Patient will have increased strength of hip extensors for improvement in standing tolerance.  4. Patient will have overall improvement in condition to have decreased limitation score on FOTO to <30%.     Plan     Plan of care Certification: 08/14/2023 to 11/12/2023     Outpatient Physical Therapy 1 times weekly for 4 weeks to include the following interventions:  Cervical/Lumbar Traction, Electrical Stimulation IFC/VMS, Gait Training, Manual Therapy, Moist Heat/ Ice, Neuromuscular Re-ed, Patient Education, Self Care, Therapeutic Activities, and Therapeutic Exercise.     Plan Next Visit   Date     Visit     Therapeutic Exercise    Nustep    DKTC     Supine hamstring stretch with strap     Piriformis stretch     Prone press up with hold     Sidelying QL stretch     Neuromuscular Re-Education    PPT     PPT + SLR     Clams     SL hip abduction     Prone hip extension     Bridging PPT   (Theraband and ball)     Bird-dog     Superman      Mariana Azul, PT

## 2023-08-23 ENCOUNTER — IMMUNIZATION (OUTPATIENT)
Dept: INTERNAL MEDICINE | Facility: CLINIC | Age: 68
End: 2023-08-23
Payer: MEDICARE

## 2023-08-23 DIAGNOSIS — Z23 NEED FOR VACCINATION: Primary | ICD-10-CM

## 2023-08-23 PROCEDURE — 91312 COVID-19, MRNA, LNP-S, BIVALENT BOOSTER, PF, 30 MCG/0.3 ML DOSE: CPT | Mod: HCNC,S$GLB,, | Performed by: INTERNAL MEDICINE

## 2023-08-23 PROCEDURE — 0124A COVID-19, MRNA, LNP-S, BIVALENT BOOSTER, PF, 30 MCG/0.3 ML DOSE: CPT | Mod: HCNC,S$GLB,, | Performed by: INTERNAL MEDICINE

## 2023-08-23 PROCEDURE — 0124A COVID-19, MRNA, LNP-S, BIVALENT BOOSTER, PF, 30 MCG/0.3 ML DOSE: ICD-10-PCS | Mod: HCNC,S$GLB,, | Performed by: INTERNAL MEDICINE

## 2023-08-23 PROCEDURE — 91312 COVID-19, MRNA, LNP-S, BIVALENT BOOSTER, PF, 30 MCG/0.3 ML DOSE: ICD-10-PCS | Mod: HCNC,S$GLB,, | Performed by: INTERNAL MEDICINE

## 2023-08-24 ENCOUNTER — CLINICAL SUPPORT (OUTPATIENT)
Dept: REHABILITATION | Facility: HOSPITAL | Age: 68
End: 2023-08-24
Payer: MEDICARE

## 2023-08-24 DIAGNOSIS — M54.50 LOW BACK PAIN ASSOCIATED WITH A SPINAL DISORDER OTHER THAN RADICULOPATHY OR SPINAL STENOSIS: Primary | ICD-10-CM

## 2023-08-24 PROCEDURE — 97112 NEUROMUSCULAR REEDUCATION: CPT | Mod: HCNC,PO

## 2023-08-24 PROCEDURE — 97110 THERAPEUTIC EXERCISES: CPT | Mod: HCNC,PO

## 2023-08-24 NOTE — PROGRESS NOTES
"Physical Therapy (PT) Daily Treatment Note     Name: Anjel Sanders  Clinic Number: [id number]    Therapy Diagnosis:   Encounter Diagnosis   Name Primary?    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  Surgical Procedure and Date: None  Evaluation Date: 8/14/2023  Insurance Authorization Period Expiration: 12/29/2023  Plan of Care Certification Period: 11/12/2023  Progress Note Due: 6th visit or every 30 days    Date of Return to MD: 8/14/2023 - pain management   Visit # / Visits authorized: 1 / 20    FOTO  -Intake: 49%   -Status: Incomplete  -Discharge: Incomplete    Time In: 7:25 AM (pt late to treatment)   Time Out: 8:00 AM   Total Billable Time: 35 minutes (double book)     Precautions: Standard    Subjective     Patient reports: "I've been doing my exercises at home and noticed my back is loosening up and having less pain but I am still taking my Gabapentin."   He was compliant with home exercise program.  Response to previous treatment: minimal muscle soreness   Functional change: decrease in overall pain     Pain: 4/10  Location: bilateral back      Objective     Anjel received therapeutic exercises to develop endurance, ROM, and flexibility for 20 minutes and participated in neuromuscular re-education activities to improve: Balance, Coordination, Kinesthetic, Sense, Proprioception, and Posture for 15 minutes including:    Date   8/24/2023    Visit   1/20    Therapeutic Exercise     Nustep  8 minutes L4    DKTC   2x30"   Supine hamstring stretch with strap   2x30" each    Piriformis stretch   2x30" each    Prone press up with hold   " "Next viisit    Sidelying QL stretch   2x30" each    Neuromuscular Re-Education     PPT   2x10 each 5" hold    PPT + SLR   15 each    Clams   2x10 each    SL hip abduction   next visit    Prone hip extension   next visit    Bridging PPT   (Theraband and ball)   next visit    Bird-dog   next visit    Superman  next visit      Home Exercise Program (HEP) Provided and Patient Education Provided     Patient was provided education on 8/14/2023.     Written Home Exercises Provided: Patient instructed to cont prior home program.  Exercises were reviewed and Anjel was able to demonstrate them prior to the end of the session.  Anjel demonstrated good  understanding of the education provided.     Assessment   Pt responding well to treatment today, noticeable limitations in active range of motion with stretching today due to hip and lumbar joint/tissue restrictions. Pt requiring marked cueing with SL clams to avoid lumbar/pelvic rotation. Will assess response to new exercises next visit to determine if patient is able to progress.     Anjel Is progressing well towards his goals.     Patient will continue to benefit from skilled outpatient physical therapy to address the deficits listed in the problem list box on initial evaluation, provide patient/family education and to maximize patient's level of independence in the home and community environment.     Patient prognosis is Excellent.     Patient's spiritual, cultural and educational needs considered and patient agreeable to plan of care and goals.    Anticipated barriers to physical therapy: Co-pay     Goals:  Short Term Goals (2 weeks)  1. Patient will report < 0/10 pain at rest and < 3/10 pain with activity.  2. Patient will increase lumbar flexion by 5 centimeters and increase lumbar extension by 5 centimeters.  3. Patient will demonstrate improved tolerance with static positions to be able to stand/sit 2 hours.      Long Term Goals (4 weeks)  1. Patient will be independent " and compliant with HEP and its progression for future symptom management.  2. Patient will demonstrate proper lifting technique 3/3 trials for decreased risk of injury when performing daily tasks.  3. Patient will have increased strength of hip extensors for improvement in standing tolerance.  4. Patient will have overall improvement in condition to have decreased limitation score on FOTO to <30%.     Plan   Assess response to new exercise treatment.     Mariana Azul, PT

## 2023-08-25 DIAGNOSIS — E11.69 DYSLIPIDEMIA ASSOCIATED WITH TYPE 2 DIABETES MELLITUS: ICD-10-CM

## 2023-08-25 DIAGNOSIS — E78.5 DYSLIPIDEMIA ASSOCIATED WITH TYPE 2 DIABETES MELLITUS: ICD-10-CM

## 2023-08-25 DIAGNOSIS — E11.59 HYPERTENSION COMPLICATING DIABETES: ICD-10-CM

## 2023-08-25 DIAGNOSIS — I15.2 HYPERTENSION COMPLICATING DIABETES: ICD-10-CM

## 2023-08-25 RX ORDER — AMLODIPINE AND BENAZEPRIL HYDROCHLORIDE 10; 40 MG/1; MG/1
1 CAPSULE ORAL DAILY
Qty: 90 CAPSULE | Refills: 3 | Status: SHIPPED | OUTPATIENT
Start: 2023-08-25 | End: 2024-08-24

## 2023-08-25 RX ORDER — ATORVASTATIN CALCIUM 80 MG/1
80 TABLET, FILM COATED ORAL DAILY
Qty: 90 TABLET | Refills: 3 | Status: SHIPPED | OUTPATIENT
Start: 2023-08-25

## 2023-08-25 NOTE — TELEPHONE ENCOUNTER
Refill Decision Note   Anjel Sanders  is requesting a refill authorization.  Brief Assessment and Rationale for Refill:  Approve     Medication Therapy Plan:         Comments:     Note composed:8:33 AM 08/25/2023             Appointments     Last Visit   6/16/2023 Lars Payan MD   Next Visit   12/15/2023 Lars Payan MD

## 2023-08-25 NOTE — TELEPHONE ENCOUNTER
Care Due:                  Date            Visit Type   Department     Provider  --------------------------------------------------------------------------------                                EP [facility]    Lars Guadarramagt  Last Visit: 06-      CARE ([facility])   MEDICINE       Ora                              [facility]  Next Visit: 12-      CARE ([facility])   MEDICINE       Ora                                                            Last  Test          Frequency    Reason                     Performed    Due Date  --------------------------------------------------------------------------------    Uric Acid...  12 months..  allopurinoL..............  Not Found    Overdue    [employer] Embedded Care Due Messages. Reference number: [id number].   8/25/2023 12:16:20 AM CDT

## 2023-08-25 NOTE — TELEPHONE ENCOUNTER
Refill Routing Note   Medication(s) are not appropriate for processing by [facility] for the following reason(s):      Required vitals abnormal    [facility] action(s):  Defer Care Due:  Labs due            Appointments  past 12m or future 3m with PCP    Date Provider   Last Visit   6/16/2023 Lars Payan MD   Next Visit   8/25/2023 Lars Payan MD   ED visits in past 90 days: 0        Note composed:9:52 AM 08/25/2023

## 2023-08-25 NOTE — TELEPHONE ENCOUNTER
No care due was identified.  Health [facility] Embedded Care Due Messages. Reference number: [id number].   8/25/2023 8:16:04 AM CDT

## 2023-09-22 ENCOUNTER — OFFICE VISIT (OUTPATIENT)
Dept: UROLOGY | Facility: CLINIC | Age: 68
End: 2023-09-22
Payer: MEDICARE

## 2023-09-22 VITALS
SYSTOLIC BLOOD PRESSURE: 126 MMHG | WEIGHT: 215 LBS | HEIGHT: 70 IN | BODY MASS INDEX: 30.78 KG/M2 | DIASTOLIC BLOOD PRESSURE: 70 MMHG | HEART RATE: 61 BPM

## 2023-09-22 DIAGNOSIS — N40.0 BENIGN PROSTATIC HYPERPLASIA, UNSPECIFIED WHETHER LOWER URINARY TRACT SYMPTOMS PRESENT: ICD-10-CM

## 2023-09-22 DIAGNOSIS — N52.9 ERECTILE DYSFUNCTION, UNSPECIFIED ERECTILE DYSFUNCTION TYPE: Primary | ICD-10-CM

## 2023-09-22 PROCEDURE — 1160F PR REVIEW ALL MEDS BY PRESCRIBER/CLIN PHARMACIST DOCUMENTED: ICD-10-PCS | Mod: HCNC,CPTII,S$GLB, | Performed by: NURSE PRACTITIONER

## 2023-09-22 PROCEDURE — 3078F DIAST BP <80 MM HG: CPT | Mod: HCNC,CPTII,S$GLB, | Performed by: NURSE PRACTITIONER

## 2023-09-22 PROCEDURE — 1160F RVW MEDS BY RX/DR IN RCRD: CPT | Mod: HCNC,CPTII,S$GLB, | Performed by: NURSE PRACTITIONER

## 2023-09-22 PROCEDURE — 99999 PR PBB SHADOW E&M-EST. PATIENT-LVL III: ICD-10-PCS | Mod: PBBFAC,HCNC,, | Performed by: NURSE PRACTITIONER

## 2023-09-22 PROCEDURE — 3288F PR FALLS RISK ASSESSMENT DOCUMENTED: ICD-10-PCS | Mod: HCNC,CPTII,S$GLB, | Performed by: NURSE PRACTITIONER

## 2023-09-22 PROCEDURE — 99214 OFFICE O/P EST MOD 30 MIN: CPT | Mod: HCNC,S$GLB,, | Performed by: NURSE PRACTITIONER

## 2023-09-22 PROCEDURE — 99214 PR OFFICE/OUTPT VISIT, EST, LEVL IV, 30-39 MIN: ICD-10-PCS | Mod: HCNC,S$GLB,, | Performed by: NURSE PRACTITIONER

## 2023-09-22 PROCEDURE — 3061F PR NEG MICROALBUMINURIA RESULT DOCUMENTED/REVIEW: ICD-10-PCS | Mod: HCNC,CPTII,S$GLB, | Performed by: NURSE PRACTITIONER

## 2023-09-22 PROCEDURE — 1159F MED LIST DOCD IN RCRD: CPT | Mod: HCNC,CPTII,S$GLB, | Performed by: NURSE PRACTITIONER

## 2023-09-22 PROCEDURE — 1159F PR MEDICATION LIST DOCUMENTED IN MEDICAL RECORD: ICD-10-PCS | Mod: HCNC,CPTII,S$GLB, | Performed by: NURSE PRACTITIONER

## 2023-09-22 PROCEDURE — 3044F PR MOST RECENT HEMOGLOBIN A1C LEVEL <7.0%: ICD-10-PCS | Mod: HCNC,CPTII,S$GLB, | Performed by: NURSE PRACTITIONER

## 2023-09-22 PROCEDURE — 3008F BODY MASS INDEX DOCD: CPT | Mod: HCNC,CPTII,S$GLB, | Performed by: NURSE PRACTITIONER

## 2023-09-22 PROCEDURE — 3074F SYST BP LT 130 MM HG: CPT | Mod: HCNC,CPTII,S$GLB, | Performed by: NURSE PRACTITIONER

## 2023-09-22 PROCEDURE — 99999 PR PBB SHADOW E&M-EST. PATIENT-LVL III: CPT | Mod: PBBFAC,HCNC,, | Performed by: NURSE PRACTITIONER

## 2023-09-22 PROCEDURE — 3288F FALL RISK ASSESSMENT DOCD: CPT | Mod: HCNC,CPTII,S$GLB, | Performed by: NURSE PRACTITIONER

## 2023-09-22 PROCEDURE — 3066F PR DOCUMENTATION OF TREATMENT FOR NEPHROPATHY: ICD-10-PCS | Mod: HCNC,CPTII,S$GLB, | Performed by: NURSE PRACTITIONER

## 2023-09-22 PROCEDURE — 4010F PR ACE/ARB THEARPY RXD/TAKEN: ICD-10-PCS | Mod: HCNC,CPTII,S$GLB, | Performed by: NURSE PRACTITIONER

## 2023-09-22 PROCEDURE — 3044F HG A1C LEVEL LT 7.0%: CPT | Mod: HCNC,CPTII,S$GLB, | Performed by: NURSE PRACTITIONER

## 2023-09-22 PROCEDURE — 4010F ACE/ARB THERAPY RXD/TAKEN: CPT | Mod: HCNC,CPTII,S$GLB, | Performed by: NURSE PRACTITIONER

## 2023-09-22 PROCEDURE — 1101F PR PT FALLS ASSESS DOC 0-1 FALLS W/OUT INJ PAST YR: ICD-10-PCS | Mod: HCNC,CPTII,S$GLB, | Performed by: NURSE PRACTITIONER

## 2023-09-22 PROCEDURE — 3078F PR MOST RECENT DIASTOLIC BLOOD PRESSURE < 80 MM HG: ICD-10-PCS | Mod: HCNC,CPTII,S$GLB, | Performed by: NURSE PRACTITIONER

## 2023-09-22 PROCEDURE — 3061F NEG MICROALBUMINURIA REV: CPT | Mod: HCNC,CPTII,S$GLB, | Performed by: NURSE PRACTITIONER

## 2023-09-22 PROCEDURE — 3008F PR BODY MASS INDEX (BMI) DOCUMENTED: ICD-10-PCS | Mod: HCNC,CPTII,S$GLB, | Performed by: NURSE PRACTITIONER

## 2023-09-22 PROCEDURE — 3074F PR MOST RECENT SYSTOLIC BLOOD PRESSURE < 130 MM HG: ICD-10-PCS | Mod: HCNC,CPTII,S$GLB, | Performed by: NURSE PRACTITIONER

## 2023-09-22 PROCEDURE — 3066F NEPHROPATHY DOC TX: CPT | Mod: HCNC,CPTII,S$GLB, | Performed by: NURSE PRACTITIONER

## 2023-09-22 PROCEDURE — 1101F PT FALLS ASSESS-DOCD LE1/YR: CPT | Mod: HCNC,CPTII,S$GLB, | Performed by: NURSE PRACTITIONER

## 2023-09-22 RX ORDER — SILDENAFIL 100 MG/1
100 TABLET, FILM COATED ORAL DAILY PRN
Qty: 20 TABLET | Refills: 11 | Status: SHIPPED | OUTPATIENT
Start: 2023-09-22 | End: 2024-09-21

## 2023-09-22 RX ORDER — TAMSULOSIN HYDROCHLORIDE 0.4 MG/1
1 CAPSULE ORAL DAILY
Qty: 90 CAPSULE | Refills: 3 | Status: SHIPPED | OUTPATIENT
Start: 2023-09-22

## 2023-09-22 NOTE — PROGRESS NOTES
CHIEF COMPLAINT:    Mr. Sanders is a 68 y.o. male presenting for   Chief Complaint   Patient presents with    Erectile Dysfunction     Pt is here for f/u on ED medication. Pt says medication currently prescribed is not working on symptoms.        PRESENTING ILLNESS:    Anjel Sanders is a 68 y.o. male with a PMH of hx stroke, htn, dyslipidemia, ckd 3, obesity, gricelda who presents for erectile dysfunction follow up.    Now established patient to urology department. Presents today for erectile dysfunction follow up.  Started cialis for ED, which is not working well for him.  Would like to try viagra instead of cialis.    He reports a good urinary stream and complete bladder emptying.  Nocturia x 1 per night.  Not bothersome to him. Has no urinary complaints today.    No family history of prostate cancer. Last PSA 2.1.    REVIEW OF SYSTEMS:    Review of Systems   Constitutional:  Negative for chills and fever.   Respiratory:  Negative for shortness of breath.    Cardiovascular:  Negative for chest pain.   Gastrointestinal:  Negative for constipation and diarrhea.   Genitourinary:  Negative for dysuria, flank pain, frequency, hematuria and urgency.   Neurological:  Negative for dizziness and weakness.       PATIENT HISTORY:    Past Medical History:   Diagnosis Date    AR (allergic rhinitis)     Gouty arthritis     Hx of colonic polyps     Tubular Adenoma    GRICELDA (obstructive sleep apnea)     Other and unspecified hyperlipidemia     Stroke     Unspecified essential hypertension        Family History   Problem Relation Age of Onset    Hyperlipidemia Mother     Heart disease Mother     Diabetes Mother     Hypertension Mother     Glaucoma Mother     No Known Problems Father     Stomach cancer Sister     Stroke Sister     Cancer Sister         stomach cancer    Heart disease Sister     Diabetes Sister     Hypertension Sister     Diabetes Sister     Alcohol abuse Brother         in remission    Cancer Brother         sinus  cancer    Cancer Son         stomach cancer    Amblyopia Neg Hx     Blindness Neg Hx     Cataracts Neg Hx     Macular degeneration Neg Hx     Retinal detachment Neg Hx     Strabismus Neg Hx     Thyroid disease Neg Hx        Allergies:  Patient has no known allergies.    Medications:    Current Outpatient Medications:     allopurinoL (ZYLOPRIM) 300 MG tablet, Take 1 tablet (300 mg total) by mouth once daily., Disp: 90 tablet, Rfl: 3    amLODIPine-benazepriL (LOTREL) 10-40 mg per capsule, TAKE 1 CAPSULE BY MOUTH ONCE DAILY, Disp: 90 capsule, Rfl: 3    aspirin (ECOTRIN) 81 MG EC tablet, Take 81 mg by mouth once daily., Disp: , Rfl:     atorvastatin (LIPITOR) 80 MG tablet, TAKE 1 TABLET (80 MG TOTAL) BY MOUTH ONCE DAILY., Disp: 90 tablet, Rfl: 3    benzonatate (TESSALON) 100 MG capsule, TAKE 1 CAPSULE BY MOUTH THREE TIMES A DAY AS NEEDED FOR COUGH, Disp: 30 capsule, Rfl: 0    benzonatate (TESSALON) 200 MG capsule, Take by mouth., Disp: , Rfl:     diclofenac sodium (VOLTAREN) 1 % Gel, APPLY 2 GRAMS TO AFFECTED AREA ONCE DAILY., Disp: 100 g, Rfl: 0    gabapentin (NEURONTIN) 300 MG capsule, Take 1 capsule (300 mg total) by mouth 2 (two) times daily AND 3 capsules (900 mg total) every evening., Disp: 150 capsule, Rfl: 11    hydroCHLOROthiazide (HYDRODIURIL) 12.5 MG Tab, Take 1 tablet (12.5 mg total) by mouth once daily., Disp: 90 tablet, Rfl: 3    meloxicam (MOBIC) 15 MG tablet, TAKE 1 TABLET BY MOUTH EVERY DAY, Disp: 90 tablet, Rfl: 0    promethazine-dextromethorphan (PROMETHAZINE-DM) 6.25-15 mg/5 mL Syrp, Take 5 mLs by mouth 2 (two) times daily as needed (cough)., Disp: 180 mL, Rfl: 1    sildenafiL (VIAGRA) 100 MG tablet, Take 1 tablet (100 mg total) by mouth daily as needed for Erectile Dysfunction., Disp: 20 tablet, Rfl: 11    tamsulosin (FLOMAX) 0.4 mg Cap, Take 1 capsule (0.4 mg total) by mouth once daily., Disp: 90 capsule, Rfl: 3    tiZANidine (ZANAFLEX) 4 MG tablet, Take 1 tablet (4 mg total) by mouth nightly as  needed (muscle spasms)., Disp: 60 tablet, Rfl: 2    triamcinolone acetonide 0.1% (KENALOG) 0.1 % ointment, Apply topically 2 (two) times daily., Disp: 453 g, Rfl: 0    PHYSICAL EXAMINATION:    Physical Exam  Vitals and nursing note reviewed.   Constitutional:       Appearance: Normal appearance. He is well-developed.   HENT:      Head: Normocephalic and atraumatic.   Eyes:      Pupils: Pupils are equal, round, and reactive to light.   Pulmonary:      Effort: Pulmonary effort is normal.   Musculoskeletal:         General: Normal range of motion.      Cervical back: Normal range of motion.   Skin:     General: Skin is warm and dry.   Neurological:      Mental Status: He is alert and oriented to person, place, and time.   Psychiatric:         Behavior: Behavior normal.           LABS:  Lab Results   Component Value Date    PSA 2.1 06/17/2023    PSA 3.0 06/16/2022    PSA 2.1 06/21/2021    PSA 1.6 01/04/2020    PSA 1.3 11/27/2018    PSA 1.4 11/03/2017    PSA 0.94 08/13/2014    PSA 1.1 08/30/2013    PSA 1.06 10/20/2012    PSA 3.1 09/24/2011       IMPRESSION:  Encounter Diagnoses   Name Primary?    Erectile dysfunction, unspecified erectile dysfunction type Yes    Benign prostatic hyperplasia, unspecified whether lower urinary tract symptoms present        PLAN:  Problem List Items Addressed This Visit       ED (erectile dysfunction) - Primary    Relevant Orders    Testosterone    BPH (benign prostatic hyperplasia)    Relevant Medications    tamsulosin (FLOMAX) 0.4 mg Cap       1. ED  Trial of cialis with out success.  Will switch to Viagra instead.  Discussed side effects and proper administration.  He voiced understanding.   Obtain testosterone    2. Bph with nocturia   - not bothersome to him    3. Rtc in 3 mths     Staci Goldstein NP    I spent 30 minutes with the patient. Over 50% of the visit was spent in counseling.

## 2023-09-28 ENCOUNTER — LAB VISIT (OUTPATIENT)
Dept: LAB | Facility: HOSPITAL | Age: 68
End: 2023-09-28
Attending: NURSE PRACTITIONER
Payer: MEDICARE

## 2023-09-28 DIAGNOSIS — N52.9 ERECTILE DYSFUNCTION, UNSPECIFIED ERECTILE DYSFUNCTION TYPE: ICD-10-CM

## 2023-09-28 LAB — TESTOST SERPL-MCNC: 349 NG/DL (ref 304–1227)

## 2023-09-28 PROCEDURE — 84403 ASSAY OF TOTAL TESTOSTERONE: CPT | Mod: HCNC,PN | Performed by: NURSE PRACTITIONER

## 2023-09-28 PROCEDURE — 36415 COLL VENOUS BLD VENIPUNCTURE: CPT | Mod: HCNC,PN | Performed by: NURSE PRACTITIONER

## 2023-10-23 ENCOUNTER — TELEPHONE (OUTPATIENT)
Dept: UROLOGY | Facility: CLINIC | Age: 68
End: 2023-10-23
Payer: MEDICARE

## 2023-10-23 NOTE — TELEPHONE ENCOUNTER
----- Message from Staci Goldstein NP sent at 10/19/2023 11:18 AM CDT -----  Please call patient to inform him testosterone within normal limits.    Staci

## 2023-10-27 ENCOUNTER — PATIENT OUTREACH (OUTPATIENT)
Dept: ADMINISTRATIVE | Facility: HOSPITAL | Age: 68
End: 2023-10-27
Payer: MEDICARE

## 2023-10-27 NOTE — PROGRESS NOTES
Non-compliant GAP report chart review - Chart review completed for the following  test if overdue  (Mammogram, Colonoscopy, Cervical Cancer Screening,  Diabetic lab testing, and/or Dilated EYE EXAM)      Care Everywhere and Media reports - updates requested and reviewed.      Eye exam scheduled        Health Maintenance Due   Topic Date Due    Eye Exam  07/22/2022

## 2023-11-03 DIAGNOSIS — G89.29 CHRONIC BILATERAL LOW BACK PAIN WITHOUT SCIATICA: ICD-10-CM

## 2023-11-03 DIAGNOSIS — M54.50 CHRONIC BILATERAL LOW BACK PAIN WITHOUT SCIATICA: ICD-10-CM

## 2023-11-03 RX ORDER — MELOXICAM 15 MG/1
TABLET ORAL
Qty: 90 TABLET | Refills: 0 | Status: SHIPPED | OUTPATIENT
Start: 2023-11-03

## 2023-11-03 NOTE — TELEPHONE ENCOUNTER
No care due was identified.  Health [facility] Embedded Care Due Messages. Reference number: [id number].   11/03/2023 12:34:49 PM CDT

## 2023-12-04 ENCOUNTER — PATIENT OUTREACH (OUTPATIENT)
Dept: ADMINISTRATIVE | Facility: HOSPITAL | Age: 68
End: 2023-12-04
Payer: MEDICARE

## 2023-12-04 NOTE — PROGRESS NOTES
Updates were requested from care everywhere.  Health Maintenance has been updated.  [employer] immunization registry triggered.  Immunizations were reconciled.  Eye exam scheduled 02/08/2024.

## 2024-01-03 DIAGNOSIS — E11.9 TYPE 2 DIABETES MELLITUS WITHOUT COMPLICATION: ICD-10-CM

## 2024-03-05 DIAGNOSIS — R05.8 ALLERGIC COUGH: ICD-10-CM

## 2024-03-05 DIAGNOSIS — M43.16 SPONDYLOLISTHESIS OF LUMBAR REGION: ICD-10-CM

## 2024-03-05 DIAGNOSIS — M54.16 LUMBAR RADICULOPATHY: ICD-10-CM

## 2024-03-06 RX ORDER — TIZANIDINE 4 MG/1
TABLET ORAL
Qty: 90 TABLET | Refills: 1 | OUTPATIENT
Start: 2024-03-06

## 2024-03-06 RX ORDER — PROMETHAZINE HYDROCHLORIDE AND DEXTROMETHORPHAN HYDROBROMIDE 6.25; 15 MG/5ML; MG/5ML
5 SYRUP ORAL 2 TIMES DAILY PRN
Qty: 180 ML | Refills: 1 | Status: SHIPPED | OUTPATIENT
Start: 2024-03-06

## 2024-04-18 ENCOUNTER — TELEPHONE (OUTPATIENT)
Dept: OPTOMETRY | Facility: CLINIC | Age: 69
End: 2024-04-18
Payer: MEDICARE

## 2024-04-18 NOTE — TELEPHONE ENCOUNTER
----- Message from Laura Moreira sent at 4/18/2024  3:13 PM CDT -----  Regarding: Consult/Advisory  Contact: Anjel Sanders     Consult/Advisory     Name Of Caller:Anjel Sanders         Contact Preference:[phone] (home)       Nature of call:Patient is calling about appt today at 3.:20. Patient states he is running late and wants to know if he can still come. Requesting call back to confirm.

## 2024-04-30 ENCOUNTER — PATIENT OUTREACH (OUTPATIENT)
Dept: ADMINISTRATIVE | Facility: HOSPITAL | Age: 69
End: 2024-04-30
Payer: MEDICARE

## 2024-04-30 NOTE — PROGRESS NOTES
Population Health Chart Review & Patient Outreach Details      Additional [facility] Notes:    EYE EXAM SCHEDULED       Updates Requested / Reviewed:      Updated Care Coordination Note, Care Everywhere, Care Team Updated, and Immunizations Reconciliation Completed or Queried: [place]         Health Maintenance Topics Overdue:      [facility] Score: 1     Hemoglobin A1c                       Health Maintenance Topic(s) Outreach Outcomes & Actions Taken:    Eye Exam - Outreach Outcomes & Actions Taken  : Eye Camera Scheduled or Optometry/Ophthalmology Referral Placed/Appt Scheduled

## 2024-05-04 DIAGNOSIS — M43.16 SPONDYLOLISTHESIS OF LUMBAR REGION: ICD-10-CM

## 2024-05-04 DIAGNOSIS — M54.16 LUMBAR RADICULOPATHY: ICD-10-CM

## 2024-05-05 RX ORDER — GABAPENTIN 300 MG/1
CAPSULE ORAL
Qty: 150 CAPSULE | Refills: 11 | OUTPATIENT
Start: 2024-05-05

## 2024-05-09 ENCOUNTER — OFFICE VISIT (OUTPATIENT)
Dept: FAMILY MEDICINE | Facility: CLINIC | Age: 69
End: 2024-05-09
Attending: FAMILY MEDICINE
Payer: MEDICARE

## 2024-05-09 ENCOUNTER — LAB VISIT (OUTPATIENT)
Dept: LAB | Facility: HOSPITAL | Age: 69
End: 2024-05-09
Attending: FAMILY MEDICINE
Payer: MEDICARE

## 2024-05-09 ENCOUNTER — TELEPHONE (OUTPATIENT)
Dept: FAMILY MEDICINE | Facility: CLINIC | Age: 69
End: 2024-05-09

## 2024-05-09 ENCOUNTER — PATIENT OUTREACH (OUTPATIENT)
Dept: FAMILY MEDICINE | Facility: CLINIC | Age: 69
End: 2024-05-09

## 2024-05-09 ENCOUNTER — PATIENT MESSAGE (OUTPATIENT)
Dept: FAMILY MEDICINE | Facility: CLINIC | Age: 69
End: 2024-05-09

## 2024-05-09 VITALS
OXYGEN SATURATION: 95 % | DIASTOLIC BLOOD PRESSURE: 68 MMHG | SYSTOLIC BLOOD PRESSURE: 129 MMHG | HEIGHT: 70 IN | WEIGHT: 215.63 LBS | BODY MASS INDEX: 30.87 KG/M2 | HEART RATE: 63 BPM

## 2024-05-09 DIAGNOSIS — E11.59 HYPERTENSION COMPLICATING DIABETES: ICD-10-CM

## 2024-05-09 DIAGNOSIS — N18.2 CKD (CHRONIC KIDNEY DISEASE) STAGE 2, GFR 60-89 ML/MIN: ICD-10-CM

## 2024-05-09 DIAGNOSIS — E11.9 CONTROLLED TYPE 2 DIABETES MELLITUS WITHOUT COMPLICATION, WITHOUT LONG-TERM CURRENT USE OF INSULIN: ICD-10-CM

## 2024-05-09 DIAGNOSIS — E78.5 DYSLIPIDEMIA ASSOCIATED WITH TYPE 2 DIABETES MELLITUS: ICD-10-CM

## 2024-05-09 DIAGNOSIS — I69.351 HEMIPARESIS AFFECTING RIGHT SIDE AS LATE EFFECT OF CEREBROVASCULAR ACCIDENT: ICD-10-CM

## 2024-05-09 DIAGNOSIS — E11.69 DYSLIPIDEMIA ASSOCIATED WITH TYPE 2 DIABETES MELLITUS: Primary | ICD-10-CM

## 2024-05-09 DIAGNOSIS — I15.2 HYPERTENSION COMPLICATING DIABETES: ICD-10-CM

## 2024-05-09 DIAGNOSIS — E78.5 DYSLIPIDEMIA ASSOCIATED WITH TYPE 2 DIABETES MELLITUS: Primary | ICD-10-CM

## 2024-05-09 DIAGNOSIS — E11.69 DYSLIPIDEMIA ASSOCIATED WITH TYPE 2 DIABETES MELLITUS: ICD-10-CM

## 2024-05-09 DIAGNOSIS — N18.31 CHRONIC KIDNEY DISEASE, STAGE 3A: ICD-10-CM

## 2024-05-09 LAB
ALBUMIN SERPL BCP-MCNC: 4.3 G/DL (ref 3.5–5.2)
ALBUMIN/CREAT UR: 4 UG/MG (ref 0–30)
ALP SERPL-CCNC: 81 U/L (ref 38–126)
ALT SERPL W/O P-5'-P-CCNC: 29 U/L (ref 10–44)
ANION GAP SERPL CALC-SCNC: 10 MMOL/L (ref 8–16)
AST SERPL-CCNC: 29 U/L (ref 15–46)
BASOPHILS # BLD AUTO: 0.03 K/UL (ref 0–0.2)
BASOPHILS NFR BLD: 0.6 % (ref 0–1.9)
BILIRUB SERPL-MCNC: 0.9 MG/DL (ref 0.1–1)
BILIRUB UR QL STRIP: NEGATIVE
CALCIUM SERPL-MCNC: 9.5 MG/DL (ref 8.7–10.5)
CHLORIDE SERPL-SCNC: 107 MMOL/L (ref 95–110)
CLARITY UR REFRACT.AUTO: CLEAR
CO2 SERPL-SCNC: 28 MMOL/L (ref 23–29)
COLOR UR AUTO: YELLOW
CREAT SERPL-MCNC: 1.54 MG/DL (ref 0.5–1.4)
CREAT UR-MCNC: 398 MG/DL (ref 23–375)
DIFFERENTIAL METHOD BLD: ABNORMAL
EOSINOPHIL # BLD AUTO: 0.2 K/UL (ref 0–0.5)
EOSINOPHIL NFR BLD: 4 % (ref 0–8)
ERYTHROCYTE [DISTWIDTH] IN BLOOD BY AUTOMATED COUNT: 13.7 % (ref 11.5–14.5)
EST. GFR  (NO RACE VARIABLE): 48.5 ML/MIN/1.73 M^2
ESTIMATED AVG GLUCOSE: 131 MG/DL (ref 68–131)
GIANT PLATELETS BLD QL SMEAR: PRESENT
GLUCOSE SERPL-MCNC: 111 MG/DL (ref 70–110)
GLUCOSE UR QL STRIP: NEGATIVE
HBA1C MFR BLD: 6.2 % (ref 4–5.6)
HCT VFR BLD AUTO: 44 % (ref 40–54)
HGB BLD-MCNC: 13.9 G/DL (ref 14–18)
HGB UR QL STRIP: NEGATIVE
IMM GRANULOCYTES # BLD AUTO: 0.02 K/UL (ref 0–0.04)
IMM GRANULOCYTES NFR BLD AUTO: 0.4 % (ref 0–0.5)
KETONES UR QL STRIP: NEGATIVE
LEUKOCYTE ESTERASE UR QL STRIP: NEGATIVE
LYMPHOCYTES # BLD AUTO: 2.4 K/UL (ref 1–4.8)
LYMPHOCYTES NFR BLD: 46.3 % (ref 18–48)
MCH RBC QN AUTO: 27.8 PG (ref 27–31)
MCHC RBC AUTO-ENTMCNC: 31.6 G/DL (ref 32–36)
MCV RBC AUTO: 88 FL (ref 82–98)
MICROALBUMIN UR DL<=1MG/L-MCNC: 16 UG/ML
MONOCYTES # BLD AUTO: 0.6 K/UL (ref 0.3–1)
MONOCYTES NFR BLD: 10.9 % (ref 4–15)
NEUTROPHILS # BLD AUTO: 2 K/UL (ref 1.8–7.7)
NEUTROPHILS NFR BLD: 37.8 % (ref 38–73)
NITRITE UR QL STRIP: NEGATIVE
NRBC BLD-RTO: 0 /100 WBC
PH UR STRIP: 6 [PH] (ref 5–8)
PLATELET # BLD AUTO: 346 K/UL (ref 150–450)
PLATELET BLD QL SMEAR: ABNORMAL
PMV BLD AUTO: 9.7 FL (ref 9.2–12.9)
POTASSIUM SERPL-SCNC: 4.4 MMOL/L (ref 3.5–5.1)
PROT SERPL-MCNC: 7.9 G/DL (ref 6–8.4)
PROT UR QL STRIP: NEGATIVE
RBC # BLD AUTO: 5 M/UL (ref 4.6–6.2)
SODIUM SERPL-SCNC: 145 MMOL/L (ref 136–145)
SP GR UR STRIP: >=1.03 (ref 1–1.03)
URN SPEC COLLECT METH UR: ABNORMAL
UROBILINOGEN UR STRIP-ACNC: NEGATIVE EU/DL
UUN UR-MCNC: 15 MG/DL (ref 2–20)
WBC # BLD AUTO: 5.21 K/UL (ref 3.9–12.7)

## 2024-05-09 PROCEDURE — 85025 COMPLETE CBC W/AUTO DIFF WBC: CPT | Mod: HCNC,PN | Performed by: FAMILY MEDICINE

## 2024-05-09 PROCEDURE — 3074F SYST BP LT 130 MM HG: CPT | Mod: HCNC,CPTII,S$GLB, | Performed by: FAMILY MEDICINE

## 2024-05-09 PROCEDURE — 99215 OFFICE O/P EST HI 40 MIN: CPT | Mod: HCNC,S$GLB,, | Performed by: FAMILY MEDICINE

## 2024-05-09 PROCEDURE — 81003 URINALYSIS AUTO W/O SCOPE: CPT | Mod: HCNC,PN | Performed by: FAMILY MEDICINE

## 2024-05-09 PROCEDURE — 83036 HEMOGLOBIN GLYCOSYLATED A1C: CPT | Mod: HCNC | Performed by: FAMILY MEDICINE

## 2024-05-09 PROCEDURE — 4010F ACE/ARB THERAPY RXD/TAKEN: CPT | Mod: HCNC,CPTII,S$GLB, | Performed by: FAMILY MEDICINE

## 2024-05-09 PROCEDURE — 99999 PR PBB SHADOW E&M-EST. PATIENT-LVL IV: CPT | Mod: PBBFAC,HCNC,, | Performed by: FAMILY MEDICINE

## 2024-05-09 PROCEDURE — 1101F PT FALLS ASSESS-DOCD LE1/YR: CPT | Mod: HCNC,CPTII,S$GLB, | Performed by: FAMILY MEDICINE

## 2024-05-09 PROCEDURE — 3008F BODY MASS INDEX DOCD: CPT | Mod: HCNC,CPTII,S$GLB, | Performed by: FAMILY MEDICINE

## 2024-05-09 PROCEDURE — 3078F DIAST BP <80 MM HG: CPT | Mod: HCNC,CPTII,S$GLB, | Performed by: FAMILY MEDICINE

## 2024-05-09 PROCEDURE — 3288F FALL RISK ASSESSMENT DOCD: CPT | Mod: HCNC,CPTII,S$GLB, | Performed by: FAMILY MEDICINE

## 2024-05-09 PROCEDURE — 1160F RVW MEDS BY RX/DR IN RCRD: CPT | Mod: HCNC,CPTII,S$GLB, | Performed by: FAMILY MEDICINE

## 2024-05-09 PROCEDURE — 1126F AMNT PAIN NOTED NONE PRSNT: CPT | Mod: HCNC,CPTII,S$GLB, | Performed by: FAMILY MEDICINE

## 2024-05-09 PROCEDURE — 1159F MED LIST DOCD IN RCRD: CPT | Mod: HCNC,CPTII,S$GLB, | Performed by: FAMILY MEDICINE

## 2024-05-09 PROCEDURE — 36415 COLL VENOUS BLD VENIPUNCTURE: CPT | Mod: HCNC,PN | Performed by: FAMILY MEDICINE

## 2024-05-09 PROCEDURE — 80053 COMPREHEN METABOLIC PANEL: CPT | Mod: HCNC,PN | Performed by: FAMILY MEDICINE

## 2024-05-09 PROCEDURE — 82043 UR ALBUMIN QUANTITATIVE: CPT | Mod: HCNC,PN | Performed by: FAMILY MEDICINE

## 2024-05-09 NOTE — PROGRESS NOTES
Subjective:       Patient ID: Anjel Sanders is a 68 y.o. male.    Chief Complaint: Follow-up    66 yr old black male with Hypertension, DM II, Hyperlipidemia, gout, ED, MURTAZA, presents today for his annual exam and  6 month follow up. No new complaints today.    DM II - diet controlled  -     HGBA1C                   6.2 (H)             06/17/2023                                             - no s/s of DM - foot exam UTD - eye exam due - on ASA and ACE    HTN - controlled initially - on lotrel - no side effects - fluctuating at home and elevated with systolic 150 - 160. Initial /88 - after resting 15-20 min - taking medicine - it came down to 130/ 80    HLD -controlled - on statin -    LDLCALC                  84.2                06/17/2023                                                                 - on meds - need labs    ED - controlled - need cialis -     Gout - controlled    -  on allopurinol    Health maintenance  -UTD except labs    Follow-up  Associated symptoms include myalgias. Pertinent negatives include no chest pain, congestion, coughing, diaphoresis, headaches, neck pain, rash, vomiting or weakness.   Hypertension  This is a chronic problem. The current episode started more than 1 year ago. The problem has been gradually improving since onset. The problem is controlled. Pertinent negatives include no anxiety, blurred vision, chest pain, headaches, malaise/fatigue, neck pain, orthopnea, palpitations, peripheral edema, PND or sweats. There are no associated agents to hypertension. Risk factors for coronary artery disease include dyslipidemia, obesity and male gender. Past treatments include ACE inhibitors and diuretics. The current treatment provides moderate improvement. There are no compliance problems.  There is no history of angina, CAD/MI, CVA, heart failure, left ventricular hypertrophy or retinopathy. There is no history of chronic renal disease, coarctation of the aorta,  hypercortisolism, pheochromocytoma, renovascular disease or a thyroid problem.   Hyperlipidemia  This is a chronic problem. The current episode started more than 1 year ago. The problem is controlled. Recent lipid tests were reviewed and are normal. Exacerbating diseases include obesity. He has no history of chronic renal disease, diabetes, hypothyroidism or liver disease. There are no known factors aggravating his hyperlipidemia. Associated symptoms include myalgias. Pertinent negatives include no chest pain, focal sensory loss, focal weakness or leg pain. He is currently on no antihyperlipidemic treatment. The current treatment provides moderate improvement of lipids. There are no compliance problems.  Risk factors for coronary artery disease include dyslipidemia, hypertension, obesity and male sex.   Shoulder Injury   The left shoulder is affected. The incident occurred more than 1 week ago. The injury mechanism was a fall. The quality of the pain is described as cramping and shooting. The pain does not radiate. The pain is at a severity of 6/10. The pain is moderate. Pertinent negatives include no chest pain. The symptoms are aggravated by movement, overhead lifting and palpation. He has tried NSAIDs for the symptoms. The treatment provided moderate relief.     Review of Systems   Constitutional: Negative.  Negative for activity change, diaphoresis, malaise/fatigue and unexpected weight change.   HENT: Negative.  Negative for nasal congestion, ear pain, mouth sores, rhinorrhea and voice change.    Eyes: Negative.  Negative for blurred vision, pain, discharge and visual disturbance.   Respiratory: Negative.  Negative for apnea, cough and wheezing.    Cardiovascular: Negative.  Negative for chest pain, palpitations, orthopnea and PND.   Gastrointestinal: Negative.  Negative for abdominal distention, anal bleeding, diarrhea and vomiting.   Endocrine: Negative.  Negative for cold intolerance and polyuria.    Genitourinary: Negative.  Negative for decreased urine volume, difficulty urinating, discharge, frequency and scrotal swelling.   Musculoskeletal:  Positive for myalgias. Negative for back pain, leg pain, neck pain and neck stiffness.   Integumentary:  Negative for color change and rash. Negative.   Allergic/Immunologic: Negative.  Negative for environmental allergies.   Neurological: Negative.  Negative for dizziness, focal weakness, speech difficulty, weakness, light-headedness and headaches.   Hematological: Negative.    Psychiatric/Behavioral: Negative.  Negative for agitation, dysphoric mood and suicidal ideas. The patient is not nervous/anxious.          Active Ambulatory Problems     Diagnosis Date Noted    Gouty arthritis     MURTAZA (obstructive sleep apnea)     Hx of colonic polyps     Ocular hypertension - Both Eyes 11/28/2012    ED (erectile dysfunction) 08/27/2013    Hemiparesis affecting right side as late effect of cerebrovascular accident 05/01/2015    Muscle weakness of right upper extremity 05/06/2015    Stiffness of right shoulder joint 05/06/2015    Hypertension complicating diabetes 04/21/2017    Obesity, diabetes, and hypertension syndrome 04/21/2017    Low back pain associated with a spinal disorder other than radiculopathy or spinal stenosis 07/31/2017    History of stroke 10/05/2018    Controlled type 2 diabetes mellitus without complication, without long-term current use of insulin 02/26/2019    Complete tear of left rotator cuff 03/28/2019    Traumatic complete tear of right rotator cuff 06/16/2020    Chronic kidney disease, stage 3a 06/16/2023    Dyslipidemia associated with type 2 diabetes mellitus 06/21/2023    Cough 06/21/2023    BPH (benign prostatic hyperplasia) 06/21/2023     Resolved Ambulatory Problems     Diagnosis Date Noted    AR (allergic rhinitis)     Chest pain, atypical 08/27/2013    Abnormal EKG 09/06/2013    Acute low back pain 12/16/2013    BMI 30.0-30.9,adult 08/12/2014     Acute ischemic stroke 03/20/2015    Diabetes type 2, controlled 03/25/2015    BMI 31.0-31.9,adult 03/25/2015    Obesity (BMI 30-39.9) 05/11/2015    Allergic rhinitis due to pollen 05/11/2015    Decreased strength 10/09/2018    Decreased ROM of lumbar spine 10/09/2018    Pain of left upper extremity 12/19/2018    Weakness 12/19/2018    Stiffness in joint 12/19/2018    Morbid obesity 02/26/2019    Severe obesity (BMI 35.0-39.9) with comorbidity 02/26/2019    Decreased range of motion of right shoulder 08/20/2020    Weakness of right upper extremity 08/20/2020    Decreased functional mobility 08/20/2020    Allergic contact dermatitis due to plant 11/11/2020    Screening for malignant neoplasm of colon 09/21/2021     Past Medical History:   Diagnosis Date    Other and unspecified hyperlipidemia     Stroke     Unspecified essential hypertension      Past Surgical History:   Procedure Laterality Date    ARTHROSCOPIC REPAIR OF ROTATOR CUFF OF SHOULDER Left 4/30/2019    Procedure: REPAIR, ROTATOR CUFF, ARTHROSCOPIC;  Surgeon: Bob Esparza Jr., MD;  Location: [facility] OR;  Service: Orthopedics;  Laterality: Left;  need opus sytem (Jluis notified)  video    ARTHROSCOPY OF SHOULDER WITH DECOMPRESSION OF SUBACROMIAL SPACE  4/30/2019    Procedure: ARTHROSCOPY, SHOULDER, WITH SUBACROMIAL SPACE DECOMPRESSION;  Surgeon: Bob Esparza Jr., MD;  Location: [facility] OR;  Service: Orthopedics;;    COLONOSCOPY N/A 9/21/2021    Procedure: COLONOSCOPY;  Surgeon: Asif López MD;  Location: [facility] ENDO;  Service: Endoscopy;  Laterality: N/A;    FOOT SURGERY      ROTATOR CUFF REPAIR Right 8/4/2020    Procedure: REPAIR, ROTATOR CUFF;  Surgeon: Bob Esparza Jr., MD;  Location: [facility] OR;  Service: Orthopedics;  Laterality: Right;  open no scope  need arthrex anchors (Marylin notified per [place] 7/21, EF) Confirmed with Harry 1000 8/3/2020 KB    surgery on jaw       Family History   Problem Relation Name Age of Onset    Hyperlipidemia Mother       Heart disease Mother      Diabetes Mother      Hypertension Mother      Glaucoma Mother      No Known Problems Father      Stomach cancer Sister      Stroke Sister      Cancer Sister          stomach cancer    Heart disease Sister clari     Diabetes Sister clari     Hypertension Sister olvin     Diabetes Sister olvin     Alcohol abuse Brother brandon         in remission    Cancer Brother brandon         sinus cancer    Cancer Son x4         stomach cancer    Amblyopia Neg Hx      Blindness Neg Hx      Cataracts Neg Hx      Macular degeneration Neg Hx      Retinal detachment Neg Hx      Strabismus Neg Hx      Thyroid disease Neg Hx       Social History     Socioeconomic History    Marital status:    Tobacco Use    Smoking status: Never    Smokeless tobacco: Never   Substance and Sexual Activity    Alcohol use: Not Currently     Alcohol/week: 9.3 standard drinks of alcohol     Types: 6 Cans of beer, 4 Standard drinks or equivalent per week    Drug use: Yes     Types: Marijuana    Sexual activity: Yes     Partners: Female   Social History Narrative     with Deenty     Social Determinants of Health     Financial Resource Strain: Low Risk  (6/21/2023)    Overall Financial Resource Strain (CARDIA)     Difficulty of Paying Living Expenses: Not hard at all   Food Insecurity: No Food Insecurity (6/21/2023)    Hunger Vital Sign     Worried About Running Out of Food in the Last Year: Never true     Ran Out of Food in the Last Year: Never true   Transportation Needs: No Transportation Needs (6/21/2023)    PRAPARE - Transportation     Lack of Transportation (Medical): No     Lack of Transportation (Non-Medical): No   Physical Activity: Inactive (6/21/2023)    Exercise Vital Sign     Days of Exercise per Week: 0 days     Minutes of Exercise per Session: 0 min   Stress: No Stress Concern Present (6/21/2023)    [employer] - Occupational Stress Questionnaire      Feeling of Stress : Not at all   Housing Stability: Unknown (6/21/2023)    Housing Stability Vital Sign     Unable to Pay for Housing in the Last Year: No     Unstable Housing in the Last Year: No     Review of patient's allergies indicates:  No Known Allergies  Current Outpatient Medications on File Prior to Visit   Medication Sig Dispense Refill    allopurinoL (ZYLOPRIM) 300 MG tablet Take 1 tablet (300 mg total) by mouth once daily. 90 tablet 3    amLODIPine-benazepriL (LOTREL) 10-40 mg per capsule TAKE 1 CAPSULE BY MOUTH ONCE DAILY 90 capsule 3    aspirin (ECOTRIN) 81 MG EC tablet Take 81 mg by mouth once daily.      atorvastatin (LIPITOR) 80 MG tablet TAKE 1 TABLET (80 MG TOTAL) BY MOUTH ONCE DAILY. 90 tablet 3    benzonatate (TESSALON) 100 MG capsule TAKE 1 CAPSULE BY MOUTH THREE TIMES A DAY AS NEEDED FOR COUGH 30 capsule 0    benzonatate (TESSALON) 200 MG capsule Take by mouth.      diclofenac sodium (VOLTAREN) 1 % Gel APPLY 2 GRAMS TO AFFECTED AREA ONCE DAILY. 100 g 0    meloxicam (MOBIC) 15 MG tablet TAKE 1 TABLET BY MOUTH EVERY DAY 90 tablet 0    promethazine-dextromethorphan (PROMETHAZINE-DM) 6.25-15 mg/5 mL Syrp TAKE 5 MLS BY MOUTH 2 (TWO) TIMES DAILY AS NEEDED (COUGH). 180 mL 1    sildenafiL (VIAGRA) 100 MG tablet Take 1 tablet (100 mg total) by mouth daily as needed for Erectile Dysfunction. 20 tablet 11    tamsulosin (FLOMAX) 0.4 mg Cap Take 1 capsule (0.4 mg total) by mouth once daily. 90 capsule 3    tiZANidine (ZANAFLEX) 4 MG tablet Take 1 tablet (4 mg total) by mouth nightly as needed (muscle spasms). 60 tablet 2    triamcinolone acetonide 0.1% (KENALOG) 0.1 % ointment Apply topically 2 (two) times daily. 453 g 0    gabapentin (NEURONTIN) 300 MG capsule Take 1 capsule (300 mg total) by mouth 2 (two) times daily AND 3 capsules (900 mg total) every evening. 150 capsule 11    hydroCHLOROthiazide (HYDRODIURIL) 12.5 MG Tab Take 1 tablet (12.5 mg total) by mouth once daily. 90 tablet 3     No current  facility-administered medications on file prior to visit.       Objective:       Vitals:    05/09/24 0919   BP: 129/68   Pulse: 63       Physical Exam  Constitutional:       General: He is not in acute distress.     Appearance: He is well-developed. He is not diaphoretic.   HENT:      Head: Normocephalic and atraumatic.      Right Ear: External ear normal.      Left Ear: External ear normal.      Nose: Nose normal.      Mouth/Throat:      Pharynx: No oropharyngeal exudate.   Eyes:      General: No scleral icterus.        Right eye: No discharge.         Left eye: No discharge.      Conjunctiva/sclera: Conjunctivae normal.      Pupils: Pupils are equal, round, and reactive to light.   Neck:      Thyroid: No thyromegaly.      Vascular: No JVD.      Trachea: No tracheal deviation.   Cardiovascular:      Rate and Rhythm: Normal rate and regular rhythm.      Heart sounds: Murmur (3/6 ESM AORTIC AREA) heard.      No friction rub. No gallop.   Pulmonary:      Effort: Pulmonary effort is normal. No respiratory distress.      Breath sounds: Normal breath sounds. No stridor. No wheezing or rales.   Chest:      Chest wall: No tenderness.   Abdominal:      General: Bowel sounds are normal. There is no distension.      Palpations: Abdomen is soft. There is no mass.      Tenderness: There is no abdominal tenderness. There is no guarding or rebound.      Hernia: No hernia is present.   Musculoskeletal:         General: Tenderness (TTP LEFT SHOULDER WITH ROTATOR CUFF IMPINGEMENT) present. Normal range of motion.      Cervical back: Normal range of motion and neck supple.      Comments: Ttp L2-l4 and paralumbar right area   Lymphadenopathy:      Cervical: No cervical adenopathy.   Skin:     General: Skin is warm and dry.      Coloration: Skin is not pale.      Findings: No erythema or rash.   Neurological:      Mental Status: He is alert and oriented to person, place, and time.      Cranial Nerves: No cranial nerve deficit.       Motor: No abnormal muscle tone.      Coordination: Coordination normal.      Deep Tendon Reflexes: Reflexes are normal and symmetric. Reflexes normal.      Comments: No deficits   Psychiatric:         Behavior: Behavior normal.         Thought Content: Thought content normal.         Judgment: Judgment normal.         Assessment:       Problem List Items Addressed This Visit       Hypertension complicating diabetes    Relevant Orders    CBC Auto Differential    Comprehensive Metabolic Panel    Hemoglobin A1C    Urinalysis    Microalbumin/Creatinine Ratio, Urine    Hemiparesis affecting right side as late effect of cerebrovascular accident    Relevant Orders    CBC Auto Differential    Comprehensive Metabolic Panel    Hemoglobin A1C    Urinalysis    Microalbumin/Creatinine Ratio, Urine    Dyslipidemia associated with type 2 diabetes mellitus - Primary    Relevant Orders    CBC Auto Differential    Comprehensive Metabolic Panel    Hemoglobin A1C    Urinalysis    Microalbumin/Creatinine Ratio, Urine    Controlled type 2 diabetes mellitus without complication, without long-term current use of insulin    Relevant Orders    CBC Auto Differential    Comprehensive Metabolic Panel    Hemoglobin A1C    Urinalysis    Microalbumin/Creatinine Ratio, Urine    Chronic kidney disease, stage 3a    Relevant Orders    CBC Auto Differential    Comprehensive Metabolic Panel    Hemoglobin A1C    Urinalysis    Microalbumin/Creatinine Ratio, Urine     Other Visit Diagnoses       CKD (chronic kidney disease) stage 2, GFR 60-89 ml/min        Relevant Orders    CBC Auto Differential    Comprehensive Metabolic Panel    Hemoglobin A1C    Urinalysis    Microalbumin/Creatinine Ratio, Urine            Plan:           Anjel was seen today for follow-up.    Diagnoses and all orders for this visit:    Dyslipidemia associated with type 2 diabetes mellitus  -     CBC Auto Differential; Future  -     Comprehensive Metabolic Panel; Future  -      Hemoglobin A1C; Future  -     Urinalysis; Future  -     Microalbumin/Creatinine Ratio, Urine; Future    Chronic kidney disease, stage 3a  -     CBC Auto Differential; Future  -     Comprehensive Metabolic Panel; Future  -     Hemoglobin A1C; Future  -     Urinalysis; Future  -     Microalbumin/Creatinine Ratio, Urine; Future    Controlled type 2 diabetes mellitus without complication, without long-term current use of insulin  -     CBC Auto Differential; Future  -     Comprehensive Metabolic Panel; Future  -     Hemoglobin A1C; Future  -     Urinalysis; Future  -     Microalbumin/Creatinine Ratio, Urine; Future    CKD (chronic kidney disease) stage 2, GFR 60-89 ml/min  -     CBC Auto Differential; Future  -     Comprehensive Metabolic Panel; Future  -     Hemoglobin A1C; Future  -     Urinalysis; Future  -     Microalbumin/Creatinine Ratio, Urine; Future    Hemiparesis affecting right side as late effect of cerebrovascular accident  -     CBC Auto Differential; Future  -     Comprehensive Metabolic Panel; Future  -     Hemoglobin A1C; Future  -     Urinalysis; Future  -     Microalbumin/Creatinine Ratio, Urine; Future    Hypertension complicating diabetes  -     CBC Auto Differential; Future  -     Comprehensive Metabolic Panel; Future  -     Hemoglobin A1C; Future  -     Urinalysis; Future  -     Microalbumin/Creatinine Ratio, Urine; Future          BPH  -continue flomax daily. Side effects of medications have been discussed and patient agreed to proceed with treatment and understands the risks and benefits.    HTN  -controlled   -HCTZ with lotrel. Side effects of medications have been discussed and patient agreed to proceed with treatment and understands the risks and benefits.  -precautions for hypotension given    HLD  -controlled    Back pain chronic  -increase gabapentin to 600 and mobic 15 daily      HEART MURMUR  -ECHO reassurance      ED and Gout   -controlled     MURTAZA  -stable    CVA  -on ASA and  plavix      DM II  -controlled  -diet and exercise only    Obesity  -diet and exercise  -weight loss          Spent adequate time in obtaining history and explaining differentials     40 minutes spent during this visit of which greater than 50% devoted to face-face counseling and coordination of care regarding diagnosis and management plan    Rtc 6 m r prn

## 2024-05-29 ENCOUNTER — TELEPHONE (OUTPATIENT)
Dept: OPHTHALMOLOGY | Facility: CLINIC | Age: 69
End: 2024-05-29
Payer: MEDICARE

## 2024-05-29 ENCOUNTER — OFFICE VISIT (OUTPATIENT)
Dept: OPTOMETRY | Facility: CLINIC | Age: 69
End: 2024-05-29
Payer: MEDICARE

## 2024-05-29 DIAGNOSIS — H52.7 REFRACTIVE ERROR: ICD-10-CM

## 2024-05-29 DIAGNOSIS — H25.13 NUCLEAR SCLEROSIS OF BOTH EYES: ICD-10-CM

## 2024-05-29 DIAGNOSIS — H40.033 NARROW ANGLE GLAUCOMA SUSPECT OF BOTH EYES: ICD-10-CM

## 2024-05-29 DIAGNOSIS — E11.9 TYPE 2 DIABETES MELLITUS WITHOUT RETINOPATHY: Primary | ICD-10-CM

## 2024-05-29 PROCEDURE — 1159F MED LIST DOCD IN RCRD: CPT | Mod: HCNC,CPTII,S$GLB, | Performed by: OPTOMETRIST

## 2024-05-29 PROCEDURE — 3044F HG A1C LEVEL LT 7.0%: CPT | Mod: HCNC,CPTII,S$GLB, | Performed by: OPTOMETRIST

## 2024-05-29 PROCEDURE — 99214 OFFICE O/P EST MOD 30 MIN: CPT | Mod: HCNC,S$GLB,, | Performed by: OPTOMETRIST

## 2024-05-29 PROCEDURE — 92015 DETERMINE REFRACTIVE STATE: CPT | Mod: HCNC,S$GLB,, | Performed by: OPTOMETRIST

## 2024-05-29 PROCEDURE — 3061F NEG MICROALBUMINURIA REV: CPT | Mod: HCNC,CPTII,S$GLB, | Performed by: OPTOMETRIST

## 2024-05-29 PROCEDURE — 4010F ACE/ARB THERAPY RXD/TAKEN: CPT | Mod: HCNC,CPTII,S$GLB, | Performed by: OPTOMETRIST

## 2024-05-29 PROCEDURE — 1126F AMNT PAIN NOTED NONE PRSNT: CPT | Mod: HCNC,CPTII,S$GLB, | Performed by: OPTOMETRIST

## 2024-05-29 PROCEDURE — 99999 PR PBB SHADOW E&M-EST. PATIENT-LVL III: CPT | Mod: PBBFAC,HCNC,, | Performed by: OPTOMETRIST

## 2024-05-29 PROCEDURE — 2023F DILAT RTA XM W/O RTNOPTHY: CPT | Mod: HCNC,CPTII,S$GLB, | Performed by: OPTOMETRIST

## 2024-05-29 PROCEDURE — 3288F FALL RISK ASSESSMENT DOCD: CPT | Mod: HCNC,CPTII,S$GLB, | Performed by: OPTOMETRIST

## 2024-05-29 PROCEDURE — 1101F PT FALLS ASSESS-DOCD LE1/YR: CPT | Mod: HCNC,CPTII,S$GLB, | Performed by: OPTOMETRIST

## 2024-05-29 PROCEDURE — 3066F NEPHROPATHY DOC TX: CPT | Mod: HCNC,CPTII,S$GLB, | Performed by: OPTOMETRIST

## 2024-05-29 NOTE — PROGRESS NOTES
HPI    CC: Pt presents today for diabetic eye exam. Pt states blurry vision. Pt   states he misplaced his prescription glasses and needs an updated   prescription.     MAURO: 7/22/20221, Dr. Pichardo    (+) Changes in vision   (-) Pain  (-) Irritation   (-) Itching   (-) Flashes  (+) Floaters, occasional; longstanding   (+) Glasses wearer, lost glasses  (-) CL wearer  (-) Uses eye gtts    Does patient want a refraction today? yes    (-) Eye injury  (-) Eye surgery   (-)POHx  (-)FOHx    (+)DM  Hemoglobin A1C       Date                     Value               Ref Range             Status                05/09/2024               6.2 (H)             4.0 - 5.6 %           Final                  06/17/2023               6.2 (H)             4.0 - 5.6 %           Final                  12/13/2022               6.2 (H)             4.0 - 5.6 %           Final                 Last edited by Sara Stewart, OD on 5/29/2024  3:09 PM.            Assessment /Plan     For exam results, see Encounter Report.    Type 2 diabetes mellitus without retinopathy    Nuclear sclerosis of both eyes    Narrow angle glaucoma suspect of both eyes    Refractive error      No retinopathy noted, OU. Continue proper BS control and annual diabetic eye exams. Monitor yearly.      2. Educated pt on findings. Mildly visually significant, however, no need for removal at this time. Monitor yearly.      3. Educated pt on findings. Significant increase in c/d OD since MAURO (07/2021). Thick Pachy OU. Mildly high IOP OS. Angles appear narrow OU post-dilation but were not evaluated by provider prior to dilation. Refer to Dr. Magana for further eval. Monitor.     4. Updated SRx. Monitor yearly.        RTC with Dr. Magana as directed, me prn.

## 2024-05-29 NOTE — TELEPHONE ENCOUNTER
New patient glaucoma evaluation scheduled with Dr. Magana 08/14/2024 at 8:45 am; patient referred by Dr. Stewart.

## 2024-05-29 NOTE — TELEPHONE ENCOUNTER
----- Message from Nuris Elizabeth sent at 5/29/2024  3:34 PM CDT -----  Regarding: Appt Request  Good afternoon, All    Dr. Stewart is needing Brandi Anjel Sanders seen for a Glaucoma Eval.     Mr. Anjel Henderson call back number is [phone]    Thanks

## 2024-06-27 DIAGNOSIS — E11.9 TYPE 2 DIABETES MELLITUS WITHOUT COMPLICATION: ICD-10-CM

## 2024-08-06 ENCOUNTER — TELEPHONE (OUTPATIENT)
Dept: OPHTHALMOLOGY | Facility: CLINIC | Age: 69
End: 2024-08-06
Payer: MEDICARE

## 2024-09-04 ENCOUNTER — OFFICE VISIT (OUTPATIENT)
Dept: FAMILY MEDICINE | Facility: CLINIC | Age: 69
End: 2024-09-04
Attending: FAMILY MEDICINE
Payer: MEDICARE

## 2024-09-04 ENCOUNTER — LAB VISIT (OUTPATIENT)
Dept: LAB | Facility: HOSPITAL | Age: 69
End: 2024-09-04
Attending: FAMILY MEDICINE
Payer: MEDICARE

## 2024-09-04 VITALS
OXYGEN SATURATION: 99 % | DIASTOLIC BLOOD PRESSURE: 70 MMHG | SYSTOLIC BLOOD PRESSURE: 116 MMHG | HEART RATE: 98 BPM | WEIGHT: 214.94 LBS | HEIGHT: 70 IN | BODY MASS INDEX: 30.77 KG/M2

## 2024-09-04 DIAGNOSIS — N18.2 CKD (CHRONIC KIDNEY DISEASE) STAGE 2, GFR 60-89 ML/MIN: ICD-10-CM

## 2024-09-04 DIAGNOSIS — N40.0 BENIGN PROSTATIC HYPERPLASIA, UNSPECIFIED WHETHER LOWER URINARY TRACT SYMPTOMS PRESENT: ICD-10-CM

## 2024-09-04 DIAGNOSIS — E11.69 DYSLIPIDEMIA ASSOCIATED WITH TYPE 2 DIABETES MELLITUS: ICD-10-CM

## 2024-09-04 DIAGNOSIS — E11.9 CONTROLLED TYPE 2 DIABETES MELLITUS WITHOUT COMPLICATION, WITHOUT LONG-TERM CURRENT USE OF INSULIN: Primary | ICD-10-CM

## 2024-09-04 DIAGNOSIS — M54.16 LUMBAR RADICULOPATHY: ICD-10-CM

## 2024-09-04 DIAGNOSIS — Z12.5 ENCOUNTER FOR SCREENING FOR MALIGNANT NEOPLASM OF PROSTATE: ICD-10-CM

## 2024-09-04 DIAGNOSIS — E11.9 TYPE 2 DIABETES MELLITUS WITHOUT COMPLICATION: ICD-10-CM

## 2024-09-04 DIAGNOSIS — E78.5 DYSLIPIDEMIA ASSOCIATED WITH TYPE 2 DIABETES MELLITUS: ICD-10-CM

## 2024-09-04 DIAGNOSIS — I15.2 HYPERTENSION COMPLICATING DIABETES: ICD-10-CM

## 2024-09-04 DIAGNOSIS — N18.31 CHRONIC KIDNEY DISEASE, STAGE 3A: ICD-10-CM

## 2024-09-04 DIAGNOSIS — M43.16 SPONDYLOLISTHESIS OF LUMBAR REGION: ICD-10-CM

## 2024-09-04 DIAGNOSIS — I69.351 HEMIPARESIS AFFECTING RIGHT SIDE AS LATE EFFECT OF CEREBROVASCULAR ACCIDENT: ICD-10-CM

## 2024-09-04 DIAGNOSIS — E11.59 HYPERTENSION COMPLICATING DIABETES: ICD-10-CM

## 2024-09-04 LAB
ALBUMIN SERPL BCP-MCNC: 4 G/DL (ref 3.5–5.2)
ALP SERPL-CCNC: 85 U/L (ref 55–135)
ALT SERPL W/O P-5'-P-CCNC: 21 U/L (ref 10–44)
ANION GAP SERPL CALC-SCNC: 7 MMOL/L (ref 8–16)
AST SERPL-CCNC: 22 U/L (ref 10–40)
BILIRUB SERPL-MCNC: 0.7 MG/DL (ref 0.1–1)
BUN SERPL-MCNC: 13 MG/DL (ref 8–23)
CALCIUM SERPL-MCNC: 9.8 MG/DL (ref 8.7–10.5)
CHLORIDE SERPL-SCNC: 105 MMOL/L (ref 95–110)
CHOLEST SERPL-MCNC: 216 MG/DL (ref 120–199)
CHOLEST/HDLC SERPL: 5 {RATIO} (ref 2–5)
CO2 SERPL-SCNC: 28 MMOL/L (ref 23–29)
COMPLEXED PSA SERPL-MCNC: 3.1 NG/ML (ref 0–4)
CREAT SERPL-MCNC: 1.4 MG/DL (ref 0.5–1.4)
EST. GFR  (NO RACE VARIABLE): 54 ML/MIN/1.73 M^2
GLUCOSE SERPL-MCNC: 103 MG/DL (ref 70–110)
HDLC SERPL-MCNC: 43 MG/DL (ref 40–75)
HDLC SERPL: 19.9 % (ref 20–50)
LDLC SERPL CALC-MCNC: 154.6 MG/DL (ref 63–159)
NONHDLC SERPL-MCNC: 173 MG/DL
POTASSIUM SERPL-SCNC: 4.7 MMOL/L (ref 3.5–5.1)
PROT SERPL-MCNC: 7.9 G/DL (ref 6–8.4)
SODIUM SERPL-SCNC: 140 MMOL/L (ref 136–145)
TRIGL SERPL-MCNC: 92 MG/DL (ref 30–150)

## 2024-09-04 PROCEDURE — 1160F RVW MEDS BY RX/DR IN RCRD: CPT | Mod: HCNC,CPTII,S$GLB, | Performed by: FAMILY MEDICINE

## 2024-09-04 PROCEDURE — 3074F SYST BP LT 130 MM HG: CPT | Mod: HCNC,CPTII,S$GLB, | Performed by: FAMILY MEDICINE

## 2024-09-04 PROCEDURE — 4010F ACE/ARB THERAPY RXD/TAKEN: CPT | Mod: HCNC,CPTII,S$GLB, | Performed by: FAMILY MEDICINE

## 2024-09-04 PROCEDURE — 80061 LIPID PANEL: CPT | Mod: HCNC | Performed by: FAMILY MEDICINE

## 2024-09-04 PROCEDURE — 3061F NEG MICROALBUMINURIA REV: CPT | Mod: HCNC,CPTII,S$GLB, | Performed by: FAMILY MEDICINE

## 2024-09-04 PROCEDURE — 3078F DIAST BP <80 MM HG: CPT | Mod: HCNC,CPTII,S$GLB, | Performed by: FAMILY MEDICINE

## 2024-09-04 PROCEDURE — 99999 PR PBB SHADOW E&M-EST. PATIENT-LVL III: CPT | Mod: PBBFAC,HCNC,, | Performed by: FAMILY MEDICINE

## 2024-09-04 PROCEDURE — 99215 OFFICE O/P EST HI 40 MIN: CPT | Mod: HCNC,S$GLB,, | Performed by: FAMILY MEDICINE

## 2024-09-04 PROCEDURE — 1125F AMNT PAIN NOTED PAIN PRSNT: CPT | Mod: HCNC,CPTII,S$GLB, | Performed by: FAMILY MEDICINE

## 2024-09-04 PROCEDURE — 84153 ASSAY OF PSA TOTAL: CPT | Mod: HCNC | Performed by: FAMILY MEDICINE

## 2024-09-04 PROCEDURE — 3044F HG A1C LEVEL LT 7.0%: CPT | Mod: HCNC,CPTII,S$GLB, | Performed by: FAMILY MEDICINE

## 2024-09-04 PROCEDURE — 3066F NEPHROPATHY DOC TX: CPT | Mod: HCNC,CPTII,S$GLB, | Performed by: FAMILY MEDICINE

## 2024-09-04 PROCEDURE — 36415 COLL VENOUS BLD VENIPUNCTURE: CPT | Mod: HCNC | Performed by: FAMILY MEDICINE

## 2024-09-04 PROCEDURE — 1101F PT FALLS ASSESS-DOCD LE1/YR: CPT | Mod: HCNC,CPTII,S$GLB, | Performed by: FAMILY MEDICINE

## 2024-09-04 PROCEDURE — 1159F MED LIST DOCD IN RCRD: CPT | Mod: HCNC,CPTII,S$GLB, | Performed by: FAMILY MEDICINE

## 2024-09-04 PROCEDURE — 3008F BODY MASS INDEX DOCD: CPT | Mod: HCNC,CPTII,S$GLB, | Performed by: FAMILY MEDICINE

## 2024-09-04 PROCEDURE — 80053 COMPREHEN METABOLIC PANEL: CPT | Mod: HCNC | Performed by: FAMILY MEDICINE

## 2024-09-04 PROCEDURE — 3288F FALL RISK ASSESSMENT DOCD: CPT | Mod: HCNC,CPTII,S$GLB, | Performed by: FAMILY MEDICINE

## 2024-09-04 RX ORDER — TIZANIDINE 4 MG/1
4 TABLET ORAL NIGHTLY PRN
Qty: 60 TABLET | Refills: 2 | Status: SHIPPED | OUTPATIENT
Start: 2024-09-04

## 2024-09-04 RX ORDER — GABAPENTIN 300 MG/1
CAPSULE ORAL
Qty: 150 CAPSULE | Refills: 11 | Status: SHIPPED | OUTPATIENT
Start: 2024-09-04 | End: 2025-09-04

## 2024-09-04 RX ORDER — TAMSULOSIN HYDROCHLORIDE 0.4 MG/1
1 CAPSULE ORAL DAILY
Qty: 90 CAPSULE | Refills: 3 | Status: SHIPPED | OUTPATIENT
Start: 2024-09-04
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
## 2024-09-06 ENCOUNTER — TELEPHONE (OUTPATIENT)
Dept: ORTHOPEDICS | Facility: CLINIC | Age: 69
End: 2024-09-06
Payer: MEDICARE

## 2024-11-13 DIAGNOSIS — E11.9 TYPE 2 DIABETES MELLITUS WITHOUT COMPLICATION: ICD-10-CM

## 2024-11-15 ENCOUNTER — OFFICE VISIT (OUTPATIENT)
Dept: FAMILY MEDICINE | Facility: CLINIC | Age: 69
End: 2024-11-15
Payer: MEDICARE

## 2024-11-15 VITALS
BODY MASS INDEX: 31.23 KG/M2 | SYSTOLIC BLOOD PRESSURE: 126 MMHG | OXYGEN SATURATION: 98 % | HEART RATE: 70 BPM | WEIGHT: 218.13 LBS | HEIGHT: 70 IN | TEMPERATURE: 99 F | DIASTOLIC BLOOD PRESSURE: 72 MMHG

## 2024-11-15 DIAGNOSIS — S46.011D TRAUMATIC COMPLETE TEAR OF RIGHT ROTATOR CUFF, SUBSEQUENT ENCOUNTER: ICD-10-CM

## 2024-11-15 DIAGNOSIS — E11.69 OBESITY, DIABETES, AND HYPERTENSION SYNDROME: ICD-10-CM

## 2024-11-15 DIAGNOSIS — E66.811 CLASS 1 OBESITY DUE TO EXCESS CALORIES WITH SERIOUS COMORBIDITY AND BODY MASS INDEX (BMI) OF 31.0 TO 31.9 IN ADULT: ICD-10-CM

## 2024-11-15 DIAGNOSIS — E11.9 CONTROLLED TYPE 2 DIABETES MELLITUS WITHOUT COMPLICATION, WITHOUT LONG-TERM CURRENT USE OF INSULIN: ICD-10-CM

## 2024-11-15 DIAGNOSIS — Z00.00 ENCOUNTER FOR PREVENTIVE HEALTH EXAMINATION: ICD-10-CM

## 2024-11-15 DIAGNOSIS — M54.50 LOW BACK PAIN ASSOCIATED WITH A SPINAL DISORDER OTHER THAN RADICULOPATHY OR SPINAL STENOSIS: ICD-10-CM

## 2024-11-15 DIAGNOSIS — N52.01 ERECTILE DYSFUNCTION DUE TO ARTERIAL INSUFFICIENCY: ICD-10-CM

## 2024-11-15 DIAGNOSIS — I15.2 OBESITY, DIABETES, AND HYPERTENSION SYNDROME: ICD-10-CM

## 2024-11-15 DIAGNOSIS — Z00.00 ENCOUNTER FOR MEDICARE ANNUAL WELLNESS EXAM: Primary | ICD-10-CM

## 2024-11-15 DIAGNOSIS — M75.122 COMPLETE TEAR OF LEFT ROTATOR CUFF, UNSPECIFIED WHETHER TRAUMATIC: ICD-10-CM

## 2024-11-15 DIAGNOSIS — M10.9 GOUTY ARTHRITIS: ICD-10-CM

## 2024-11-15 DIAGNOSIS — E66.9 OBESITY, DIABETES, AND HYPERTENSION SYNDROME: ICD-10-CM

## 2024-11-15 DIAGNOSIS — H40.053 BILATERAL OCULAR HYPERTENSION: ICD-10-CM

## 2024-11-15 DIAGNOSIS — N40.0 BENIGN PROSTATIC HYPERPLASIA, UNSPECIFIED WHETHER LOWER URINARY TRACT SYMPTOMS PRESENT: ICD-10-CM

## 2024-11-15 DIAGNOSIS — E11.59 OBESITY, DIABETES, AND HYPERTENSION SYNDROME: ICD-10-CM

## 2024-11-15 DIAGNOSIS — I10 HYPERTENSION COMPLICATING DIABETES: ICD-10-CM

## 2024-11-15 DIAGNOSIS — I69.351 HEMIPARESIS AFFECTING RIGHT SIDE AS LATE EFFECT OF CEREBROVASCULAR ACCIDENT: ICD-10-CM

## 2024-11-15 DIAGNOSIS — E78.5 DYSLIPIDEMIA ASSOCIATED WITH TYPE 2 DIABETES MELLITUS: ICD-10-CM

## 2024-11-15 DIAGNOSIS — G47.33 OSA (OBSTRUCTIVE SLEEP APNEA): ICD-10-CM

## 2024-11-15 DIAGNOSIS — N18.31 CHRONIC KIDNEY DISEASE, STAGE 3A: ICD-10-CM

## 2024-11-15 DIAGNOSIS — E11.69 DYSLIPIDEMIA ASSOCIATED WITH TYPE 2 DIABETES MELLITUS: ICD-10-CM

## 2024-11-15 DIAGNOSIS — E66.09 CLASS 1 OBESITY DUE TO EXCESS CALORIES WITH SERIOUS COMORBIDITY AND BODY MASS INDEX (BMI) OF 31.0 TO 31.9 IN ADULT: ICD-10-CM

## 2024-11-15 DIAGNOSIS — E11.9 HYPERTENSION COMPLICATING DIABETES: ICD-10-CM

## 2024-11-15 NOTE — PROGRESS NOTES
"  Anjel Sanders presented for a follow-up Medicare AWV today. The following components were reviewed and updated:    Medical history  Family History  Social history  Allergies and Current Medications  Health Risk Assessment  Health Maintenance  Care Team    **See Completed Assessments for Annual Wellness visit with in the encounter summary    The following assessments were completed:  Depression Screening  Cognitive function Screening    Timed Get Up Test  Whisper Test      Opioid documentation:      Patient does not have a current opioid prescription.          Vitals:    11/15/24 1458   BP: 126/72   BP Location: Right arm   Patient Position: Sitting   Pulse: 70   Temp: 98.8 °F (37.1 °C)   SpO2: 98%   Weight: 98.9 kg (218 lb 2.3 oz)   Height: 5' 10" (1.778 m)     Body mass index is 31.3 kg/m².       Physical Exam  Constitutional:       General: He is not in acute distress.     Appearance: Normal appearance. He is obese.   HENT:      Head: Normocephalic and atraumatic.   Eyes:      General: Lids are normal. Gaze aligned appropriately.      Pupils: Pupils are equal, round, and reactive to light.   Neck:      Trachea: Trachea normal.   Cardiovascular:      Rate and Rhythm: Normal rate and regular rhythm.      Heart sounds: S1 normal and S2 normal.   Pulmonary:      Effort: Pulmonary effort is normal.      Breath sounds: Normal breath sounds.   Abdominal:      General: Bowel sounds are normal. There is no distension.      Palpations: Abdomen is soft.      Tenderness: There is no abdominal tenderness.   Musculoskeletal:      Cervical back: Neck supple.      Right lower leg: No edema.      Left lower leg: No edema.   Lymphadenopathy:      Cervical: No cervical adenopathy.   Skin:     General: Skin is cool and dry.   Neurological:      Mental Status: He is alert and oriented to person, place, and time.   Psychiatric:         Attention and Perception: Attention normal.         Mood and Affect: Mood normal.         " Behavior: Behavior is cooperative.         Thought Content: Thought content normal.         Diagnoses and health risks identified today and associated recommendations/orders:    1. Encounter for Medicare annual wellness exam  - Chart reviewed. Problem list updated. Discussed current medical diagnosis, current medications, medical/surgical/family/social history; updated provider list; documented vital signs; identified any cognitive impairment; and updated risk factor list. Addressed any outstanding health maintenance. Provided patient with personalized health advice. Continue to follow up with PCP and any specialists.     2. Encounter for preventive health examination  - Chart reviewed. Problem list updated. Discussed current medical diagnosis, current medications, medical/surgical/family/social history; updated provider list; documented vital signs; identified any cognitive impairment; and updated risk factor list. Addressed any outstanding health maintenance. Provided patient with personalized health advice. Continue to follow up with PCP and any specialists.     3. Hemiparesis affecting right side as late effect of cerebrovascular accident  - Chronic, stable  - Continue aspirin and atorvastatin  - Follow with PCP    4. Bilateral ocular hypertension  - Chronic, stable  - Continue to monitor  - Follow with ophthalmology    5. Dyslipidemia associated with type 2 diabetes mellitus  - Chronic, stable  - Continue atorvastatin  - Follow with PCP    6. Hypertension complicating diabetes  - Chronic, stable  - Continue lotrel, HCTZ  - Follow with PCP    7. Benign prostatic hyperplasia, unspecified whether lower urinary tract symptoms present  - Chronic, stable  - Continue flomax  - Follow with PCP    8. Chronic kidney disease, stage 3a  - Chronic, stable  - Continue to monitor  - Follow with PCP    9. Erectile dysfunction due to arterial insufficiency  - Chronic, stable  - Continue viagra  - Follow with PCP    10.  Controlled type 2 diabetes mellitus without complication, without long-term current use of insulin  - Chronic, stable  - No med management at this time  - Follow with PCP    11. Obesity, diabetes, and hypertension syndrome  - Chronic, stable  - Continue medications  - Follow with PCP    12. Complete tear of left rotator cuff, unspecified whether traumatic  - S/p bilateral rotator cuff repair  - Continue medications  - Follow with ortho    13. Gouty arthritis  - Chronic, stable  - Continue allopurinol  - Follow with PCP    14. Low back pain associated with a spinal disorder other than radiculopathy or spinal stenosis  - Chronic, stable  - Continue gabapentin  - Follow with PCP    15. Traumatic complete tear of right rotator cuff, subsequent encounter  - S/p rotator cuff repair  - Continue to monitor  - Follow with PCP    16. MURTAZA (obstructive sleep apnea)  - Chronic, stable  - Continue CPAP  - Follow with PCP    17. Class 1 obesity due to excess calories with serious comorbidity and body mass index (BMI) of 31.0 to 31.9 in adult  - Body mass index is 31.3 kg/m².  - Recommendation for healthy diet and increasing exercise as tolerated with goal of 150min/week. Recommend weight loss.      Provided Anjel with a 5-10 year written screening schedule and personal prevention plan. Recommendations were developed using the USPSTF age appropriate recommendations. Education, counseling, and referrals were provided as needed.  After Visit Summary printed and given to patient which includes a list of additional screenings\tests needed.    No follow-ups on file.      Carlyn Rizvi PA-C      Advance Care Planning   I offered to discuss advanced care planning, including how to pick a person who would make decisions for you if you were unable to make them for yourself, called a health care power of , and what kind of decisions you might make such as use of life sustaining treatments such as ventilators and tube feeding when  faced with a life limiting illness recorded on a living will that they will need to know. (How you want to be cared for as you near the end of your natural life)     X Patient is interested in learning more about how to make advanced directives.  I provided them paperwork and offered to discuss this with them.

## 2024-11-15 NOTE — PATIENT INSTRUCTIONS
Counseling and Referral of Other Preventative  (Italic type indicates deductible and co-insurance are waived)    Patient Name: Anjel Sanders  Today's Date: 11/15/2024    Health Maintenance       Date Due Completion Date    Colorectal Cancer Screening 09/21/2024 9/21/2021    Hemoglobin A1c 11/09/2024 5/9/2024    Override on 8/22/2018: Done    Diabetes Urine Screening 05/09/2025 5/9/2024    Foot Exam 05/09/2025 5/9/2024 (Done)    Override on 5/9/2024: Done    Override on 6/16/2023: Done    Override on 6/16/2022: Done    Override on 6/17/2021: Done    Override on 6/19/2020: Done    Override on 7/12/2019: Done    Override on 10/25/2017: Done    Override on 10/31/2016: Done    Eye Exam 05/29/2025 5/29/2024    Override on 11/21/2018: Done    Override on 11/3/2017: Done    TETANUS VACCINE 08/11/2025 8/11/2015    PROSTATE-SPECIFIC ANTIGEN 09/04/2025 9/4/2024    Lipid Panel 09/04/2025 9/4/2024    High Dose Statin 11/15/2025 11/15/2024        No orders of the defined types were placed in this encounter.      The following information is provided to all patients.  This information is to help you find resources for any of the problems found today that may be affecting your health:                  Living healthy guide: www.Count includes the [facility].[place].gov      Understanding Diabetes: www.diabetes.org      Eating healthy: www.cdc.gov/healthyweight      CDC home safety checklist: www.cdc.gov/steadi/patient.html      Agency on Aging: www.goea.[place].[facility]      Alcoholics anonymous (AA): www.aa.org      Physical Activity: www.omar.nih.gov/vb0pjgc      Tobacco use: www.quitwithusla.org

## 2024-11-22 ENCOUNTER — TELEPHONE (OUTPATIENT)
Dept: ENDOSCOPY | Facility: HOSPITAL | Age: 69
End: 2024-11-22

## 2024-11-22 ENCOUNTER — CLINICAL SUPPORT (OUTPATIENT)
Dept: ENDOSCOPY | Facility: HOSPITAL | Age: 69
End: 2024-11-22
Attending: FAMILY MEDICINE
Payer: MEDICARE

## 2024-11-22 DIAGNOSIS — Z12.11 SPECIAL SCREENING FOR MALIGNANT NEOPLASMS, COLON: Primary | ICD-10-CM

## 2024-11-22 DIAGNOSIS — Z86.0100 HISTORY OF COLON POLYPS: ICD-10-CM

## 2024-11-22 DIAGNOSIS — M54.16 LUMBAR RADICULOPATHY: ICD-10-CM

## 2024-11-22 RX ORDER — SODIUM, POTASSIUM,MAG SULFATES 17.5-3.13G
1 SOLUTION, RECONSTITUTED, ORAL ORAL DAILY
Qty: 1 KIT | Refills: 0 | Status: SHIPPED | OUTPATIENT
Start: 2024-11-22 | End: 2024-11-24

## 2024-11-22 NOTE — TELEPHONE ENCOUNTER
Colonoscopy Procedure Prep Instructions      Date of procedure: 01/15/25 Arrive at: 8:00AM    Location of Department:   [facility] [address] [place] [address]   Take the Elevators to 2nd Floor Endoscopy Procedural Area    As soon as possible:   your prep from pharmacy and over the counter DULCOLAX LAXATIVE TABLETS     On the day before your procedure   What You CAN do:   You may have clear liquids ONLY -see below for list.     Liquids That Are OK to Drink:   Water  Sports drinks (Gatorade, Power-Aid)  Coffee or tea (no cream or nondairy creamer)  Clear juices without pulp (apple, white grape)  Gelatin desserts (no fruit or toppings)  Clear soda (sprite, coke, ginger ale)  Chicken broth (until 12 midnight the night before procedure)      What You CANNOT do:   Do not EAT solid food, drink milk or anything   colored red.  Do not drink alcohol.  Do not take oral medications within 1 hour of starting   each dose of SUPREP.  No gum chewing or candy morning of procedure.      Note:   (Please disregard the insert instructions from pharmacy).  SUPREP Bowel Prep Kit is indicated for cleansing of the colon as a preparation for colonoscopy in adults.   Be sure to tell your doctor about all the medicines you take, including prescription and non-prescription medicines, vitamins, and herbal supplements. SUPREP Bowel Prep Kit may affect how other medicines work.  Medication taken by mouth may not be absorbed properly when taken within 1 hour before the start of each dose of SUPREP Bowel Prep Kit.    It is not uncommon to experience some abdominal cramping, nausea and/or vomiting when taking the prep. If you have nausea and/or vomiting while taking the prep, stop drinking for 20 to 30 minutes then continue.    How to take prep:    SUPREP Bowel Prep Kit is a (2-day) prep.   Both 6-ounce bottles are required for a complete preparation for colonoscopy. Dilute the solution concentrate as directed  prior to use. You must drink water with each dose of SUPREP, and additional water after each dose.    DOSE 1--Day Before Colonoscopy 01/14/25    Drink at least 6 to 8 glasses of clear liquids from time you wake up until you begin your prep and then continue until bedtime to avoid dehydration.     12:00 pm (NOON) Take four (4) Dulcolax (Bisacodyl) tablets with at least 8 ounces or more of clear liquids.      6:00 pm:    You must complete Steps 1 through 4 using one (1) 6-ounce bottle before going to bed as shown below:    Step 1-Pour ONE (1) 6-ounce bottle of SUPREP liquid into the mixing container.  Step 2-Add cool drinking water to the 16-ounce line on the container and mix.  Step 3-Drink ALL the liquid in the container.  Step 4-You must drink two (2) more 16-ounce containers of water over the next 1 hour.  IMPORTANT: If you experience preparation-related symptoms (for example, nausea, bloating, or cramping), stop, or slow the rate of drinking the additional water until your symptoms decrease.    DOSE 2--Day of the Colonoscopy 01/15/25 at 2-3 AM.    For this dose, repeat Steps 1 through 4 shown above using the other 6-ounce bottle.   You may continue drinking water/clear liquids until   2 hours before your colonoscopy or as directed by the scheduling nurse  7:00AM.    For information about your procedure, two (2) things to view prior to colonoscopy:  Please watch this informational video. It is important to watch this animated consent video prior to your arrival. If you haven't watched the video prior to arriving, you are required to watch it during admission which can causes delays.    Options for viewing:   Using a keyboard:  press and hold the control tab (Ctrl) and left mouse click to follow links.           Colonoscopy Instructional Video                                                                                   OR    Type link address into your web browser's address  bar:  [email or web address]      Educational Booklet with pictures:      Colonoscopy Prep - Liquid                     IMPORTANT INFORMATION TO KNOW BEFORE YOUR PROCEDURE    [facility] 2nd Floor         If your procedure requires the administration of anesthesia, it is necessary for a responsible adult to drive you home. (Medical Transportation, Uber, Lyft, Taxi, etc. may ONLY be used if a responsible adult is present to accompany you home.  The responsible adult CAN'T be the  of the service).      person must be available to return to pick you up within 15 minutes of being notified of discharge.       Please bring a picture ID, insurance card, & copayment      Take Medications as directed below:        If you begin taking any blood thinning medications, injectable weight loss/diabetes medications (other than insulin) , or Adipex (Phentermine) please contact the endoscopy scheduling department listed below as soon as possible.    If you are diabetic see the attached instruction sheet regarding your medication.     If you take HEART, BLOOD PRESSURE, SEIZURE, PAIN, LUNG (including inhalers/nebulizers), ANTI-REJECTION (transplant patients), or PSYCHIATRIC medications, please take at your regular times with a sip of water or as directed by the scheduling nurse.     Important contact information:    Endoscopy [phone] Hours of operation Monday-Friday 8:00-4:30pm.    Questions about insurance or financial obligations call [phone] or [phone].    If you have questions regarding the prep or need to reschedule, please call [phone]. After hours questions requiring immediate assistance, contact [facility] nurse line at [phone] or [phone].   NOTE:     On occasion, unforeseen circumstances may cause a delay in your procedure start time. We respect your time and appreciate your patience during these  circumstances.      Comments: .            Are you ready for your Colonoscopy?      __ If you take blood thinners,weight loss or diabetic injectable medications, have you stopped taking them according to your doctor's instructions before your procedures?  __ Have you stopped eating solid foods and followed a clear liquid diet a full day before your procedure or followed the diet       guidelines indicated in your instructions?       REMINDER: NO BROTH AFTER MIDNIGHT THE DAY BEFORE PROCEDURE.   __ Have you completed all your prep solution? PLEASE DO NOT FOLLOW the insert/or box instructions from pharmacy)  __ Have you taken your blood pressure, heart, seizure, or other essential medications the morning of your procedure?  __ Have you planned for a ride to and from procedure?  (Medical Transportation, Uber, Lyft, Taxi, etc. may ONLY be used if a responsible adult is present to accompany you home). The responsible adult CANNOT be the  of the service.  person must be available to return and pick you up within 15 minutes of being notified of discharge.           Questions or Concerns?  Please call us!  [phone] (M-F) [phone] (Nights and weekends)    Listed below are some helpful tips:    Please bring protective cases for eyewear and hearing aids-wear comfortable clothing/shoes.  Follow prep instructions closely so you don't have to do it twice.  Bowel prep is prescription used to clean out the colon before a colonoscopy. The prep increases movement of your colon by causing you to have diarrhea (loose stools). Cleaning out stool from the colon helps your doctor to see in your colon clearly during this procedure.   It is important to stay hydrated before, during and after bowel prep to prevent loss of fluid (dehydration). You can have water and your choice of clear liquids. Reminder: these liquids you will drink in addition to the bowel prep.     Preparing the mixture:    First, mix the prep with  water.  Make the taste better by adding a sugar free drink mix (Crystal Light) can improve the taste of your prep.   Use a large bore (opening) straw. Place towards the back of mouth (throat) as tolerated.  Prepare a prep mixture that is lightly chilled, but not ice-cold. Drinking a large amount of ice-cold liquid can make you feel very ill.  Avoid drinking any RED beverages or eating popsicles with this color for the 24 hours leading up to your procedure. This color can look like blood in the colon.    Consuming the prep:    Take your time. If you feel ill, take a 15-minute break from drinking the prep mixture  Combat hunger and dehydration with clear liquids. Options like JELL-O, or popsicles will help.  Settle in with good reading material. The goal is to clean out 6 feet of colon, so you can plan on spending a good deal of time in the bathroom. Have some good reading material on-hand or an iPad ready to keep yourself entertained!  Keep yourself comfortable. We recommend applying personal hygiene wipes, Tucks pads and a soothing ointment, like A&D ointment, Desitin, or Vaseline to your bottom before starting and as needed to protect your skin.

## 2024-11-22 NOTE — TELEPHONE ENCOUNTER
Referral for procedure from PAT appointment      Spoke to patient to schedule procedure(s) Colonoscopy       Physician to perform procedure(s) Dr. BOLIVAR Brink  Date of Procedure (s) 01/15/25  Arrival Time 8:00 AM  Time of Procedure(s) 9:00 AM   Location of Procedure(s) [facility] 2nd Floor  Type of Rx Prep sent to patient: Suprep  Instructions provided to patient via Postal Mail    Patient was informed on the following information and verbalized understanding. Screening questionnaire reviewed with patient and complete. If procedure requires anesthesia, a responsible adult needs to be present to accompany the patient home, patient cannot drive after receiving anesthesia. Appointment details are tentative, especially check-in time. Patient will receive a prep-op call 7 days prior to confirm check-in time for procedure. If applicable the patient should contact their pharmacy to verify Rx for procedure prep is ready for pick-up. Patient was advised to call the scheduling department at [phone] if pharmacy states no Rx is available. Patient was advised to call the endoscopy scheduling department if any questions or concerns arise.      [facility]oscopy Scheduling Department

## 2024-12-04 ENCOUNTER — OFFICE VISIT (OUTPATIENT)
Dept: FAMILY MEDICINE | Facility: CLINIC | Age: 69
End: 2024-12-04
Attending: FAMILY MEDICINE
Payer: MEDICARE

## 2024-12-04 ENCOUNTER — LAB VISIT (OUTPATIENT)
Dept: LAB | Facility: HOSPITAL | Age: 69
End: 2024-12-04
Attending: FAMILY MEDICINE
Payer: MEDICARE

## 2024-12-04 VITALS
HEART RATE: 55 BPM | OXYGEN SATURATION: 98 % | DIASTOLIC BLOOD PRESSURE: 78 MMHG | SYSTOLIC BLOOD PRESSURE: 129 MMHG | BODY MASS INDEX: 31 KG/M2 | WEIGHT: 216.06 LBS

## 2024-12-04 DIAGNOSIS — E66.9 OBESITY, DIABETES, AND HYPERTENSION SYNDROME: ICD-10-CM

## 2024-12-04 DIAGNOSIS — I15.2 OBESITY, DIABETES, AND HYPERTENSION SYNDROME: ICD-10-CM

## 2024-12-04 DIAGNOSIS — N40.0 BENIGN PROSTATIC HYPERPLASIA, UNSPECIFIED WHETHER LOWER URINARY TRACT SYMPTOMS PRESENT: ICD-10-CM

## 2024-12-04 DIAGNOSIS — I10 HYPERTENSION COMPLICATING DIABETES: ICD-10-CM

## 2024-12-04 DIAGNOSIS — E11.9 HYPERTENSION COMPLICATING DIABETES: ICD-10-CM

## 2024-12-04 DIAGNOSIS — E11.69 OBESITY, DIABETES, AND HYPERTENSION SYNDROME: ICD-10-CM

## 2024-12-04 DIAGNOSIS — M54.50 CHRONIC BILATERAL LOW BACK PAIN WITHOUT SCIATICA: ICD-10-CM

## 2024-12-04 DIAGNOSIS — M54.16 LUMBAR RADICULOPATHY: ICD-10-CM

## 2024-12-04 DIAGNOSIS — M43.16 SPONDYLOLISTHESIS OF LUMBAR REGION: ICD-10-CM

## 2024-12-04 DIAGNOSIS — N18.31 CHRONIC KIDNEY DISEASE, STAGE 3A: ICD-10-CM

## 2024-12-04 DIAGNOSIS — E11.59 OBESITY, DIABETES, AND HYPERTENSION SYNDROME: ICD-10-CM

## 2024-12-04 DIAGNOSIS — I69.351 HEMIPARESIS AFFECTING RIGHT SIDE AS LATE EFFECT OF CEREBROVASCULAR ACCIDENT: ICD-10-CM

## 2024-12-04 DIAGNOSIS — E11.9 CONTROLLED TYPE 2 DIABETES MELLITUS WITHOUT COMPLICATION, WITHOUT LONG-TERM CURRENT USE OF INSULIN: ICD-10-CM

## 2024-12-04 DIAGNOSIS — E78.5 DYSLIPIDEMIA ASSOCIATED WITH TYPE 2 DIABETES MELLITUS: Primary | ICD-10-CM

## 2024-12-04 DIAGNOSIS — E78.5 DYSLIPIDEMIA ASSOCIATED WITH TYPE 2 DIABETES MELLITUS: ICD-10-CM

## 2024-12-04 DIAGNOSIS — E11.69 DYSLIPIDEMIA ASSOCIATED WITH TYPE 2 DIABETES MELLITUS: ICD-10-CM

## 2024-12-04 DIAGNOSIS — G89.29 CHRONIC BILATERAL LOW BACK PAIN WITHOUT SCIATICA: ICD-10-CM

## 2024-12-04 DIAGNOSIS — E11.69 DYSLIPIDEMIA ASSOCIATED WITH TYPE 2 DIABETES MELLITUS: Primary | ICD-10-CM

## 2024-12-04 DIAGNOSIS — M10.9 GOUTY ARTHRITIS: ICD-10-CM

## 2024-12-04 LAB
ALBUMIN SERPL BCP-MCNC: 4.3 G/DL (ref 3.5–5.2)
ALP SERPL-CCNC: 87 U/L (ref 40–150)
ALT SERPL W/O P-5'-P-CCNC: 25 U/L (ref 10–44)
ANION GAP SERPL CALC-SCNC: 13 MMOL/L (ref 8–16)
AST SERPL-CCNC: 23 U/L (ref 10–40)
BASOPHILS # BLD AUTO: 0.03 K/UL (ref 0–0.2)
BASOPHILS NFR BLD: 0.6 % (ref 0–1.9)
BILIRUB SERPL-MCNC: 1.1 MG/DL (ref 0.1–1)
BUN SERPL-MCNC: 12 MG/DL (ref 8–23)
CALCIUM SERPL-MCNC: 9.7 MG/DL (ref 8.7–10.5)
CHLORIDE SERPL-SCNC: 104 MMOL/L (ref 95–110)
CHOLEST SERPL-MCNC: 171 MG/DL (ref 120–199)
CHOLEST/HDLC SERPL: 4 {RATIO} (ref 2–5)
CO2 SERPL-SCNC: 23 MMOL/L (ref 23–29)
CREAT SERPL-MCNC: 1.2 MG/DL (ref 0.5–1.4)
DIFFERENTIAL METHOD BLD: NORMAL
EOSINOPHIL # BLD AUTO: 0.2 K/UL (ref 0–0.5)
EOSINOPHIL NFR BLD: 3.2 % (ref 0–8)
ERYTHROCYTE [DISTWIDTH] IN BLOOD BY AUTOMATED COUNT: 13.3 % (ref 11.5–14.5)
EST. GFR  (NO RACE VARIABLE): >60 ML/MIN/1.73 M^2
ESTIMATED AVG GLUCOSE: 128 MG/DL (ref 68–131)
GLUCOSE SERPL-MCNC: 100 MG/DL (ref 70–110)
HBA1C MFR BLD: 6.1 % (ref 4–5.6)
HCT VFR BLD AUTO: 48.1 % (ref 40–54)
HDLC SERPL-MCNC: 43 MG/DL (ref 40–75)
HDLC SERPL: 25.1 % (ref 20–50)
HGB BLD-MCNC: 15.5 G/DL (ref 14–18)
IMM GRANULOCYTES # BLD AUTO: 0.01 K/UL (ref 0–0.04)
IMM GRANULOCYTES NFR BLD AUTO: 0.2 % (ref 0–0.5)
LDLC SERPL CALC-MCNC: 113.8 MG/DL (ref 63–159)
LYMPHOCYTES # BLD AUTO: 1.7 K/UL (ref 1–4.8)
LYMPHOCYTES NFR BLD: 34.4 % (ref 18–48)
MCH RBC QN AUTO: 28 PG (ref 27–31)
MCHC RBC AUTO-ENTMCNC: 32.2 G/DL (ref 32–36)
MCV RBC AUTO: 87 FL (ref 82–98)
MONOCYTES # BLD AUTO: 0.7 K/UL (ref 0.3–1)
MONOCYTES NFR BLD: 14.7 % (ref 4–15)
NEUTROPHILS # BLD AUTO: 2.4 K/UL (ref 1.8–7.7)
NEUTROPHILS NFR BLD: 46.9 % (ref 38–73)
NONHDLC SERPL-MCNC: 128 MG/DL
NRBC BLD-RTO: 0 /100 WBC
PLATELET # BLD AUTO: 247 K/UL (ref 150–450)
PMV BLD AUTO: 10.3 FL (ref 9.2–12.9)
POTASSIUM SERPL-SCNC: 4.2 MMOL/L (ref 3.5–5.1)
PROT SERPL-MCNC: 8.2 G/DL (ref 6–8.4)
RBC # BLD AUTO: 5.54 M/UL (ref 4.6–6.2)
SODIUM SERPL-SCNC: 140 MMOL/L (ref 136–145)
TRIGL SERPL-MCNC: 71 MG/DL (ref 30–150)
WBC # BLD AUTO: 5.03 K/UL (ref 3.9–12.7)

## 2024-12-04 PROCEDURE — 80061 LIPID PANEL: CPT | Mod: HCNC | Performed by: FAMILY MEDICINE

## 2024-12-04 PROCEDURE — 36415 COLL VENOUS BLD VENIPUNCTURE: CPT | Mod: HCNC | Performed by: FAMILY MEDICINE

## 2024-12-04 PROCEDURE — 4010F ACE/ARB THERAPY RXD/TAKEN: CPT | Mod: HCNC,CPTII,S$GLB, | Performed by: FAMILY MEDICINE

## 2024-12-04 PROCEDURE — 99215 OFFICE O/P EST HI 40 MIN: CPT | Mod: HCNC,S$GLB,, | Performed by: FAMILY MEDICINE

## 2024-12-04 PROCEDURE — 3074F SYST BP LT 130 MM HG: CPT | Mod: HCNC,CPTII,S$GLB, | Performed by: FAMILY MEDICINE

## 2024-12-04 PROCEDURE — 1159F MED LIST DOCD IN RCRD: CPT | Mod: HCNC,CPTII,S$GLB, | Performed by: FAMILY MEDICINE

## 2024-12-04 PROCEDURE — 3078F DIAST BP <80 MM HG: CPT | Mod: HCNC,CPTII,S$GLB, | Performed by: FAMILY MEDICINE

## 2024-12-04 PROCEDURE — 3061F NEG MICROALBUMINURIA REV: CPT | Mod: HCNC,CPTII,S$GLB, | Performed by: FAMILY MEDICINE

## 2024-12-04 PROCEDURE — 1160F RVW MEDS BY RX/DR IN RCRD: CPT | Mod: HCNC,CPTII,S$GLB, | Performed by: FAMILY MEDICINE

## 2024-12-04 PROCEDURE — 1101F PT FALLS ASSESS-DOCD LE1/YR: CPT | Mod: HCNC,CPTII,S$GLB, | Performed by: FAMILY MEDICINE

## 2024-12-04 PROCEDURE — 80053 COMPREHEN METABOLIC PANEL: CPT | Mod: HCNC | Performed by: FAMILY MEDICINE

## 2024-12-04 PROCEDURE — 3044F HG A1C LEVEL LT 7.0%: CPT | Mod: HCNC,CPTII,S$GLB, | Performed by: FAMILY MEDICINE

## 2024-12-04 PROCEDURE — 83036 HEMOGLOBIN GLYCOSYLATED A1C: CPT | Mod: HCNC | Performed by: FAMILY MEDICINE

## 2024-12-04 PROCEDURE — 99999 PR PBB SHADOW E&M-EST. PATIENT-LVL III: CPT | Mod: PBBFAC,HCNC,, | Performed by: FAMILY MEDICINE

## 2024-12-04 PROCEDURE — 3066F NEPHROPATHY DOC TX: CPT | Mod: HCNC,CPTII,S$GLB, | Performed by: FAMILY MEDICINE

## 2024-12-04 PROCEDURE — 3008F BODY MASS INDEX DOCD: CPT | Mod: HCNC,CPTII,S$GLB, | Performed by: FAMILY MEDICINE

## 2024-12-04 PROCEDURE — 85025 COMPLETE CBC W/AUTO DIFF WBC: CPT | Mod: HCNC | Performed by: FAMILY MEDICINE

## 2024-12-04 PROCEDURE — 3288F FALL RISK ASSESSMENT DOCD: CPT | Mod: HCNC,CPTII,S$GLB, | Performed by: FAMILY MEDICINE

## 2024-12-04 RX ORDER — ATORVASTATIN CALCIUM 80 MG/1
80 TABLET, FILM COATED ORAL DAILY
Qty: 90 TABLET | Refills: 3 | Status: SHIPPED | OUTPATIENT
Start: 2024-12-04

## 2024-12-04 RX ORDER — GABAPENTIN 300 MG/1
CAPSULE ORAL
Qty: 150 CAPSULE | Refills: 11 | Status: SHIPPED | OUTPATIENT
Start: 2024-12-04 | End: 2025-12-04

## 2024-12-04 RX ORDER — AMLODIPINE AND BENAZEPRIL HYDROCHLORIDE 10; 40 MG/1; MG/1
1 CAPSULE ORAL DAILY
Qty: 90 CAPSULE | Refills: 3 | Status: SHIPPED | OUTPATIENT
Start: 2024-12-04 | End: 2025-12-04

## 2024-12-04 RX ORDER — TAMSULOSIN HYDROCHLORIDE 0.4 MG/1
1 CAPSULE ORAL DAILY
Qty: 90 CAPSULE | Refills: 3 | Status: SHIPPED | OUTPATIENT
Start: 2024-12-04

## 2024-12-04 RX ORDER — ALLOPURINOL 300 MG/1
300 TABLET ORAL DAILY
Qty: 90 TABLET | Refills: 3 | Status: SHIPPED | OUTPATIENT
Start: 2024-12-04

## 2024-12-04 RX ORDER — MELOXICAM 15 MG/1
15 TABLET ORAL DAILY
Qty: 90 TABLET | Refills: 0 | Status: SHIPPED | OUTPATIENT
Start: 2024-12-04

## 2024-12-04 RX ORDER — HYDROCHLOROTHIAZIDE 12.5 MG/1
12.5 TABLET ORAL DAILY
Qty: 90 TABLET | Refills: 3 | Status: SHIPPED | OUTPATIENT
Start: 2024-12-04 | End: 2025-12-04

## 2024-12-04 NOTE — PROGRESS NOTES
Subjective:       Patient ID: Anjel Sanders is a 69 y.o. male.    Chief Complaint: Follow-up    69  yr old black male with Hypertension, DM II, CKD 3, Hyperlipidemia, gout, ED, MURTAZA, presents today for his annual exam and  6 month follow up.    DM II - diet controlled  - HGBA1C                   6.2 (H)             05/09/2024                                                          - no s/s of DM - foot exam UTD - eye exam due - on ASA and ACE    HTN - controlled initially - on lotrel - no side effects - fluctuating at home and elevated with systolic 150 - 160. Initial /88 - after resting 15-20 min - taking medicine - it came down to 130/ 80    HLD -controlled - on statin -       LDLCALC                  154.6               09/04/2024                                                                    - on meds - need labs    ED - controlled - need cialis -     Gout - controlled    -  on allopurinol    Health maintenance  -UTD except labs    Follow-up  Associated symptoms include myalgias. Pertinent negatives include no congestion, coughing, diaphoresis, headaches, neck pain, rash, vomiting or weakness.   Hypertension  This is a chronic problem. The current episode started more than 1 year ago. The problem has been gradually improving since onset. The problem is controlled. Pertinent negatives include no anxiety, blurred vision, headaches, malaise/fatigue, neck pain, orthopnea, palpitations, peripheral edema, PND or sweats. There are no associated agents to hypertension. Risk factors for coronary artery disease include dyslipidemia, obesity and male gender. Past treatments include ACE inhibitors and diuretics. The current treatment provides moderate improvement. There are no compliance problems.  There is no history of angina, CAD/MI, CVA, heart failure, left ventricular hypertrophy or retinopathy. There is no history of chronic renal disease, coarctation of the aorta, hypercortisolism, pheochromocytoma,  renovascular disease or a thyroid problem.   Hyperlipidemia  This is a chronic problem. The current episode started more than 1 year ago. The problem is controlled. Recent lipid tests were reviewed and are normal. Exacerbating diseases include obesity. He has no history of chronic renal disease, diabetes, hypothyroidism or liver disease. There are no known factors aggravating his hyperlipidemia. Associated symptoms include myalgias. Pertinent negatives include no focal sensory loss, focal weakness or leg pain. He is currently on no antihyperlipidemic treatment. The current treatment provides moderate improvement of lipids. There are no compliance problems.  Risk factors for coronary artery disease include dyslipidemia, hypertension, obesity and male sex.   Shoulder Injury   The left shoulder is affected. The incident occurred more than 1 week ago. The injury mechanism was a fall. The quality of the pain is described as cramping and shooting. The pain does not radiate. The pain is at a severity of 6/10. The pain is moderate. The symptoms are aggravated by movement, overhead lifting and palpation. He has tried NSAIDs for the symptoms. The treatment provided moderate relief.     Review of Systems   Constitutional: Negative.  Negative for activity change, diaphoresis, malaise/fatigue and unexpected weight change.   HENT: Negative.  Negative for nasal congestion, ear pain, mouth sores, rhinorrhea and voice change.    Eyes: Negative.  Negative for blurred vision, pain, discharge and visual disturbance.   Respiratory: Negative.  Negative for apnea, cough and wheezing.    Cardiovascular: Negative.  Negative for palpitations, orthopnea and PND.   Gastrointestinal: Negative.  Negative for abdominal distention, anal bleeding, diarrhea and vomiting.   Endocrine: Negative.  Negative for cold intolerance and polyuria.   Genitourinary: Negative.  Negative for decreased urine volume, difficulty urinating, discharge, frequency and  scrotal swelling.   Musculoskeletal:  Positive for myalgias. Negative for back pain, leg pain, neck pain and neck stiffness.   Integumentary:  Negative for color change and rash. Negative.   Allergic/Immunologic: Negative.  Negative for environmental allergies.   Neurological: Negative.  Negative for dizziness, focal weakness, speech difficulty, weakness, light-headedness and headaches.   Hematological: Negative.    Psychiatric/Behavioral: Negative.  Negative for agitation, dysphoric mood and suicidal ideas. The patient is not nervous/anxious.          Active Ambulatory Problems     Diagnosis Date Noted    Gouty arthritis     MURTAZA (obstructive sleep apnea)     Hx of colonic polyps     Ocular hypertension - Both Eyes 11/28/2012    ED (erectile dysfunction) 08/27/2013    Hemiparesis affecting right side as late effect of cerebrovascular accident 05/01/2015    Muscle weakness of right upper extremity 05/06/2015    Stiffness of right shoulder joint 05/06/2015    Hypertension complicating diabetes 04/21/2017    Obesity, diabetes, and hypertension syndrome 04/21/2017    Low back pain associated with a spinal disorder other than radiculopathy or spinal stenosis 07/31/2017    History of stroke 10/05/2018    Controlled type 2 diabetes mellitus without complication, without long-term current use of insulin 02/26/2019    Complete tear of left rotator cuff 03/28/2019    Traumatic complete tear of right rotator cuff 06/16/2020    Chronic kidney disease, stage 3a 06/16/2023    Dyslipidemia associated with type 2 diabetes mellitus 06/21/2023    Cough 06/21/2023    BPH (benign prostatic hyperplasia) 06/21/2023     Resolved Ambulatory Problems     Diagnosis Date Noted    AR (allergic rhinitis)     Chest pain, atypical 08/27/2013    Abnormal EKG 09/06/2013    Acute low back pain 12/16/2013    BMI 30.0-30.9,adult 08/12/2014    Acute ischemic stroke 03/20/2015    Diabetes type 2, controlled 03/25/2015    BMI 31.0-31.9,adult 03/25/2015     Obesity (BMI 30-39.9) 05/11/2015    Allergic rhinitis due to pollen 05/11/2015    Decreased strength 10/09/2018    Decreased ROM of lumbar spine 10/09/2018    Pain of left upper extremity 12/19/2018    Weakness 12/19/2018    Stiffness in joint 12/19/2018    Morbid obesity 02/26/2019    Severe obesity (BMI 35.0-39.9) with comorbidity 02/26/2019    Decreased range of motion of right shoulder 08/20/2020    Weakness of right upper extremity 08/20/2020    Decreased functional mobility 08/20/2020    Allergic contact dermatitis due to plant 11/11/2020    Screening for malignant neoplasm of colon 09/21/2021     Past Medical History:   Diagnosis Date    Other and unspecified hyperlipidemia     Stroke     Unspecified essential hypertension      Past Surgical History:   Procedure Laterality Date    ARTHROSCOPIC REPAIR OF ROTATOR CUFF OF SHOULDER Left 04/30/2019    Procedure: REPAIR, ROTATOR CUFF, ARTHROSCOPIC;  Surgeon: Bob Esparza Jr., MD;  Location: [facility] OR;  Service: Orthopedics;  Laterality: Left;  need opus sytem (Jluis notified)  video    ARTHROSCOPY OF SHOULDER WITH DECOMPRESSION OF SUBACROMIAL SPACE  04/30/2019    Procedure: ARTHROSCOPY, SHOULDER, WITH SUBACROMIAL SPACE DECOMPRESSION;  Surgeon: Bob Esparza Jr., MD;  Location: [facility] OR;  Service: Orthopedics;;    COLONOSCOPY N/A 09/21/2021    Procedure: COLONOSCOPY;  Surgeon: Asif López MD;  Location: [facility] ENDO;  Service: Endoscopy;  Laterality: N/A;    FOOT SURGERY Bilateral     ROTATOR CUFF REPAIR Right 08/04/2020    Procedure: REPAIR, ROTATOR CUFF;  Surgeon: oBb Esparza Jr., MD;  Location: [facility] OR;  Service: Orthopedics;  Laterality: Right;  open no scope  need arthrex anchors (Marylin notified per [place] 7/21, EF) Confirmed with Harry 1000 8/3/2020 KB    surgery on jaw       Family History   Problem Relation Name Age of Onset    Hyperlipidemia Mother      Heart disease Mother      Diabetes Mother      Hypertension Mother      Glaucoma Mother       No Known Problems Father      Stroke Sister randi     Stomach cancer Sister Roeana     Cancer Sister Roeana         stomach cancer    Heart disease Sister clari     Diabetes Sister clari     Hypertension Sister olvin     Diabetes Sister olvin     Alcohol abuse Brother brandon         in remission    Cancer Brother brandon         sinus cancer    Stomach cancer Son x4     Cancer Son x4         stomach cancer    Amblyopia Neg Hx      Blindness Neg Hx      Cataracts Neg Hx      Macular degeneration Neg Hx      Retinal detachment Neg Hx      Strabismus Neg Hx      Thyroid disease Neg Hx       Social History     Socioeconomic History    Marital status:    Tobacco Use    Smoking status: Never    Smokeless tobacco: Never   Substance and Sexual Activity    Alcohol use: Not Currently     Alcohol/week: 9.3 standard drinks of alcohol     Types: 6 Cans of beer, 4 Standard drinks or equivalent per week    Drug use: Yes     Types: Marijuana    Sexual activity: Yes     Partners: Female   Social History Narrative     with [facility] [employer]     Social Drivers of Health     Financial Resource Strain: Low Risk  (11/15/2024)    Overall Financial Resource Strain (CARDIA)     Difficulty of Paying Living Expenses: Not hard at all   Food Insecurity: Food Insecurity Present (11/15/2024)    Hunger Vital Sign     Worried About Running Out of Food in the Last Year: Sometimes true     Ran Out of Food in the Last Year: Sometimes true   Transportation Needs: No Transportation Needs (11/15/2024)    PRAPARE - Transportation     Lack of Transportation (Medical): No     Lack of Transportation (Non-Medical): No   Physical Activity: Inactive (11/15/2024)    Exercise Vital Sign     Days of Exercise per Week: 0 days     Minutes of Exercise per Session: 0 min   Stress: No Stress Concern Present (11/15/2024)    [employer] - Occupational Stress Questionnaire     Feeling of Stress : Only a little    Housing Stability: High Risk (11/15/2024)    Housing Stability Vital Sign     Unable to Pay for Housing in the Last Year: Yes     Homeless in the Last Year: No     Review of patient's allergies indicates:  No Known Allergies  Current Outpatient Medications on File Prior to Visit   Medication Sig Dispense Refill    aspirin (ECOTRIN) 81 MG EC tablet Take 81 mg by mouth once daily.      benzonatate (TESSALON) 100 MG capsule TAKE 1 CAPSULE BY MOUTH THREE TIMES A DAY AS NEEDED FOR COUGH 30 capsule 0    benzonatate (TESSALON) 200 MG capsule Take by mouth.      diclofenac sodium (VOLTAREN) 1 % Gel APPLY 2 GRAMS TO AFFECTED AREA ONCE DAILY. 100 g 0    promethazine-dextromethorphan (PROMETHAZINE-DM) 6.25-15 mg/5 mL Syrp TAKE 5 MLS BY MOUTH 2 (TWO) TIMES DAILY AS NEEDED (COUGH). 180 mL 1    tiZANidine (ZANAFLEX) 4 MG tablet Take 1 tablet (4 mg total) by mouth nightly as needed (muscle spasms). 60 tablet 2    triamcinolone acetonide 0.1% (KENALOG) 0.1 % ointment Apply topically 2 (two) times daily. 453 g 0    [DISCONTINUED] allopurinoL (ZYLOPRIM) 300 MG tablet Take 1 tablet (300 mg total) by mouth once daily. 90 tablet 3    [DISCONTINUED] amLODIPine-benazepriL (LOTREL) 10-40 mg per capsule TAKE 1 CAPSULE BY MOUTH ONCE DAILY 90 capsule 3    [DISCONTINUED] atorvastatin (LIPITOR) 80 MG tablet TAKE 1 TABLET (80 MG TOTAL) BY MOUTH ONCE DAILY. 90 tablet 3    [DISCONTINUED] gabapentin (NEURONTIN) 300 MG capsule Take 1 capsule (300 mg total) by mouth 2 (two) times daily AND 3 capsules (900 mg total) every evening. 150 capsule 11    [DISCONTINUED] hydroCHLOROthiazide (HYDRODIURIL) 12.5 MG Tab Take 1 tablet (12.5 mg total) by mouth once daily. 90 tablet 3    [DISCONTINUED] meloxicam (MOBIC) 15 MG tablet TAKE 1 TABLET BY MOUTH EVERY DAY 90 tablet 0    [DISCONTINUED] tamsulosin (FLOMAX) 0.4 mg Cap Take 1 capsule (0.4 mg total) by mouth once daily. 90 capsule 3    sildenafiL (VIAGRA) 100 MG tablet Take 1 tablet (100 mg total) by mouth  daily as needed for Erectile Dysfunction. 20 tablet 11     No current facility-administered medications on file prior to visit.       Objective:       Vitals:    12/04/24 0915   BP: 129/78   Pulse: (!) 55       Physical Exam  Constitutional:       General: He is not in acute distress.     Appearance: He is well-developed. He is not diaphoretic.   HENT:      Head: Normocephalic and atraumatic.      Right Ear: External ear normal.      Left Ear: External ear normal.      Nose: Nose normal.      Mouth/Throat:      Pharynx: No oropharyngeal exudate.   Eyes:      General: No scleral icterus.        Right eye: No discharge.         Left eye: No discharge.      Conjunctiva/sclera: Conjunctivae normal.      Pupils: Pupils are equal, round, and reactive to light.   Neck:      Thyroid: No thyromegaly.      Vascular: No JVD.      Trachea: No tracheal deviation.   Cardiovascular:      Rate and Rhythm: Normal rate and regular rhythm.      Heart sounds: Murmur (3/6 ESM AORTIC AREA) heard.      No friction rub. No gallop.   Pulmonary:      Effort: Pulmonary effort is normal. No respiratory distress.      Breath sounds: Normal breath sounds. No stridor. No wheezing or rales.   Chest:      Chest wall: No tenderness.   Abdominal:      General: Bowel sounds are normal. There is no distension.      Palpations: Abdomen is soft. There is no mass.      Tenderness: There is no abdominal tenderness. There is no guarding or rebound.      Hernia: No hernia is present.   Musculoskeletal:         General: Tenderness (TTP LEFT SHOULDER WITH ROTATOR CUFF IMPINGEMENT) present. Normal range of motion.      Cervical back: Normal range of motion and neck supple.      Comments: Ttp L2-l4 and paralumbar right area   Lymphadenopathy:      Cervical: No cervical adenopathy.   Skin:     General: Skin is warm and dry.      Coloration: Skin is not pale.      Findings: No erythema or rash.   Neurological:      Mental Status: He is alert and oriented to  person, place, and time.      Cranial Nerves: No cranial nerve deficit.      Motor: No abnormal muscle tone.      Coordination: Coordination normal.      Deep Tendon Reflexes: Reflexes are normal and symmetric. Reflexes normal.      Comments: No deficits   Psychiatric:         Behavior: Behavior normal.         Thought Content: Thought content normal.         Judgment: Judgment normal.         Assessment:       Problem List Items Addressed This Visit       Gouty arthritis    Relevant Medications    allopurinoL (ZYLOPRIM) 300 MG tablet    Obesity, diabetes, and hypertension syndrome    Relevant Orders    CBC Auto Differential    Comprehensive Metabolic Panel    Hemoglobin A1C    Lipid Panel    Hypertension complicating diabetes    Relevant Medications    amLODIPine-benazepriL (LOTREL) 10-40 mg per capsule    hydroCHLOROthiazide (HYDRODIURIL) 12.5 MG Tab    Other Relevant Orders    CBC Auto Differential    Comprehensive Metabolic Panel    Hemoglobin A1C    Lipid Panel    Hemiparesis affecting right side as late effect of cerebrovascular accident    Relevant Orders    CBC Auto Differential    Comprehensive Metabolic Panel    Hemoglobin A1C    Lipid Panel    Dyslipidemia associated with type 2 diabetes mellitus - Primary    Relevant Medications    atorvastatin (LIPITOR) 80 MG tablet    Other Relevant Orders    CBC Auto Differential    Comprehensive Metabolic Panel    Hemoglobin A1C    Lipid Panel    Controlled type 2 diabetes mellitus without complication, without long-term current use of insulin    Relevant Medications    atorvastatin (LIPITOR) 80 MG tablet    Other Relevant Orders    CBC Auto Differential    Comprehensive Metabolic Panel    Hemoglobin A1C    Lipid Panel    Chronic kidney disease, stage 3a    Relevant Orders    CBC Auto Differential    Comprehensive Metabolic Panel    Hemoglobin A1C    Lipid Panel    BPH (benign prostatic hyperplasia)    Relevant Medications    tamsulosin (FLOMAX) 0.4 mg Cap    Other  Relevant Orders    CBC Auto Differential    Comprehensive Metabolic Panel    Hemoglobin A1C    Lipid Panel     Other Visit Diagnoses       Spondylolisthesis of lumbar region        Relevant Medications    gabapentin (NEURONTIN) 300 MG capsule    Lumbar radiculopathy        Relevant Medications    gabapentin (NEURONTIN) 300 MG capsule    Chronic bilateral low back pain without sciatica        Relevant Medications    meloxicam (MOBIC) 15 MG tablet            Plan:           Anjel was seen today for follow-up.    Diagnoses and all orders for this visit:    Dyslipidemia associated with type 2 diabetes mellitus  -     CBC Auto Differential; Future  -     Comprehensive Metabolic Panel; Future  -     Hemoglobin A1C; Future  -     Lipid Panel; Future  -     atorvastatin (LIPITOR) 80 MG tablet; Take 1 tablet (80 mg total) by mouth once daily.    Hypertension complicating diabetes  -     CBC Auto Differential; Future  -     Comprehensive Metabolic Panel; Future  -     Hemoglobin A1C; Future  -     Lipid Panel; Future  -     amLODIPine-benazepriL (LOTREL) 10-40 mg per capsule; Take 1 capsule by mouth once daily.  -     hydroCHLOROthiazide (HYDRODIURIL) 12.5 MG Tab; Take 1 tablet (12.5 mg total) by mouth once daily.    Hemiparesis affecting right side as late effect of cerebrovascular accident  -     CBC Auto Differential; Future  -     Comprehensive Metabolic Panel; Future  -     Hemoglobin A1C; Future  -     Lipid Panel; Future    Chronic kidney disease, stage 3a  -     CBC Auto Differential; Future  -     Comprehensive Metabolic Panel; Future  -     Hemoglobin A1C; Future  -     Lipid Panel; Future    Benign prostatic hyperplasia, unspecified whether lower urinary tract symptoms present  -     CBC Auto Differential; Future  -     Comprehensive Metabolic Panel; Future  -     Hemoglobin A1C; Future  -     Lipid Panel; Future  -     tamsulosin (FLOMAX) 0.4 mg Cap; Take 1 capsule (0.4 mg total) by mouth once daily.    Controlled  type 2 diabetes mellitus without complication, without long-term current use of insulin  -     CBC Auto Differential; Future  -     Comprehensive Metabolic Panel; Future  -     Hemoglobin A1C; Future  -     Lipid Panel; Future    Obesity, diabetes, and hypertension syndrome  -     CBC Auto Differential; Future  -     Comprehensive Metabolic Panel; Future  -     Hemoglobin A1C; Future  -     Lipid Panel; Future    Gouty arthritis  -     allopurinoL (ZYLOPRIM) 300 MG tablet; Take 1 tablet (300 mg total) by mouth once daily.    Spondylolisthesis of lumbar region  -     gabapentin (NEURONTIN) 300 MG capsule; Take 1 capsule (300 mg total) by mouth 2 (two) times daily AND 3 capsules (900 mg total) every evening.    Lumbar radiculopathy  -     gabapentin (NEURONTIN) 300 MG capsule; Take 1 capsule (300 mg total) by mouth 2 (two) times daily AND 3 capsules (900 mg total) every evening.    Chronic bilateral low back pain without sciatica  -     meloxicam (MOBIC) 15 MG tablet; Take 1 tablet (15 mg total) by mouth once daily.          BPH  -continue flomax daily. Side effects of medications have been discussed and patient agreed to proceed with treatment and understands the risks and benefits.    HTN  -controlled   -HCTZ with lotrel. Side effects of medications have been discussed and patient agreed to proceed with treatment and understands the risks and benefits.  -precautions for hypotension given    HLD  -controlled    Back pain chronic  -increase gabapentin to 600 and mobic 15 daily      HEART MURMUR  -ECHO reassurance      ED and Gout   -controlled     MURTAZA  -stable    CVA  -on ASA and plavix      DM II  -controlled  -diet and exercise only    Obesity  -diet and exercise  -weight loss          Spent adequate time in obtaining history and explaining differentials     40 minutes spent during this visit of which greater than 50% devoted to face-face counseling and coordination of care regarding diagnosis and management  plan    Rtc 6 m r prn

## 2024-12-18 ENCOUNTER — LAB VISIT (OUTPATIENT)
Dept: LAB | Facility: HOSPITAL | Age: 69
End: 2024-12-18
Payer: MEDICARE

## 2024-12-18 ENCOUNTER — TELEPHONE (OUTPATIENT)
Dept: FAMILY MEDICINE | Facility: CLINIC | Age: 69
End: 2024-12-18

## 2024-12-18 ENCOUNTER — OFFICE VISIT (OUTPATIENT)
Dept: FAMILY MEDICINE | Facility: CLINIC | Age: 69
End: 2024-12-18
Payer: MEDICARE

## 2024-12-18 VITALS
OXYGEN SATURATION: 100 % | TEMPERATURE: 100 F | HEART RATE: 80 BPM | HEIGHT: 71 IN | SYSTOLIC BLOOD PRESSURE: 120 MMHG | WEIGHT: 210.31 LBS | BODY MASS INDEX: 29.44 KG/M2 | DIASTOLIC BLOOD PRESSURE: 50 MMHG

## 2024-12-18 DIAGNOSIS — R10.31 RIGHT GROIN PAIN: ICD-10-CM

## 2024-12-18 DIAGNOSIS — R39.15 URINARY URGENCY: ICD-10-CM

## 2024-12-18 DIAGNOSIS — N50.82 SCROTAL PAIN: Primary | ICD-10-CM

## 2024-12-18 DIAGNOSIS — N50.82 SCROTAL PAIN: ICD-10-CM

## 2024-12-18 DIAGNOSIS — N40.0 BENIGN PROSTATIC HYPERPLASIA, UNSPECIFIED WHETHER LOWER URINARY TRACT SYMPTOMS PRESENT: ICD-10-CM

## 2024-12-18 LAB
BILIRUB UR QL STRIP: NEGATIVE
CLARITY UR: CLEAR
COLOR UR: YELLOW
GLUCOSE UR QL STRIP: NEGATIVE
HGB UR QL STRIP: ABNORMAL
KETONES UR QL STRIP: NEGATIVE
LEUKOCYTE ESTERASE UR QL STRIP: ABNORMAL
MICROSCOPIC COMMENT: ABNORMAL
NITRITE UR QL STRIP: NEGATIVE
PH UR STRIP: 6 [PH] (ref 5–8)
PROT UR QL STRIP: ABNORMAL
RBC #/AREA URNS HPF: 21 /HPF (ref 0–4)
SP GR UR STRIP: 1.02 (ref 1–1.03)
SQUAMOUS #/AREA URNS HPF: 3 /HPF
URN SPEC COLLECT METH UR: ABNORMAL
UROBILINOGEN UR STRIP-ACNC: NEGATIVE EU/DL
WBC #/AREA URNS HPF: 23 /HPF (ref 0–5)

## 2024-12-18 PROCEDURE — 99999 PR PBB SHADOW E&M-EST. PATIENT-LVL V: CPT | Mod: PBBFAC,,,

## 2024-12-18 PROCEDURE — 1159F MED LIST DOCD IN RCRD: CPT | Mod: HCNC,CPTII,S$GLB,

## 2024-12-18 PROCEDURE — 3061F NEG MICROALBUMINURIA REV: CPT | Mod: HCNC,CPTII,S$GLB,

## 2024-12-18 PROCEDURE — 81000 URINALYSIS NONAUTO W/SCOPE: CPT | Mod: HCNC

## 2024-12-18 PROCEDURE — 1125F AMNT PAIN NOTED PAIN PRSNT: CPT | Mod: HCNC,CPTII,S$GLB,

## 2024-12-18 PROCEDURE — 3078F DIAST BP <80 MM HG: CPT | Mod: HCNC,CPTII,S$GLB,

## 2024-12-18 PROCEDURE — 3066F NEPHROPATHY DOC TX: CPT | Mod: HCNC,CPTII,S$GLB,

## 2024-12-18 PROCEDURE — 3288F FALL RISK ASSESSMENT DOCD: CPT | Mod: HCNC,CPTII,S$GLB,

## 2024-12-18 PROCEDURE — 1101F PT FALLS ASSESS-DOCD LE1/YR: CPT | Mod: HCNC,CPTII,S$GLB,

## 2024-12-18 PROCEDURE — 3044F HG A1C LEVEL LT 7.0%: CPT | Mod: HCNC,CPTII,S$GLB,

## 2024-12-18 PROCEDURE — 3008F BODY MASS INDEX DOCD: CPT | Mod: HCNC,CPTII,S$GLB,

## 2024-12-18 PROCEDURE — 3074F SYST BP LT 130 MM HG: CPT | Mod: HCNC,CPTII,S$GLB,

## 2024-12-18 PROCEDURE — 1160F RVW MEDS BY RX/DR IN RCRD: CPT | Mod: HCNC,CPTII,S$GLB,

## 2024-12-18 PROCEDURE — 87086 URINE CULTURE/COLONY COUNT: CPT | Mod: HCNC

## 2024-12-18 PROCEDURE — 4010F ACE/ARB THERAPY RXD/TAKEN: CPT | Mod: HCNC,CPTII,S$GLB,

## 2024-12-18 PROCEDURE — 99214 OFFICE O/P EST MOD 30 MIN: CPT | Mod: HCNC,S$GLB,,

## 2024-12-18 RX ORDER — CIPROFLOXACIN 500 MG/1
500 TABLET ORAL 2 TIMES DAILY
Qty: 20 TABLET | Refills: 0 | Status: SHIPPED | OUTPATIENT
Start: 2024-12-18 | End: 2024-12-28

## 2024-12-18 NOTE — PROGRESS NOTES
Subjective     Patient ID: Anjel Sanders is a 69 y.o. male.    Chief Complaint: Urinary Frequency and Pain (Pt woke up Sunday morning and experienced pain in groin, medication was given but pt feels as if it is not working.)      69  yr old black male with Hypertension, DM II, CKD 3, Hyperlipidemia, gout, ED, MURTAZA, presents today as new patient to me, established with Dr. Payan, for c/o right groin pain and right sided testicular swelling and pain. States symptoms started Sunday morning when he woke up and have progressively worsened. Denies dysuria or hematuria. Endorses urinary frequency and urgency but that was occurring prior to the pain. Denies any discharge, abdominal pain, n/v/d/, or constipation. Endorses subjective fever with chills off and on but has not taken his temperature. Currently taking Flomax to help with BPH symptoms.       Review of Systems   Constitutional:  Positive for chills. Negative for fatigue and fever.   Respiratory:  Negative for cough and shortness of breath.    Cardiovascular:  Negative for chest pain.   Gastrointestinal:  Negative for abdominal pain, constipation, diarrhea, nausea and vomiting.   Genitourinary:  Positive for frequency, scrotal swelling, testicular pain and urgency. Negative for difficulty urinating and dysuria.   Musculoskeletal:  Negative for back pain.   Integumentary:  Negative for rash.   Neurological:  Negative for dizziness and headaches.   Psychiatric/Behavioral:  Negative for confusion and hallucinations.      Past Medical History:   Diagnosis Date    AR (allergic rhinitis)     Gouty arthritis     Hx of colonic polyps     Tubular Adenoma    MURTAZA (obstructive sleep apnea)     Other and unspecified hyperlipidemia     Stroke     Unspecified essential hypertension        Past Surgical History:   Procedure Laterality Date    ARTHROSCOPIC REPAIR OF ROTATOR CUFF OF SHOULDER Left 04/30/2019    Procedure: REPAIR, ROTATOR CUFF, ARTHROSCOPIC;  Surgeon: Bob Esparza  MD Madiha;  Location: [facility] OR;  Service: Orthopedics;  Laterality: Left;  need opus sytem (Jluis notified)  video    ARTHROSCOPY OF SHOULDER WITH DECOMPRESSION OF SUBACROMIAL SPACE  04/30/2019    Procedure: ARTHROSCOPY, SHOULDER, WITH SUBACROMIAL SPACE DECOMPRESSION;  Surgeon: Bob Esparza Jr., MD;  Location: [facility] OR;  Service: Orthopedics;;    COLONOSCOPY N/A 09/21/2021    Procedure: COLONOSCOPY;  Surgeon: Asif López MD;  Location: [facility] ENDO;  Service: Endoscopy;  Laterality: N/A;    FOOT SURGERY Bilateral     ROTATOR CUFF REPAIR Right 08/04/2020    Procedure: REPAIR, ROTATOR CUFF;  Surgeon: Bob Esparza Jr., MD;  Location: [facility] OR;  Service: Orthopedics;  Laterality: Right;  open no scope  need arthrex anchors (Marylin notified per [place] 7/21, EF) Confirmed with Harry 1000 8/3/2020 KB    surgery on jaw         Family History   Problem Relation Name Age of Onset    Hyperlipidemia Mother      Heart disease Mother      Diabetes Mother      Hypertension Mother      Glaucoma Mother      No Known Problems Father      Stroke Sister randi     Stomach cancer Sister Roeana     Cancer Sister Roeana         stomach cancer    Heart disease Sister clari     Diabetes Sister clari     Hypertension Sister olvin     Diabetes Sister olvin     Alcohol abuse Brother brandon         in remission    Cancer Brother brandon         sinus cancer    Stomach cancer Son x4     Cancer Son x4         stomach cancer    Amblyopia Neg Hx      Blindness Neg Hx      Cataracts Neg Hx      Macular degeneration Neg Hx      Retinal detachment Neg Hx      Strabismus Neg Hx      Thyroid disease Neg Hx         Social History     Socioeconomic History    Marital status:    Tobacco Use    Smoking status: Never    Smokeless tobacco: Never   Substance and Sexual Activity    Alcohol use: Not Currently     Alcohol/week: 9.3 standard drinks of alcohol     Types: 6 Cans of beer, 4 Standard drinks or equivalent per week    Drug use: Yes     Types:  Marijuana    Sexual activity: Yes     Partners: Female   Social History Narrative     with [employer]     Social Drivers of Health     Financial Resource Strain: Low Risk  (11/15/2024)    Overall Financial Resource Strain (CARDIA)     Difficulty of Paying Living Expenses: Not hard at all   Food Insecurity: Food Insecurity Present (11/15/2024)    Hunger Vital Sign     Worried About Running Out of Food in the Last Year: Sometimes true     Ran Out of Food in the Last Year: Sometimes true   Transportation Needs: No Transportation Needs (11/15/2024)    PRAPARE - Transportation     Lack of Transportation (Medical): No     Lack of Transportation (Non-Medical): No   Physical Activity: Inactive (11/15/2024)    Exercise Vital Sign     Days of Exercise per Week: 0 days     Minutes of Exercise per Session: 0 min   Stress: No Stress Concern Present (11/15/2024)    [employer] [place] [employer] - Occupational Stress Questionnaire     Feeling of Stress : Only a little   Housing Stability: High Risk (11/15/2024)    Housing Stability Vital Sign     Unable to Pay for Housing in the Last Year: Yes     Homeless in the Last Year: No       Current Outpatient Medications   Medication Sig Dispense Refill    allopurinoL (ZYLOPRIM) 300 MG tablet Take 1 tablet (300 mg total) by mouth once daily. 90 tablet 3    amLODIPine-benazepriL (LOTREL) 10-40 mg per capsule Take 1 capsule by mouth once daily. 90 capsule 3    aspirin (ECOTRIN) 81 MG EC tablet Take 81 mg by mouth once daily.      atorvastatin (LIPITOR) 80 MG tablet Take 1 tablet (80 mg total) by mouth once daily. 90 tablet 3    benzonatate (TESSALON) 100 MG capsule TAKE 1 CAPSULE BY MOUTH THREE TIMES A DAY AS NEEDED FOR COUGH 30 capsule 0    benzonatate (TESSALON) 200 MG capsule Take by mouth.      diclofenac sodium (VOLTAREN) 1 % Gel APPLY 2 GRAMS TO AFFECTED AREA ONCE DAILY. 100 g 0    gabapentin (NEURONTIN) 300 MG capsule Take 1 capsule (300 mg total)  "by mouth 2 (two) times daily AND 3 capsules (900 mg total) every evening. 150 capsule 11    hydroCHLOROthiazide (HYDRODIURIL) 12.5 MG Tab Take 1 tablet (12.5 mg total) by mouth once daily. 90 tablet 3    meloxicam (MOBIC) 15 MG tablet Take 1 tablet (15 mg total) by mouth once daily. 90 tablet 0    promethazine-dextromethorphan (PROMETHAZINE-DM) 6.25-15 mg/5 mL Syrp TAKE 5 MLS BY MOUTH 2 (TWO) TIMES DAILY AS NEEDED (COUGH). 180 mL 1    tamsulosin (FLOMAX) 0.4 mg Cap Take 1 capsule (0.4 mg total) by mouth once daily. 90 capsule 3    tiZANidine (ZANAFLEX) 4 MG tablet Take 1 tablet (4 mg total) by mouth nightly as needed (muscle spasms). 60 tablet 2    triamcinolone acetonide 0.1% (KENALOG) 0.1 % ointment Apply topically 2 (two) times daily. 453 g 0    ciprofloxacin HCl (CIPRO) 500 MG tablet Take 1 tablet (500 mg total) by mouth 2 (two) times daily. for 10 days 20 tablet 0    sildenafiL (VIAGRA) 100 MG tablet Take 1 tablet (100 mg total) by mouth daily as needed for Erectile Dysfunction. (Patient not taking: Reported on 12/18/2024) 20 tablet 11     No current facility-administered medications for this visit.       Review of patient's allergies indicates:  No Known Allergies      Objective     Vitals:    12/18/24 1317   BP: (!) 120/50   Pulse: 80   Temp: 100 °F (37.8 °C)   TempSrc: Oral   SpO2: 100%   Weight: 95.4 kg (210 lb 5.1 oz)   Height: 5' 11" (1.803 m)   PainSc:   6   PainLoc: Groin      Physical Exam  Vitals and nursing note reviewed. Exam conducted with a chaperone present.   Constitutional:       General: He is not in acute distress.     Appearance: Normal appearance. He is not ill-appearing.   HENT:      Head: Normocephalic and atraumatic.      Right Ear: Tympanic membrane normal.      Left Ear: Tympanic membrane normal.      Mouth/Throat:      Mouth: Mucous membranes are moist.   Eyes:      Extraocular Movements: Extraocular movements intact.      Conjunctiva/sclera: Conjunctivae normal.   Cardiovascular:     "  Rate and Rhythm: Normal rate and regular rhythm.      Pulses: Normal pulses.      Heart sounds: Normal heart sounds.   Pulmonary:      Effort: Pulmonary effort is normal. No respiratory distress.      Breath sounds: Normal breath sounds.   Abdominal:      General: Abdomen is flat. Bowel sounds are normal.      Palpations: Abdomen is soft.      Hernia: There is no hernia in the right inguinal area.   Genitourinary:     Penis: Normal.       Testes:         Right: Tenderness and swelling present.      Epididymis:      Right: Tenderness present.   Musculoskeletal:         General: Normal range of motion.      Cervical back: Normal range of motion.      Right lower leg: No edema.      Left lower leg: No edema.   Lymphadenopathy:      Lower Body: No right inguinal adenopathy.   Skin:     General: Skin is warm and dry.      Findings: No rash.   Neurological:      Mental Status: He is alert and oriented to person, place, and time.   Psychiatric:         Mood and Affect: Mood normal.         Behavior: Behavior normal. Behavior is cooperative.         Cognition and Memory: Cognition normal.            Assessment and Plan     1. Scrotal pain  - New problem; symptoms started Sunday morning and have progressively worsened. Denies dysuria,hematuria, discharge. Urine and culture ordered for today; U/S STAT ordered for today. Start taking medications as prescribed. Will reach out with results. RTC if s/s worsen.   -     Urinalysis; Future; Expected date: 12/18/2024  -     CULTURE, URINE; Future; Expected date: 12/18/2024  -     US Soft Tissue, Groin Right; Future; Expected date: 12/18/2024  -     US Scrotum And Testicles; Future; Expected date: 12/18/2024  -     ciprofloxacin HCl (CIPRO) 500 MG tablet; Take 1 tablet (500 mg total) by mouth 2 (two) times daily. for 10 days  Dispense: 20 tablet; Refill: 0    2. Right groin pain  - New problem; symptoms started Sunday morning and have progressively worsened. Denies dysuria,hematuria,  discharge. Urine and culture ordered for today; U/S STAT ordered for today. Start taking medications as prescribed. Will reach out with results. RTC if s/s worsen.   -     Urinalysis; Future; Expected date: 12/18/2024  -     CULTURE, URINE; Future; Expected date: 12/18/2024  -     US Soft Tissue, Groin Right; Future; Expected date: 12/18/2024  -     US Scrotum And Testicles; Future; Expected date: 12/18/2024    3. Urinary urgency  - New problem; symptoms started Sunday morning and have progressively worsened. Denies dysuria,hematuria, discharge. Urine and culture ordered for today; U/S STAT ordered for today. Start taking medications as prescribed. Will reach out with results. RTC if s/s worsen.   -     Urinalysis; Future; Expected date: 12/18/2024  -     CULTURE, URINE; Future; Expected date: 12/18/2024    4. Benign prostatic hyperplasia, unspecified whether lower urinary tract symptoms present  Chronic; stable on current treatment plan; follow up with PCP  Taking Flomax.            Follow up if symptoms worsen or fail to improve.     30 minutes of total time spent on the encounter, which includes face to face time and non-face to face time preparing to see the patient (eg, review of tests), Obtaining and/or reviewing separately obtained history, Documenting clinical information in the electronic or other health record, Independently interpreting results (not separately reported) and communicating results to the patient/family/caregiver, or Care coordination (not separately reported).      Ana Laura Potts, MSN, APRN, FNP-C  Family Medicine  Office #[phone]

## 2024-12-18 NOTE — TELEPHONE ENCOUNTER
Called patient and left message that we sent his antibiotics in and that he can call back if he has any questions.

## 2024-12-19 ENCOUNTER — HOSPITAL ENCOUNTER (OUTPATIENT)
Dept: RADIOLOGY | Facility: HOSPITAL | Age: 69
Discharge: HOME | End: 2024-12-19
Payer: MEDICARE

## 2024-12-19 ENCOUNTER — TELEPHONE (OUTPATIENT)
Dept: FAMILY MEDICINE | Facility: CLINIC | Age: 69
End: 2024-12-19
Payer: MEDICARE

## 2024-12-19 DIAGNOSIS — N50.82 SCROTAL PAIN: ICD-10-CM

## 2024-12-19 DIAGNOSIS — R10.31 RIGHT GROIN PAIN: ICD-10-CM

## 2024-12-19 LAB — BACTERIA UR CULT: NORMAL

## 2024-12-19 PROCEDURE — 76870 US EXAM SCROTUM: CPT | Mod: TC,HCNC,PN

## 2024-12-19 PROCEDURE — 76882 US LMTD JT/FCL EVL NVASC XTR: CPT | Mod: 26,HCNC,RT, | Performed by: RADIOLOGY

## 2024-12-19 PROCEDURE — 76882 US LMTD JT/FCL EVL NVASC XTR: CPT | Mod: TC,HCNC,PN,RT

## 2024-12-19 PROCEDURE — 76870 US EXAM SCROTUM: CPT | Mod: 26,HCNC,, | Performed by: RADIOLOGY

## 2024-12-19 NOTE — PROGRESS NOTES
Please call patient.    Your urine shows signs of infection. Please still have the Ultrasound done today to check for other concerns. I had sent an antibiotic to the pharmacy yesterday for you to start taking. I will reach out to you with the results when I receive the ultrasound results and urine culture results.

## 2024-12-19 NOTE — TELEPHONE ENCOUNTER
Spoke with pt voiced understanding of providers notes and recommendations states he did complete his u/s today  and has started his abx

## 2024-12-19 NOTE — TELEPHONE ENCOUNTER
----- Message from RONY Oconnor sent at 12/19/2024  8:41 AM CST -----  Please call patient.    Your urine shows signs of infection. Please still have the Ultrasound done today to check for other concerns. I had sent an antibiotic to the pharmacy yesterday for you to start taking. I will reach out to you with the results when I receive the ultrasound results and urine culture results.

## 2024-12-20 ENCOUNTER — TELEPHONE (OUTPATIENT)
Dept: FAMILY MEDICINE | Facility: CLINIC | Age: 69
End: 2024-12-20
Payer: MEDICARE

## 2024-12-20 DIAGNOSIS — N43.40 SPERMATOCELE: ICD-10-CM

## 2024-12-20 DIAGNOSIS — N45.1 ACUTE EPIDIDYMITIS: Primary | ICD-10-CM

## 2024-12-20 NOTE — PROGRESS NOTES
Please call patient.    Ultrasound was consistent with epididymitis. Continue taking antibiotics as prescribed. I am going to refer you to urology because your ultrasound also showed a possible cyst. Urology will be best to monitor and manage this. Let me know if you have any further concerns or questions

## 2024-12-20 NOTE — TELEPHONE ENCOUNTER
Left message requesting a callback to discuss the following:    Ultrasound was consistent with epididymitis. Continue taking antibiotics as prescribed. I am going to refer you to urology because your ultrasound also showed a possible cyst. Urology will be best to monitor and manage this. Let me know if you have any further concerns or questions

## 2025-01-02 ENCOUNTER — OFFICE VISIT (OUTPATIENT)
Dept: UROLOGY | Facility: CLINIC | Age: 70
End: 2025-01-02
Payer: MEDICARE

## 2025-01-02 VITALS
HEIGHT: 71 IN | SYSTOLIC BLOOD PRESSURE: 170 MMHG | WEIGHT: 209.75 LBS | BODY MASS INDEX: 29.36 KG/M2 | DIASTOLIC BLOOD PRESSURE: 79 MMHG | HEART RATE: 58 BPM

## 2025-01-02 DIAGNOSIS — N43.40 SPERMATOCELE: ICD-10-CM

## 2025-01-02 DIAGNOSIS — N45.1 ACUTE EPIDIDYMITIS: ICD-10-CM

## 2025-01-02 PROCEDURE — 3078F DIAST BP <80 MM HG: CPT | Mod: HCNC,CPTII,S$GLB,

## 2025-01-02 PROCEDURE — 99999 PR PBB SHADOW E&M-EST. PATIENT-LVL V: CPT | Mod: PBBFAC,HCNC,,

## 2025-01-02 PROCEDURE — 1159F MED LIST DOCD IN RCRD: CPT | Mod: HCNC,CPTII,S$GLB,

## 2025-01-02 PROCEDURE — 3008F BODY MASS INDEX DOCD: CPT | Mod: HCNC,CPTII,S$GLB,

## 2025-01-02 PROCEDURE — 3077F SYST BP >= 140 MM HG: CPT | Mod: HCNC,CPTII,S$GLB,

## 2025-01-02 PROCEDURE — 3072F LOW RISK FOR RETINOPATHY: CPT | Mod: HCNC,CPTII,S$GLB,

## 2025-01-02 PROCEDURE — 99214 OFFICE O/P EST MOD 30 MIN: CPT | Mod: HCNC,S$GLB,,

## 2025-01-02 PROCEDURE — 1125F AMNT PAIN NOTED PAIN PRSNT: CPT | Mod: HCNC,CPTII,S$GLB,

## 2025-01-02 PROCEDURE — 1160F RVW MEDS BY RX/DR IN RCRD: CPT | Mod: HCNC,CPTII,S$GLB,

## 2025-01-02 NOTE — PROGRESS NOTES
Subjective:       Patient ID: nAjel Sanders is a 69 y.o. male.    Chief Complaint: Acute epididymitis and Spermatocele     This is a 69 y.o.  male patient that is new to me.  The patient was referred to me by Ana Laura Potts NP for right epididymitis. Reports that he started having right testicular pain over a week ago. He went and saw the NP that works with Dr. Payan.   Scrotal US 12/19/24-- showed right epididymitis, bilateral small hydroceles, left 6mm epididymal cyst. No torsion or hernia.  Started on ciprofloxacin.  Today reports pain to right testicle is better and he still has two more antibiotic tablets left to take. Reports that he forgot to to tell primary care that the pain started after he lifted his sister up from her chair and felt he pulled something then. Denies pain, n/v, fevers, chills, urination issues, testicular swelling.    LAST PSA  Lab Results   Component Value Date    PSA 3.1 09/04/2024    PSA 2.1 06/17/2023    PSA 3.0 06/16/2022    PSA 2.1 06/21/2021    PSA 1.6 01/04/2020    PSA 1.3 11/27/2018    PSA 1.4 11/03/2017    PSA 0.94 08/13/2014    PSA 1.1 08/30/2013    PSA 1.06 10/20/2012    PSA 3.1 09/24/2011    PSA 0.82 08/13/2010    PSA 0.78 07/27/2009    PSA 0.7 07/26/2007       Lab Results   Component Value Date    CREATININE 1.2 12/04/2024       ---  PMH/PSH/Medications/Allergies/Social history reviewed and as in chart.    Review of Systems   Constitutional:  Negative for activity change, chills and fever.   Respiratory:  Negative for shortness of breath.    Cardiovascular:  Negative for chest pain and palpitations.   Gastrointestinal:  Negative for abdominal pain and constipation.   Genitourinary:  Negative for difficulty urinating, dysuria, flank pain, frequency, hematuria and urgency.   Neurological:  Negative for dizziness and light-headedness.     Objective:      Physical Exam  HENT:      Head: Normocephalic.   Pulmonary:      Effort: Pulmonary effort is normal.   Abdominal:       General: Abdomen is flat.      Palpations: Abdomen is soft.   Genitourinary:     Penis: Normal and uncircumcised.       Testes:         Right: Swelling present. Tenderness not present.         Left: Swelling present. Tenderness not present.      Epididymis:      Right: Not enlarged. No tenderness.      Left: Not enlarged. No tenderness.   Musculoskeletal:         General: Normal range of motion.      Cervical back: Normal range of motion.   Skin:     General: Skin is warm and dry.   Neurological:      Mental Status: He is alert and oriented to person, place, and time.       Assessment:     Problem Noted   Acute Epididymitis 1/2/2025       Plan:     Finish taking the antibiotics that were prescribed to you.  Apply ice packs daily for 2-30 minutes for 2 weeks. Place a rolled up towel under the testicle to elevate scrotum while applying ice packs.  Wear good supportive underwear, can take warm baths.  Take ibuprofen as needed for pain.  Follow-up PRN for problems and concerns    RAIMUNDO Ruvalcaba    I spent a total of 30 minutes on the day of the visit.This includes face to face time and non-face to face time preparing to see the patient (eg, review of tests), obtaining and/or reviewing separately obtained history, documenting clinical information in the electronic or other health record, independently interpreting results and communicating results to the patient/family/caregiver, or care coordinator.

## 2025-01-02 NOTE — PATIENT INSTRUCTIONS
Finish taking the antibiotics that were prescribed to you.  Apply ice packs daily for 2-30 minutes for 2 weeks. Place a rolled up towel under the testicle to elevate scrotum while applying ice packs.  Wear good supportive underwear, can take warm bath.  Take ibuprofen as needed for pain.  Let me know if symptoms not getting better after 2 more weeks.

## 2025-01-08 ENCOUNTER — TELEPHONE (OUTPATIENT)
Dept: ENDOSCOPY | Facility: HOSPITAL | Age: 70
End: 2025-01-08
Payer: MEDICARE

## 2025-01-08 DIAGNOSIS — Z12.11 SPECIAL SCREENING FOR MALIGNANT NEOPLASMS, COLON: Primary | ICD-10-CM

## 2025-01-08 RX ORDER — SODIUM, POTASSIUM,MAG SULFATES 17.5-3.13G
1 SOLUTION, RECONSTITUTED, ORAL ORAL DAILY
Qty: 1 KIT | Refills: 0 | Status: SHIPPED | OUTPATIENT
Start: 2025-01-08 | End: 2025-01-11

## 2025-01-14 ENCOUNTER — TELEPHONE (OUTPATIENT)
Dept: ENDOSCOPY | Facility: HOSPITAL | Age: 70
End: 2025-01-14
Payer: MEDICARE

## 2025-01-14 NOTE — TELEPHONE ENCOUNTER
Telephoned pt because his Colonoscopy Instructions were sent to me for pt pickup at [facility].  Pt has not come to  his instructions and procedure is tomorrow 1/15/25.  Voicemail message left reminding pt to  instructions, along with direct contact number if needed.

## 2025-01-15 ENCOUNTER — ANESTHESIA (OUTPATIENT)
Dept: ENDOSCOPY | Facility: HOSPITAL | Age: 70
End: 2025-01-15
Payer: MEDICARE

## 2025-01-15 ENCOUNTER — ANESTHESIA EVENT (OUTPATIENT)
Dept: ENDOSCOPY | Facility: HOSPITAL | Age: 70
End: 2025-01-15
Payer: MEDICARE

## 2025-01-15 ENCOUNTER — HOSPITAL ENCOUNTER (OUTPATIENT)
Facility: HOSPITAL | Age: 70
Discharge: HOME | End: 2025-01-15
Attending: INTERNAL MEDICINE | Admitting: INTERNAL MEDICINE
Payer: MEDICARE

## 2025-01-15 VITALS
OXYGEN SATURATION: 99 % | DIASTOLIC BLOOD PRESSURE: 85 MMHG | SYSTOLIC BLOOD PRESSURE: 169 MMHG | HEART RATE: 50 BPM | WEIGHT: 212 LBS | RESPIRATION RATE: 12 BRPM | BODY MASS INDEX: 30.35 KG/M2 | HEIGHT: 70 IN | TEMPERATURE: 98 F

## 2025-01-15 DIAGNOSIS — K63.5 COLON POLYP: ICD-10-CM

## 2025-01-15 PROCEDURE — D9220A PRA ANESTHESIA: Mod: PT,ANES,, | Performed by: STUDENT IN AN ORGANIZED HEALTH CARE EDUCATION/TRAINING PROGRAM

## 2025-01-15 PROCEDURE — 25000003 PHARM REV CODE 250: Mod: HCNC | Performed by: NURSE ANESTHETIST, CERTIFIED REGISTERED

## 2025-01-15 PROCEDURE — 37000009 HC ANESTHESIA EA ADD 15 MINS: Mod: HCNC | Performed by: INTERNAL MEDICINE

## 2025-01-15 PROCEDURE — 45385 COLONOSCOPY W/LESION REMOVAL: CPT | Mod: PT,HCNC,, | Performed by: INTERNAL MEDICINE

## 2025-01-15 PROCEDURE — 88305 TISSUE EXAM BY PATHOLOGIST: CPT | Mod: 26,HCNC,, | Performed by: PATHOLOGY

## 2025-01-15 PROCEDURE — D9220A PRA ANESTHESIA: Mod: PT,CRNA,, | Performed by: NURSE ANESTHETIST, CERTIFIED REGISTERED

## 2025-01-15 PROCEDURE — 37000008 HC ANESTHESIA 1ST 15 MINUTES: Mod: HCNC | Performed by: INTERNAL MEDICINE

## 2025-01-15 PROCEDURE — 45385 COLONOSCOPY W/LESION REMOVAL: CPT | Mod: PT,HCNC | Performed by: INTERNAL MEDICINE

## 2025-01-15 PROCEDURE — 88305 TISSUE EXAM BY PATHOLOGIST: CPT | Mod: HCNC | Performed by: PATHOLOGY

## 2025-01-15 PROCEDURE — 63600175 PHARM REV CODE 636 W HCPCS: Mod: HCNC | Performed by: NURSE ANESTHETIST, CERTIFIED REGISTERED

## 2025-01-15 PROCEDURE — 27201089 HC SNARE, DISP (ANY): Mod: HCNC | Performed by: INTERNAL MEDICINE

## 2025-01-15 RX ORDER — SODIUM CHLORIDE 9 MG/ML
INJECTION, SOLUTION INTRAVENOUS CONTINUOUS
Status: DISCONTINUED | OUTPATIENT
Start: 2025-01-15 | End: 2025-01-15 | Stop reason: HOSPADM

## 2025-01-15 RX ORDER — PROPOFOL 10 MG/ML
VIAL (ML) INTRAVENOUS CONTINUOUS PRN
Status: DISCONTINUED | OUTPATIENT
Start: 2025-01-15 | End: 2025-01-15

## 2025-01-15 RX ORDER — LIDOCAINE HYDROCHLORIDE 20 MG/ML
INJECTION INTRAVENOUS
Status: DISCONTINUED | OUTPATIENT
Start: 2025-01-15 | End: 2025-01-15

## 2025-01-15 RX ADMIN — SODIUM CHLORIDE: 0.9 INJECTION, SOLUTION INTRAVENOUS at 10:01

## 2025-01-15 RX ADMIN — PROPOFOL 150 MCG/KG/MIN: 10 INJECTION, EMULSION INTRAVENOUS at 10:01

## 2025-01-15 RX ADMIN — LIDOCAINE HYDROCHLORIDE 100 MG: 20 INJECTION, SOLUTION INTRAVENOUS at 10:01

## 2025-01-15 RX ADMIN — PROPOFOL 40 MG: 10 INJECTION, EMULSION INTRAVENOUS at 10:01

## 2025-01-15 NOTE — H&P
Short Stay Endoscopy History and Physical    PCP - Lars Payan MD    Procedure - Colonoscopy  ASA - III  Mallampati - per anesthesia  History of Anesthesia problems - no  Family history Anesthesia problems - no     HPI:  This is a 69 y.o. male here for evaluation of : Colon polyps    Pt here today for surveillance of prior colon polyps. The most recent exam was in 2021, at which time patients recalls having polyps removed. Since patient denies change in bowel habits or overt blood in stool. Denies weight loss.     ROS:  Constitutional: No fevers, chills, No weight loss  ENT: No allergies  CV: No chest pain  Pulm: No shortness of breath  GI: see HPI  Derm: No rash    Medical History:  has a past medical history of AR (allergic rhinitis), Gouty arthritis, colonic polyps, MURTAZA (obstructive sleep apnea), Other and unspecified hyperlipidemia, Stroke, and Unspecified essential hypertension.    Surgical History:  has a past surgical history that includes Foot surgery (Bilateral); surgery on jaw; Arthroscopic repair of rotator cuff of shoulder (Left, 04/30/2019); Arthroscopy of shoulder with decompression of subacromial space (04/30/2019); Rotator cuff repair (Right, 08/04/2020); and Colonoscopy (N/A, 09/21/2021).    Family History: family history includes Alcohol abuse in his brother; Cancer in his brother, sister, and son; Diabetes in his mother, sister, and sister; Glaucoma in his mother; Heart disease in his mother and sister; Hyperlipidemia in his mother; Hypertension in his mother and sister; No Known Problems in his father; Stomach cancer in his sister and son; Stroke in his sister.. Otherwise no colon cancer, inflammatory bowel disease, or GI malignancies.    Social History:  reports that he has never smoked. He has never been exposed to tobacco smoke. He has never used smokeless tobacco. He reports that he does not currently use alcohol after a past usage of about 9.3 standard drinks of alcohol per week. He  reports current drug use. Drug: Marijuana.    Review of patient's allergies indicates:  No Known Allergies    Medications:   Medications Prior to Admission   Medication Sig Dispense Refill Last Dose/Taking    allopurinoL (ZYLOPRIM) 300 MG tablet Take 1 tablet (300 mg total) by mouth once daily. 90 tablet 3     amLODIPine-benazepriL (LOTREL) 10-40 mg per capsule Take 1 capsule by mouth once daily. 90 capsule 3     aspirin (ECOTRIN) 81 MG EC tablet Take 81 mg by mouth once daily.       atorvastatin (LIPITOR) 80 MG tablet Take 1 tablet (80 mg total) by mouth once daily. 90 tablet 3     benzonatate (TESSALON) 100 MG capsule TAKE 1 CAPSULE BY MOUTH THREE TIMES A DAY AS NEEDED FOR COUGH 30 capsule 0     benzonatate (TESSALON) 200 MG capsule Take by mouth.       diclofenac sodium (VOLTAREN) 1 % Gel APPLY 2 GRAMS TO AFFECTED AREA ONCE DAILY. 100 g 0     gabapentin (NEURONTIN) 300 MG capsule Take 1 capsule (300 mg total) by mouth 2 (two) times daily AND 3 capsules (900 mg total) every evening. 150 capsule 11     hydroCHLOROthiazide (HYDRODIURIL) 12.5 MG Tab Take 1 tablet (12.5 mg total) by mouth once daily. 90 tablet 3     meloxicam (MOBIC) 15 MG tablet Take 1 tablet (15 mg total) by mouth once daily. 90 tablet 0     promethazine-dextromethorphan (PROMETHAZINE-DM) 6.25-15 mg/5 mL Syrp TAKE 5 MLS BY MOUTH 2 (TWO) TIMES DAILY AS NEEDED (COUGH). 180 mL 1     sildenafiL (VIAGRA) 100 MG tablet Take 1 tablet (100 mg total) by mouth daily as needed for Erectile Dysfunction. 20 tablet 11     tamsulosin (FLOMAX) 0.4 mg Cap Take 1 capsule (0.4 mg total) by mouth once daily. 90 capsule 3     tiZANidine (ZANAFLEX) 4 MG tablet Take 1 tablet (4 mg total) by mouth nightly as needed (muscle spasms). 60 tablet 2     triamcinolone acetonide 0.1% (KENALOG) 0.1 % ointment Apply topically 2 (two) times daily. 453 g 0          Objective Findings:    Vital Signs: see nursing notes  Physical Exam:  General Appearance: Well appearing in no acute  distress  Eyes:    No scleral icterus  ENT: Neck supple  Lungs: CTA anteriorly  Heart:  S1, S2 normal, no murmurs heard  Abdomen: Soft, non tender, non distended with positive bowel sounds. No hepatosplenomegaly, ascites, or mass  Extremities: no edema  Skin: No rash      Labs:  Lab Results   Component Value Date    WBC 5.03 12/04/2024    HGB 15.5 12/04/2024    HCT 48.1 12/04/2024     12/04/2024    CHOL 171 12/04/2024    TRIG 71 12/04/2024    HDL 43 12/04/2024    ALT 25 12/04/2024    AST 23 12/04/2024     12/04/2024    K 4.2 12/04/2024     12/04/2024    CREATININE 1.2 12/04/2024    BUN 12 12/04/2024    CO2 23 12/04/2024    TSH 0.740 03/20/2015    PSA 3.1 09/04/2024    INR 1.1 03/20/2015    HGBA1C 6.1 (H) 12/04/2024       I have explained the risks and benefits of endoscopy procedures to the patient including but not limited to bleeding, perforation, infection, and death.    Mario Brink MD

## 2025-01-15 NOTE — ANESTHESIA PREPROCEDURE EVALUATION
[facility]  Anesthesia Pre-Operative Evaluation         Patient Name: Anjel Sanders  YOB: 1955  MRN: [id number]    SUBJECTIVE:     Pre-operative evaluation for Procedure(s) (LRB):  COLONOSCOPY, SCREENING, HIGH RISK PATIENT (N/A)     01/15/2025    Anjel Sanders is a 69 y.o. male w/ a significant PMHx of seen below.    Patient now presents for the above procedure(s).    TTE:   No echocardiogram results found for the past 12 months     Patient Active Problem List   Diagnosis    Gouty arthritis    MURTAZA (obstructive sleep apnea)    Hx of colonic polyps    Ocular hypertension - Both Eyes    ED (erectile dysfunction)    Hemiparesis affecting right side as late effect of cerebrovascular accident    Muscle weakness of right upper extremity    Stiffness of right shoulder joint    Hypertension complicating diabetes    Obesity, diabetes, and hypertension syndrome    Low back pain associated with a spinal disorder other than radiculopathy or spinal stenosis    History of stroke    Controlled type 2 diabetes mellitus without complication, without long-term current use of insulin    Complete tear of left rotator cuff    Traumatic complete tear of right rotator cuff    Chronic kidney disease, stage 3a    Dyslipidemia associated with type 2 diabetes mellitus    Cough    BPH (benign prostatic hyperplasia)    Acute epididymitis       Review of patient's allergies indicates:  No Known Allergies    Current Inpatient Medications:      No current facility-administered medications on file prior to encounter.     Current Outpatient Medications on File Prior to Encounter   Medication Sig Dispense Refill    aspirin (ECOTRIN) 81 MG EC tablet Take 81 mg by mouth once daily.      benzonatate (TESSALON) 100 MG capsule TAKE 1 CAPSULE BY MOUTH THREE TIMES A DAY AS NEEDED FOR COUGH 30 capsule 0    benzonatate (TESSALON) 200 MG capsule Take by mouth.      diclofenac sodium (VOLTAREN) 1 % Gel APPLY 2 GRAMS TO  AFFECTED AREA ONCE DAILY. 100 g 0    promethazine-dextromethorphan (PROMETHAZINE-DM) 6.25-15 mg/5 mL Syrp TAKE 5 MLS BY MOUTH 2 (TWO) TIMES DAILY AS NEEDED (COUGH). 180 mL 1    sildenafiL (VIAGRA) 100 MG tablet Take 1 tablet (100 mg total) by mouth daily as needed for Erectile Dysfunction. 20 tablet 11    tiZANidine (ZANAFLEX) 4 MG tablet Take 1 tablet (4 mg total) by mouth nightly as needed (muscle spasms). 60 tablet 2    triamcinolone acetonide 0.1% (KENALOG) 0.1 % ointment Apply topically 2 (two) times daily. 453 g 0       Past Surgical History:   Procedure Laterality Date    ARTHROSCOPIC REPAIR OF ROTATOR CUFF OF SHOULDER Left 04/30/2019    Procedure: REPAIR, ROTATOR CUFF, ARTHROSCOPIC;  Surgeon: Bob Esparza Jr., MD;  Location: [facility] OR;  Service: Orthopedics;  Laterality: Left;  need opus sytem (Jluis notified)  video    ARTHROSCOPY OF SHOULDER WITH DECOMPRESSION OF SUBACROMIAL SPACE  04/30/2019    Procedure: ARTHROSCOPY, SHOULDER, WITH SUBACROMIAL SPACE DECOMPRESSION;  Surgeon: Bob Esparza Jr., MD;  Location: [facility] OR;  Service: Orthopedics;;    COLONOSCOPY N/A 09/21/2021    Procedure: COLONOSCOPY;  Surgeon: Asif López MD;  Location: [facility] ENDO;  Service: Endoscopy;  Laterality: N/A;    FOOT SURGERY Bilateral     ROTATOR CUFF REPAIR Right 08/04/2020    Procedure: REPAIR, ROTATOR CUFF;  Surgeon: Bob Esparza Jr., MD;  Location: [facility] OR;  Service: Orthopedics;  Laterality: Right;  open no scope  need arthrex anchors (Marylin notified per [place] 7/21, EF) Confirmed with Harry 1000 8/3/2020 KB    surgery on jaw         OBJECTIVE:     Vital Signs Range (Last 24H):         Significant Labs:  Lab Results   Component Value Date    WBC 5.03 12/04/2024    HGB 15.5 12/04/2024    HCT 48.1 12/04/2024     12/04/2024    CHOL 171 12/04/2024    TRIG 71 12/04/2024    HDL 43 12/04/2024    ALT 25 12/04/2024    AST 23 12/04/2024     12/04/2024    K 4.2 12/04/2024     12/04/2024    CREATININE 1.2  12/04/2024    BUN 12 12/04/2024    CO2 23 12/04/2024    TSH 0.740 03/20/2015    PSA 3.1 09/04/2024    INR 1.1 03/20/2015    HGBA1C 6.1 (H) 12/04/2024       Diagnostic Studies: No relevant studies.    EKG:   Results for orders placed or performed during the hospital encounter of 07/31/20   EKG 12-lead    Collection Time: 07/31/20  8:39 AM    Narrative    Test Reason : M25.511,S46.011D,    Vent. Rate : 061 BPM     Atrial Rate : 061 BPM     P-R Int : 156 ms          QRS Dur : 086 ms      QT Int : 418 ms       P-R-T Axes : 030 049 -14 degrees     QTc Int : 420 ms    Normal sinus rhythm  Nonspecific ST and T wave abnormality  Abnormal ECG  When compared with ECG of 25-APR-2019 11:21,  Inverted T waves have replaced nonspecific T wave abnormality in Lateral  leads  Confirmed by Raul HOUSE MD, Jefry GRIFFITH ([id number]) on 7/31/2020 9:10:39 AM    Referred By: NADEEN VASQUEZ           Confirmed By:Jefry Carter III, MD       ASSESSMENT/PLAN:       Pre-op Assessment    I have reviewed the Patient Summary Reports.     I have reviewed the Nursing Notes. I have reviewed the NPO Status.   I have reviewed the Medications.     Review of Systems  Anesthesia Hx:  No problems with previous Anesthesia               Denies Personal Hx of Anesthesia complications.                    Social:  Non-Smoker       Hematology/Oncology:       -- Denies Anemia:                  Denies Current/Recent Cancer                Cardiovascular:  Exercise tolerance: good   Hypertension   Denies MI.  Denies CAD.     Denies Dysrhythmias.   CHF    no hyperlipidemia   ECG has been reviewed. HFpEF   Functional Capacity good / => 4 METS                   Hypertension         Pulmonary:    Denies COPD.  Denies Asthma.   Denies Shortness of breath.  Sleep Apnea     Obstructive Sleep Apnea (MURTAZA).           Renal/:  Chronic Renal Disease, CKD        Kidney Function/Disease             Hepatic/GI:      Denies GERD.                Musculoskeletal:  Arthritis         Arthritis          Neurological:  Denies TIA. CVA    Denies Seizures.        Arthritis              CVA - Cerebrovasular Accident                 Endocrine:  Diabetes, type 2    Diabetes                          Physical Exam  General: Well nourished, Cooperative, Alert and Oriented    Airway:  Mallampati: IV   Mouth Opening: Normal  TM Distance: Normal  Tongue: Normal  Neck ROM: Normal ROM    Dental:  Dentures        Anesthesia Plan  Type of Anesthesia, risks & benefits discussed:    Anesthesia Type: Gen Natural Airway  Intra-op Monitoring Plan: Standard ASA Monitors  Post Op Pain Control Plan: multimodal analgesia  Induction:  IV  Informed Consent: Informed consent signed with the Patient and all parties understand the risks and agree with anesthesia plan.  All questions answered.   ASA Score: 3  Day of Surgery Review of History & Physical: H&P Update referred to the surgeon/provider.    Ready For Surgery From Anesthesia Perspective.     .

## 2025-01-15 NOTE — TRANSFER OF CARE
"Anesthesia Transfer of Care Note    Patient: Anjel Sanders    Procedure(s) Performed: Procedure(s) (LRB):  COLONOSCOPY, SCREENING, HIGH RISK PATIENT (N/A)    Patient location: GI    Anesthesia Type: general    Transport from OR: Transported from OR on room air with adequate spontaneous ventilation    Post pain: adequate analgesia    Post assessment: no apparent anesthetic complications and tolerated procedure well    Post vital signs: stable    Level of consciousness: awake and alert    Nausea/Vomiting: no nausea/vomiting    Complications: none    Transfer of care protocol was followed      Last vitals: Visit Vitals  BP (!) 176/82 (BP Location: Left arm, Patient Position: Lying)   Pulse (!) 57   Temp 37.1 °C (98.7 °F) (Temporal)   Resp 17   Ht 5' 10" (1.778 m)   Wt 96.2 kg (212 lb)   SpO2 98%   BMI 30.42 kg/m²     "

## 2025-01-15 NOTE — PROVATION PATIENT INSTRUCTIONS
Discharge Summary/Instructions after an Endoscopic Procedure  Patient Name: Anjel Sanders  Patient MRN: [id number]  Patient YOB: 1955  Wednesday, January 15, 2025  Mario Brink MD  Dear patient,  As a result of recent federal legislation (The Federal Cures Act), you may   receive lab or pathology results from your procedure in your [facility]   account before your physician is able to contact you. Your physician or   their representative will relay the results to you with their   recommendations at their soonest availability.  Thank you,  Your health is very important to us during the Covid Crisis. Following your   procedure today, you will receive a daily text for 2 weeks asking about   signs or symptoms of Covid 19.  Please respond to this text when you   receive it so we can follow up and keep you as safe as possible.   RESTRICTIONS:  During your procedure today, you received medications for sedation.  These   medications may affect your judgment, balance and coordination.  Therefore,   for 24 hours, you have the following restrictions:   - DO NOT drive a car, operate machinery, make legal/financial decisions,   sign important papers or drink alcohol.    ACTIVITY:  Today: no heavy lifting, straining or running due to procedural   sedation/anesthesia.  The following day: return to full activity including work.  DIET:  Eat and drink normally unless instructed otherwise.     TREATMENT FOR COMMON SIDE EFFECTS:  - Mild abdominal pain, nausea, belching, bloating or excessive gas:  rest,   eat lightly and use a heating pad.  - Sore Throat: treat with throat lozenges and/or gargle with warm salt   water.  - Because air was used during the procedure, expelling large amounts of air   from your rectum or belching is normal.  - If a bowel prep was taken, you may not have a bowel movement for 1-3 days.    This is normal.  SYMPTOMS TO WATCH FOR AND REPORT TO YOUR PHYSICIAN:  1. Abdominal pain or bloating,  other than gas cramps.  2. Chest pain.  3. Back pain.  4. Signs of infection such as: chills or fever occurring within 24 hours   after the procedure.  5. Rectal bleeding, which would show as bright red, maroon, or black stools.   (A tablespoon of blood from the rectum is not serious, especially if   hemorrhoids are present.)  6. Vomiting.  7. Weakness or dizziness.  GO DIRECTLY TO THE NEAREST EMERGENCY ROOM IF YOU HAVE ANY OF THE FOLLOWING:      Difficulty breathing              Chills and/or fever over 101 F   Persistent vomiting and/or vomiting blood   Severe abdominal pain   Severe chest pain   Black, tarry stools   Bleeding- more than one tablespoon   Any other symptom or condition that you feel may need urgent attention  Your doctor recommends these additional instructions:  If any biopsies were taken, your doctors clinic will contact you in 1 to 2   weeks with any results.  - Discharge patient to home (ambulatory).   - Patient has a contact number available for emergencies.  The signs and   symptoms of potential delayed complications were discussed with the   patient.  Return to normal activities tomorrow.  Written discharge   instructions were provided to the patient.   - Resume previous diet.   - Continue present medications.   - Return to primary care physician as previously scheduled.   - Repeat colonoscopy in 5 years for surveillance.  For questions, problems or results please call your physician - Mario Brink MD.  EMERGENCY PHONE NUMBER: [phone],  LAB RESULTS: [phone]  IF A COMPLICATION OR EMERGENCY SITUATION ARISES AND YOU ARE UNABLE TO REACH   YOUR PHYSICIAN - GO DIRECTLY TO THE EMERGENCY ROOM.  Mario Brink MD  1/15/2025 10:47:06 AM  This report has been verified and signed electronically.  Dear patient,  As a result of recent federal legislation (The Federal Cures Act), you may   receive lab or pathology results from your procedure in your [facility]   account  before your physician is able to contact you. Your physician or   their representative will relay the results to you with their   recommendations at their soonest availability.  Thank you,  PROVATION

## 2025-01-15 NOTE — ANESTHESIA POSTPROCEDURE EVALUATION
Anesthesia Post Evaluation    Patient: Anjel Sanders    Procedure(s) Performed: Procedure(s) (LRB):  COLONOSCOPY, SCREENING, HIGH RISK PATIENT (N/A)    Final Anesthesia Type: general      Patient location during evaluation: GI PACU  Patient participation: Yes- Able to Participate  Level of consciousness: awake and alert and oriented  Post-procedure vital signs: reviewed and stable  Pain management: adequate  Airway patency: patent    PONV status at discharge: No PONV  Anesthetic complications: no      Cardiovascular status: hemodynamically stable  Respiratory status: spontaneous ventilation and unassisted  Hydration status: euvolemic  Follow-up not needed.              Vitals Value Taken Time   /76 01/15/25 1100   Temp 36.8 °C (98.2 °F) 01/15/25 1045   Pulse 48 01/15/25 1100   Resp 13 01/15/25 1100   SpO2 99 % 01/15/25 1100         No case tracking events are documented in the log.      Pain/Alden Score: Alden Score: 10 (1/15/2025 11:00 AM)

## 2025-01-16 ENCOUNTER — OFFICE VISIT (OUTPATIENT)
Dept: OPHTHALMOLOGY | Facility: CLINIC | Age: 70
End: 2025-01-16
Payer: MEDICARE

## 2025-01-16 DIAGNOSIS — H52.03 HYPEROPIA WITH PRESBYOPIA OF BOTH EYES: ICD-10-CM

## 2025-01-16 DIAGNOSIS — H40.053 BORDERLINE GLAUCOMA OF BOTH EYES WITH OCULAR HYPERTENSION: ICD-10-CM

## 2025-01-16 DIAGNOSIS — H52.4 HYPEROPIA WITH PRESBYOPIA OF BOTH EYES: ICD-10-CM

## 2025-01-16 DIAGNOSIS — H40.023 OPEN ANGLE WITH BORDERLINE FINDINGS AND HIGH GLAUCOMA RISK IN BOTH EYES: Primary | ICD-10-CM

## 2025-01-16 DIAGNOSIS — H25.13 NUCLEAR SCLEROTIC CATARACT OF BOTH EYES: ICD-10-CM

## 2025-01-16 LAB
FINAL PATHOLOGIC DIAGNOSIS: NORMAL
GROSS: NORMAL
Lab: NORMAL

## 2025-01-16 PROCEDURE — 1101F PT FALLS ASSESS-DOCD LE1/YR: CPT | Mod: HCNC,CPTII,S$GLB, | Performed by: OPHTHALMOLOGY

## 2025-01-16 PROCEDURE — 92020 GONIOSCOPY: CPT | Mod: HCNC,S$GLB,, | Performed by: OPHTHALMOLOGY

## 2025-01-16 PROCEDURE — 1160F RVW MEDS BY RX/DR IN RCRD: CPT | Mod: HCNC,CPTII,S$GLB, | Performed by: OPHTHALMOLOGY

## 2025-01-16 PROCEDURE — 99999 PR PBB SHADOW E&M-EST. PATIENT-LVL III: CPT | Mod: PBBFAC,HCNC,, | Performed by: OPHTHALMOLOGY

## 2025-01-16 PROCEDURE — 99204 OFFICE O/P NEW MOD 45 MIN: CPT | Mod: HCNC,S$GLB,, | Performed by: OPHTHALMOLOGY

## 2025-01-16 PROCEDURE — 1126F AMNT PAIN NOTED NONE PRSNT: CPT | Mod: HCNC,CPTII,S$GLB, | Performed by: OPHTHALMOLOGY

## 2025-01-16 PROCEDURE — 3288F FALL RISK ASSESSMENT DOCD: CPT | Mod: HCNC,CPTII,S$GLB, | Performed by: OPHTHALMOLOGY

## 2025-01-16 PROCEDURE — 1159F MED LIST DOCD IN RCRD: CPT | Mod: HCNC,CPTII,S$GLB, | Performed by: OPHTHALMOLOGY

## 2025-01-16 NOTE — PROGRESS NOTES
HPI    DLS: 5/29/2024 With Dr. Stewart    Pt here for Narrow angle/Glaucoma Eval per Dr. Stewart;  Pt states no eye pain but vision isn't as clear. Pt's wife sees Dr. Saha seeing her today as well.     Meds;  No GTTS    Last edited by Mary Milton on 1/16/2025  9:45 AM.            Assessment /Plan     For exam results, see Encounter Report.    Open angle with borderline findings and high glaucoma risk in both eyes    Borderline glaucoma of both eyes with ocular hypertension    Nuclear sclerotic cataract of both eyes    Hyperopia with presbyopia of both eyes        Pts wife is a long established patient of mine   ?? Narrow angles per Dr Stewart        Glaucoma (type and duration)    suspect w/ OHT   First HVF   1 old test 11/17/2020 - ? Early inf defect od // full os    First photos   10/6/2020   Treatment / Drops started   latanoprost 1/16/2025           Family history    + mother         Glaucoma meds    latanoprost started 1/16/2025         H/O adverse rxn to glaucoma drops    none        LASERS    none        GLAUCOMA SURGERIES    none        OTHER EYE SURGERIES    none        CDR  per photos 10/6/2010 _0.65/~0.6        Tbase    - range 16-28 od // 20-25 os           Tmax    28/25            Ttarget    ?             HVF    1 old test  test 2010  - neer full - ? Inf defect od // full os         Gonio    +3 with some ant bowing // SS visb 360 ou        CCT    608/618 (thick ou)         OCT    1 old test  test 2010  RNFL - nl od // nl os        Disc photos    10/6/2020 (~0.65 od / ~ 0.6 os)     - Ttoday    28/25   - Test done today    IOP // gonio // chart review     (Pt had to leave after gonio - has to go  a sick grand child w/ high fever from school )     2. NS ou     3. Type 2 diabetes - no retinopathy     4. Hyperopia / presbyopia     PLAN   OHT vs POAG  Pernell ope  Rec start latanoprost ou     F/U 2-3 with HVF 24-2 ss (one old test 11/210) // DFE // photos // IOP check on latanoprost

## 2025-01-17 RX ORDER — LATANOPROST 50 UG/ML
1 SOLUTION/ DROPS OPHTHALMIC NIGHTLY
Qty: 2.5 ML | Refills: 12 | Status: SHIPPED | OUTPATIENT
Start: 2025-01-17 | End: 2026-01-17

## 2025-01-24 DIAGNOSIS — R05.8 ALLERGIC COUGH: ICD-10-CM

## 2025-01-24 DIAGNOSIS — M43.16 SPONDYLOLISTHESIS OF LUMBAR REGION: ICD-10-CM

## 2025-01-24 DIAGNOSIS — M54.16 LUMBAR RADICULOPATHY: ICD-10-CM

## 2025-01-24 RX ORDER — PROMETHAZINE HYDROCHLORIDE AND DEXTROMETHORPHAN HYDROBROMIDE 6.25; 15 MG/5ML; MG/5ML
SYRUP ORAL
Qty: 180 ML | Refills: 1 | Status: SHIPPED | OUTPATIENT
Start: 2025-01-24

## 2025-01-24 RX ORDER — TIZANIDINE 4 MG/1
TABLET ORAL
Qty: 90 TABLET | Refills: 1 | Status: SHIPPED | OUTPATIENT
Start: 2025-01-24

## 2025-01-24 NOTE — TELEPHONE ENCOUNTER
Care Due:                  Date            Visit Type   Department     Provider  --------------------------------------------------------------------------------                                EP [facility]    Lars Guadarramagt  Last Visit: 12-      CARE ([facility])   MEDICINE       Ora                              [facility]  Next Visit: 06-      CARE ([facility])   MEDICINE       Ora                                                            Last  Test          Frequency    Reason                     Performed    Due Date  --------------------------------------------------------------------------------    Uric Acid...  12 months..  allopurinoL..............  Not Found    Overdue    [employer] Embedded Care Due Messages. Reference number: [id number].   1/24/2025 2:51:13 PM CST

## 2025-01-24 NOTE — TELEPHONE ENCOUNTER
Refill Routing Note   Medication(s) are not appropriate for processing by [facility] for the following reason(s):        Outside of protocol    [facility] action(s):  Route     Requires labs : Yes             Appointments  past 12m or future 3m with PCP    Date Provider   Last Visit   12/4/2024 Lars Payan MD   Next Visit   6/4/2025 Lars Payan MD   ED visits in past 90 days: 0        Note composed:4:13 PM 01/24/2025

## 2025-02-05 ENCOUNTER — TELEPHONE (OUTPATIENT)
Dept: GASTROENTEROLOGY | Facility: CLINIC | Age: 70
End: 2025-02-05
Payer: MEDICARE

## 2025-02-05 NOTE — TELEPHONE ENCOUNTER
Spoke with pt about test results. Verbal understanding.         ----- Message from Mario Brink MD sent at 1/16/2025  9:49 AM CST -----  Pathology from recent colonoscopy reviewed.  Colon polyp(s) benign.  Repeat colonoscopy in 5 years.

## 2025-02-22 DIAGNOSIS — Z00.00 ENCOUNTER FOR MEDICARE ANNUAL WELLNESS EXAM: ICD-10-CM

## 2025-03-15 NOTE — PROGRESS NOTES
HPI     Glaucoma            Comments: HVF review today and pt feels he is  not seeing as well even   with his glasses on          Comments    DLS: 1/16/25    1. OHT vs POAG OU  2. NS OU  3. Type 2 DM no DR  4. Hyperopia and Presbyopia    MEDS:  Latanoprost QHS OU = PT STATES HE NEVER STARTED DROPS          Last edited by Yoselin Oh MA on 3/20/2025 10:56 AM.            Assessment /Plan     For exam results, see Encounter Report.    Primary open-angle glaucoma, right eye, severe stage    Primary open-angle glaucoma, left eye, moderate stage    Nuclear sclerotic cataract of both eyes    Type 2 diabetes mellitus without ophthalmic manifestations    Hyperopia with presbyopia of both eyes        Pts wife is a long established patient of mine   ?? Narrow angles per Dr Stewart        Glaucoma (type and duration)    suspect w/ OHT   First HVF   1 old test 11/17/2020 - ? Early inf defect od // full os    First photos   10/6/2020   Treatment / Drops started   latanoprost 1/16/2025           Family history    + mother         Glaucoma meds    latanoprost started 1/16/2025         H/O adverse rxn to glaucoma drops    none        LASERS    none        GLAUCOMA SURGERIES    none        OTHER EYE SURGERIES    none        CDR  per photos 10/6/2010 _0.65/~0.6 // 0.9/0.7 by photos (3/2025)         Tbase    - range 16-28 od // 20-25 os           Tmax    28/25            Ttarget    ?             HVF    2 old test  test 2010 - 2012   - near full - od // full os    New base - 1 test 2025 to 2025 - dense SAD / almost SALT w/ split fix and IAD od // gen dep w/ lens rim art os         Gonio    +3 with some ant bowing // SS visb 360 ou        CCT    608/618 (thick ou)         OCT    1 old test  test 2010  RNFL - nl od // nl os        Disc photos    10/6/2020 (~0.65 od / ~ 0.6 os)     - Ttoday    34/32 ( DID NOT  glaucoma drops and start ) - up from 28/25   - Test done today    IOP // HVF/ DFE / photos         2. NS ou     3. Type 2  diabetes - no retinopathy     4. Hyperopia / presbyopia     PLAN   OHT vs POAG  Pernell solis  Rec start latanoprost ou - re-send rx (never picked it up )   Add brimonidine - tid ou     F/U 6-8 weeks - IOP check // gonio //  OCT - do NOT dilate

## 2025-03-20 ENCOUNTER — CLINICAL SUPPORT (OUTPATIENT)
Dept: OPHTHALMOLOGY | Facility: CLINIC | Age: 70
End: 2025-03-20
Payer: MEDICARE

## 2025-03-20 ENCOUNTER — OFFICE VISIT (OUTPATIENT)
Dept: OPHTHALMOLOGY | Facility: CLINIC | Age: 70
End: 2025-03-20
Payer: MEDICARE

## 2025-03-20 DIAGNOSIS — H52.4 HYPEROPIA WITH PRESBYOPIA OF BOTH EYES: ICD-10-CM

## 2025-03-20 DIAGNOSIS — H25.13 NUCLEAR SCLEROTIC CATARACT OF BOTH EYES: ICD-10-CM

## 2025-03-20 DIAGNOSIS — H40.1113 PRIMARY OPEN-ANGLE GLAUCOMA, RIGHT EYE, SEVERE STAGE: Primary | ICD-10-CM

## 2025-03-20 DIAGNOSIS — H52.03 HYPEROPIA WITH PRESBYOPIA OF BOTH EYES: ICD-10-CM

## 2025-03-20 DIAGNOSIS — E11.9 TYPE 2 DIABETES MELLITUS WITHOUT OPHTHALMIC MANIFESTATIONS: ICD-10-CM

## 2025-03-20 DIAGNOSIS — H40.023 OPEN ANGLE WITH BORDERLINE FINDINGS AND HIGH GLAUCOMA RISK IN BOTH EYES: ICD-10-CM

## 2025-03-20 DIAGNOSIS — H40.1122 PRIMARY OPEN-ANGLE GLAUCOMA, LEFT EYE, MODERATE STAGE: ICD-10-CM

## 2025-03-20 PROCEDURE — 3288F FALL RISK ASSESSMENT DOCD: CPT | Mod: HCNC,CPTII,S$GLB, | Performed by: OPHTHALMOLOGY

## 2025-03-20 PROCEDURE — 92083 EXTENDED VISUAL FIELD XM: CPT | Mod: HCNC,S$GLB,, | Performed by: OPHTHALMOLOGY

## 2025-03-20 PROCEDURE — 99999 PR PBB SHADOW E&M-EST. PATIENT-LVL III: CPT | Mod: PBBFAC,HCNC,, | Performed by: OPHTHALMOLOGY

## 2025-03-20 PROCEDURE — 1159F MED LIST DOCD IN RCRD: CPT | Mod: HCNC,CPTII,S$GLB, | Performed by: OPHTHALMOLOGY

## 2025-03-20 PROCEDURE — 1160F RVW MEDS BY RX/DR IN RCRD: CPT | Mod: HCNC,CPTII,S$GLB, | Performed by: OPHTHALMOLOGY

## 2025-03-20 PROCEDURE — 92250 FUNDUS PHOTOGRAPHY W/I&R: CPT | Mod: HCNC,S$GLB,, | Performed by: OPHTHALMOLOGY

## 2025-03-20 PROCEDURE — 99214 OFFICE O/P EST MOD 30 MIN: CPT | Mod: HCNC,S$GLB,, | Performed by: OPHTHALMOLOGY

## 2025-03-20 PROCEDURE — 1101F PT FALLS ASSESS-DOCD LE1/YR: CPT | Mod: HCNC,CPTII,S$GLB, | Performed by: OPHTHALMOLOGY

## 2025-03-20 RX ORDER — BRIMONIDINE TARTRATE 2 MG/ML
1 SOLUTION/ DROPS OPHTHALMIC 3 TIMES DAILY
Qty: 10 ML | Refills: 12 | Status: SHIPPED | OUTPATIENT
Start: 2025-03-20

## 2025-03-20 RX ORDER — LATANOPROST 50 UG/ML
1 SOLUTION/ DROPS OPHTHALMIC NIGHTLY
Qty: 2.5 ML | Refills: 12 | Status: SHIPPED | OUTPATIENT
Start: 2025-03-20 | End: 2026-03-20

## 2025-04-29 NOTE — PROGRESS NOTES
Subjective:       Patient ID: Anjel Sanders is a 69 y.o. male.    Chief Complaint: ED     This is a 69 y.o.  male patient that is an established patient of mine. The patient was referred to me by Ana Laura Potts NP for right epididymitis.   Scrotal US 12/19/24-- showed right epididymitis, bilateral small hydroceles, left 6mm epididymal cyst. No torsion or hernia.  Treated with ciprofloxacin.  Patient here with complaints of erectile dysfunction.  He endorses difficulty getting and maintaining erections.  He is not able to reliably achieve a strong enough erection for intercourse.   FOREST: 6/25  Previous ED treatment: yes, viagra was prescribed but never tried this because of the cost. Denies taking nitroglycerins.  Patient endorses no additional complaints at this time.     LAST PSA  Lab Results   Component Value Date    PSA 3.1 09/04/2024    PSA 2.1 06/17/2023    PSA 3.0 06/16/2022    PSA 2.1 06/21/2021    PSA 1.6 01/04/2020    PSA 1.3 11/27/2018    PSA 1.4 11/03/2017    PSA 0.94 08/13/2014    PSA 1.1 08/30/2013    PSA 1.06 10/20/2012    PSA 3.1 09/24/2011    PSA 0.82 08/13/2010    PSA 0.78 07/27/2009    PSA 0.7 07/26/2007       Lab Results   Component Value Date    CREATININE 1.2 12/04/2024       ---  PMH/PSH/Medications/Allergies/Social history reviewed and as in chart.    Review of Systems   Constitutional:  Negative for activity change, chills and fever.   Respiratory:  Negative for shortness of breath.    Cardiovascular:  Negative for chest pain and palpitations.   Gastrointestinal:  Negative for abdominal pain and constipation.   Genitourinary:  Negative for difficulty urinating, dysuria, flank pain, frequency, hematuria and urgency.   Neurological:  Negative for dizziness and light-headedness.       Objective:      Physical Exam  HENT:      Head: Normocephalic.   Pulmonary:      Effort: Pulmonary effort is normal.   Abdominal:      General: Abdomen is flat.      Palpations: Abdomen is soft.    Genitourinary:     Epididymis:      Right: Not enlarged. No tenderness.      Left: Not enlarged. No tenderness.   Musculoskeletal:         General: Normal range of motion.      Cervical back: Normal range of motion.   Skin:     General: Skin is warm and dry.   Neurological:      Mental Status: He is alert and oriented to person, place, and time.         Assessment:     Problem Noted   Ed (Erectile Dysfunction) 8/27/2013    Cialis 20mg PRN     Acute Epididymitis (Resolved) 1/2/2025       Plan:     Erectile Dysfunction   We discussed the etiology and management of ED, including organic and psychogenic causes. First line therapy involves treatment with PDE-5 inhibitors.    - We discussed the risks and benefits of treatment with PDE5i's. Treatment with JUAN MANUEL was also briefly discussed.   - Next line therapies would include intraurethral suppository (125-1000 ug), then intracavernosal injections (1 ml trimix papaverine 30 mg, phentolamine 1 mg, prostaglandin E1 10 ug). Ultimately, a penile prosthesis would be the final option.   - We will initiate therapy with a PDE5i with Cialis 20mg PRN. Only sent 15 tablets, reviewed side effects of the medication, patient voiced understanding.   Follow-up 6 months or sooner if needed       RAIMUNDO Ruvalcaba spent a total of 25 minutes on the day of the visit.This includes face to face time and non-face to face time preparing to see the patient (eg, review of tests), obtaining and/or reviewing separately obtained history, documenting clinical information in the electronic or other health record, independently interpreting results and communicating results to the patient/family/caregiver, or care coordinator.

## 2025-04-30 ENCOUNTER — OFFICE VISIT (OUTPATIENT)
Dept: UROLOGY | Facility: CLINIC | Age: 70
End: 2025-04-30
Payer: MEDICARE

## 2025-04-30 VITALS
DIASTOLIC BLOOD PRESSURE: 82 MMHG | HEART RATE: 60 BPM | HEIGHT: 70 IN | SYSTOLIC BLOOD PRESSURE: 174 MMHG | BODY MASS INDEX: 30.75 KG/M2 | WEIGHT: 214.81 LBS

## 2025-04-30 DIAGNOSIS — N52.9 ERECTILE DYSFUNCTION, UNSPECIFIED ERECTILE DYSFUNCTION TYPE: Primary | ICD-10-CM

## 2025-04-30 PROBLEM — N45.1 ACUTE EPIDIDYMITIS: Status: RESOLVED | Noted: 2025-01-02 | Resolved: 2025-04-30

## 2025-04-30 PROCEDURE — 3077F SYST BP >= 140 MM HG: CPT | Mod: CPTII,HCNC,S$GLB,

## 2025-04-30 PROCEDURE — 1160F RVW MEDS BY RX/DR IN RCRD: CPT | Mod: CPTII,HCNC,S$GLB,

## 2025-04-30 PROCEDURE — 1126F AMNT PAIN NOTED NONE PRSNT: CPT | Mod: CPTII,HCNC,S$GLB,

## 2025-04-30 PROCEDURE — 3079F DIAST BP 80-89 MM HG: CPT | Mod: CPTII,HCNC,S$GLB,

## 2025-04-30 PROCEDURE — 4010F ACE/ARB THERAPY RXD/TAKEN: CPT | Mod: CPTII,HCNC,S$GLB,

## 2025-04-30 PROCEDURE — 3008F BODY MASS INDEX DOCD: CPT | Mod: CPTII,HCNC,S$GLB,

## 2025-04-30 PROCEDURE — 99213 OFFICE O/P EST LOW 20 MIN: CPT | Mod: HCNC,S$GLB,,

## 2025-04-30 PROCEDURE — 99999 PR PBB SHADOW E&M-EST. PATIENT-LVL IV: CPT | Mod: PBBFAC,HCNC,,

## 2025-04-30 PROCEDURE — 1159F MED LIST DOCD IN RCRD: CPT | Mod: CPTII,HCNC,S$GLB,

## 2025-04-30 RX ORDER — TADALAFIL 20 MG/1
20 TABLET ORAL DAILY
Qty: 15 TABLET | Refills: 11 | Status: SHIPPED | OUTPATIENT
Start: 2025-04-30 | End: 2025-10-27

## 2025-05-06 ENCOUNTER — OFFICE VISIT (OUTPATIENT)
Dept: PAIN MEDICINE | Facility: CLINIC | Age: 70
End: 2025-05-06
Payer: MEDICARE

## 2025-05-06 VITALS
BODY MASS INDEX: 31.18 KG/M2 | HEART RATE: 61 BPM | WEIGHT: 217.81 LBS | HEIGHT: 70 IN | DIASTOLIC BLOOD PRESSURE: 85 MMHG | SYSTOLIC BLOOD PRESSURE: 154 MMHG

## 2025-05-06 DIAGNOSIS — M47.819 SPONDYLOSIS WITHOUT MYELOPATHY: Primary | ICD-10-CM

## 2025-05-06 DIAGNOSIS — M47.897 OTHER SPONDYLOSIS, LUMBOSACRAL REGION: ICD-10-CM

## 2025-05-06 DIAGNOSIS — M47.816 OSTEOARTHRITIS OF LUMBAR SPINE WITHOUT MYELOPATHY OR RADICULOPATHY: ICD-10-CM

## 2025-05-06 DIAGNOSIS — M47.819 ARTHROPATHY OF FACET JOINTS AT MULTIPLE LEVELS: ICD-10-CM

## 2025-05-06 DIAGNOSIS — M54.16 LUMBAR RADICULOPATHY: ICD-10-CM

## 2025-05-06 PROCEDURE — 1159F MED LIST DOCD IN RCRD: CPT | Mod: CPTII,HCNC,S$GLB, | Performed by: NURSE PRACTITIONER

## 2025-05-06 PROCEDURE — 1101F PT FALLS ASSESS-DOCD LE1/YR: CPT | Mod: CPTII,HCNC,S$GLB, | Performed by: NURSE PRACTITIONER

## 2025-05-06 PROCEDURE — 99214 OFFICE O/P EST MOD 30 MIN: CPT | Mod: HCNC,S$GLB,, | Performed by: NURSE PRACTITIONER

## 2025-05-06 PROCEDURE — 3288F FALL RISK ASSESSMENT DOCD: CPT | Mod: CPTII,HCNC,S$GLB, | Performed by: NURSE PRACTITIONER

## 2025-05-06 PROCEDURE — 3008F BODY MASS INDEX DOCD: CPT | Mod: CPTII,HCNC,S$GLB, | Performed by: NURSE PRACTITIONER

## 2025-05-06 PROCEDURE — 99999 PR PBB SHADOW E&M-EST. PATIENT-LVL III: CPT | Mod: PBBFAC,HCNC,, | Performed by: NURSE PRACTITIONER

## 2025-05-06 PROCEDURE — 1125F AMNT PAIN NOTED PAIN PRSNT: CPT | Mod: CPTII,HCNC,S$GLB, | Performed by: NURSE PRACTITIONER

## 2025-05-06 PROCEDURE — 1160F RVW MEDS BY RX/DR IN RCRD: CPT | Mod: CPTII,HCNC,S$GLB, | Performed by: NURSE PRACTITIONER

## 2025-05-06 PROCEDURE — 4010F ACE/ARB THERAPY RXD/TAKEN: CPT | Mod: CPTII,HCNC,S$GLB, | Performed by: NURSE PRACTITIONER

## 2025-05-06 PROCEDURE — 3077F SYST BP >= 140 MM HG: CPT | Mod: CPTII,HCNC,S$GLB, | Performed by: NURSE PRACTITIONER

## 2025-05-06 PROCEDURE — 3079F DIAST BP 80-89 MM HG: CPT | Mod: CPTII,HCNC,S$GLB, | Performed by: NURSE PRACTITIONER

## 2025-05-06 PROCEDURE — G2211 COMPLEX E/M VISIT ADD ON: HCPCS | Mod: HCNC,S$GLB,, | Performed by: NURSE PRACTITIONER

## 2025-05-14 DIAGNOSIS — E11.9 TYPE 2 DIABETES MELLITUS WITHOUT COMPLICATION: ICD-10-CM

## 2025-05-18 NOTE — PROGRESS NOTES
HPI     Glaucoma            Comments: 2 month IOP and OCT review today and pt states no changes since   last exam           Comments    DLS: 1/16/25    1. OHT vs POAG OU  2. NS OU  3. Type 2 DM no DR  4. Hyperopia and Presbyopia    MEDS:  Brimonidine TID OU  Latanoprost QHS OU           Last edited by Yoselin Oh MA on 5/20/2025 11:27 AM.            Assessment /Plan     For exam results, see Encounter Report.    Primary open-angle glaucoma, right eye, severe stage  -     latanoprost 0.005 % ophthalmic solution; Place 1 drop into both eyes every evening.  Dispense: 7.5 mL; Refill: 3  -     brimonidine 0.2% (ALPHAGAN) 0.2 % Drop; Place 1 drop into both eyes 3 (three) times daily.  Dispense: 30 mL; Refill: 3  -     dorzolamide-timolol 2-0.5% (COSOPT) 22.3-6.8 mg/mL ophthalmic solution; Place 1 drop into the right eye 2 (two) times daily.  Dispense: 1 each; Refill: 12  -     Posterior Segment OCT Optic Nerve- Both eyes    Primary open-angle glaucoma, left eye, moderate stage  -     latanoprost 0.005 % ophthalmic solution; Place 1 drop into both eyes every evening.  Dispense: 7.5 mL; Refill: 3  -     brimonidine 0.2% (ALPHAGAN) 0.2 % Drop; Place 1 drop into both eyes 3 (three) times daily.  Dispense: 30 mL; Refill: 3  -     Posterior Segment OCT Optic Nerve- Both eyes    Nuclear sclerotic cataract of both eyes    Type 2 diabetes mellitus without ophthalmic manifestations    Hyperopia with presbyopia of both eyes        Pts wife is a long established patient of mine   ?? Narrow angles per Dr Stewart        Glaucoma (type and duration)    suspect w/ OHT   First HVF   1 old test 11/17/2020 - ? Early inf defect od // full os    First photos   10/6/2020   Treatment / Drops started   latanoprost 1/16/2025           Family history    + mother         Glaucoma meds    latanoprost started 1/16/2025         H/O adverse rxn to glaucoma drops    none        LASERS    none        GLAUCOMA SURGERIES    none        OTHER EYE SURGERIES     none        CDR  per photos 10/6/2010 _0.65/~0.6 // 0.9/0.7 by photos (3/2025)         Tbase    - range 16-28 od // 20-25 os           Tmax    28/25            Ttarget    ?             HVF    2 old test  test 2010 - 2012   - near full - od // full os    New base - 1 test 2025 to 2025 - dense SAD / almost SALT w/ split fix and IAD od // gen dep w/ lens rim art os         Gonio    +3 with some ant bowing // SS visb 360 ou (big squeezer - poor slt candidate)         CCT    608/618 (thick ou)         OCT    1 old test  test 2010  RNFL - nl od // nl os   1 test 2025 to 2025 - RNFL - dec G/TS/T/TI/N, bord NI od // bord TS os         Disc photos    10/6/2020 (~0.65 od / ~ 0.6 os)     - Ttoday    20 / 16 ( down from 34/32 )    - Test done today    IOP // gonio and OCT       2. NS ou     3. Type 2 diabetes - no retinopathy     4. Hyperopia / presbyopia    LOST GLASSES  5/20/2025    Old glasses +1.25 sph od      +1.00+1.00 x 12   (add +2.50)     PLAN   POAG severe stage od and mild to mod os   IOP still higher than ideal od // IOP ok os   Add cosopt od   Not a good  SLT candidate - pt extremely difficult to gonio      Refer to optometry for refraction - has lost his glasses and they were not really working well for him     F/U 2-3 months IOP check with addition of cosopt od

## 2025-05-20 ENCOUNTER — OFFICE VISIT (OUTPATIENT)
Dept: OPHTHALMOLOGY | Facility: CLINIC | Age: 70
End: 2025-05-20
Payer: MEDICARE

## 2025-05-20 DIAGNOSIS — H52.03 HYPEROPIA WITH PRESBYOPIA OF BOTH EYES: ICD-10-CM

## 2025-05-20 DIAGNOSIS — H52.4 HYPEROPIA WITH PRESBYOPIA OF BOTH EYES: ICD-10-CM

## 2025-05-20 DIAGNOSIS — H40.1113 PRIMARY OPEN-ANGLE GLAUCOMA, RIGHT EYE, SEVERE STAGE: Primary | ICD-10-CM

## 2025-05-20 DIAGNOSIS — H25.13 NUCLEAR SCLEROTIC CATARACT OF BOTH EYES: ICD-10-CM

## 2025-05-20 DIAGNOSIS — E11.9 TYPE 2 DIABETES MELLITUS WITHOUT OPHTHALMIC MANIFESTATIONS: ICD-10-CM

## 2025-05-20 DIAGNOSIS — H40.1122 PRIMARY OPEN-ANGLE GLAUCOMA, LEFT EYE, MODERATE STAGE: ICD-10-CM

## 2025-05-20 PROCEDURE — 1101F PT FALLS ASSESS-DOCD LE1/YR: CPT | Mod: CPTII,HCNC,S$GLB, | Performed by: OPHTHALMOLOGY

## 2025-05-20 PROCEDURE — 1160F RVW MEDS BY RX/DR IN RCRD: CPT | Mod: CPTII,HCNC,S$GLB, | Performed by: OPHTHALMOLOGY

## 2025-05-20 PROCEDURE — 92133 CPTRZD OPH DX IMG PST SGM ON: CPT | Mod: HCNC,S$GLB,, | Performed by: OPHTHALMOLOGY

## 2025-05-20 PROCEDURE — 99214 OFFICE O/P EST MOD 30 MIN: CPT | Mod: HCNC,S$GLB,, | Performed by: OPHTHALMOLOGY

## 2025-05-20 PROCEDURE — 99999 PR PBB SHADOW E&M-EST. PATIENT-LVL III: CPT | Mod: PBBFAC,HCNC,, | Performed by: OPHTHALMOLOGY

## 2025-05-20 PROCEDURE — 3288F FALL RISK ASSESSMENT DOCD: CPT | Mod: CPTII,HCNC,S$GLB, | Performed by: OPHTHALMOLOGY

## 2025-05-20 PROCEDURE — 1126F AMNT PAIN NOTED NONE PRSNT: CPT | Mod: CPTII,HCNC,S$GLB, | Performed by: OPHTHALMOLOGY

## 2025-05-20 PROCEDURE — 92020 GONIOSCOPY: CPT | Mod: HCNC,S$GLB,, | Performed by: OPHTHALMOLOGY

## 2025-05-20 PROCEDURE — 1159F MED LIST DOCD IN RCRD: CPT | Mod: CPTII,HCNC,S$GLB, | Performed by: OPHTHALMOLOGY

## 2025-05-20 RX ORDER — LATANOPROST 50 UG/ML
1 SOLUTION/ DROPS OPHTHALMIC NIGHTLY
Qty: 7.5 ML | Refills: 3 | Status: SHIPPED | OUTPATIENT
Start: 2025-05-20

## 2025-05-20 RX ORDER — DORZOLAMIDE HYDROCHLORIDE AND TIMOLOL MALEATE 20; 5 MG/ML; MG/ML
1 SOLUTION/ DROPS OPHTHALMIC 2 TIMES DAILY
Qty: 1 EACH | Refills: 12 | Status: SHIPPED | OUTPATIENT
Start: 2025-05-20

## 2025-05-20 RX ORDER — BRIMONIDINE TARTRATE 2 MG/ML
1 SOLUTION/ DROPS OPHTHALMIC 3 TIMES DAILY
Qty: 30 ML | Refills: 3 | Status: SHIPPED | OUTPATIENT
Start: 2025-05-20

## 2025-06-04 ENCOUNTER — OFFICE VISIT (OUTPATIENT)
Dept: FAMILY MEDICINE | Facility: CLINIC | Age: 70
End: 2025-06-04
Attending: FAMILY MEDICINE
Payer: MEDICARE

## 2025-06-04 ENCOUNTER — LAB VISIT (OUTPATIENT)
Dept: LAB | Facility: HOSPITAL | Age: 70
End: 2025-06-04
Attending: FAMILY MEDICINE
Payer: MEDICARE

## 2025-06-04 ENCOUNTER — TELEPHONE (OUTPATIENT)
Dept: FAMILY MEDICINE | Facility: CLINIC | Age: 70
End: 2025-06-04

## 2025-06-04 VITALS
HEART RATE: 52 BPM | OXYGEN SATURATION: 98 % | WEIGHT: 212.06 LBS | SYSTOLIC BLOOD PRESSURE: 126 MMHG | DIASTOLIC BLOOD PRESSURE: 80 MMHG | BODY MASS INDEX: 30.43 KG/M2

## 2025-06-04 DIAGNOSIS — E11.69 DYSLIPIDEMIA ASSOCIATED WITH TYPE 2 DIABETES MELLITUS: ICD-10-CM

## 2025-06-04 DIAGNOSIS — E11.69 DYSLIPIDEMIA ASSOCIATED WITH TYPE 2 DIABETES MELLITUS: Primary | ICD-10-CM

## 2025-06-04 DIAGNOSIS — I10 HYPERTENSION COMPLICATING DIABETES: ICD-10-CM

## 2025-06-04 DIAGNOSIS — N18.31 CHRONIC KIDNEY DISEASE, STAGE 3A: ICD-10-CM

## 2025-06-04 DIAGNOSIS — E11.9 HYPERTENSION COMPLICATING DIABETES: ICD-10-CM

## 2025-06-04 DIAGNOSIS — E78.5 DYSLIPIDEMIA ASSOCIATED WITH TYPE 2 DIABETES MELLITUS: ICD-10-CM

## 2025-06-04 DIAGNOSIS — I69.351 HEMIPARESIS AFFECTING RIGHT SIDE AS LATE EFFECT OF CEREBROVASCULAR ACCIDENT: ICD-10-CM

## 2025-06-04 DIAGNOSIS — E11.9 CONTROLLED TYPE 2 DIABETES MELLITUS WITHOUT COMPLICATION, WITHOUT LONG-TERM CURRENT USE OF INSULIN: ICD-10-CM

## 2025-06-04 DIAGNOSIS — E78.5 DYSLIPIDEMIA ASSOCIATED WITH TYPE 2 DIABETES MELLITUS: Primary | ICD-10-CM

## 2025-06-04 LAB
ALBUMIN SERPL BCP-MCNC: 4 G/DL (ref 3.5–5.2)
ALBUMIN/CREAT UR: NORMAL
ALP SERPL-CCNC: 84 UNIT/L (ref 40–150)
ALT SERPL W/O P-5'-P-CCNC: 21 UNIT/L (ref 10–44)
ANION GAP (OHS): 5 MMOL/L (ref 8–16)
AST SERPL-CCNC: 19 UNIT/L (ref 11–45)
BILIRUB SERPL-MCNC: 1.2 MG/DL (ref 0.1–1)
BILIRUB UR QL STRIP.AUTO: NEGATIVE
BUN SERPL-MCNC: 16 MG/DL (ref 8–23)
CALCIUM SERPL-MCNC: 9.2 MG/DL (ref 8.7–10.5)
CHLORIDE SERPL-SCNC: 107 MMOL/L (ref 95–110)
CHOLEST SERPL-MCNC: 146 MG/DL (ref 120–199)
CHOLEST/HDLC SERPL: 3.8 {RATIO} (ref 2–5)
CLARITY UR: CLEAR
CO2 SERPL-SCNC: 27 MMOL/L (ref 23–29)
COLOR UR AUTO: YELLOW
CREAT SERPL-MCNC: 1.3 MG/DL (ref 0.5–1.4)
CREAT UR-MCNC: 199 MG/DL (ref 23–375)
EAG (OHS): 137 MG/DL (ref 68–131)
GFR SERPLBLD CREATININE-BSD FMLA CKD-EPI: 59 ML/MIN/1.73/M2
GLUCOSE SERPL-MCNC: 109 MG/DL (ref 70–110)
GLUCOSE UR QL STRIP: NEGATIVE
HBA1C MFR BLD: 6.4 % (ref 4–5.6)
HDLC SERPL-MCNC: 38 MG/DL (ref 40–75)
HDLC SERPL: 26 % (ref 20–50)
HGB UR QL STRIP: NEGATIVE
KETONES UR QL STRIP: NEGATIVE
LDLC SERPL CALC-MCNC: 94.8 MG/DL (ref 63–159)
LEUKOCYTE ESTERASE UR QL STRIP: NEGATIVE
MICROALBUMIN UR-MCNC: <5 UG/ML (ref ?–5000)
NITRITE UR QL STRIP: NEGATIVE
NONHDLC SERPL-MCNC: 108 MG/DL
PH UR STRIP: 5 [PH]
POTASSIUM SERPL-SCNC: 4.3 MMOL/L (ref 3.5–5.1)
PROT SERPL-MCNC: 8.2 GM/DL (ref 6–8.4)
PROT UR QL STRIP: NEGATIVE
SODIUM SERPL-SCNC: 139 MMOL/L (ref 136–145)
SP GR UR STRIP: 1.02
TRIGL SERPL-MCNC: 66 MG/DL (ref 30–150)
UROBILINOGEN UR STRIP-ACNC: NEGATIVE EU/DL

## 2025-06-04 PROCEDURE — 3008F BODY MASS INDEX DOCD: CPT | Mod: CPTII,HCNC,S$GLB, | Performed by: FAMILY MEDICINE

## 2025-06-04 PROCEDURE — 1159F MED LIST DOCD IN RCRD: CPT | Mod: CPTII,HCNC,S$GLB, | Performed by: FAMILY MEDICINE

## 2025-06-04 PROCEDURE — 1101F PT FALLS ASSESS-DOCD LE1/YR: CPT | Mod: CPTII,HCNC,S$GLB, | Performed by: FAMILY MEDICINE

## 2025-06-04 PROCEDURE — 82043 UR ALBUMIN QUANTITATIVE: CPT

## 2025-06-04 PROCEDURE — 99215 OFFICE O/P EST HI 40 MIN: CPT | Mod: HCNC,S$GLB,, | Performed by: FAMILY MEDICINE

## 2025-06-04 PROCEDURE — 3074F SYST BP LT 130 MM HG: CPT | Mod: CPTII,HCNC,S$GLB, | Performed by: FAMILY MEDICINE

## 2025-06-04 PROCEDURE — 36415 COLL VENOUS BLD VENIPUNCTURE: CPT

## 2025-06-04 PROCEDURE — 3079F DIAST BP 80-89 MM HG: CPT | Mod: CPTII,HCNC,S$GLB, | Performed by: FAMILY MEDICINE

## 2025-06-04 PROCEDURE — 1126F AMNT PAIN NOTED NONE PRSNT: CPT | Mod: CPTII,HCNC,S$GLB, | Performed by: FAMILY MEDICINE

## 2025-06-04 PROCEDURE — 81003 URINALYSIS AUTO W/O SCOPE: CPT

## 2025-06-04 PROCEDURE — 99999 PR PBB SHADOW E&M-EST. PATIENT-LVL III: CPT | Mod: PBBFAC,,, | Performed by: FAMILY MEDICINE

## 2025-06-04 PROCEDURE — 83036 HEMOGLOBIN GLYCOSYLATED A1C: CPT

## 2025-06-04 PROCEDURE — 3288F FALL RISK ASSESSMENT DOCD: CPT | Mod: CPTII,HCNC,S$GLB, | Performed by: FAMILY MEDICINE

## 2025-06-04 PROCEDURE — 80053 COMPREHEN METABOLIC PANEL: CPT

## 2025-06-04 PROCEDURE — 80061 LIPID PANEL: CPT

## 2025-06-11 ENCOUNTER — OFFICE VISIT (OUTPATIENT)
Dept: OPTOMETRY | Facility: CLINIC | Age: 70
End: 2025-06-11
Payer: MEDICARE

## 2025-06-11 DIAGNOSIS — H25.042 POSTERIOR SUBCAPSULAR AGE-RELATED CATARACT, LEFT EYE: Primary | ICD-10-CM

## 2025-06-11 DIAGNOSIS — H52.7 REFRACTIVE ERROR: ICD-10-CM

## 2025-06-11 DIAGNOSIS — H25.13 NUCLEAR SCLEROSIS OF BOTH EYES: ICD-10-CM

## 2025-06-11 DIAGNOSIS — H40.1134 PRIMARY OPEN ANGLE GLAUCOMA (POAG) OF BOTH EYES, INDETERMINATE STAGE: ICD-10-CM

## 2025-06-11 PROCEDURE — 3066F NEPHROPATHY DOC TX: CPT | Mod: CPTII,HCNC,S$GLB, | Performed by: OPTOMETRIST

## 2025-06-11 PROCEDURE — 3044F HG A1C LEVEL LT 7.0%: CPT | Mod: CPTII,HCNC,S$GLB, | Performed by: OPTOMETRIST

## 2025-06-11 PROCEDURE — 99999 PR PBB SHADOW E&M-EST. PATIENT-LVL III: CPT | Mod: PBBFAC,HCNC,, | Performed by: OPTOMETRIST

## 2025-06-11 PROCEDURE — 3061F NEG MICROALBUMINURIA REV: CPT | Mod: CPTII,HCNC,S$GLB, | Performed by: OPTOMETRIST

## 2025-06-11 PROCEDURE — 1101F PT FALLS ASSESS-DOCD LE1/YR: CPT | Mod: CPTII,HCNC,S$GLB, | Performed by: OPTOMETRIST

## 2025-06-11 PROCEDURE — 4010F ACE/ARB THERAPY RXD/TAKEN: CPT | Mod: CPTII,HCNC,S$GLB, | Performed by: OPTOMETRIST

## 2025-06-11 PROCEDURE — 1159F MED LIST DOCD IN RCRD: CPT | Mod: CPTII,HCNC,S$GLB, | Performed by: OPTOMETRIST

## 2025-06-11 PROCEDURE — 99214 OFFICE O/P EST MOD 30 MIN: CPT | Mod: HCNC,S$GLB,, | Performed by: OPTOMETRIST

## 2025-06-11 PROCEDURE — 1126F AMNT PAIN NOTED NONE PRSNT: CPT | Mod: CPTII,HCNC,S$GLB, | Performed by: OPTOMETRIST

## 2025-06-11 PROCEDURE — 3288F FALL RISK ASSESSMENT DOCD: CPT | Mod: CPTII,HCNC,S$GLB, | Performed by: OPTOMETRIST

## 2025-06-11 NOTE — PROGRESS NOTES
HPI    DLS: 5/20/25 Dr Saha, 5/29/24 Dr Stewart    Pt here for eyeglasses prescription.  Pt states he lost his eyeglasses but   has since has found them but doesn't feel like they are strong enough.  Pt   states decreased VA OU at distance and near.   Last edited by Shira Grider MA on 6/11/2025  2:50 PM.            Assessment /Plan     For exam results, see Encounter Report.    Posterior subcapsular age-related cataract, left eye    Nuclear sclerosis of both eyes    Refractive error    Primary open angle glaucoma (POAG) of both eyes, indeterminate stage      1-2. Educated pt on findings. PSC OS, NS OU. Recommend cataract surgery OS. Pt to discuss with Dr. Saha at next f/u. Monitor.     3. Updated SRx. Monitor yearly.      4. Continue glaucoma care as directed by Dr. Saha.       RTC with Dr. Saha as directed, me prn.

## 2025-07-22 ENCOUNTER — TELEPHONE (OUTPATIENT)
Dept: ORTHOPEDICS | Facility: CLINIC | Age: 70
End: 2025-07-22
Payer: MEDICARE

## 2025-07-22 ENCOUNTER — NURSE TRIAGE (OUTPATIENT)
Dept: ADMINISTRATIVE | Facility: CLINIC | Age: 70
End: 2025-07-22
Payer: MEDICARE

## 2025-07-22 NOTE — TELEPHONE ENCOUNTER
"Patient transferred from patient access. He is C/O shoulder pain, This has gone on for about 3 or 4 days. The Left shoulder is worse than the Right but both hurt. He did have Rotator surgery with Dr. Esparza in 2019. States he [place] [facility] [place] told him to make appointment for pain. Triage done- dispo see provider in 3 days. Advised I could send message for appointment. He is actually there with his spouse now for her appointment with another provider. Advised to check to see if they could make appointment for him. Offered appointment with PCP, declined. Strict ED advice given. Verb understanding   Reason for Disposition   [1] MODERATE pain (e.g., interferes with normal activities) AND [2] present > 3 days    Additional Information   Negative: Passed out (e.g., fainted, lost consciousness, blacked out and was not responding)   Negative: Shock suspected (e.g., cold/pale/clammy skin, too weak to stand, low BP, rapid pulse)   Negative: [1] Similar pain previously AND [2] it was from "heart attack"   Negative: [1] Similar pain previously AND [2] it was from "angina" AND [3] not relieved by nitroglycerin   Negative: Sounds like a life-threatening emergency to the triager   Negative: Followed a shoulder injury   Negative: Difficulty breathing or unusual sweating (e.g., sweating without exertion)   Negative: [1] Pain lasting > 5 minutes AND [2] pain also present in chest  (Exception: Pain is clearly made worse by movement.)   Negative: [1] Age > 40 AND [2] no obvious cause AND [3] pain even when not moving the arm  (Exception: Pain is clearly made worse by moving arm or bending neck.)   Negative: Dark (cola or tea-colored) or red-colored urine   Negative: [1] SEVERE pain AND [2] not improved 2 hours after pain medicine   Negative: [1] Red area or streak AND [2] fever   Negative: [1] Swollen joint AND [2] fever   Negative: Patient sounds very sick or weak to the triager   Negative: Entire arm " "is swollen   Negative: Weakness (i.e., loss of strength) in hand or fingers  (Exception: Not truly weak; hand feels weak because of pain.)   Negative: [1] Shoulder pains with exertion (e.g., walking) AND [2] pain goes away on resting AND [3] not present now   Negative: [1] Painful rash AND [2] multiple small blisters grouped together (i.e., dermatomal distribution or "band" or "stripe")   Negative: Looks like a boil, infected sore, deep ulcer or other infected rash (spreading redness, pus)   Negative: [1] Localized rash is very painful AND [2] no fever   Negative: [1] Unable to use arm at all AND [2] because of shoulder pain or stiffness   Negative: Numbness (i.e., loss of sensation) in hand or fingers    Protocols used: Shoulder Pain-A-AH    "

## 2025-07-28 NOTE — PROGRESS NOTES
[facility]  Patient Evaluation    Referred by: No ref. provider found   Reason for referral: * No diagnoses found *     CC:   Chief Complaint   Patient presents with    Follow-up    Low-back Pain    Leg Pain     B/l          5/6/2025     3:26 PM 6/20/2023    10:52 AM   Last 3 PDI Scores   Pain Disability Index (PDI) 36 9     Interval Update 08/05/2025: Patient return to clinic for three month follow-up on low back and bilateral leg pain left greater than right.  Patient reports that his low back pain is stable at the moment his pain score today was 2/10 he continues to utilize the medications that we provided specifically gabapentin that has reduce his symptoms he denies any radicular symptoms today denies any paresthesia like symptoms denies any profound weakness denies any new pain.    Interval update 05/06/25:  Patient returns to clinic for lower back  with bilateral leg pain left greater than right.  Is primary source of pain is axial low back pain prior to our clinic visit I did review his images he has significant facet arthritis in the lumbar sacral area.  With severe stenosis he was previously ordered a lumbar diagnostic lumbar MBB bilaterally however patient did not have this procedure.    Subjective 06/20/2023:   Anjel Sanders is a 69 y.o. male who presents complaining of low back pain with radiation of pain to the posterior aspect of his thighs.  Pain is worse in the morning, but improves after he is up and moving around.  The pain will then worsen again toward the end of the day.  He is taking gabapentin and he states that this helps a little.    Location: lower back   Onset: 4 months  Current Pain Score: 3/10  Daily Pain of Range: 10-10/10  Quality: Aching, Tingling, and Numb  Radiation: both legs  Worsened by: nothing in particular  Improved by: medications    Patient denies night fever/night sweats, urinary incontinence, bowel incontinence,  significant weight loss, significant motor weakness, and loss of sensations.    Previous Interventions:  - N/A    Previous Therapies:  PT/OT: no   Chiropractor:   HEP:   Relevant Surgery: no   Previous Medications:   - NSAIDS: N/A  - Muscle Relaxants: N/A   - TCAs: N/A  - SNRIs: N/A  - Topicals: N/A  - Anticonvulsants: Gabapentin   - Opioids: N/A  - Adjuvants: N/A    Current Pain Medications:  Gabapentin     Zanaflex    Review of Systems:  ROS    GENERAL:  No weight loss, malaise or fevers. +DM Type II  HEENT:   No recent changes in vision or hearing  NECK:  No difficulty with swallowing. No stridor.   RESPIRATORY:  Negative for cough, wheezing or shortness of breath, patient denies any recent URI.  CARDIOVASCULAR:  Negative for chest pain, leg swelling or palpitations. +HTN  HLD  GI/Gu:  Negative for abdominal discomfort, blood in stools or black stools or change in bowel habits.  +CKD  MUSCULOSKELETAL:  See HPI.  SKIN:  Negative for lesions, rash, and itching.  PSYCH:  No mood disorder or recent psychosocial stressors.    HEMATOLOGY/LYMPHOLOGY:  Negative for prolonged bleeding, bruising easily or swollen nodes.  Patient is not currently taking any anti-coagulants  NEURO:   No history of headaches, syncope, paralysis, seizures or tremors.  All other reviewed and negative other than HPI.    History:  Current medications, allergies, medical history, surgical history,   family history, and social history were reviewed in the chart as marked.    Full Medication List:    Current Outpatient Medications:     allopurinoL (ZYLOPRIM) 300 MG tablet, Take 1 tablet (300 mg total) by mouth once daily., Disp: 90 tablet, Rfl: 3    amLODIPine-benazepriL (LOTREL) 10-40 mg per capsule, Take 1 capsule by mouth once daily., Disp: 90 capsule, Rfl: 3    aspirin (ECOTRIN) 81 MG EC tablet, Take 81 mg by mouth once daily., Disp: , Rfl:     atorvastatin (LIPITOR) 80 MG tablet, Take 1 tablet (80 mg total) by mouth once daily., Disp: 90  tablet, Rfl: 3    brimonidine 0.2% (ALPHAGAN) 0.2 % Drop, Place 1 drop into both eyes 3 (three) times daily., Disp: 30 mL, Rfl: 3    diclofenac sodium (VOLTAREN) 1 % Gel, APPLY 2 GRAMS TO AFFECTED AREA ONCE DAILY., Disp: 100 g, Rfl: 0    dorzolamide-timolol 2-0.5% (COSOPT) 22.3-6.8 mg/mL ophthalmic solution, Place 1 drop into the right eye 2 (two) times daily., Disp: 1 each, Rfl: 12    gabapentin (NEURONTIN) 300 MG capsule, Take 1 capsule (300 mg total) by mouth 2 (two) times daily AND 3 capsules (900 mg total) every evening., Disp: 150 capsule, Rfl: 11    hydroCHLOROthiazide (HYDRODIURIL) 12.5 MG Tab, Take 1 tablet (12.5 mg total) by mouth once daily., Disp: 90 tablet, Rfl: 3    latanoprost 0.005 % ophthalmic solution, Place 1 drop into both eyes every evening., Disp: 7.5 mL, Rfl: 3    meloxicam (MOBIC) 15 MG tablet, Take 1 tablet (15 mg total) by mouth once daily., Disp: 90 tablet, Rfl: 0    promethazine-dextromethorphan (PROMETHAZINE-DM) 6.25-15 mg/5 mL Syrp, TAKE 1 TEASPOONFUL BY MOUTH TWICE A DAY AS NEEDED FOR COUGH, Disp: 180 mL, Rfl: 1    sildenafiL (VIAGRA) 100 MG tablet, Take 1 tablet (100 mg total) by mouth daily as needed for Erectile Dysfunction., Disp: 20 tablet, Rfl: 11    tadalafiL (CIALIS) 20 MG Tab, Take 1 tablet (20 mg total) by mouth once daily., Disp: 15 tablet, Rfl: 11    tamsulosin (FLOMAX) 0.4 mg Cap, Take 1 capsule (0.4 mg total) by mouth once daily., Disp: 90 capsule, Rfl: 3    tiZANidine (ZANAFLEX) 4 MG tablet, TAKE 1 TABLET BY MOUTH EVERY DAY IN THE EVENING AS NEEDED FOR MUSCLE SPASMS, Disp: 90 tablet, Rfl: 1     Allergies:  Patient has no known allergies.     Medical History:   has a past medical history of Acute epididymitis (01/02/2025), AR (allergic rhinitis), Cataract, Diabetes mellitus, Glaucoma, Gouty arthritis, colonic polyps, MURTAZA (obstructive sleep apnea), Other and unspecified hyperlipidemia, Stroke, and Unspecified essential hypertension.    Surgical History:   has a past  "surgical history that includes Foot surgery (Bilateral); surgery on jaw; Arthroscopic repair of rotator cuff of shoulder (Left, 04/30/2019); Arthroscopy of shoulder with decompression of subacromial space (04/30/2019); Rotator cuff repair (Right, 08/04/2020); Colonoscopy (N/A, 09/21/2021); and colonoscopy, screening, high risk patient (N/A, 01/15/2025).    Family History:  family history includes Alcohol abuse in his brother; Cancer in his brother, sister, and son; Diabetes in his mother, sister, and sister; Glaucoma in his mother; Heart disease in his mother and sister; Hyperlipidemia in his mother; Hypertension in his mother and sister; No Known Problems in his father; Stomach cancer in his sister and son; Stroke in his sister.    Social History:   reports that he has never smoked. He has never been exposed to tobacco smoke. He has never used smokeless tobacco. He reports that he does not currently use alcohol after a past usage of about 9.3 standard drinks of alcohol per week. He reports current drug use. Drug: Marijuana.    Physical Exam:  Vitals:    08/05/25 1052   BP: (!) 178/88   Pulse: (!) 57   Weight: 97.3 kg (214 lb 6.4 oz)   Height: 5' 10" (1.778 m)   PainSc:   2   PainLoc: Back         GENERAL: Well appearing, in no acute distress, alert and oriented x3.  PSYCH:  Mood and affect appropriate.  SKIN: Skin color, texture, turgor normal, no rashes or lesions.  HEAD/FACE:  Normocephalic, atraumatic. Cranial nerves grossly intact.  NECK: Not examined  CV: RRR with palpation of the radial artery.  PULM: No evidence of respiratory difficulty, symmetric chest rise.  GI:  Soft and non-distended.  MSK: Straight leg raising is negative to radicular pain. Pain to palpation over the facet joints of the lumbar spine.  Pain with lumbar facet loading. No pain over the SI joints. Normal range of motion of the lumbar spine without pain reproduction.  Peripheral joint ROM is full and pain free without obvious instability or " laxity in all four extremities. No obvious deformities, edema, or skin discoloration.  No atrophy or tone abnormalities are noted.   NEURO: Bilateral lower extremity coordination and strength is symmetric.  No loss of sensation is noted.  MENTAL STATUS: A x O x 3, good concentration, speech is fluent and goal directed  MOTOR: 5/5 in all muscle groups  GAIT: Normal. Ambulates unassisted.    Imaging:  EXAMINATION:  MRI LUMBAR SPINE WITHOUT CONTRAST     CLINICAL HISTORY:  Low back pain, symptoms persist with > 6wks conservative treatment; Dorsalgia, unspecified     TECHNIQUE:  Multiplanar, multisequence MR images were acquired from the thoracolumbar junction to the sacrum without the administration of contrast.     COMPARISON:  Radiographs 03/01/2023     FINDINGS:  ALIGNMENT: Grade 1 anterolisthesis at L4-5.     BONE: No compression fractures.  No marrow replacing lesions.     JOINT: Disc desiccation and height loss at L3-4.  Advanced multilevel facet arthropathy.  Significant findings are described in detail below.  Subchondral bone marrow edema at the L4-5 facet joints bilaterally.     SPINAL CANAL: The conus medullaris has a normal appearance and terminates at the L1-2 level.  Cauda equina nerve roots are unremarkable.  No mass or collection.     PARASPINAL SOFT TISSUES: Unremarkable.     SIGNIFICANT FINDINGS BY LEVEL:     T12-L1: Unremarkable.     L1-2: Mild disc bulge.  No canal stenosis.  Mild bilateral foraminal stenosis.     L2-3: Mild disc bulge.  Bilateral facet arthropathy and ligamentum flavum thickening.  Mild canal stenosis.  Moderate bilateral foraminal stenosis.     L3-4: Disc bulge.  Severe bilateral facet arthropathy and ligamentum flavum thickening.  Severe canal stenosis with complete effacement of the thecal sac.  Moderate bilateral foraminal stenosis.     L4-5: Disc bulge.  Severe bilateral facet arthropathy and ligamentum flavum thickening.  Mild canal stenosis.  Narrowing of both subarticular  zones with abutment of the descending L5 nerve roots.  Moderate right and severe left foraminal stenosis.     L5-S1: Disc bulge.  Bilateral facet arthropathy and ligamentum flavum thickening, severe on the right.  Mild canal stenosis.  Moderate bilateral foraminal stenosis, right greater than left.     Impression:     Degenerative changes resulting in severe canal stenosis at L3-4 and high-grade foraminal stenosis at multiple levels as detailed above.     This report was flagged in Epic as abnormal.        Electronically signed by: Ramón Hunter  Date:                                            05/10/2023  Time:                                           09:13    Labs:  BMP  Lab Results   Component Value Date     06/04/2025    K 4.3 06/04/2025     06/04/2025    CO2 27 06/04/2025    BUN 16 06/04/2025    CREATININE 1.3 06/04/2025    CALCIUM 9.2 06/04/2025    ANIONGAP 5 (L) 06/04/2025    EGFRNORACEVR 59 (L) 06/04/2025     Lab Results   Component Value Date    ALT 21 06/04/2025    AST 19 06/04/2025    ALKPHOS 84 06/04/2025    BILITOT 1.2 (H) 06/04/2025     Lab Results   Component Value Date    WBC 5.03 12/04/2024    HGB 15.5 12/04/2024    HCT 48.1 12/04/2024    MCV 87 12/04/2024     12/04/2024             Assessment:  Problem List Items Addressed This Visit    None      06/20/2023 - Anjel Sanders is a 69 y.o. male who  has a past medical history of Acute epididymitis (01/02/2025), AR (allergic rhinitis), Cataract, Diabetes mellitus, Glaucoma, Gouty arthritis, colonic polyps, MURTAZA (obstructive sleep apnea), Other and unspecified hyperlipidemia, Stroke, and Unspecified essential hypertension.  By history and examination this patient has chronic low back pain without radiculopathy.  The underlying cause cause is facet arthritis, degenerative disc disease, and foraminal stenosis.  Pathology is confirmed by imaging.  We discussed the underlying diagnoses and multiple treatment options including non-opioid  medications, interventional procedures, physical therapy, home exercise, and core muscle enhancement.  The risks and benefits of each treatment option were discussed and all questions were answered.         05/06/2025-Anjel Sanders is a 69 y.o. male who  has a past medical history of Acute epididymitis (01/02/2025), AR (allergic rhinitis), Cataract, Diabetes mellitus, Glaucoma, Gouty arthritis, colonic polyps, MURTAZA (obstructive sleep apnea), Other and unspecified hyperlipidemia, Stroke, and Unspecified essential hypertension.  By history and examination this patient has chronic low back pain without radiculopathy.  The underlying cause cause is facet arthritis, degenerative disc disease, foraminal stenosis, and central canal stenosis.  Pathology is confirmed by imaging.  We discussed the underlying diagnoses and multiple treatment options including non-opioid medications, interventional procedures, physical therapy, home exercise, core muscle enhancement, activity modification, and weight loss.  The risks and benefits of each treatment option were discussed and all questions were answered.   Patient also has intermittent bilateral leg pain his pain today was primarily axial low back pain likely related   His 2023 lumbar MRI shows severe bilateral facet arthropathy at L3-4, L4-5 and L5-S1.  He also has degenerative changes that results in severe canal stenosis at L3-4 and high-grade foraminal stenosis at multiple levels.    08/05/20252603-11-stie-old male with a history of chronic axial low back pain without radiculopathy since last clinic visit he started gabapentin 300 mg b.i.d. denies any adverse side effects he does report that this has significant reduce his low back pain he reports continued ADLs with minimal pain cutting grass performing work duties around the home.  Denies any new pain denies any radicular symptoms for now we will hold off on any pain procedures I will provide him with a home exercise packet  to help establish some type of home exercise plan due to financial constraints he can not attend physical therapy at the moment.    Treatment Plan:   Procedures:  None at this time.  Consider bilateral lumbar L3-5 MBB with progression to RFA if symptoms persist despite conservative treatment.   PT/OT/HEP: Patient given home exercise program to establish a home exercise plan, PT is not applicable at this time due to financial constraints.  Medications:    - continue Robaxin 500mg TID PRN        - continue Mobic 15mg qd prn discussed chronic use of this medication        - Continue Gabapentin 300 mg b.i.d.  reviewed and medications are appropriately dosed for current hepatorenal function.   -  Not applicable  Imaging: Reviewed  Follow Up: RTC 3 months or sooner if needed for ongoing care    RANJIT ArriolaC  Interventional Pain Management      Disclaimer: This note was partly generated using dictation software which may occasionally result in transcription errors.

## 2025-07-30 DIAGNOSIS — M25.512 CHRONIC PAIN OF BOTH SHOULDERS: Primary | ICD-10-CM

## 2025-07-30 DIAGNOSIS — M25.511 CHRONIC PAIN OF BOTH SHOULDERS: Primary | ICD-10-CM

## 2025-07-30 DIAGNOSIS — G89.29 CHRONIC PAIN OF BOTH SHOULDERS: Primary | ICD-10-CM

## 2025-07-31 ENCOUNTER — HOSPITAL ENCOUNTER (OUTPATIENT)
Facility: HOSPITAL | Age: 70
Discharge: HOME | End: 2025-07-31
Attending: ORTHOPAEDIC SURGERY
Payer: MEDICARE

## 2025-07-31 ENCOUNTER — OFFICE VISIT (OUTPATIENT)
Dept: ORTHOPEDICS | Facility: CLINIC | Age: 70
End: 2025-07-31
Payer: MEDICARE

## 2025-07-31 VITALS — HEIGHT: 70 IN | BODY MASS INDEX: 30.43 KG/M2

## 2025-07-31 DIAGNOSIS — M75.122 COMPLETE TEAR OF LEFT ROTATOR CUFF, UNSPECIFIED WHETHER TRAUMATIC: ICD-10-CM

## 2025-07-31 DIAGNOSIS — M25.512 CHRONIC PAIN OF BOTH SHOULDERS: ICD-10-CM

## 2025-07-31 DIAGNOSIS — S46.011D TRAUMATIC COMPLETE TEAR OF RIGHT ROTATOR CUFF, SUBSEQUENT ENCOUNTER: Primary | ICD-10-CM

## 2025-07-31 DIAGNOSIS — M25.511 CHRONIC PAIN OF BOTH SHOULDERS: ICD-10-CM

## 2025-07-31 DIAGNOSIS — G89.29 CHRONIC PAIN OF BOTH SHOULDERS: ICD-10-CM

## 2025-07-31 PROCEDURE — 99999 PR PBB SHADOW E&M-EST. PATIENT-LVL III: CPT | Mod: PBBFAC,HCNC,, | Performed by: ORTHOPAEDIC SURGERY

## 2025-07-31 PROCEDURE — 73030 X-RAY EXAM OF SHOULDER: CPT | Mod: TC,50,HCNC,PN

## 2025-07-31 RX ORDER — TRIAMCINOLONE ACETONIDE 40 MG/ML
40 INJECTION, SUSPENSION INTRA-ARTICULAR; INTRAMUSCULAR
Status: COMPLETED | OUTPATIENT
Start: 2025-07-31 | End: 2025-07-31

## 2025-07-31 RX ORDER — CELECOXIB 200 MG/1
200 CAPSULE ORAL DAILY
Qty: 30 CAPSULE | Refills: 1 | Status: SHIPPED | OUTPATIENT
Start: 2025-07-31 | End: 2025-09-29

## 2025-07-31 RX ADMIN — TRIAMCINOLONE ACETONIDE 40 MG: 40 INJECTION, SUSPENSION INTRA-ARTICULAR; INTRAMUSCULAR at 03:07

## 2025-07-31 NOTE — PROGRESS NOTES
Subjective:      Patient ID: Anjel Sanders is a 69 y.o. male.    Chief Complaint: Shoulder Pain (Bilateral shoulder pain)      HPI  Anjel Sanders is a  69 y.o. male presenting today for left shoulder pain.  There was not a history of trauma.  Onset of symptoms began several months ago   He actually had surgery on left shoulder about 5 years ago for rotator cuff repair   He has done well until recently   Symptoms worse with lifting   No numbness or tingling reported.      Review of patient's allergies indicates:  No Known Allergies      Current Medications[1]    Past Medical History:   Diagnosis Date    Acute epididymitis 01/02/2025    AR (allergic rhinitis)     Cataract     Diabetes mellitus     Glaucoma     Gouty arthritis     Hx of colonic polyps     Tubular Adenoma    MURTAZA (obstructive sleep apnea)     Other and unspecified hyperlipidemia     Stroke     Unspecified essential hypertension        Past Surgical History:   Procedure Laterality Date    ARTHROSCOPIC REPAIR OF ROTATOR CUFF OF SHOULDER Left 04/30/2019    Procedure: REPAIR, ROTATOR CUFF, ARTHROSCOPIC;  Surgeon: Bob Esparza Jr., MD;  Location: [facility] OR;  Service: Orthopedics;  Laterality: Left;  need opus sytem (Jluis notified)  video    ARTHROSCOPY OF SHOULDER WITH DECOMPRESSION OF SUBACROMIAL SPACE  04/30/2019    Procedure: ARTHROSCOPY, SHOULDER, WITH SUBACROMIAL SPACE DECOMPRESSION;  Surgeon: Bob Esparza Jr., MD;  Location: [facility] OR;  Service: Orthopedics;;    COLONOSCOPY N/A 09/21/2021    Procedure: COLONOSCOPY;  Surgeon: Asif López MD;  Location: [facility] ENDO;  Service: Endoscopy;  Laterality: N/A;    COLONOSCOPY, SCREENING, HIGH RISK PATIENT N/A 01/15/2025    Procedure: COLONOSCOPY, SCREENING, HIGH RISK PATIENT;  Surgeon: Mario Brink MD;  Location: [facility] ENDO;  Service: Endoscopy;  Laterality: N/A;  Ref by Brianna Keating, mail - PC  1/8-pre call complete[facility]  "[facility] Bilateral     ROTATOR CUFF REPAIR Right 08/04/2020    Procedure: REPAIR, ROTATOR CUFF;  Surgeon: Bob Esparza Jr., MD;  Location: [facility];  Service: Orthopedics;  Laterality: Right;  open no scope  need arthrex anchors ([place]lin notified per [place] 7/21, EF) Confirmed with Harry 1000 8/3/2020 KB    surgery on jaw         Review of Systems:  ROS    OBJECTIVE:     PHYSICAL EXAM:  Height: 5' 10" (177.8 cm)    Vitals:    07/31/25 1504   Height: 5' 10" (1.778 m)   PainSc: 10-Worst pain ever   PainLoc: Shoulder     Well developed, well nourished male in no acute distress  Alert and oriented x 3  HEENT- Normal exam  Lungs- Clear to auscultation  Heart- Regular rate and rhythm  Abdomen- Soft nontender  Extremity exam- examination left shoulder no tenderness no swelling   Range of motion is actually pretty good mildly positive impingement sign no weakness no instability       RADIOGRAPHS:  AP lateral x-ray left shoulder show some postsurgical changes no other abnormalities  Comments: I have personally reviewed the imaging and I agree with the above radiologist's report.    ASSESSMENT/PLAN:     IMPRESSION:  Left shoulder pain after previous rotator cuff repair    PLAN:  I have recommended injection today   After pause for time-out identified the left shoulder injected with Kenalog 40 mg 2 cc xylocaine sterile technique   Tolerated the procedure well without complication   I have also started him on Celebrex 200 mg once a day with food   Avoid overhead lifting   Follow-up 4-6 weeks   If symptoms persist we may consider a short course of therapy       - We talked at length about the anatomy and pathophysiology of   Encounter Diagnoses   Name Primary?    Traumatic complete tear of right rotator cuff, subsequent encounter Yes    Complete tear of left rotator cuff, unspecified whether traumatic            Disclaimer: This note has been generated using voice-recognition software. There may be typographical errors that have " been missed during proof-reading.          [1]   Current Outpatient Medications   Medication Sig Dispense Refill    allopurinoL (ZYLOPRIM) 300 MG tablet Take 1 tablet (300 mg total) by mouth once daily. 90 tablet 3    amLODIPine-benazepriL (LOTREL) 10-40 mg per capsule Take 1 capsule by mouth once daily. 90 capsule 3    aspirin (ECOTRIN) 81 MG EC tablet Take 81 mg by mouth once daily.      atorvastatin (LIPITOR) 80 MG tablet Take 1 tablet (80 mg total) by mouth once daily. 90 tablet 3    brimonidine 0.2% (ALPHAGAN) 0.2 % Drop Place 1 drop into both eyes 3 (three) times daily. 30 mL 3    diclofenac sodium (VOLTAREN) 1 % Gel APPLY 2 GRAMS TO AFFECTED AREA ONCE DAILY. 100 g 0    dorzolamide-timolol 2-0.5% (COSOPT) 22.3-6.8 mg/mL ophthalmic solution Place 1 drop into the right eye 2 (two) times daily. 1 each 12    gabapentin (NEURONTIN) 300 MG capsule Take 1 capsule (300 mg total) by mouth 2 (two) times daily AND 3 capsules (900 mg total) every evening. 150 capsule 11    hydroCHLOROthiazide (HYDRODIURIL) 12.5 MG Tab Take 1 tablet (12.5 mg total) by mouth once daily. 90 tablet 3    latanoprost 0.005 % ophthalmic solution Place 1 drop into both eyes every evening. 7.5 mL 3    promethazine-dextromethorphan (PROMETHAZINE-DM) 6.25-15 mg/5 mL Syrp TAKE 1 TEASPOONFUL BY MOUTH TWICE A DAY AS NEEDED FOR COUGH 180 mL 1    tadalafiL (CIALIS) 20 MG Tab Take 1 tablet (20 mg total) by mouth once daily. 15 tablet 11    tamsulosin (FLOMAX) 0.4 mg Cap Take 1 capsule (0.4 mg total) by mouth once daily. 90 capsule 3    tiZANidine (ZANAFLEX) 4 MG tablet TAKE 1 TABLET BY MOUTH EVERY DAY IN THE EVENING AS NEEDED FOR MUSCLE SPASMS 90 tablet 1    celecoxib (CELEBREX) 200 MG capsule Take 1 capsule (200 mg total) by mouth once daily. 30 capsule 1    sildenafiL (VIAGRA) 100 MG tablet Take 1 tablet (100 mg total) by mouth daily as needed for Erectile Dysfunction. 20 tablet 11     No current facility-administered medications for this visit.

## 2025-08-05 ENCOUNTER — OFFICE VISIT (OUTPATIENT)
Dept: PAIN MEDICINE | Facility: CLINIC | Age: 70
End: 2025-08-05
Payer: MEDICARE

## 2025-08-05 VITALS
WEIGHT: 214.38 LBS | HEART RATE: 57 BPM | BODY MASS INDEX: 30.69 KG/M2 | DIASTOLIC BLOOD PRESSURE: 88 MMHG | SYSTOLIC BLOOD PRESSURE: 178 MMHG | HEIGHT: 70 IN

## 2025-08-05 DIAGNOSIS — M47.819 SPONDYLOSIS WITHOUT MYELOPATHY: Primary | ICD-10-CM

## 2025-08-05 DIAGNOSIS — M47.819 ARTHROPATHY OF FACET JOINTS AT MULTIPLE LEVELS: ICD-10-CM

## 2025-08-05 DIAGNOSIS — M54.16 LUMBAR RADICULOPATHY: ICD-10-CM

## 2025-08-05 DIAGNOSIS — M47.816 OSTEOARTHRITIS OF LUMBAR SPINE WITHOUT MYELOPATHY OR RADICULOPATHY: ICD-10-CM

## 2025-08-05 PROCEDURE — 99999 PR PBB SHADOW E&M-EST. PATIENT-LVL IV: CPT | Mod: PBBFAC,HCNC,, | Performed by: NURSE PRACTITIONER

## 2025-08-18 ENCOUNTER — TELEPHONE (OUTPATIENT)
Dept: ORTHOPEDICS | Facility: CLINIC | Age: 70
End: 2025-08-18
Payer: MEDICARE

## 2025-08-19 ENCOUNTER — TELEPHONE (OUTPATIENT)
Dept: INTERNAL MEDICINE | Facility: CLINIC | Age: 70
End: 2025-08-19
Payer: MEDICARE

## 2025-08-19 ENCOUNTER — OFFICE VISIT (OUTPATIENT)
Dept: OPHTHALMOLOGY | Facility: CLINIC | Age: 70
End: 2025-08-19
Payer: MEDICARE

## 2025-08-19 DIAGNOSIS — E11.9 TYPE 2 DIABETES MELLITUS WITHOUT OPHTHALMIC MANIFESTATIONS: ICD-10-CM

## 2025-08-19 DIAGNOSIS — H40.1113 PRIMARY OPEN-ANGLE GLAUCOMA, RIGHT EYE, SEVERE STAGE: Primary | ICD-10-CM

## 2025-08-19 DIAGNOSIS — H25.13 NUCLEAR SCLEROTIC CATARACT OF BOTH EYES: ICD-10-CM

## 2025-08-19 DIAGNOSIS — H52.03 HYPEROPIA WITH PRESBYOPIA OF BOTH EYES: ICD-10-CM

## 2025-08-19 DIAGNOSIS — H40.1122 PRIMARY OPEN-ANGLE GLAUCOMA, LEFT EYE, MODERATE STAGE: ICD-10-CM

## 2025-08-19 DIAGNOSIS — H52.4 HYPEROPIA WITH PRESBYOPIA OF BOTH EYES: ICD-10-CM

## 2025-08-19 PROCEDURE — 99999 PR PBB SHADOW E&M-EST. PATIENT-LVL III: CPT | Mod: PBBFAC,HCNC,, | Performed by: OPHTHALMOLOGY

## 2025-08-19 PROCEDURE — 99214 OFFICE O/P EST MOD 30 MIN: CPT | Mod: HCNC,S$GLB,, | Performed by: OPHTHALMOLOGY

## 2025-08-19 PROCEDURE — 3044F HG A1C LEVEL LT 7.0%: CPT | Mod: CPTII,HCNC,S$GLB, | Performed by: OPHTHALMOLOGY

## 2025-08-19 PROCEDURE — 3288F FALL RISK ASSESSMENT DOCD: CPT | Mod: CPTII,HCNC,S$GLB, | Performed by: OPHTHALMOLOGY

## 2025-08-19 PROCEDURE — 1160F RVW MEDS BY RX/DR IN RCRD: CPT | Mod: CPTII,HCNC,S$GLB, | Performed by: OPHTHALMOLOGY

## 2025-08-19 PROCEDURE — 1101F PT FALLS ASSESS-DOCD LE1/YR: CPT | Mod: CPTII,HCNC,S$GLB, | Performed by: OPHTHALMOLOGY

## 2025-08-19 PROCEDURE — 4010F ACE/ARB THERAPY RXD/TAKEN: CPT | Mod: CPTII,HCNC,S$GLB, | Performed by: OPHTHALMOLOGY

## 2025-08-19 PROCEDURE — 3061F NEG MICROALBUMINURIA REV: CPT | Mod: CPTII,HCNC,S$GLB, | Performed by: OPHTHALMOLOGY

## 2025-08-19 PROCEDURE — 1159F MED LIST DOCD IN RCRD: CPT | Mod: CPTII,HCNC,S$GLB, | Performed by: OPHTHALMOLOGY

## 2025-08-19 PROCEDURE — 3066F NEPHROPATHY DOC TX: CPT | Mod: CPTII,HCNC,S$GLB, | Performed by: OPHTHALMOLOGY

## 2025-08-19 PROCEDURE — 1126F AMNT PAIN NOTED NONE PRSNT: CPT | Mod: CPTII,HCNC,S$GLB, | Performed by: OPHTHALMOLOGY

## 2025-08-28 ENCOUNTER — OFFICE VISIT (OUTPATIENT)
Dept: ORTHOPEDICS | Facility: CLINIC | Age: 70
End: 2025-08-28
Payer: MEDICARE

## 2025-08-28 DIAGNOSIS — M54.12 CERVICAL RADICULOPATHY: Primary | ICD-10-CM

## 2025-08-28 PROCEDURE — 1125F AMNT PAIN NOTED PAIN PRSNT: CPT | Mod: CPTII,HCNC,S$GLB, | Performed by: ORTHOPAEDIC SURGERY

## 2025-08-28 PROCEDURE — 4010F ACE/ARB THERAPY RXD/TAKEN: CPT | Mod: CPTII,HCNC,S$GLB, | Performed by: ORTHOPAEDIC SURGERY

## 2025-08-28 PROCEDURE — 3066F NEPHROPATHY DOC TX: CPT | Mod: CPTII,HCNC,S$GLB, | Performed by: ORTHOPAEDIC SURGERY

## 2025-08-28 PROCEDURE — 1101F PT FALLS ASSESS-DOCD LE1/YR: CPT | Mod: CPTII,HCNC,S$GLB, | Performed by: ORTHOPAEDIC SURGERY

## 2025-08-28 PROCEDURE — 3061F NEG MICROALBUMINURIA REV: CPT | Mod: CPTII,HCNC,S$GLB, | Performed by: ORTHOPAEDIC SURGERY

## 2025-08-28 PROCEDURE — 99213 OFFICE O/P EST LOW 20 MIN: CPT | Mod: HCNC,S$GLB,, | Performed by: ORTHOPAEDIC SURGERY

## 2025-08-28 PROCEDURE — 1159F MED LIST DOCD IN RCRD: CPT | Mod: CPTII,HCNC,S$GLB, | Performed by: ORTHOPAEDIC SURGERY

## 2025-08-28 PROCEDURE — 3044F HG A1C LEVEL LT 7.0%: CPT | Mod: CPTII,HCNC,S$GLB, | Performed by: ORTHOPAEDIC SURGERY

## 2025-08-28 PROCEDURE — 99999 PR PBB SHADOW E&M-EST. PATIENT-LVL II: CPT | Mod: PBBFAC,HCNC,, | Performed by: ORTHOPAEDIC SURGERY

## 2025-08-28 PROCEDURE — 3288F FALL RISK ASSESSMENT DOCD: CPT | Mod: CPTII,HCNC,S$GLB, | Performed by: ORTHOPAEDIC SURGERY

## 2025-08-28 RX ORDER — CELECOXIB 200 MG/1
200 CAPSULE ORAL DAILY
Qty: 30 CAPSULE | Refills: 1 | Status: SHIPPED | OUTPATIENT
Start: 2025-08-28 | End: 2025-10-27

## 2025-09-02 ENCOUNTER — OFFICE VISIT (OUTPATIENT)
Dept: FAMILY MEDICINE | Facility: CLINIC | Age: 70
End: 2025-09-02
Payer: MEDICARE

## 2025-09-02 VITALS
WEIGHT: 214.75 LBS | TEMPERATURE: 99 F | HEIGHT: 70 IN | DIASTOLIC BLOOD PRESSURE: 90 MMHG | SYSTOLIC BLOOD PRESSURE: 150 MMHG | BODY MASS INDEX: 30.74 KG/M2 | OXYGEN SATURATION: 99 % | HEART RATE: 51 BPM

## 2025-09-02 DIAGNOSIS — R21 FACIAL RASH: Primary | ICD-10-CM

## 2025-09-02 DIAGNOSIS — E11.9 HYPERTENSION COMPLICATING DIABETES: ICD-10-CM

## 2025-09-02 DIAGNOSIS — I10 HYPERTENSION COMPLICATING DIABETES: ICD-10-CM

## 2025-09-02 PROCEDURE — 99214 OFFICE O/P EST MOD 30 MIN: CPT | Mod: HCNC,S$GLB,,

## 2025-09-02 PROCEDURE — 3008F BODY MASS INDEX DOCD: CPT | Mod: CPTII,HCNC,S$GLB,

## 2025-09-02 PROCEDURE — 3044F HG A1C LEVEL LT 7.0%: CPT | Mod: CPTII,HCNC,S$GLB,

## 2025-09-02 PROCEDURE — 1159F MED LIST DOCD IN RCRD: CPT | Mod: CPTII,HCNC,S$GLB,

## 2025-09-02 PROCEDURE — 3080F DIAST BP >= 90 MM HG: CPT | Mod: CPTII,HCNC,S$GLB,

## 2025-09-02 PROCEDURE — 1160F RVW MEDS BY RX/DR IN RCRD: CPT | Mod: CPTII,HCNC,S$GLB,

## 2025-09-02 PROCEDURE — 3061F NEG MICROALBUMINURIA REV: CPT | Mod: CPTII,HCNC,S$GLB,

## 2025-09-02 PROCEDURE — 1126F AMNT PAIN NOTED NONE PRSNT: CPT | Mod: CPTII,HCNC,S$GLB,

## 2025-09-02 PROCEDURE — 99999 PR PBB SHADOW E&M-EST. PATIENT-LVL V: CPT | Mod: PBBFAC,HCNC,,

## 2025-09-02 PROCEDURE — 3077F SYST BP >= 140 MM HG: CPT | Mod: CPTII,HCNC,S$GLB,

## 2025-09-02 PROCEDURE — 3288F FALL RISK ASSESSMENT DOCD: CPT | Mod: CPTII,HCNC,S$GLB,

## 2025-09-02 PROCEDURE — 3066F NEPHROPATHY DOC TX: CPT | Mod: CPTII,HCNC,S$GLB,

## 2025-09-02 PROCEDURE — 4010F ACE/ARB THERAPY RXD/TAKEN: CPT | Mod: CPTII,HCNC,S$GLB,

## 2025-09-02 PROCEDURE — 1101F PT FALLS ASSESS-DOCD LE1/YR: CPT | Mod: CPTII,HCNC,S$GLB,

## 2025-09-02 RX ORDER — FLUOCINONIDE 0.05 MG/G
OINTMENT TOPICAL 2 TIMES DAILY
Qty: 30 G | Refills: 1 | Status: SHIPPED | OUTPATIENT
Start: 2025-09-02

## 2025-09-03 ENCOUNTER — TELEPHONE (OUTPATIENT)
Dept: DERMATOLOGY | Facility: CLINIC | Age: 70
End: 2025-09-03
Payer: MEDICARE

## 2025-09-05 ENCOUNTER — TELEPHONE (OUTPATIENT)
Dept: FAMILY MEDICINE | Facility: CLINIC | Age: 70
End: 2025-09-05
Payer: MEDICARE

## 2025-09-05 VITALS — SYSTOLIC BLOOD PRESSURE: 129 MMHG | DIASTOLIC BLOOD PRESSURE: 79 MMHG

## 2025-09-05 DIAGNOSIS — Z23 NEED FOR COVID-19 VACCINE: Primary | ICD-10-CM

## (undated) DEVICE — BLADE SURG #15 CARBON STEEL

## (undated) DEVICE — SUT TI-CRON BLU 2 30 HOS-10

## (undated) DEVICE — ALCOHOL 70% ISOP W/GREEN 16OZ

## (undated) DEVICE — CLOSURE SKIN STERI STRIP 1/2X4

## (undated) DEVICE — SEE MEDLINE ITEM 146292

## (undated) DEVICE — SEE MEDLINE ITEM 157216

## (undated) DEVICE — SEE MEDLINE ITEM 157117

## (undated) DEVICE — SUT ETHILON 3/0 18IN PS-1

## (undated) DEVICE — ELECTRODE REM PLYHSV RETURN 9

## (undated) DEVICE — SEE MEDLINE ITEM 152622

## (undated) DEVICE — GAUZE SPONGE 4X4 12PLY

## (undated) DEVICE — SUPPORT SLING SHOT II MEDIUM

## (undated) DEVICE — DRAPE STERI INSTRUMENT 1018

## (undated) DEVICE — SUT BLU BR 2 TAPERD NDL 1/2

## (undated) DEVICE — BLADE SHAVER 4.5 6/BX

## (undated) DEVICE — NDL SPINAL 18GX3.5 SPINOCAN

## (undated) DEVICE — GAUZE SPONGE 4X4 12 PLY NS

## (undated) DEVICE — PAD ABDOMINAL 5X9 STERILE

## (undated) DEVICE — SUPPORT ULNA NERVE PROTECTOR

## (undated) DEVICE — DRAPE PLASTIC U 60X72

## (undated) DEVICE — DRESSING XEROFORM 1X8IN

## (undated) DEVICE — APPLICATOR CHLORAPREP ORN 26ML

## (undated) DEVICE — MAT SUCTION PUDDLEVAC ORANGE

## (undated) DEVICE — COVER OVERHEAD SURG LT BLUE

## (undated) DEVICE — SEE MEDLINE ITEM 156955

## (undated) DEVICE — ADHESIVE MASTISOL VIAL 48/BX

## (undated) DEVICE — SEE MEDLINE ITEM 157116

## (undated) DEVICE — SEE MEDLINE ITEM 157131

## (undated) DEVICE — TAPE MEDIPORE 4IN X 2YDS

## (undated) DEVICE — SPONGE LAP 18X18 PREWASHED

## (undated) DEVICE — SHEET THYROID W/ISO-BAC

## (undated) DEVICE — CONN SMARTSTITCH PERFECTPASSER

## (undated) DEVICE — TRAY INST ROTATOR CUFF RENTAL

## (undated) DEVICE — GLOVE PROTEXIS PI CLASSIC 6.0

## (undated) DEVICE — SEE MEDLINE ITEM 157150

## (undated) DEVICE — SUT ETHIBOND EXCEL 1 CTX 18

## (undated) DEVICE — INSTRAMOD SHOULDER TRACTION

## (undated) DEVICE — CARTRIDGE SUTURE SMARTSTITCH

## (undated) DEVICE — DRAPE SHOULDER 160X102IN 10/CA

## (undated) DEVICE — SEE MEDLINE ITEM 146313

## (undated) DEVICE — SEE MEDLINE ITEM 157125

## (undated) DEVICE — SUT VICRYL CTD 2-0 GI 27 SH

## (undated) DEVICE — SUT JJ41G O VICRYL

## (undated) DEVICE — PACK BASIC

## (undated) DEVICE — SHEET DRAPE MEDIUM

## (undated) DEVICE — DRESSING XEROFORM FOIL PK 1X8

## (undated) DEVICE — GLOVE PROTEXIS PI CLASSIC 7.5

## (undated) DEVICE — GLOVE SURG BIOGEL LATEX SZ 7.5

## (undated) DEVICE — SUT 38 FIBERWIRE #2

## (undated) DEVICE — MANIFOLD 4 PORT

## (undated) DEVICE — SEE MEDLINE ITEM 146420

## (undated) DEVICE — BLADE SAGITTAL SAW

## (undated) DEVICE — TUBE SET INFLOW/OUTFLOW

## (undated) DEVICE — SUT ETHIBOND 0 CR/CT-2 8-18

## (undated) DEVICE — BLADE SCALP OPHTL BEVEL STR

## (undated) DEVICE — TAPE ADH MEDIPORE 4 X 10YDS

## (undated) DEVICE — PAD PREP 50/CA

## (undated) DEVICE — BLADE SURG CARBON STEEL #10

## (undated) DEVICE — SUT ETHICON 3-0 BLK MONO PS

## (undated) DEVICE — DRAPE INCISE IOBAN 2 23X23IN

## (undated) DEVICE — SUT X424H ETHIBOND 0-0

## (undated) DEVICE — SEE MEDLINE ITEM 152529

## (undated) DEVICE — SEE L#120831

## (undated) DEVICE — DRESSING AQUACEL SACRAL LARGE

## (undated) DEVICE — SPONGE GAUZE 4X4 12 PLY STRL

## (undated) DEVICE — WAND COBLATION TURBOVAC 90

## (undated) DEVICE — SUT SMARTSTITCH PERFECTPASS

## (undated) DEVICE — GLOVE 6.5 PROTEXIS PI BLUE

## (undated) DEVICE — PUNCH BN BIO-CRKSCRW 4.5MM

## (undated) DEVICE — SOL IRR NACL .9% 3000ML